# Patient Record
Sex: MALE | Race: ASIAN | NOT HISPANIC OR LATINO | URBAN - METROPOLITAN AREA
[De-identification: names, ages, dates, MRNs, and addresses within clinical notes are randomized per-mention and may not be internally consistent; named-entity substitution may affect disease eponyms.]

---

## 2019-09-01 ENCOUNTER — OUTPATIENT (OUTPATIENT)
Dept: OUTPATIENT SERVICES | Facility: HOSPITAL | Age: 74
LOS: 1 days | End: 2019-09-01
Payer: MEDICAID

## 2019-09-01 PROCEDURE — G9001: CPT

## 2019-09-06 ENCOUNTER — INPATIENT (INPATIENT)
Facility: HOSPITAL | Age: 74
LOS: 2 days | Discharge: HOME HEALTH | End: 2019-09-09
Attending: INTERNAL MEDICINE | Admitting: INTERNAL MEDICINE
Payer: MEDICAID

## 2019-09-06 VITALS
SYSTOLIC BLOOD PRESSURE: 154 MMHG | DIASTOLIC BLOOD PRESSURE: 88 MMHG | OXYGEN SATURATION: 100 % | HEART RATE: 83 BPM | RESPIRATION RATE: 18 BRPM | TEMPERATURE: 100 F

## 2019-09-06 DIAGNOSIS — N45.3 EPIDIDYMO-ORCHITIS: ICD-10-CM

## 2019-09-06 DIAGNOSIS — T83.511D INFECTION AND INFLAMMATORY REACTION DUE TO INDWELLING URETHRAL CATHETER, SUBSEQUENT ENCOUNTER: ICD-10-CM

## 2019-09-06 LAB
ALBUMIN SERPL ELPH-MCNC: 4.2 G/DL — SIGNIFICANT CHANGE UP (ref 3.3–5)
ALP SERPL-CCNC: 87 U/L — SIGNIFICANT CHANGE UP (ref 40–120)
ALT FLD-CCNC: 8 U/L — SIGNIFICANT CHANGE UP (ref 4–41)
ANION GAP SERPL CALC-SCNC: 13 MMO/L — SIGNIFICANT CHANGE UP (ref 7–14)
ANION GAP SERPL CALC-SCNC: 15 MMO/L — HIGH (ref 7–14)
APPEARANCE UR: SIGNIFICANT CHANGE UP
AST SERPL-CCNC: 11 U/L — SIGNIFICANT CHANGE UP (ref 4–40)
BACTERIA # UR AUTO: NEGATIVE — SIGNIFICANT CHANGE UP
BASE EXCESS BLDV CALC-SCNC: 1 MMOL/L — SIGNIFICANT CHANGE UP
BASOPHILS # BLD AUTO: 0.03 K/UL — SIGNIFICANT CHANGE UP (ref 0–0.2)
BASOPHILS NFR BLD AUTO: 0.2 % — SIGNIFICANT CHANGE UP (ref 0–2)
BILIRUB SERPL-MCNC: 0.7 MG/DL — SIGNIFICANT CHANGE UP (ref 0.2–1.2)
BILIRUB UR-MCNC: NEGATIVE — SIGNIFICANT CHANGE UP
BLOOD GAS VENOUS - CREATININE: 0.72 MG/DL — SIGNIFICANT CHANGE UP (ref 0.5–1.3)
BLOOD GAS VENOUS - FIO2: 21 — SIGNIFICANT CHANGE UP
BLOOD UR QL VISUAL: SIGNIFICANT CHANGE UP
BUN SERPL-MCNC: 7 MG/DL — SIGNIFICANT CHANGE UP (ref 7–23)
BUN SERPL-MCNC: 7 MG/DL — SIGNIFICANT CHANGE UP (ref 7–23)
CALCIUM SERPL-MCNC: 8.6 MG/DL — SIGNIFICANT CHANGE UP (ref 8.4–10.5)
CALCIUM SERPL-MCNC: 9.2 MG/DL — SIGNIFICANT CHANGE UP (ref 8.4–10.5)
CHLORIDE BLDV-SCNC: 88 MMOL/L — LOW (ref 96–108)
CHLORIDE SERPL-SCNC: 85 MMOL/L — LOW (ref 98–107)
CHLORIDE SERPL-SCNC: 90 MMOL/L — LOW (ref 98–107)
CK SERPL-CCNC: 104 U/L — SIGNIFICANT CHANGE UP (ref 30–200)
CO2 SERPL-SCNC: 20 MMOL/L — LOW (ref 22–31)
CO2 SERPL-SCNC: 21 MMOL/L — LOW (ref 22–31)
COLOR SPEC: SIGNIFICANT CHANGE UP
CREAT SERPL-MCNC: 0.62 MG/DL — SIGNIFICANT CHANGE UP (ref 0.5–1.3)
CREAT SERPL-MCNC: 0.7 MG/DL — SIGNIFICANT CHANGE UP (ref 0.5–1.3)
EOSINOPHIL # BLD AUTO: 0.07 K/UL — SIGNIFICANT CHANGE UP (ref 0–0.5)
EOSINOPHIL NFR BLD AUTO: 0.5 % — SIGNIFICANT CHANGE UP (ref 0–6)
GAS PNL BLDV: 120 MMOL/L — CRITICAL LOW (ref 136–146)
GLUCOSE BLDV-MCNC: 126 MG/DL — HIGH (ref 70–99)
GLUCOSE SERPL-MCNC: 124 MG/DL — HIGH (ref 70–99)
GLUCOSE SERPL-MCNC: 133 MG/DL — HIGH (ref 70–99)
GLUCOSE UR-MCNC: NEGATIVE — SIGNIFICANT CHANGE UP
HCO3 BLDV-SCNC: 24 MMOL/L — SIGNIFICANT CHANGE UP (ref 20–27)
HCT VFR BLD CALC: 36.1 % — LOW (ref 39–50)
HCT VFR BLDV CALC: 40.4 % — SIGNIFICANT CHANGE UP (ref 39–51)
HGB BLD-MCNC: 12.7 G/DL — LOW (ref 13–17)
HGB BLDV-MCNC: 13.1 G/DL — SIGNIFICANT CHANGE UP (ref 13–17)
HYALINE CASTS # UR AUTO: NEGATIVE — SIGNIFICANT CHANGE UP
IMM GRANULOCYTES NFR BLD AUTO: 0.6 % — SIGNIFICANT CHANGE UP (ref 0–1.5)
KETONES UR-MCNC: NEGATIVE — SIGNIFICANT CHANGE UP
LACTATE BLDV-MCNC: 1.8 MMOL/L — SIGNIFICANT CHANGE UP (ref 0.5–2)
LEUKOCYTE ESTERASE UR-ACNC: SIGNIFICANT CHANGE UP
LYMPHOCYTES # BLD AUTO: 19.5 % — SIGNIFICANT CHANGE UP (ref 13–44)
LYMPHOCYTES # BLD AUTO: 2.93 K/UL — SIGNIFICANT CHANGE UP (ref 1–3.3)
MCHC RBC-ENTMCNC: 25 PG — LOW (ref 27–34)
MCHC RBC-ENTMCNC: 35.2 % — SIGNIFICANT CHANGE UP (ref 32–36)
MCV RBC AUTO: 71.2 FL — LOW (ref 80–100)
MONOCYTES # BLD AUTO: 0.73 K/UL — SIGNIFICANT CHANGE UP (ref 0–0.9)
MONOCYTES NFR BLD AUTO: 4.9 % — SIGNIFICANT CHANGE UP (ref 2–14)
NEUTROPHILS # BLD AUTO: 11.15 K/UL — HIGH (ref 1.8–7.4)
NEUTROPHILS NFR BLD AUTO: 74.3 % — SIGNIFICANT CHANGE UP (ref 43–77)
NITRITE UR-MCNC: POSITIVE — HIGH
NRBC # FLD: 0 K/UL — SIGNIFICANT CHANGE UP (ref 0–0)
OSMOLALITY SERPL: 252 MOSMO/KG — LOW (ref 275–295)
OSMOLALITY UR: 182 MOSMO/KG — SIGNIFICANT CHANGE UP (ref 50–1200)
PCO2 BLDV: 44 MMHG — SIGNIFICANT CHANGE UP (ref 41–51)
PH BLDV: 7.38 PH — SIGNIFICANT CHANGE UP (ref 7.32–7.43)
PH UR: 6 — SIGNIFICANT CHANGE UP (ref 5–8)
PLATELET # BLD AUTO: 454 K/UL — HIGH (ref 150–400)
PMV BLD: 8.6 FL — SIGNIFICANT CHANGE UP (ref 7–13)
PO2 BLDV: 27 MMHG — LOW (ref 35–40)
POTASSIUM BLDV-SCNC: 3.4 MMOL/L — SIGNIFICANT CHANGE UP (ref 3.4–4.5)
POTASSIUM SERPL-MCNC: 3.3 MMOL/L — LOW (ref 3.5–5.3)
POTASSIUM SERPL-MCNC: 3.5 MMOL/L — SIGNIFICANT CHANGE UP (ref 3.5–5.3)
POTASSIUM SERPL-SCNC: 3.3 MMOL/L — LOW (ref 3.5–5.3)
POTASSIUM SERPL-SCNC: 3.5 MMOL/L — SIGNIFICANT CHANGE UP (ref 3.5–5.3)
PROT SERPL-MCNC: 8.7 G/DL — HIGH (ref 6–8.3)
PROT UR-MCNC: 10 — SIGNIFICANT CHANGE UP
RBC # BLD: 5.07 M/UL — SIGNIFICANT CHANGE UP (ref 4.2–5.8)
RBC # FLD: 13.4 % — SIGNIFICANT CHANGE UP (ref 10.3–14.5)
RBC CASTS # UR COMP ASSIST: HIGH (ref 0–?)
SAO2 % BLDV: 44.9 % — LOW (ref 60–85)
SODIUM SERPL-SCNC: 121 MMOL/L — LOW (ref 135–145)
SODIUM SERPL-SCNC: 123 MMOL/L — LOW (ref 135–145)
SODIUM UR-SCNC: < 20 MMOL/L — SIGNIFICANT CHANGE UP
SP GR SPEC: 1.01 — SIGNIFICANT CHANGE UP (ref 1–1.04)
SQUAMOUS # UR AUTO: SIGNIFICANT CHANGE UP
UROBILINOGEN FLD QL: NORMAL — SIGNIFICANT CHANGE UP
WBC # BLD: 15 K/UL — HIGH (ref 3.8–10.5)
WBC # FLD AUTO: 15 K/UL — HIGH (ref 3.8–10.5)
WBC UR QL: >50 — HIGH (ref 0–?)

## 2019-09-06 PROCEDURE — 99223 1ST HOSP IP/OBS HIGH 75: CPT

## 2019-09-06 PROCEDURE — 71045 X-RAY EXAM CHEST 1 VIEW: CPT | Mod: 26

## 2019-09-06 PROCEDURE — 76870 US EXAM SCROTUM: CPT | Mod: 26

## 2019-09-06 RX ORDER — CEFTRIAXONE 500 MG/1
1000 INJECTION, POWDER, FOR SOLUTION INTRAMUSCULAR; INTRAVENOUS ONCE
Refills: 0 | Status: COMPLETED | OUTPATIENT
Start: 2019-09-06 | End: 2019-09-06

## 2019-09-06 RX ORDER — SODIUM CHLORIDE 9 MG/ML
1000 INJECTION INTRAMUSCULAR; INTRAVENOUS; SUBCUTANEOUS ONCE
Refills: 0 | Status: COMPLETED | OUTPATIENT
Start: 2019-09-06 | End: 2019-09-06

## 2019-09-06 RX ORDER — ACETAMINOPHEN 500 MG
975 TABLET ORAL ONCE
Refills: 0 | Status: COMPLETED | OUTPATIENT
Start: 2019-09-06 | End: 2019-09-06

## 2019-09-06 RX ADMIN — Medication 975 MILLIGRAM(S): at 16:45

## 2019-09-06 RX ADMIN — SODIUM CHLORIDE 1000 MILLILITER(S): 9 INJECTION INTRAMUSCULAR; INTRAVENOUS; SUBCUTANEOUS at 18:50

## 2019-09-06 RX ADMIN — CEFTRIAXONE 100 MILLIGRAM(S): 500 INJECTION, POWDER, FOR SOLUTION INTRAMUSCULAR; INTRAVENOUS at 17:00

## 2019-09-06 NOTE — ED ADULT NURSE NOTE - OBJECTIVE STATEMENT
Pt received in #11, aaox3 with c/o fevers and left testicular pain x 3 days. Pt states that he has been taking Tylenol with moderate relief of symptoms. Pt noted to have a chronic indwelling pichardo catheter, which he states was changed within the past 2 weeks. Denies any decreased output, change to quality of urine or any hematuria. Pt received in #11, aaox3 with c/o fevers and right testicular pain x 3 days. Pt states that he has been taking Tylenol with moderate relief of symptoms. Pt noted to have a chronic indwelling pichardo catheter, which he states was changed within the past 2 weeks. Denies any decreased output, change to quality of urine or any hematuria.

## 2019-09-06 NOTE — CONSULT NOTE ADULT - ATTENDING COMMENTS
outpatient f/u for counseling regarding possible SPT placement given this episode of epididymitis and chronic need for catheter

## 2019-09-06 NOTE — ED PROVIDER NOTE - PROGRESS NOTE DETAILS
DH: Patient received in sign out. Pending urology cs and dispo urology vs medicine. Patient seen and evaluated. NAD, son at bedside. Will continue to monitor. DH: Discussed patient with hospitalist for admission, who accepted the patient. Repeat Na from 121 to 123. Will obtain EKG. Patient's daughter at bedside. Discussed plan for admission and family acknowledged. Patient in NAD, VSS, alert to baseline.

## 2019-09-06 NOTE — CONSULT NOTE ADULT - ASSESSMENT
74 year old male with a medical history of Parkinson's, Dementia, and BPH with a chronic indwelling pichardo for retention, [place] of [place], presenting to the ED with complaints of weakness, lethargy and fevers for the past 3 days, similar presentation 7/25/19, s/p 7 day course of Cefuroxime, new right sided testicular pain, with findings of  epididymo-orchitis, and recurrent UTI.

## 2019-09-06 NOTE — CONSULT NOTE ADULT - PROBLEM SELECTOR RECOMMENDATION 9
-No acute urologic intervention at this time.  -Continue antibiotics  -Patient may follow-up with his outpatient urologist, or at the [facility] with Dr. Jain, please call [phone] for an appointment.

## 2019-09-06 NOTE — CONSULT NOTE ADULT - SUBJECTIVE AND OBJECTIVE BOX
HPI:  This is a 74 year old male with a medical history of Parkinson's, Dementia, and BPH with a chronic indwelling pichardo for retention, [place] , presenting to the ED with complaints of weakness, lethargy and fevers for the past 3 days.  Patient presented to the ED  with similar presentation, was admitted and discharged the following day.  Pichardo was exchanged at that time, he completed a 7 day course of Cefuroxime, urine culture at that time was polymicrobial and considered a contaminant.  Per family he is active at baseline and able to perform all ADLs.  Over past 3 days he has become more weak and lethargic and not able to perform ADLs.  He has been complaining of right sided testicular pain as well for 3 days.  He has had low grade fevers to 100 at home.  Per grandson, the pichardo was changed by an outpatient urologist 2 weeks ago (does not know Urologist's name).  Pichardo was initially placed 1 year ago in [facility] for retention related to BPH, and has been changed monthly.  Denies hematuria, denies retention.      PAST MEDICAL & SURGICAL HISTORY:  Parkinson's  Dementia  BPH, chronic indwelling pichardo    MEDICATIONS:  Flomax 0.4mg po QHS  Baclofen 10mg 1/2 tab po BID  Biotin forte QD  Carbidopa-Levodopa 25-100mg 1 tab po 2pm, 1.5 tab po 8pm  Trihexyphenidyl 2mg po BID  Clonazepam 0.5mg po QHS  Donepezil 5mg 1/2 tab po QHS  Memantine 5mg 1/2tab po QHS    Allergies  No Known Allergies    SOCIAL HISTORY:  Originally from [facility], living in  as of   Lives with grandson  Denies smoking, alcohol or drug use    REVIEW OF SYSTEMS: Otherwise negative as stated in HPI    Vital Signs Last 24 Hrs  T(C): 37.2 (06 Sep 2019 18:55), Max: 37.9 (06 Sep 2019 15:32)  T(F): 99 (06 Sep 2019 18:55), Max: 100.3 (06 Sep 2019 15:32)  HR: 70 (06 Sep 2019 18:55) (70 - 88)  BP: 134/72 (06 Sep 2019 18:55) (134/72 - 154/88)  BP(mean): --  RR: 16 (06 Sep 2019 18:55) (16 - 18)  SpO2: 100% (06 Sep 2019 18:55) (100% - 100%)    PHYSICAL EXAM:    General: ill appearing, somnolent    Respiratory: clear  	  Cardiovascular: S1,S2 RRR    Gastrointestinal: soft, nontender, no CVAT     Genitourinary: Glans not circumcised, No lesions present. pichardo in place, draining well.   Testes  descended, Right testicle with mild tenderness, edematous and firm.                           Extremities:  no edema.   	  LABS:                        12.7   15.00 )-----------( 454      ( 06 Sep 2019 16:35 )             36.1         121<L>  |  85<L>  |  7   ----------------------------<  124<H>  3.5   |  21<L>  |  0.70    Ca    9.2      06 Sep 2019 16:35    TPro  8.7<H>  /  Alb  4.2  /  TBili  0.7  /  DBili  x   /  AST  11  /  ALT  8   /  AlkPhos  87      Urinalysis Basic - ( 06 Sep 2019 16:35 )    Color: LIGHT YELLOW / Appearance: Lt TURBID / S.007 / pH: 6.0  Gluc: NEGATIVE / Ketone: NEGATIVE  / Bili: NEGATIVE / Urobili: NORMAL   Blood: SMALL / Protein: 10 / Nitrite: POSITIVE   Leuk Esterase: LARGE / RBC: 6-10 / WBC >50   Sq Epi: OCC / Non Sq Epi: x / Bacteria: NEGATIVE    RADIOLOGY:  < from: US Testicles (19 @ 17:32) >  IMPRESSION:     Diffuse hyperemia involving the right testes, epididymis and partially   imaged spermatic cord with a large complex hydrocele. Findings are   consistent with right sided epididymoorchitis.    < end of copied text >

## 2019-09-06 NOTE — ED ADULT NURSE REASSESSMENT NOTE - CONDITION
rec'd pt in 10 b with family at bedside. pt appears comnfortable. only speaks salvador... aw per son, pt denies pain or any distress.  report given to inna. regulored to floor

## 2019-09-06 NOTE — ED PROVIDER NOTE - CARE PLAN
Principal Discharge DX:	Epididymo-orchitis  Secondary Diagnosis:	Urinary tract infection associated with indwelling urethral catheter, subsequent encounter

## 2019-09-06 NOTE — ED PROVIDER NOTE - PHYSICAL EXAMINATION
GENERAL: Patient awake alert NAD.  LUNGS: CTAB, no wheezes or crackles.   CARDIAC: RRR, no m/r/g.    ABDOMEN: Soft, NT, ND, No rebound, guarding. No CVA tenderness.   :  Testicular exam reveals right testicular tenderness, no discharge. Armando Barnard RN  EXT: No edema. No calf tenderness. CV 2+DP/PT bilaterally.   MSK: No pain with movement, no deformities.  NEURO: A&Ox3. Moving all extremities.  SKIN: Warm and dry. No rash.  PSYCH: Normal affect.

## 2019-09-06 NOTE — ED PROVIDER NOTE - ATTENDING CONTRIBUTION TO CARE
Patient is a 75 yo M with history of Parkinson's disease, BPH with a chronic indwelling pichardo here for 3 days of weakness and fever. History taken from patient's sons who are at his bedside who translated at bedside. Patient has not been able to get up out of bed secondary to weakness. He also developed pain to his right testicle. No nausea, vomiting. Pichardo was last replaced 2 weeks ago. Denies cough, shortness of breath, chest pain, abdominal pain.    VS noted  Gen. no acute distress, Non toxic   HEENT: EOMI, mmm  Lungs: CTAB/L no C/ W /R   CVS: RRR   Abd; Soft non tender, non distended   : normal external genitalia, ttp to right testicle, no erythema or crepitus, pichardo in place  Ext: no edema  Skin: no rash  Neuro AAOx3 non focal clear speech  a/p: fever, testicular pain - concern for epididymitis, UTI. Plan for antibiotics, labs, u/a, testicular US and CXR. Given 3 days of fever and weakness, unable to get out of bed - plan for admission.   - Esha COTE

## 2019-09-06 NOTE — ED PROVIDER NOTE - NS ED ROS FT
General: +fever, chills; denies weight loss/weight gain.  HENT: denies nasal congestion, sore throat, rhinorrhea, ear pain  Eyes: denies visual changes, blurred vision, eye discharge, eye redness  Neck: denies neck pain, neck swelling  CV: denies chest pain, palpitations  Resp: denies difficulty breathing, cough  Abdominal: denies nausea, vomiting, diarrhea, abdominal pain, blood in stool, dark stool  MSK: denies muscle aches, bony pain, leg pain, leg swelling  Neuro: denies headaches, numbness, tingling, dizziness, lightheadedness.  Skin: denies rashes, cuts, bruises  Hematologic: denies unexplained bruises

## 2019-09-06 NOTE — ED PROVIDER NOTE - OBJECTIVE STATEMENT
Pt. is a 74 y.o. M w/ PMHx of Parkinson's, BPH, chronic indwelling pichardo for the last 6 months p/w subjective fevers and weakness.  Pt. doesn't speak english and translation was done by son and grandson, who are at bedside.  Pt. has been feeling feverish for the last 3 days and has been weak more than usual.  Per son, pt. normally walks around and does his own ADLs, including emptying out the pichardo bag.  But over the last 3 days, he hasn't been able to empty his bag and sometimes even finds it difficult to get out of bed.  Highest recorded temperature at home was 100.  Pt. has been taking 375 of tylenol for the last 3 days w/o much relief.  The pichardo gets replaced every month by his urologist and last change was 2 weeks ago.  Pt. denies any new headache, CP, SOB, n/v/d, abdominal tenderness, dysuria, hematuria, hematochezia, or melena.

## 2019-09-06 NOTE — ED PROVIDER NOTE - CLINICAL SUMMARY MEDICAL DECISION MAKING FREE TEXT BOX
Pt. is a 74 y.o. M w/ PMHx of Parkinson's, BPH, chronic indwelling pichardo p/w fevers and weakness for 3 days.  Considering that PE revealed right testicular tenderness, highest suspicion for epididymitis as source of infection.  However, will obtain cbc, cmp, blood cultures, u/a, and cxr to r/o other sources.  Will likely admit considering the lethargy even if no source is identified. Pt. is a 74 y.o. M w/ PMHx of Parkinson's, BPH, chronic indwelling pichardo p/w fevers and weakness for 3 days.  Considering that PE revealed right testicular tenderness, highest suspicion for epididymitis as source of infection.  However, will obtain cbc, cmp, blood cultures, u/a, and cxr to r/o other sources.  Will likely admit considering the fever and lethargy.

## 2019-09-07 ENCOUNTER — TRANSCRIPTION ENCOUNTER (OUTPATIENT)
Age: 74
End: 2019-09-07

## 2019-09-07 DIAGNOSIS — N40.0 BENIGN PROSTATIC HYPERPLASIA WITHOUT LOWER URINARY TRACT SYMPTOMS: ICD-10-CM

## 2019-09-07 DIAGNOSIS — Z29.9 ENCOUNTER FOR PROPHYLACTIC MEASURES, UNSPECIFIED: ICD-10-CM

## 2019-09-07 DIAGNOSIS — D50.9 IRON DEFICIENCY ANEMIA, UNSPECIFIED: ICD-10-CM

## 2019-09-07 DIAGNOSIS — E87.1 HYPO-OSMOLALITY AND HYPONATREMIA: ICD-10-CM

## 2019-09-07 DIAGNOSIS — F03.90 UNSPECIFIED DEMENTIA WITHOUT BEHAVIORAL DISTURBANCE: ICD-10-CM

## 2019-09-07 DIAGNOSIS — G20 PARKINSON'S DISEASE: ICD-10-CM

## 2019-09-07 LAB
ANION GAP SERPL CALC-SCNC: 10 MMO/L — SIGNIFICANT CHANGE UP (ref 7–14)
ANION GAP SERPL CALC-SCNC: 11 MMO/L — SIGNIFICANT CHANGE UP (ref 7–14)
ANION GAP SERPL CALC-SCNC: 13 MMO/L — SIGNIFICANT CHANGE UP (ref 7–14)
BASOPHILS # BLD AUTO: 0.03 K/UL — SIGNIFICANT CHANGE UP (ref 0–0.2)
BASOPHILS NFR BLD AUTO: 0.2 % — SIGNIFICANT CHANGE UP (ref 0–2)
BUN SERPL-MCNC: 6 MG/DL — LOW (ref 7–23)
BUN SERPL-MCNC: 7 MG/DL — SIGNIFICANT CHANGE UP (ref 7–23)
BUN SERPL-MCNC: 7 MG/DL — SIGNIFICANT CHANGE UP (ref 7–23)
CALCIUM SERPL-MCNC: 8.3 MG/DL — LOW (ref 8.4–10.5)
CALCIUM SERPL-MCNC: 8.7 MG/DL — SIGNIFICANT CHANGE UP (ref 8.4–10.5)
CALCIUM SERPL-MCNC: 8.9 MG/DL — SIGNIFICANT CHANGE UP (ref 8.4–10.5)
CHLORIDE SERPL-SCNC: 90 MMOL/L — LOW (ref 98–107)
CHLORIDE SERPL-SCNC: 91 MMOL/L — LOW (ref 98–107)
CHLORIDE SERPL-SCNC: 93 MMOL/L — LOW (ref 98–107)
CO2 SERPL-SCNC: 20 MMOL/L — LOW (ref 22–31)
CO2 SERPL-SCNC: 21 MMOL/L — LOW (ref 22–31)
CO2 SERPL-SCNC: 21 MMOL/L — LOW (ref 22–31)
CREAT SERPL-MCNC: 0.58 MG/DL — SIGNIFICANT CHANGE UP (ref 0.5–1.3)
CREAT SERPL-MCNC: 0.63 MG/DL — SIGNIFICANT CHANGE UP (ref 0.5–1.3)
CREAT SERPL-MCNC: 0.63 MG/DL — SIGNIFICANT CHANGE UP (ref 0.5–1.3)
EOSINOPHIL # BLD AUTO: 0.04 K/UL — SIGNIFICANT CHANGE UP (ref 0–0.5)
EOSINOPHIL NFR BLD AUTO: 0.3 % — SIGNIFICANT CHANGE UP (ref 0–6)
GLUCOSE SERPL-MCNC: 195 MG/DL — HIGH (ref 70–99)
GLUCOSE SERPL-MCNC: 214 MG/DL — HIGH (ref 70–99)
GLUCOSE SERPL-MCNC: 237 MG/DL — HIGH (ref 70–99)
HCT VFR BLD CALC: 33.7 % — LOW (ref 39–50)
HGB BLD-MCNC: 11.6 G/DL — LOW (ref 13–17)
IMM GRANULOCYTES NFR BLD AUTO: 0.4 % — SIGNIFICANT CHANGE UP (ref 0–1.5)
LYMPHOCYTES # BLD AUTO: 1.74 K/UL — SIGNIFICANT CHANGE UP (ref 1–3.3)
LYMPHOCYTES # BLD AUTO: 12.9 % — LOW (ref 13–44)
MAGNESIUM SERPL-MCNC: 1.8 MG/DL — SIGNIFICANT CHANGE UP (ref 1.6–2.6)
MAGNESIUM SERPL-MCNC: 2 MG/DL — SIGNIFICANT CHANGE UP (ref 1.6–2.6)
MAGNESIUM SERPL-MCNC: 2.1 MG/DL — SIGNIFICANT CHANGE UP (ref 1.6–2.6)
MCHC RBC-ENTMCNC: 24.7 PG — LOW (ref 27–34)
MCHC RBC-ENTMCNC: 34.4 % — SIGNIFICANT CHANGE UP (ref 32–36)
MCV RBC AUTO: 71.9 FL — LOW (ref 80–100)
MONOCYTES # BLD AUTO: 0.7 K/UL — SIGNIFICANT CHANGE UP (ref 0–0.9)
MONOCYTES NFR BLD AUTO: 5.2 % — SIGNIFICANT CHANGE UP (ref 2–14)
NEUTROPHILS # BLD AUTO: 10.97 K/UL — HIGH (ref 1.8–7.4)
NEUTROPHILS NFR BLD AUTO: 81 % — HIGH (ref 43–77)
NRBC # FLD: 0 K/UL — SIGNIFICANT CHANGE UP (ref 0–0)
PHOSPHATE SERPL-MCNC: 2.3 MG/DL — LOW (ref 2.5–4.5)
PHOSPHATE SERPL-MCNC: 2.4 MG/DL — LOW (ref 2.5–4.5)
PHOSPHATE SERPL-MCNC: 2.6 MG/DL — SIGNIFICANT CHANGE UP (ref 2.5–4.5)
PLATELET # BLD AUTO: 406 K/UL — HIGH (ref 150–400)
PMV BLD: 8.7 FL — SIGNIFICANT CHANGE UP (ref 7–13)
POTASSIUM SERPL-MCNC: 3.6 MMOL/L — SIGNIFICANT CHANGE UP (ref 3.5–5.3)
POTASSIUM SERPL-MCNC: 4 MMOL/L — SIGNIFICANT CHANGE UP (ref 3.5–5.3)
POTASSIUM SERPL-MCNC: 4.5 MMOL/L — SIGNIFICANT CHANGE UP (ref 3.5–5.3)
POTASSIUM SERPL-SCNC: 3.6 MMOL/L — SIGNIFICANT CHANGE UP (ref 3.5–5.3)
POTASSIUM SERPL-SCNC: 4 MMOL/L — SIGNIFICANT CHANGE UP (ref 3.5–5.3)
POTASSIUM SERPL-SCNC: 4.5 MMOL/L — SIGNIFICANT CHANGE UP (ref 3.5–5.3)
RBC # BLD: 4.69 M/UL — SIGNIFICANT CHANGE UP (ref 4.2–5.8)
RBC # FLD: 13.4 % — SIGNIFICANT CHANGE UP (ref 10.3–14.5)
SODIUM SERPL-SCNC: 121 MMOL/L — LOW (ref 135–145)
SODIUM SERPL-SCNC: 124 MMOL/L — LOW (ref 135–145)
SODIUM SERPL-SCNC: 125 MMOL/L — LOW (ref 135–145)
SPECIMEN SOURCE: SIGNIFICANT CHANGE UP
WBC # BLD: 13.53 K/UL — HIGH (ref 3.8–10.5)
WBC # FLD AUTO: 13.53 K/UL — HIGH (ref 3.8–10.5)

## 2019-09-07 PROCEDURE — 99222 1ST HOSP IP/OBS MODERATE 55: CPT

## 2019-09-07 PROCEDURE — 12345: CPT | Mod: NC,GC

## 2019-09-07 RX ORDER — MEMANTINE HYDROCHLORIDE 10 MG/1
2.5 TABLET ORAL DAILY
Refills: 0 | Status: DISCONTINUED | OUTPATIENT
Start: 2019-09-07 | End: 2019-09-09

## 2019-09-07 RX ORDER — DONEPEZIL HYDROCHLORIDE 10 MG/1
5 TABLET, FILM COATED ORAL AT BEDTIME
Refills: 0 | Status: DISCONTINUED | OUTPATIENT
Start: 2019-09-07 | End: 2019-09-07

## 2019-09-07 RX ORDER — POTASSIUM CHLORIDE 20 MEQ
40 PACKET (EA) ORAL EVERY 4 HOURS
Refills: 0 | Status: DISCONTINUED | OUTPATIENT
Start: 2019-09-07 | End: 2019-09-07

## 2019-09-07 RX ORDER — CARBIDOPA AND LEVODOPA 25; 100 MG/1; MG/1
1.5 TABLET ORAL
Refills: 0 | Status: DISCONTINUED | OUTPATIENT
Start: 2019-09-07 | End: 2019-09-09

## 2019-09-07 RX ORDER — SODIUM,POTASSIUM PHOSPHATES 278-250MG
1 POWDER IN PACKET (EA) ORAL ONCE
Refills: 0 | Status: COMPLETED | OUTPATIENT
Start: 2019-09-07 | End: 2019-09-07

## 2019-09-07 RX ORDER — TAMSULOSIN HYDROCHLORIDE 0.4 MG/1
0.4 CAPSULE ORAL AT BEDTIME
Refills: 0 | Status: DISCONTINUED | OUTPATIENT
Start: 2019-09-07 | End: 2019-09-09

## 2019-09-07 RX ORDER — IBUPROFEN 200 MG
600 TABLET ORAL ONCE
Refills: 0 | Status: COMPLETED | OUTPATIENT
Start: 2019-09-07 | End: 2019-09-07

## 2019-09-07 RX ORDER — SODIUM CHLORIDE 9 MG/ML
1000 INJECTION INTRAMUSCULAR; INTRAVENOUS; SUBCUTANEOUS
Refills: 0 | Status: DISCONTINUED | OUTPATIENT
Start: 2019-09-07 | End: 2019-09-07

## 2019-09-07 RX ORDER — PIPERACILLIN AND TAZOBACTAM 4; .5 G/20ML; G/20ML
3.38 INJECTION, POWDER, LYOPHILIZED, FOR SOLUTION INTRAVENOUS EVERY 8 HOURS
Refills: 0 | Status: DISCONTINUED | OUTPATIENT
Start: 2019-09-07 | End: 2019-09-09

## 2019-09-07 RX ORDER — FERROUS SULFATE 325(65) MG
325 TABLET ORAL DAILY
Refills: 0 | Status: DISCONTINUED | OUTPATIENT
Start: 2019-09-07 | End: 2019-09-09

## 2019-09-07 RX ORDER — PIPERACILLIN AND TAZOBACTAM 4; .5 G/20ML; G/20ML
3.38 INJECTION, POWDER, LYOPHILIZED, FOR SOLUTION INTRAVENOUS ONCE
Refills: 0 | Status: COMPLETED | OUTPATIENT
Start: 2019-09-07 | End: 2019-09-07

## 2019-09-07 RX ORDER — ACETAMINOPHEN 500 MG
650 TABLET ORAL EVERY 4 HOURS
Refills: 0 | Status: DISCONTINUED | OUTPATIENT
Start: 2019-09-07 | End: 2019-09-09

## 2019-09-07 RX ORDER — INFLUENZA VIRUS VACCINE 15; 15; 15; 15 UG/.5ML; UG/.5ML; UG/.5ML; UG/.5ML
0.5 SUSPENSION INTRAMUSCULAR ONCE
Refills: 0 | Status: COMPLETED | OUTPATIENT
Start: 2019-09-07 | End: 2019-09-07

## 2019-09-07 RX ORDER — BACLOFEN 100 %
10 POWDER (GRAM) MISCELLANEOUS
Refills: 0 | Status: DISCONTINUED | OUTPATIENT
Start: 2019-09-07 | End: 2019-09-09

## 2019-09-07 RX ORDER — POTASSIUM CHLORIDE 20 MEQ
40 PACKET (EA) ORAL ONCE
Refills: 0 | Status: COMPLETED | OUTPATIENT
Start: 2019-09-07 | End: 2019-09-07

## 2019-09-07 RX ORDER — CLONAZEPAM 1 MG
1 TABLET ORAL
Qty: 0 | Refills: 0 | DISCHARGE

## 2019-09-07 RX ORDER — CARBIDOPA AND LEVODOPA 25; 100 MG/1; MG/1
1 TABLET ORAL
Refills: 0 | Status: DISCONTINUED | OUTPATIENT
Start: 2019-09-07 | End: 2019-09-09

## 2019-09-07 RX ORDER — BACLOFEN 100 %
0.5 POWDER (GRAM) MISCELLANEOUS
Qty: 0 | Refills: 0 | DISCHARGE

## 2019-09-07 RX ORDER — ENOXAPARIN SODIUM 100 MG/ML
40 INJECTION SUBCUTANEOUS DAILY
Refills: 0 | Status: DISCONTINUED | OUTPATIENT
Start: 2019-09-07 | End: 2019-09-09

## 2019-09-07 RX ORDER — SODIUM CHLORIDE 9 MG/ML
1000 INJECTION INTRAMUSCULAR; INTRAVENOUS; SUBCUTANEOUS
Refills: 0 | Status: DISCONTINUED | OUTPATIENT
Start: 2019-09-07 | End: 2019-09-08

## 2019-09-07 RX ORDER — ACETAMINOPHEN 500 MG
1000 TABLET ORAL ONCE
Refills: 0 | Status: COMPLETED | OUTPATIENT
Start: 2019-09-07 | End: 2019-09-07

## 2019-09-07 RX ORDER — TRIHEXYPHENIDYL HCL 2 MG
2 TABLET ORAL
Refills: 0 | Status: DISCONTINUED | OUTPATIENT
Start: 2019-09-07 | End: 2019-09-09

## 2019-09-07 RX ADMIN — Medication 10 MILLIGRAM(S): at 05:52

## 2019-09-07 RX ADMIN — Medication 1 TABLET(S): at 11:13

## 2019-09-07 RX ADMIN — SODIUM CHLORIDE 75 MILLILITER(S): 9 INJECTION INTRAMUSCULAR; INTRAVENOUS; SUBCUTANEOUS at 11:13

## 2019-09-07 RX ADMIN — CARBIDOPA AND LEVODOPA 1 TABLET(S): 25; 100 TABLET ORAL at 13:22

## 2019-09-07 RX ADMIN — ENOXAPARIN SODIUM 40 MILLIGRAM(S): 100 INJECTION SUBCUTANEOUS at 11:13

## 2019-09-07 RX ADMIN — Medication 600 MILLIGRAM(S): at 03:30

## 2019-09-07 RX ADMIN — Medication 40 MILLIEQUIVALENT(S): at 02:50

## 2019-09-07 RX ADMIN — Medication 325 MILLIGRAM(S): at 11:13

## 2019-09-07 RX ADMIN — Medication 2 MILLIGRAM(S): at 05:52

## 2019-09-07 RX ADMIN — Medication 1000 MILLIGRAM(S): at 01:12

## 2019-09-07 RX ADMIN — Medication 2 MILLIGRAM(S): at 17:16

## 2019-09-07 RX ADMIN — MEMANTINE HYDROCHLORIDE 2.5 MILLIGRAM(S): 10 TABLET ORAL at 17:15

## 2019-09-07 RX ADMIN — CARBIDOPA AND LEVODOPA 1.5 TABLET(S): 25; 100 TABLET ORAL at 19:19

## 2019-09-07 RX ADMIN — Medication 600 MILLIGRAM(S): at 02:30

## 2019-09-07 RX ADMIN — Medication 10 MILLIGRAM(S): at 17:15

## 2019-09-07 RX ADMIN — PIPERACILLIN AND TAZOBACTAM 25 GRAM(S): 4; .5 INJECTION, POWDER, LYOPHILIZED, FOR SOLUTION INTRAVENOUS at 10:06

## 2019-09-07 RX ADMIN — PIPERACILLIN AND TAZOBACTAM 200 GRAM(S): 4; .5 INJECTION, POWDER, LYOPHILIZED, FOR SOLUTION INTRAVENOUS at 02:50

## 2019-09-07 RX ADMIN — Medication 400 MILLIGRAM(S): at 00:48

## 2019-09-07 RX ADMIN — Medication 1 PACKET(S): at 10:06

## 2019-09-07 RX ADMIN — PIPERACILLIN AND TAZOBACTAM 25 GRAM(S): 4; .5 INJECTION, POWDER, LYOPHILIZED, FOR SOLUTION INTRAVENOUS at 17:21

## 2019-09-07 NOTE — PROGRESS NOTE ADULT - PROBLEM SELECTOR PLAN 7
Patient with history of dementia  - Continue home memantine, donepezil Patient with history of dementia  - Continue home memantine  - Holding home donepezil in setting of hyponatremia

## 2019-09-07 NOTE — DISCHARGE NOTE PROVIDER - CARE PROVIDER_API CALL
Venancio Henriquez  [address] [id number]  Phone: [phone]  Fax: (   )    -  Follow Up Time:

## 2019-09-07 NOTE — DISCHARGE NOTE PROVIDER - HOSPITAL COURSE
The patient is a 75yo M with PMH Parkinson disease, BPH with chronic indwelling pichardo catheter, dementia, who presented with 3 days of subjective fevers, lethargy and right groin pain. Patient was hospitalized in July 2019 with similar symptoms, was found to have polymicrobial UTI, treated with cefuroxime. In the ED, patient was found to have leukocytosis with positive UA, given 1 dose ceftriaxone, then switched to zosyn. Right testicular US showed right-sided epididymo-orchitis. UCx subsequently grew serratia marcescens. Zosyn was continued until sensitivities indicated the bacteria was susceptible to _____. Patient completed _____ days of antibiotics, will complete _____ days as an outpatient to finish the course. Patient's symptoms improved over the course of hospitalization. He is currently medically stable for discharge _____ with close outpatient follow up in the PCP's office. The patient is a 75yo M with PMH Parkinson disease, BPH with chronic indwelling pichardo catheter, dementia, who presented with 3 days of subjective fevers, lethargy and right groin pain. Patient was hospitalized in July 2019 with similar symptoms, was found to have polymicrobial UTI, treated with cefuroxime. In the ED, patient was found to have leukocytosis with positive UA, given 1 dose ceftriaxone, then switched to zosyn. Right testicular US showed right-sided epididymo-orchitis. UCx subsequently grew serratia marcescens. Zosyn was continued until sensitivities indicated the bacteria was susceptible to levofloxacin. Patient completed 4 days of antibiotics, will complete 10 days as an outpatient to finish the course. Patient's symptoms improved over the course of hospitalization. He is currently medically stable for discharge home with close outpatient follow up in the PCP's office.

## 2019-09-07 NOTE — H&P ADULT - PROBLEM SELECTOR PLAN 1
Based on US of right testes, symptoms, and history/physical most likely etiology of fever and right testicular pain is epididymo-orchitis. No recent trauma to suggest torsion. Given age and history of obstructive uropathy, most common bugs are PA and E. Coli.   - s/p 1 g of ceftriaxone. Can cover with Levaquin 500mg PO qD   - encourage testicle elevation with warm compress  - NSAIDS for pain control as Tylenol has not helped  - Urology c/s appreciated, no acute surgical intervention. Based on US of right testes, symptoms, and history/physical most likely etiology of fever and right testicular pain is epididymo-orchitis. No recent trauma to suggest torsion. Given age and history of obstructive uropathy, most common bugs are PA and E. Coli.   - s/p 1 g of ceftriaxone. Can treat with zosyn for now and transition to PO later.  - encourage testicle elevation with warm compress  - NSAIDS for pain control as Tylenol has not helped  - Urology c/s appreciated, no acute surgical intervention. Based on US of right testes, symptoms, and history/physical most likely etiology of fever and right testicular pain is epididymo-orchitis. No recent trauma to suggest torsion. Given age and history of obstructive uropathy, most common bugs are PA and E. Coli.   - s/p 1 g of ceftriaxone. Can treat with zosyn for now pending cx results and transition to PO later.  - encourage testicle elevation with warm compress  - F/u blood cxs and urine cx  - Pain control with Tylenol PRN, will avoid NSAIDs given pt at risk for TUSHAR  - Urology c/s appreciated, no acute surgical intervention.

## 2019-09-07 NOTE — PROGRESS NOTE ADULT - SUBJECTIVE AND OBJECTIVE BOX
ADALBERTO Jain, PGY 1  Pager [phone]    Patient is a 74y old  Male who presents with a chief complaint of Fevers, weakness, right testicle pain (07 Sep 2019 01:21)    SUBJECTIVE / OVERNIGHT EVENTS: No acute overnight events. Patient seen and examined at bedside this morning, appears to be resting comfortably, in NAD. Patient refused  services, translation provided by patient's son. Patient reports he feels better than he did yesterday, has mild pain in the right groin. Denies chest pain, SOB, abdominal pain, nausea/vomiting/diarrhea.     MEDICATIONS  (STANDING):  baclofen 10 milliGRAM(s) Oral two times a day  carbidopa/levodopa  25/100 1 Tablet(s) Oral <User Schedule>  carbidopa/levodopa  25/100 1.5 Tablet(s) Oral <User Schedule>  donepezil 5 milliGRAM(s) Oral at bedtime  enoxaparin Injectable 40 milliGRAM(s) SubCutaneous daily  ferrous    sulfate 325 milliGRAM(s) Oral daily  influenza   Vaccine 0.5 milliLiter(s) IntraMuscular once  memantine 2.5 milliGRAM(s) Oral daily  multivitamin 1 Tablet(s) Oral daily  piperacillin/tazobactam IVPB.. 3.375 Gram(s) IV Intermittent every 8 hours  tamsulosin 0.4 milliGRAM(s) Oral at bedtime  trihexyphenidyl 2 milliGRAM(s) Oral two times a day    MEDICATIONS  (PRN):  acetaminophen   Tablet .. 650 milliGRAM(s) Oral every 4 hours PRN Mild Pain (1 - 3)    VITAL SIGNS:  T(C): 36.4 (19 @ 05:47), Max: 37.9 (19 @ 15:32)  HR: 73 (19 @ 05:47) (70 - 88)  BP: 130/82 (19 @ 05:47) (130/82 - 154/88)  RR: 16 (19 @ 05:47) (16 - 18)  SpO2: 100% (19 @ 05:47) (100% - 100%)    PHYSICAL EXAM    GENERAL: NAD  	HEAD:  Atraumatic, Normocephalic  	EYES: EOMI, PERRLA, conjunctiva and sclera clear  	CHEST/LUNG: Clear to auscultation bilaterally; No wheeze/rhonchi/rales   	HEART: Regular rate and rhythm; normal S1 S2,  No murmurs, rubs, or gallops  	ABDOMEN: Soft, Nontender, Nondistended; Bowel sounds normal  	: Right testicular swelling, TTP  	EXTREMITIES:  2+ Peripheral Pulses, No clubbing, cyanosis, or edema  	PSYCH: AAOx3, affect and behavior appropriate for setting  	NEUROLOGY: non-focal  SKIN: No rashes or lesions    LABS:                        11.6   13.53 )-----------( 406      ( 07 Sep 2019 03:15 )             33.7     -    124<L>  |  90<L>  |  7   ----------------------------<  237<H>  3.6   |  21<L>  |  0.58    Ca    8.3<L>      07 Sep 2019 03:15  Phos  2.4     -  Mg     2.0     -    TPro  8.7<H>  /  Alb  4.2  /  TBili  0.7  /  DBili  x   /  AST  11  /  ALT  8   /  AlkPhos  87  09-06      CARDIAC MARKERS ( 06 Sep 2019 18:00 )  x     / x     / 104 u/L / x     / x        Urinalysis Basic - ( 06 Sep 2019 16:35 )    Color: LIGHT YELLOW / Appearance: Lt TURBID / S.007 / pH: 6.0  Gluc: NEGATIVE / Ketone: NEGATIVE  / Bili: NEGATIVE / Urobili: NORMAL   Blood: SMALL / Protein: 10 / Nitrite: POSITIVE   Leuk Esterase: LARGE / RBC: 6-10 / WBC >50   Sq Epi: OCC / Non Sq Epi: x / Bacteria: NEGATIVE    I&O's Summary    06 Sep 2019 07:01  -  07 Sep 2019 07:00  --------------------------------------------------------  IN: 0 mL / OUT: 1500 mL / NET: -1500 mL ADALBERTO Jain, PGY 1  Pager [phone]    Patient is a 74y old  Male who presents with a chief complaint of Fevers, weakness, right testicle pain (07 Sep 2019 01:21)    SUBJECTIVE / OVERNIGHT EVENTS: No acute overnight events. Patient seen and examined at bedside this morning, appears to be resting comfortably, in NAD. Patient refused  services, translation provided by patient's son. Patient reports he feels better than he did yesterday, has mild pain in the right groin. Denies chest pain, SOB, abdominal pain, nausea/vomiting/diarrhea.     MEDICATIONS  (STANDING):  baclofen 10 milliGRAM(s) Oral two times a day  carbidopa/levodopa  25/100 1 Tablet(s) Oral <User Schedule>  carbidopa/levodopa  25/100 1.5 Tablet(s) Oral <User Schedule>  donepezil 5 milliGRAM(s) Oral at bedtime  enoxaparin Injectable 40 milliGRAM(s) SubCutaneous daily  ferrous    sulfate 325 milliGRAM(s) Oral daily  influenza   Vaccine 0.5 milliLiter(s) IntraMuscular once  memantine 2.5 milliGRAM(s) Oral daily  multivitamin 1 Tablet(s) Oral daily  piperacillin/tazobactam IVPB.. 3.375 Gram(s) IV Intermittent every 8 hours  tamsulosin 0.4 milliGRAM(s) Oral at bedtime  trihexyphenidyl 2 milliGRAM(s) Oral two times a day    MEDICATIONS  (PRN):  acetaminophen   Tablet .. 650 milliGRAM(s) Oral every 4 hours PRN Mild Pain (1 - 3)    VITAL SIGNS:  T(C): 36.4 (19 @ 05:47), Max: 37.9 (19 @ 15:32)  HR: 73 (19 @ 05:47) (70 - 88)  BP: 130/82 (19 @ 05:47) (130/82 - 154/88)  RR: 16 (19 @ 05:47) (16 - 18)  SpO2: 100% (19 @ 05:47) (100% - 100%)    PHYSICAL EXAM    GENERAL: NAD  	HEAD:  Atraumatic, Normocephalic  	EYES: EOMI, PERRLA, conjunctiva and sclera clear  	CHEST/LUNG: Clear to auscultation bilaterally; No wheeze/rhonchi/rales   	HEART: Regular rate and rhythm; normal S1 S2,  No murmurs, rubs, or gallops  	ABDOMEN: Soft, Nontender, Nondistended; Bowel sounds normal  	: Mild TTP of R testicle, no erythema or discharge noted  	EXTREMITIES:  2+ Peripheral Pulses, No clubbing, cyanosis, or edema  	PSYCH: AAOx3, affect and behavior appropriate for setting  	NEUROLOGY: non-focal  SKIN: No rashes or lesions    LABS:                        11.6   13.53 )-----------( 406      ( 07 Sep 2019 03:15 )             33.7     09-    124<L>  |  90<L>  |  7   ----------------------------<  237<H>  3.6   |  21<L>  |  0.58    Ca    8.3<L>      07 Sep 2019 03:15  Phos  2.4       Mg     2.0         TPro  8.7<H>  /  Alb  4.2  /  TBili  0.7  /  DBili  x   /  AST  11  /  ALT  8   /  AlkPhos  87  09-06      CARDIAC MARKERS ( 06 Sep 2019 18:00 )  x     / x     / 104 u/L / x     / x        Urinalysis Basic - ( 06 Sep 2019 16:35 )    Color: LIGHT YELLOW / Appearance: Lt TURBID / S.007 / pH: 6.0  Gluc: NEGATIVE / Ketone: NEGATIVE  / Bili: NEGATIVE / Urobili: NORMAL   Blood: SMALL / Protein: 10 / Nitrite: POSITIVE   Leuk Esterase: LARGE / RBC: 6-10 / WBC >50   Sq Epi: OCC / Non Sq Epi: x / Bacteria: NEGATIVE    I&O's Summary    06 Sep 2019 07:01  -  07 Sep 2019 07:00  --------------------------------------------------------  IN: 0 mL / OUT: 1500 mL / NET: -1500 mL

## 2019-09-07 NOTE — PHYSICAL THERAPY INITIAL EVALUATION ADULT - ADDITIONAL COMMENTS
Per family, patient is active at baseline and able to perform all ADLs.  3 days prior to admission, he has become more weak and lethargic and not able to perform ADLs. Patient has 4 steps to enter without railings and then can remain on first floor.

## 2019-09-07 NOTE — DISCHARGE NOTE PROVIDER - NSFOLLOWUPCLINICS_GEN_ALL_ED_FT
[facility] [place]  Internal Medicine  [address] [place] [address]  Phone: [phone]  Fax: [phone]  Follow Up Time:

## 2019-09-07 NOTE — H&P ADULT - NSHPPHYSICALEXAM_GEN_ALL_CORE
Vital Signs Last 24 Hrs  T(C): 36.8 (06 Sep 2019 23:51), Max: 37.9 (06 Sep 2019 15:32)  T(F): 98.2 (06 Sep 2019 23:51), Max: 100.3 (06 Sep 2019 15:32)  HR: 75 (06 Sep 2019 23:51) (70 - 88)  BP: 146/79 (06 Sep 2019 23:51) (134/70 - 154/88)  RR: 16 (06 Sep 2019 23:51) (16 - 18)  SpO2: 100% (06 Sep 2019 23:51) (100% - 100%)    GENERAL: NAD  HEAD:  Atraumatic, Normocephalic  EYES: EOMI, PERRLA, conjunctiva and sclera clear  NECK: Supple, No JVD  CHEST/LUNG: Clear to auscultation bilaterally; No wheeze/rhonchi/rales   HEART: Regular rate and rhythm; normal S1 S2,  No murmurs, rubs, or gallops  ABDOMEN: Soft, Nontender, Nondistended; Bowel sounds normal  : Right testicular swelling, TTP  EXTREMITIES:  2+ Peripheral Pulses, No clubbing, cyanosis, or edema  PSYCH: AAOx3, No acute distress   NEUROLOGY: non-focal  SKIN: No rashes or lesions

## 2019-09-07 NOTE — DISCHARGE NOTE PROVIDER - PROVIDER TOKENS
FREE:[LAST:[Martinez],FIRST:[Venancio],PHONE:[phone]],FAX:[(   [phone]    -],ADDRESS:[[address] [place] [address]]]

## 2019-09-07 NOTE — DISCHARGE NOTE PROVIDER - NSDCCPCAREPLAN_GEN_ALL_CORE_FT
PRINCIPAL DISCHARGE DIAGNOSIS  Diagnosis: Urinary tract infection associated with indwelling urethral catheter, subsequent encounter  Assessment and Plan of Treatment: Urinary tract infection associated with indwelling urethral catheter, subsequent encounter  You came to the hospital because you experienced fevers and groin pain. We performed a test of the urine and found that you had a urinary tract infection. We gave you intravenous antibiotics to treat the infection. Your symptoms improved while you were in the hospital. Please continue to take oral antibiotics as prescribed for _____ days and follow up with your urologist within 1 week of discharge.      SECONDARY DISCHARGE DIAGNOSES  Diagnosis: BPH (benign prostatic hyperplasia)  Assessment and Plan of Treatment: BPH (benign prostatic hyperplasia)  You have a history of benign prostatic hyperplasia for which you take tamsulosin. Please continue to take tamsulosin as prescribed and follow up with your urologist for your scheduled catheter change in 2 weeks.    Diagnosis: Microcytic anemia  Assessment and Plan of Treatment: Microcytic anemia  While you were in the hospital, we performed a blood test which showed that you have anemia. We believe the anemia is due to iron deficiency. Please continue to take iron supplements as prescribed and follow up with your primary care doctor for further management. In addition, it is important to eat and drink regularly at home as tolerated.    Diagnosis: Dementia  Assessment and Plan of Treatment: Dementia  You have history of dementia, for which you take donepezil and memantine. Please continue to take these medications as prescribed.    Diagnosis: Parkinson disease  Assessment and Plan of Treatment: Parkinson disease  You have history of Parkinson disease for which you take sinement and baclofen. Please continue to take these medications as prescribed. PRINCIPAL DISCHARGE DIAGNOSIS  Diagnosis: Urinary tract infection associated with indwelling urethral catheter, subsequent encounter  Assessment and Plan of Treatment: Urinary tract infection associated with indwelling urethral catheter, subsequent encounter  You came to the hospital because you experienced fevers and groin pain. We performed a test of the urine and found that you had a urinary tract infection. We gave you intravenous antibiotics to treat the infection. Your symptoms improved while you were in the hospital. Please continue to take oral antibiotics as prescribed for 10 days and follow up with your urologist within 1 week of discharge.      SECONDARY DISCHARGE DIAGNOSES  Diagnosis: BPH (benign prostatic hyperplasia)  Assessment and Plan of Treatment: BPH (benign prostatic hyperplasia)  You have a history of benign prostatic hyperplasia for which you take tamsulosin. Please continue to take tamsulosin as prescribed and follow up with your urologist for your scheduled catheter change in 2 weeks.    Diagnosis: Microcytic anemia  Assessment and Plan of Treatment: Microcytic anemia  While you were in the hospital, we performed a blood test which showed that you have anemia. We believe the anemia is due to iron deficiency. Please continue to take iron supplements as prescribed and follow up with your primary care doctor for further management. In addition, it is important to eat and drink regularly at home as tolerated.    Diagnosis: Dementia  Assessment and Plan of Treatment: Dementia  You have history of dementia, for which you take donepezil and memantine. Please continue to take these medications as prescribed.    Diagnosis: Parkinson disease  Assessment and Plan of Treatment: Parkinson disease  You have history of Parkinson disease for which you take sinement and baclofen. Please continue to take these medications as prescribed.

## 2019-09-07 NOTE — PHYSICAL THERAPY INITIAL EVALUATION ADULT - DISCHARGE DISPOSITION, PT EVAL
[facility] to improve functional mobility and strength and to return to baseline functional status. However, spoke with son at bedside and he wants patient to be discharged home with home PT; patient needs a rolling walker.

## 2019-09-07 NOTE — PHYSICAL THERAPY INITIAL EVALUATION ADULT - GENERAL OBSERVATIONS, REHAB EVAL
Patient found supine in bed in NAD; +IV; Eileen speaking, son at bedside and able to translate; all info obtained from son at bedside.

## 2019-09-07 NOTE — H&P ADULT - PROBLEM SELECTOR PLAN 4
- c/w Flomax Hb 12.7 MCV 71.2 most likely 2/2 iron deficiency.   - start iron supplementation  - o/p GI f/u

## 2019-09-07 NOTE — H&P ADULT - HISTORY OF PRESENT ILLNESS
74 M with a PMHx of Parkinson's, Dementia, and BPH with a chronic indwelling pichardo for retention, [place] of [place], presented to the ED with weakness, lethargy and fevers for the past 3 days.  Patient presented to the ED on 7/25 with similar presentation, was admitted and discharged the following day.  Pichardo was exchanged at that time and he completed a 7 day course of Cefuroxime.  Urine culture at that time was polymicrobial and considered a contaminant.  Per family he is active at baseline and able to perform all ADLs.  Over past 3 days he has become more weak and lethargic and not able to perform ADLs.  He has been complaining of right sided testicular pain as well for 3 days.  He has had low grade fevers to 100 max at home.  Per grandson, the pichardo was changed by an outpatient urologist 2 weeks ago (does not know Urologist's name).  Pichardo was initially placed 1 year ago in [facility] for retention related to BPH, and has been changed monthly.  Denies hematuria, denies retention.      VS:  100.3 F, HR 88, /88, RR 18, SpO2 100% on RA  Given 1g of tylenol, ceftriaxone 1g x1, NS 1 L 74 M with a PMHx of Parkinson's, Dementia, and BPH with a chronic indwelling pichardo for retention, [place] of [place], presented to the ED with weakness, lethargy and fevers for the past 3 days.  Patient presented to the ED on 7/25 with similar presentation, was admitted and discharged the following day.  Pichardo was exchanged at that time and he completed a 7 day course of Cefuroxime.  Urine culture at that time was polymicrobial and considered a contaminant.  Per grandson at bedside, he is active at baseline and able to perform all ADLs.  Over past 3 days he has become more weak and lethargic and not able to perform ADLs.  He has been complaining of right sided testicular pain as well for 3 days.  He has had low grade fevers to 100 max at home.  Per grandson, the Pichardo was changed by an outpatient urologist 2 weeks ago (does not know Urologist's name).  Pichardo was initially placed 1 year ago in [facility] for retention related to BPH, and has been changed monthly.  Denies hematuria, denies retention.      VS:  100.3 F, HR 88, /88, RR 18, SpO2 100% on RA  Given 1g of tylenol, ceftriaxone 1g x1, NS 1 L

## 2019-09-07 NOTE — PROGRESS NOTE ADULT - ASSESSMENT
74 year old male with a medical history of Parkinson's, Dementia, and BPH with a chronic indwelling pichardo for retention, [place], presenting to the ED with complaints of weakness, lethargy and fevers for the past 3 days, similar presentation 7/25/19, s/p 7 day course of Cefuroxime, new right sided testicular pain, with findings of  epididymo-orchitis, and recurrent UTI.    - C/w pichardo catheter, change monthly  - Abx per culture, will need course of 7-10 days for complicated UTI  - Rec NSAIDs for orchitis  - Rec scrotal elevation    Urology  [id number]

## 2019-09-07 NOTE — H&P ADULT - PROBLEM SELECTOR PLAN 8
Diet: Regular  DVT: Doug Ramirez MD PGY-2  Department of Internal Medicine  Pager: [phone] (NS) /[phone] (ESTEE) Diet: Regular  DVT ppx: Doug Ramirez MD PGY-2  Department of Internal Medicine  Pager: [phone] (NS) /[phone] (ESTEE)

## 2019-09-07 NOTE — PROGRESS NOTE ADULT - ASSESSMENT
4 M with a PMHx of Parkinson's, Dementia, and BPH with a chronic indwelling pichardo found to have epididymo-orchitis and hyponatremia, currently receiving IV zosyn 4 M with a PMHx of Parkinson's, Dementia, and BPH with a chronic indwelling pichardo found to have epididymo-orchitis and hyponatremia, urine culture growing gram negative rods, currently receiving IV zosyn.

## 2019-09-07 NOTE — H&P ADULT - PROBLEM SELECTOR PLAN 3
Urine Na <20, U margret 182 and low serum osm suggest hypovolemia. Pt admits to not drink enough water or eat for the past couple of days. Na 121 improved to 123 after 1 L of NS.  - NS @100cc/hr  - check BMP q6hrs, no faster than 4-6 in a 24hr period Urine Na <20, U margret 182 and low serum osm suggest hypovolemia. Pt admits to not drink enough water or eat for the past couple of days. Na 121 improved to 123 after 1 L of NS.  - NS @100cc/hr x12 hrs  - check BMP q6hrs, no faster than 4-6 in a 24hr period Urine Na <20, U margret 182 and low serum osm suggests SIADH vs hypovolemia. Pt admits to not drink enough water or eat for the past couple of days. Na 121 improved to 123 after 1 L of NS.  - Will monitor BMP off fluids for now pending repeat BMP. If Na drops or stays the same will need maintenance fluids.   - check BMP q6hrs, no faster than 6-8 in a 24hr period given chronic hyponatremia (>48hrs) Urine Na <20, U margret 182 and low serum osm suggests SIADH vs hypovolemia. Pt admits to not drink enough water or eat for the past couple of days. Na 121 improved to 123 after 1 L of NS.  - Will monitor BMP off fluids for now pending repeat BMP, as unclear if due to SIADH (possibly from pain) or if hypovolemic hyponatremia. If Na drops or stays the same with fluid restriction, will start maintenance IVF with NS.   - check BMP q6hrs, do not correct more than 6-8 mEq in a 24hr period given chronic hyponatremia (>48hrs)  - donepezil with possible association with hyponatremia; will discontinue if no improvement in hyponatremia with measures stated above

## 2019-09-07 NOTE — H&P ADULT - PROBLEM SELECTOR PLAN 7
Hb 12.7 MCV 71.2 most likely 2/2 iron deficiency.   - start iron supplementation  - o/p GI f/u - memantine, donepezil

## 2019-09-07 NOTE — PROGRESS NOTE ADULT - SUBJECTIVE AND OBJECTIVE BOX
Subjective    Patient seen and examined. No complaints, catheter draining clear yellow urine.    Objective    Vital signs  T(F): , Max: 100.3 (09-06-19 @ 15:32)  HR: 73 (09-07-19 @ 05:47)  BP: 130/82 (09-07-19 @ 05:47)  SpO2: 100% (09-07-19 @ 05:47)  Wt(kg): --    Output     OUT:    Indwelling Catheter - Urethral: 1500 mL  Total OUT: 1500 mL    Total NET: -1500 mL      OUT:    Indwelling Catheter - Urethral: 325 mL  Total OUT: 325 mL    Total NET: -325 mL          Physical Exam  Gen: NAD  Abd: soft, NT, ND  : pichardo in place, clear yellow urine, left testicle enlarged and firm, tender to palpation, foreskin reduced    Labs      09-07 @ 09:32    WBC --    / Hct --    / SCr 0.63     09-07 @ 03:15    WBC 13.53 / Hct 33.7  / SCr 0.58       Urine Cx:   Culture - Urine (09.06.19 @ 17:08)    Culture - Urine:   GNR^Gram Neg Rods  COLONY COUNT: > = 100,000 CFU/ML    Specimen Source: URINE CATHETER        Imaging  < from: US Testicles (09.06.19 @ 17:32) >    EXAM:  US SCROTUM AND CONTENTS        PROCEDURE DATE:  Sep  6 2019         INTERPRETATION:  CLINICAL INFORMATION: Right testicular pain.    COMPARISON: None available    TECHNIQUE: Testicular ultrasound utilizing color and spectral Doppler.    FINDINGS:    RIGHT:    Right testis: 3.7 x 2.6 x 2.8 cm. Homogenous in appearance with diffuse   hyperemia. No areas of architectural distortion. There is normal arterial   and venous blood flow present.     Right epididymis: Diffuse hyperemia.    Right hydrocele: Large hydrocele with internal septations.    Right varicocele: None.    The partially imaged right spermatic cord appears hyperemic.    LEFT:     Left testis: 4.4 x 1.8 x 2.2 cm. Normal echogenicity and echotexture with   no masses or areas of architectural distortion. Normal arterial and   venous blood flow pattern.    Left epididymis: A 3 mm cyst is present.    Left hydrocele: None.    Left varicocele: None.    IMPRESSION:     Diffuse hyperemia involving the right testes, epididymis and partially   imaged spermatic cord with a large complex hydrocele. Findings are   consistent with right sided epididymoorchitis.              CHAN CAMILO M.D., RADIOLOGY RESIDENT  This document has been electronically signed.  CARLOTTA HAYWOOD M.D., ATTENDING RADIOLOGIST  This document has been electronically signed. Sep  6 2019  6:10PM                  < end of copied text >

## 2019-09-07 NOTE — PROGRESS NOTE ADULT - PROBLEM SELECTOR PLAN 3
Urine Na <20, U margret 182 and low serum osm suggests SIADH vs hypovolemia. Pt admits to poor PO intake of food and fluids over the last few days, Na improved s/p 1L NS from 121 to 124  - check BMP q6hrs, no faster than 6-8 in a 24hr period given chronic hyponatremia (>48hrs) Urine Na <20, U margret 182 and low serum osm suggests hypovolemia. Pt admits to poor PO intake of food and fluids over the last few days, Na improved s/p 1L NS from 121 to 125  - continue gentle hydration  - check BMP q6hrs, no faster than 6-8 in a 24hr period given chronic hyponatremia (>48hrs) Urine Na <20, U margret 182 and low serum osm suggests hypovolemia. Pt admits to poor PO intake of food and fluids over the last few days, Na improved s/p 1L NS from 121 to 125  - continue gentle hydration  - check BMP q12hrs, no faster than 6-8 in a 24hr period given chronic hyponatremia (>48hrs)  - If Na improves on BMP this evening, will monitor with daily BMPs starting tomorrow 9/8

## 2019-09-07 NOTE — H&P ADULT - PROBLEM SELECTOR PLAN 2
Chronic indwelling pichardo with strongly positive U/A.  - can treat with ceftriaxone 1g qD Chronic indwelling Aguilar with positive U/A. Would favor against treating at this time. Chronic indwelling Aguilar with positive U/A. Would favor against treating at this time, though Zosyn would cover for UTI as well.

## 2019-09-07 NOTE — H&P ADULT - NSHPREVIEWOFSYSTEMS_GEN_ALL_CORE
CONSTITUTIONAL: + weakness and fevers  EYES/ENT: No visual changes;  No vertigo or throat pain   NECK: No pain or stiffness  RESPIRATORY: No cough, wheezing, hemoptysis; No shortness of breath  CARDIOVASCULAR: No chest pain or palpitations  GASTROINTESTINAL: No abdominal or epigastric pain. No nausea, vomiting, or hematemesis; No diarrhea or constipation. No melena or hematochezia.  GENITOURINARY: +right testicular pain  NEUROLOGICAL: No numbness or weakness  SKIN: No itching, rashes

## 2019-09-07 NOTE — H&P ADULT - NSHPLABSRESULTS_GEN_ALL_CORE
12.7   15.00 )-----------( 454      ( 06 Sep 2019 16:35 )             36.1         123<L>  |  90<L>  |  7   ----------------------------<  133<H>  3.3<L>   |  20<L>  |  0.62    Ca    8.6      06 Sep 2019 21:04    TPro  8.7<H>  /  Alb  4.2  /  TBili  0.7  /  DBili  x   /  AST  11  /  ALT  8   /  AlkPhos  87      Urinalysis Basic - ( 06 Sep 2019 16:35 )    Color: LIGHT YELLOW / Appearance: Lt TURBID / S.007 / pH: 6.0  Gluc: NEGATIVE / Ketone: NEGATIVE  / Bili: NEGATIVE / Urobili: NORMAL   Blood: SMALL / Protein: 10 / Nitrite: POSITIVE   Leuk Esterase: LARGE / RBC: 6-10 / WBC >50   Sq Epi: OCC / Non Sq Epi: x / Bacteria: NEGATIVE    Urine Na: <20  Urine osm: 182  Serum osm: 252    < from: US Testicles (19 @ 17:32) >    IMPRESSION:     Diffuse hyperemia involving the right testes, epididymis and partially   imaged spermatic cord with a large complex hydrocele. Findings are   consistent with right sided epididymoorchitis.

## 2019-09-07 NOTE — PHYSICAL THERAPY INITIAL EVALUATION ADULT - PLANNED THERAPY INTERVENTIONS, PT EVAL
transfer training/balance training/gait training/bed mobility training/strengthening/Patient left supine in bed in NAD, call bell in reach, all lines intact. +bed alarm. Son at bedside.

## 2019-09-07 NOTE — H&P ADULT - ASSESSMENT
4 M with a PMHx of Parkinson's, Dementia, and BPH with a chronic indwelling pichardo found to have epididymo-orchitis, hyponatremia

## 2019-09-07 NOTE — PROGRESS NOTE ADULT - PROBLEM SELECTOR PLAN 1
Based on US of right testes, symptoms, and history/physical most likely etiology of fever and right testicular pain is epididymo-orchitis. No recent trauma to suggest torsion. Given age and history of obstructive uropathy, most common bugs are PA and E. Coli.   - Continue zosyn (9/7 = day 2 abx), will f/u urine culture   - encourage testicle elevation with warm compress  - NSAIDS for pain control as Tylenol has not helped  - Urology c/s appreciated, no acute surgical intervention. Based on US of right testes, symptoms, and history/physical most likely etiology of fever and right testicular pain is epididymo-orchitis. No recent trauma to suggest torsion. Given age and history of obstructive uropathy, most common bugs are PA and E. Coli.   - Continue zosyn (9/7 = day 2 abx), UCx growing GNR, will f/u sensitivities and speciation   - encourage testicle elevation with warm compress  - NSAIDS for pain control as Tylenol has not helped  - Urology c/s appreciated, no acute surgical intervention.

## 2019-09-07 NOTE — PHYSICAL THERAPY INITIAL EVALUATION ADULT - PERTINENT HX OF CURRENT PROBLEM, REHAB EVAL
74 year old male with a PMHx of Parkinson's, Dementia, and BPH with a chronic indwelling pichardo for retention, [place], presented to the ED with weakness, lethargy and fevers for 3 days prior to admission.  Patient presented to the ED on 7/25 with similar presentation, was admitted and discharged the following day.  He has been complaining of right sided testicular pain as well for 3 days, low grade fever. US testicles showed right sided epididymoorchitis.

## 2019-09-08 LAB
ANION GAP SERPL CALC-SCNC: 12 MMO/L — SIGNIFICANT CHANGE UP (ref 7–14)
ANION GAP SERPL CALC-SCNC: 12 MMO/L — SIGNIFICANT CHANGE UP (ref 7–14)
BASOPHILS # BLD AUTO: 0.03 K/UL — SIGNIFICANT CHANGE UP (ref 0–0.2)
BASOPHILS NFR BLD AUTO: 0.3 % — SIGNIFICANT CHANGE UP (ref 0–2)
BUN SERPL-MCNC: 5 MG/DL — LOW (ref 7–23)
BUN SERPL-MCNC: 6 MG/DL — LOW (ref 7–23)
CALCIUM SERPL-MCNC: 8 MG/DL — LOW (ref 8.4–10.5)
CALCIUM SERPL-MCNC: 8.2 MG/DL — LOW (ref 8.4–10.5)
CHLORIDE SERPL-SCNC: 93 MMOL/L — LOW (ref 98–107)
CHLORIDE SERPL-SCNC: 95 MMOL/L — LOW (ref 98–107)
CO2 SERPL-SCNC: 21 MMOL/L — LOW (ref 22–31)
CO2 SERPL-SCNC: 21 MMOL/L — LOW (ref 22–31)
CREAT SERPL-MCNC: 0.74 MG/DL — SIGNIFICANT CHANGE UP (ref 0.5–1.3)
CREAT SERPL-MCNC: 0.75 MG/DL — SIGNIFICANT CHANGE UP (ref 0.5–1.3)
EOSINOPHIL # BLD AUTO: 0.04 K/UL — SIGNIFICANT CHANGE UP (ref 0–0.5)
EOSINOPHIL NFR BLD AUTO: 0.5 % — SIGNIFICANT CHANGE UP (ref 0–6)
GLUCOSE SERPL-MCNC: 124 MG/DL — HIGH (ref 70–99)
GLUCOSE SERPL-MCNC: 145 MG/DL — HIGH (ref 70–99)
HCT VFR BLD CALC: 32.7 % — LOW (ref 39–50)
HGB BLD-MCNC: 11 G/DL — LOW (ref 13–17)
IMM GRANULOCYTES NFR BLD AUTO: 0.3 % — SIGNIFICANT CHANGE UP (ref 0–1.5)
LYMPHOCYTES # BLD AUTO: 3 K/UL — SIGNIFICANT CHANGE UP (ref 1–3.3)
LYMPHOCYTES # BLD AUTO: 34.1 % — SIGNIFICANT CHANGE UP (ref 13–44)
MAGNESIUM SERPL-MCNC: 1.8 MG/DL — SIGNIFICANT CHANGE UP (ref 1.6–2.6)
MAGNESIUM SERPL-MCNC: 2 MG/DL — SIGNIFICANT CHANGE UP (ref 1.6–2.6)
MCHC RBC-ENTMCNC: 24.6 PG — LOW (ref 27–34)
MCHC RBC-ENTMCNC: 33.6 % — SIGNIFICANT CHANGE UP (ref 32–36)
MCV RBC AUTO: 73 FL — LOW (ref 80–100)
METHOD TYPE: SIGNIFICANT CHANGE UP
MONOCYTES # BLD AUTO: 0.54 K/UL — SIGNIFICANT CHANGE UP (ref 0–0.9)
MONOCYTES NFR BLD AUTO: 6.1 % — SIGNIFICANT CHANGE UP (ref 2–14)
NEUTROPHILS # BLD AUTO: 5.17 K/UL — SIGNIFICANT CHANGE UP (ref 1.8–7.4)
NEUTROPHILS NFR BLD AUTO: 58.7 % — SIGNIFICANT CHANGE UP (ref 43–77)
NRBC # FLD: 0 K/UL — SIGNIFICANT CHANGE UP (ref 0–0)
ORGANISM # SPEC MICROSCOPIC CNT: SIGNIFICANT CHANGE UP
PHOSPHATE SERPL-MCNC: 3 MG/DL — SIGNIFICANT CHANGE UP (ref 2.5–4.5)
PHOSPHATE SERPL-MCNC: 3.1 MG/DL — SIGNIFICANT CHANGE UP (ref 2.5–4.5)
PLATELET # BLD AUTO: 385 K/UL — SIGNIFICANT CHANGE UP (ref 150–400)
PMV BLD: 8.6 FL — SIGNIFICANT CHANGE UP (ref 7–13)
POTASSIUM SERPL-MCNC: 3.5 MMOL/L — SIGNIFICANT CHANGE UP (ref 3.5–5.3)
POTASSIUM SERPL-MCNC: 3.7 MMOL/L — SIGNIFICANT CHANGE UP (ref 3.5–5.3)
POTASSIUM SERPL-SCNC: 3.5 MMOL/L — SIGNIFICANT CHANGE UP (ref 3.5–5.3)
POTASSIUM SERPL-SCNC: 3.7 MMOL/L — SIGNIFICANT CHANGE UP (ref 3.5–5.3)
RBC # BLD: 4.48 M/UL — SIGNIFICANT CHANGE UP (ref 4.2–5.8)
RBC # FLD: 13.5 % — SIGNIFICANT CHANGE UP (ref 10.3–14.5)
SODIUM SERPL-SCNC: 126 MMOL/L — LOW (ref 135–145)
SODIUM SERPL-SCNC: 128 MMOL/L — LOW (ref 135–145)
WBC # BLD: 8.81 K/UL — SIGNIFICANT CHANGE UP (ref 3.8–10.5)
WBC # FLD AUTO: 8.81 K/UL — SIGNIFICANT CHANGE UP (ref 3.8–10.5)

## 2019-09-08 PROCEDURE — 99233 SBSQ HOSP IP/OBS HIGH 50: CPT | Mod: GC

## 2019-09-08 RX ORDER — SODIUM CHLORIDE 9 MG/ML
1000 INJECTION INTRAMUSCULAR; INTRAVENOUS; SUBCUTANEOUS
Refills: 0 | Status: DISCONTINUED | OUTPATIENT
Start: 2019-09-08 | End: 2019-09-09

## 2019-09-08 RX ADMIN — PIPERACILLIN AND TAZOBACTAM 25 GRAM(S): 4; .5 INJECTION, POWDER, LYOPHILIZED, FOR SOLUTION INTRAVENOUS at 17:41

## 2019-09-08 RX ADMIN — CARBIDOPA AND LEVODOPA 1.5 TABLET(S): 25; 100 TABLET ORAL at 21:40

## 2019-09-08 RX ADMIN — ENOXAPARIN SODIUM 40 MILLIGRAM(S): 100 INJECTION SUBCUTANEOUS at 11:28

## 2019-09-08 RX ADMIN — Medication 2 MILLIGRAM(S): at 17:42

## 2019-09-08 RX ADMIN — PIPERACILLIN AND TAZOBACTAM 25 GRAM(S): 4; .5 INJECTION, POWDER, LYOPHILIZED, FOR SOLUTION INTRAVENOUS at 11:27

## 2019-09-08 RX ADMIN — PIPERACILLIN AND TAZOBACTAM 25 GRAM(S): 4; .5 INJECTION, POWDER, LYOPHILIZED, FOR SOLUTION INTRAVENOUS at 01:42

## 2019-09-08 RX ADMIN — TAMSULOSIN HYDROCHLORIDE 0.4 MILLIGRAM(S): 0.4 CAPSULE ORAL at 21:40

## 2019-09-08 RX ADMIN — Medication 10 MILLIGRAM(S): at 17:42

## 2019-09-08 RX ADMIN — SODIUM CHLORIDE 75 MILLILITER(S): 9 INJECTION INTRAMUSCULAR; INTRAVENOUS; SUBCUTANEOUS at 09:00

## 2019-09-08 RX ADMIN — Medication 10 MILLIGRAM(S): at 07:47

## 2019-09-08 RX ADMIN — SODIUM CHLORIDE 125 MILLILITER(S): 9 INJECTION INTRAMUSCULAR; INTRAVENOUS; SUBCUTANEOUS at 01:41

## 2019-09-08 RX ADMIN — SODIUM CHLORIDE 75 MILLILITER(S): 9 INJECTION INTRAMUSCULAR; INTRAVENOUS; SUBCUTANEOUS at 21:41

## 2019-09-08 RX ADMIN — Medication 325 MILLIGRAM(S): at 11:28

## 2019-09-08 RX ADMIN — MEMANTINE HYDROCHLORIDE 2.5 MILLIGRAM(S): 10 TABLET ORAL at 11:29

## 2019-09-08 RX ADMIN — CARBIDOPA AND LEVODOPA 1 TABLET(S): 25; 100 TABLET ORAL at 17:42

## 2019-09-08 RX ADMIN — Medication 1 TABLET(S): at 11:28

## 2019-09-08 RX ADMIN — Medication 650 MILLIGRAM(S): at 12:30

## 2019-09-08 RX ADMIN — Medication 650 MILLIGRAM(S): at 11:31

## 2019-09-08 RX ADMIN — TAMSULOSIN HYDROCHLORIDE 0.4 MILLIGRAM(S): 0.4 CAPSULE ORAL at 01:40

## 2019-09-08 RX ADMIN — Medication 2 MILLIGRAM(S): at 11:27

## 2019-09-08 NOTE — PROGRESS NOTE ADULT - ASSESSMENT
4 M with a PMHx of Parkinson's, Dementia, and BPH with a chronic indwelling pichardo found to have epididymo-orchitis and hyponatremia, urine culture growing serratia, currently receiving IV zosyn.

## 2019-09-08 NOTE — PROGRESS NOTE ADULT - SUBJECTIVE AND OBJECTIVE BOX
ADALBERTO Jain, PGY 1  Pager [phone]    Patient is a 74y old  Male who presents with a chief complaint of Fevers, weakness, right testicle pain (07 Sep 2019 01:21)    SUBJECTIVE / OVERNIGHT EVENTS: No acute overnight events. Patient seen and examined at bedside this morning, appears to be resting comfortably, in NAD. Patient refused  services, translation provided by patient's grandson. Patient says he still has some pain in the right groin, but it is improved compared to yesterday. Denies chest pain, SOB, abdominal pain, nausea/vomiting/diarrhea, fevers/chills.     MEDICATIONS  (STANDING):  baclofen 10 milliGRAM(s) Oral two times a day  carbidopa/levodopa  25/100 1 Tablet(s) Oral <User Schedule>  carbidopa/levodopa  25/100 1.5 Tablet(s) Oral <User Schedule>  enoxaparin Injectable 40 milliGRAM(s) SubCutaneous daily  ferrous    sulfate 325 milliGRAM(s) Oral daily  influenza   Vaccine 0.5 milliLiter(s) IntraMuscular once  memantine 2.5 milliGRAM(s) Oral daily  multivitamin 1 Tablet(s) Oral daily  piperacillin/tazobactam IVPB.. 3.375 Gram(s) IV Intermittent every 8 hours  sodium chloride 0.9%. 1000 milliLiter(s) (75 mL/Hr) IV Continuous <Continuous>  tamsulosin 0.4 milliGRAM(s) Oral at bedtime  trihexyphenidyl 2 milliGRAM(s) Oral two times a day    MEDICATIONS  (PRN):  acetaminophen   Tablet .. 650 milliGRAM(s) Oral every 4 hours PRN Mild Pain (1 - 3)    Vital Signs Last 24 Hrs  T(C): 37.1 (08 Sep 2019 01:46), Max: 37.1 (08 Sep 2019 01:46)  T(F): 98.7 (08 Sep 2019 01:46), Max: 98.7 (08 Sep 2019 01:46)  HR: 81 (08 Sep 2019 01:46) (81 - 89)  BP: 119/62 (08 Sep 2019 01:46) (119/62 - 142/74)  RR: 15 (08 Sep 2019 01:46) (15 - 18)  SpO2: 100% (08 Sep 2019 01:46) (100% - 100%)    PHYSICAL EXAM:  GENERAL: elderly man appears in NAD  	HEAD:  Atraumatic, Normocephalic  	CHEST/LUNG: Clear to auscultation bilaterally; No wheeze/rhonchi/rales   	HEART: Regular rate and rhythm; normal S1 S2,  No murmurs, rubs, or gallops  	ABDOMEN: Soft, mild TTP in LLQ, Nondistended; Bowel sounds normal  	: Mild TTP of R testicle, no erythema or discharge noted  	EXTREMITIES:  2+ Peripheral Pulses, No clubbing, cyanosis, or edema  	PSYCH: AAOx3, affect and behavior appropriate for setting  	NEUROLOGY: non-focal  SKIN: No rashes or lesions    LABS:                        11.0   8.81  )-----------( 385      ( 08 Sep 2019 06:58 )             32.7     -    128<L>  |  95<L>  |  6<L>  ----------------------------<  145<H>  3.5   |  21<L>  |  0.75    Ca    8.2<L>      08 Sep 2019 06:58  Phos  3.0     -  Mg     2.0     -    TPro  8.7<H>  /  Alb  4.2  /  TBili  0.7  /  DBili  x   /  AST  11  /  ALT  8   /  AlkPhos  87  -       Urinalysis Basic - ( 06 Sep 2019 16:35 )    Color: LIGHT YELLOW / Appearance: Lt TURBID / S.007 / pH: 6.0  Gluc: NEGATIVE / Ketone: NEGATIVE  / Bili: NEGATIVE / Urobili: NORMAL   Blood: SMALL / Protein: 10 / Nitrite: POSITIVE   Leuk Esterase: LARGE / RBC: 6-10 / WBC >50   Sq Epi: OCC / Non Sq Epi: x / Bacteria: NEGATIVE    CARDIAC MARKERS ( 06 Sep 2019 18:00 )  x     / x     / 104 u/L / x     / x          I&O's Summary    07 Sep 2019 07:01  -  08 Sep 2019 07:00  --------------------------------------------------------  IN: 650 mL / OUT: 1125 mL / NET: -475 mL

## 2019-09-08 NOTE — PROGRESS NOTE ADULT - ASSESSMENT
74 year old male with a medical history of Parkinson's, Dementia, and BPH with a chronic indwelling pichardo for retention, [place], presenting to the ED with complaints of weakness, lethargy and fevers for the past 3 days, similar presentation 7/25/19, s/p 7 day course of Cefuroxime, new right sided testicular pain, with findings of  epididymo-orchitis, and recurrent UTI.  - Pichardo draining well, c/w pichardo, monthly catheter changes.  - Follow up urine cultures, blood cultures.  - Recommend 2 weeks of antibiotics for complex infection.

## 2019-09-08 NOTE — PROGRESS NOTE ADULT - PROBLEM SELECTOR PLAN 7
Patient with history of dementia  - Continue home memantine  - Holding home donepezil in setting of hyponatremia

## 2019-09-08 NOTE — PROGRESS NOTE ADULT - PROBLEM SELECTOR PLAN 3
Urine Na <20, U margret 182 and low serum osm suggests hypovolemia. Pt admits to poor PO intake of food and fluids over the last few days. Na now improved to 126.  - continue gentle hydration  - check BMP daily, aim for correction no faster than 6-8 meq in a 24hr period given chronic hyponatremia (>48hrs)

## 2019-09-08 NOTE — PROGRESS NOTE ADULT - PROBLEM SELECTOR PLAN 1
Based on US of right testes, symptoms, and history/physical most likely etiology of fever and right testicular pain is epididymo-orchitis. No recent trauma to suggest torsion.   - Continue zosyn (9/8 = day 3 abx), UCx growing serratia, will f/u sensitivities  - encourage testicle elevation with warm compress  - NSAIDS for pain control as Tylenol has not helped

## 2019-09-08 NOTE — PROGRESS NOTE ADULT - SUBJECTIVE AND OBJECTIVE BOX
Subjective:  Patient doing well this AM, no fevers overnight.    Objectives:  T(C): 37.1 (19 @ 01:46), Max: 37.1 (19 @ 01:46)  HR: 81 (19 @ 01:46) (81 - 81)  BP: 119/62 (19 @ 01:46) (119/62 - 119/62)  RR: 15 (19 @ 01:46) (15 - 15)  SpO2: 100% (19 @ 01:46) (100% - 100%)  Wt(kg): --     @ 07:01  -   @ 07:00  --------------------------------------------------------  IN:    IV PiggyBack: 200 mL    sodium chloride 0.9%: 450 mL  Total IN: 650 mL    OUT:    Indwelling Catheter - Urethral: 1125 mL  Total OUT: 1125 mL    Total NET: -475 mL          Physcial Exam  GENERAL: NAD, well-developed  ABDOMEN: Soft, Nontender, Nondistended  GENITOURINARY: Right testicle mildly enlarged, non-tender to palpation, no erythema.    LABS:                        11.6   13.53 )-----------( 406      ( 07 Sep 2019 03:15 )             33.7     -    126<L>  |  93<L>  |  5<L>  ----------------------------<  124<H>  3.7   |  21<L>  |  0.74    Ca    8.0<L>      08 Sep 2019 02:00  Phos  3.1     -  Mg     1.8         TPro  8.7<H>  /  Alb  4.2  /  TBili  0.7  /  DBili  x   /  AST  11  /  ALT  8   /  AlkPhos  87      CAPILLARY BLOOD GLUCOSE          CAPILLARY BLOOD GLUCOSE        Urinalysis Basic - ( 06 Sep 2019 16:35 )    Color: LIGHT YELLOW / Appearance: Lt TURBID / S.007 / pH: 6.0  Gluc: NEGATIVE / Ketone: NEGATIVE  / Bili: NEGATIVE / Urobili: NORMAL   Blood: SMALL / Protein: 10 / Nitrite: POSITIVE   Leuk Esterase: LARGE / RBC: 6-10 / WBC >50   Sq Epi: OCC / Non Sq Epi: x / Bacteria: NEGATIVE

## 2019-09-09 ENCOUNTER — TRANSCRIPTION ENCOUNTER (OUTPATIENT)
Age: 74
End: 2019-09-09

## 2019-09-09 VITALS
DIASTOLIC BLOOD PRESSURE: 89 MMHG | SYSTOLIC BLOOD PRESSURE: 149 MMHG | HEART RATE: 75 BPM | TEMPERATURE: 98 F | RESPIRATION RATE: 19 BRPM | OXYGEN SATURATION: 99 %

## 2019-09-09 LAB
-  AMIKACIN: SIGNIFICANT CHANGE UP
-  AMIKACIN: SIGNIFICANT CHANGE UP
-  AMPICILLIN/SULBACTAM: SIGNIFICANT CHANGE UP
-  AMPICILLIN: SIGNIFICANT CHANGE UP
-  AZTREONAM: SIGNIFICANT CHANGE UP
-  AZTREONAM: SIGNIFICANT CHANGE UP
-  CEFAZOLIN: SIGNIFICANT CHANGE UP
-  CEFEPIME: SIGNIFICANT CHANGE UP
-  CEFEPIME: SIGNIFICANT CHANGE UP
-  CEFOXITIN: SIGNIFICANT CHANGE UP
-  CEFTAZIDIME: SIGNIFICANT CHANGE UP
-  CEFTAZIDIME: SIGNIFICANT CHANGE UP
-  CEFTRIAXONE: SIGNIFICANT CHANGE UP
-  ERTAPENEM: SIGNIFICANT CHANGE UP
-  GENTAMICIN: SIGNIFICANT CHANGE UP
-  GENTAMICIN: SIGNIFICANT CHANGE UP
-  IMIPENEM: SIGNIFICANT CHANGE UP
-  IMIPENEM: SIGNIFICANT CHANGE UP
-  LEVOFLOXACIN: SIGNIFICANT CHANGE UP
-  LEVOFLOXACIN: SIGNIFICANT CHANGE UP
-  MEROPENEM: SIGNIFICANT CHANGE UP
-  MEROPENEM: SIGNIFICANT CHANGE UP
-  NITROFURANTOIN: SIGNIFICANT CHANGE UP
-  PIPERACILLIN/TAZOBACTAM: SIGNIFICANT CHANGE UP
-  PIPERACILLIN/TAZOBACTAM: SIGNIFICANT CHANGE UP
-  TIGECYCLINE: SIGNIFICANT CHANGE UP
-  TOBRAMYCIN: SIGNIFICANT CHANGE UP
-  TOBRAMYCIN: SIGNIFICANT CHANGE UP
-  TRIMETHOPRIM/SULFAMETHOXAZOLE: SIGNIFICANT CHANGE UP
ANION GAP SERPL CALC-SCNC: 14 MMO/L — SIGNIFICANT CHANGE UP (ref 7–14)
BACTERIA UR CULT: SIGNIFICANT CHANGE UP
BASOPHILS # BLD AUTO: 0.04 K/UL — SIGNIFICANT CHANGE UP (ref 0–0.2)
BASOPHILS NFR BLD AUTO: 0.6 % — SIGNIFICANT CHANGE UP (ref 0–2)
BUN SERPL-MCNC: 4 MG/DL — LOW (ref 7–23)
CALCIUM SERPL-MCNC: 8.5 MG/DL — SIGNIFICANT CHANGE UP (ref 8.4–10.5)
CHLORIDE SERPL-SCNC: 95 MMOL/L — LOW (ref 98–107)
CO2 SERPL-SCNC: 21 MMOL/L — LOW (ref 22–31)
CREAT SERPL-MCNC: 0.64 MG/DL — SIGNIFICANT CHANGE UP (ref 0.5–1.3)
EOSINOPHIL # BLD AUTO: 0.19 K/UL — SIGNIFICANT CHANGE UP (ref 0–0.5)
EOSINOPHIL NFR BLD AUTO: 2.6 % — SIGNIFICANT CHANGE UP (ref 0–6)
GLUCOSE SERPL-MCNC: 149 MG/DL — HIGH (ref 70–99)
HCT VFR BLD CALC: 34.1 % — LOW (ref 39–50)
HGB BLD-MCNC: 11.7 G/DL — LOW (ref 13–17)
IMM GRANULOCYTES NFR BLD AUTO: 0.1 % — SIGNIFICANT CHANGE UP (ref 0–1.5)
LYMPHOCYTES # BLD AUTO: 2.46 K/UL — SIGNIFICANT CHANGE UP (ref 1–3.3)
LYMPHOCYTES # BLD AUTO: 34.1 % — SIGNIFICANT CHANGE UP (ref 13–44)
MAGNESIUM SERPL-MCNC: 2 MG/DL — SIGNIFICANT CHANGE UP (ref 1.6–2.6)
MCHC RBC-ENTMCNC: 24.9 PG — LOW (ref 27–34)
MCHC RBC-ENTMCNC: 34.3 % — SIGNIFICANT CHANGE UP (ref 32–36)
MCV RBC AUTO: 72.7 FL — LOW (ref 80–100)
METHOD TYPE: SIGNIFICANT CHANGE UP
MONOCYTES # BLD AUTO: 0.44 K/UL — SIGNIFICANT CHANGE UP (ref 0–0.9)
MONOCYTES NFR BLD AUTO: 6.1 % — SIGNIFICANT CHANGE UP (ref 2–14)
NEUTROPHILS # BLD AUTO: 4.07 K/UL — SIGNIFICANT CHANGE UP (ref 1.8–7.4)
NEUTROPHILS NFR BLD AUTO: 56.5 % — SIGNIFICANT CHANGE UP (ref 43–77)
NRBC # FLD: 0.07 K/UL — SIGNIFICANT CHANGE UP (ref 0–0)
NRBC FLD-RTO: 1 — SIGNIFICANT CHANGE UP
PHOSPHATE SERPL-MCNC: 3 MG/DL — SIGNIFICANT CHANGE UP (ref 2.5–4.5)
PLATELET # BLD AUTO: 389 K/UL — SIGNIFICANT CHANGE UP (ref 150–400)
PMV BLD: 8.7 FL — SIGNIFICANT CHANGE UP (ref 7–13)
POTASSIUM SERPL-MCNC: 4.4 MMOL/L — SIGNIFICANT CHANGE UP (ref 3.5–5.3)
POTASSIUM SERPL-SCNC: 4.4 MMOL/L — SIGNIFICANT CHANGE UP (ref 3.5–5.3)
RBC # BLD: 4.69 M/UL — SIGNIFICANT CHANGE UP (ref 4.2–5.8)
RBC # FLD: 14 % — SIGNIFICANT CHANGE UP (ref 10.3–14.5)
SODIUM SERPL-SCNC: 130 MMOL/L — LOW (ref 135–145)
WBC # BLD: 7.21 K/UL — SIGNIFICANT CHANGE UP (ref 3.8–10.5)
WBC # FLD AUTO: 7.21 K/UL — SIGNIFICANT CHANGE UP (ref 3.8–10.5)

## 2019-09-09 PROCEDURE — 99239 HOSP IP/OBS DSCHRG MGMT >30: CPT | Mod: GC

## 2019-09-09 RX ORDER — POLYETHYLENE GLYCOL 3350 17 G/17G
17 POWDER, FOR SOLUTION ORAL
Refills: 0 | Status: DISCONTINUED | OUTPATIENT
Start: 2019-09-09 | End: 2019-09-09

## 2019-09-09 RX ORDER — OXYBUTYNIN CHLORIDE 5 MG
1 TABLET ORAL
Qty: 5 | Refills: 0
Start: 2019-09-09 | End: 2019-09-13

## 2019-09-09 RX ORDER — OXYBUTYNIN CHLORIDE 5 MG
5 TABLET ORAL ONCE
Refills: 0 | Status: COMPLETED | OUTPATIENT
Start: 2019-09-09 | End: 2019-09-09

## 2019-09-09 RX ORDER — DOCUSATE SODIUM 100 MG
100 CAPSULE ORAL DAILY
Refills: 0 | Status: DISCONTINUED | OUTPATIENT
Start: 2019-09-09 | End: 2019-09-09

## 2019-09-09 RX ORDER — POLYETHYLENE GLYCOL 3350 17 G/17G
17 POWDER, FOR SOLUTION ORAL
Qty: 0 | Refills: 0 | DISCHARGE
Start: 2019-09-09

## 2019-09-09 RX ORDER — DOCUSATE SODIUM 100 MG
1 CAPSULE ORAL
Qty: 0 | Refills: 0 | DISCHARGE
Start: 2019-09-09

## 2019-09-09 RX ORDER — BACLOFEN 100 %
1 POWDER (GRAM) MISCELLANEOUS
Qty: 0 | Refills: 0 | DISCHARGE

## 2019-09-09 RX ORDER — FERROUS SULFATE 325(65) MG
1 TABLET ORAL
Qty: 30 | Refills: 0
Start: 2019-09-09 | End: 2019-10-08

## 2019-09-09 RX ORDER — BACLOFEN 100 %
1 POWDER (GRAM) MISCELLANEOUS
Qty: 28 | Refills: 0
Start: 2019-09-09 | End: 2019-09-22

## 2019-09-09 RX ADMIN — Medication 100 MILLIGRAM(S): at 11:42

## 2019-09-09 RX ADMIN — SODIUM CHLORIDE 75 MILLILITER(S): 9 INJECTION INTRAMUSCULAR; INTRAVENOUS; SUBCUTANEOUS at 06:47

## 2019-09-09 RX ADMIN — PIPERACILLIN AND TAZOBACTAM 25 GRAM(S): 4; .5 INJECTION, POWDER, LYOPHILIZED, FOR SOLUTION INTRAVENOUS at 10:31

## 2019-09-09 RX ADMIN — ENOXAPARIN SODIUM 40 MILLIGRAM(S): 100 INJECTION SUBCUTANEOUS at 11:38

## 2019-09-09 RX ADMIN — Medication 2 MILLIGRAM(S): at 06:47

## 2019-09-09 RX ADMIN — Medication 1 TABLET(S): at 11:37

## 2019-09-09 RX ADMIN — MEMANTINE HYDROCHLORIDE 2.5 MILLIGRAM(S): 10 TABLET ORAL at 11:37

## 2019-09-09 RX ADMIN — Medication 325 MILLIGRAM(S): at 11:38

## 2019-09-09 RX ADMIN — Medication 5 MILLIGRAM(S): at 15:21

## 2019-09-09 RX ADMIN — CARBIDOPA AND LEVODOPA 1 TABLET(S): 25; 100 TABLET ORAL at 15:21

## 2019-09-09 RX ADMIN — PIPERACILLIN AND TAZOBACTAM 25 GRAM(S): 4; .5 INJECTION, POWDER, LYOPHILIZED, FOR SOLUTION INTRAVENOUS at 01:41

## 2019-09-09 RX ADMIN — Medication 10 MILLIGRAM(S): at 06:47

## 2019-09-09 NOTE — PROGRESS NOTE ADULT - PROBLEM SELECTOR PLAN 5
- c/w Flomax  - Aguilar in place
Patient with history of BPH  - c/w Flomax  - Aguilar in place
Patient with history of BPH  - c/w Flomax  - Aguilar in place

## 2019-09-09 NOTE — PROGRESS NOTE ADULT - PROBLEM SELECTOR PLAN 8
Diet: Regular  DVT ppx: Lovenox    Dispo- will order PT consult, f/u recs
Diet: Regular  DVT ppx: Lovenox    Dispo- seen by PT, recommend home with home PT, needs rolling walker
Diet: Regular  DVT ppx: Lovenox    Dispo- seen by PT, recommend home with home PT, needs rolling walker

## 2019-09-09 NOTE — DISCHARGE NOTE NURSING/CASE MANAGEMENT/SOCIAL WORK - NSDCPNINST_GEN_ALL_CORE
This patient is stable for discharge , alert and oriented x 3, salvador speaking and appropriate in his behavior.  He walks about in no acute distress , vital signs are stable, afebrile and has no further c/o Pain in the Scrotum and Abdomen.  He has the Chronic pichardo and bags and supplies for the Pichardo are given.  Discharge Instructions are discussed with the Family at bedside and Copy provided.

## 2019-09-09 NOTE — PROGRESS NOTE ADULT - PROBLEM SELECTOR PROBLEM 2
Urinary tract infection associated with indwelling urethral catheter, subsequent encounter

## 2019-09-09 NOTE — PROGRESS NOTE ADULT - PROBLEM SELECTOR PLAN 1
Based on US of right testes, symptoms, and history/physical most likely etiology of fever and right testicular pain is epididymo-orchitis. Groin pain gradually improving.  - Continue zosyn (9/9 = day 4 abx), UCx growing serratia and pseudomonas, will f/u sensitivities  - encourage testicle elevation with warm compress  - NSAIDS for pain control as Tylenol has not helped

## 2019-09-09 NOTE — PROGRESS NOTE ADULT - ATTENDING COMMENTS
blood culture ngtd  sepsis resolved  c/w Zosyn  uCx Serratia, f/u sensitivities
PAtient clinically improving.  Cleared by urology. Cultures both sensitive to fluoroquinolone, qTC acceptable, to discharge on PO Abx to complete 14 days with outpatinet urology (patient has private urologist outside [facility]).    D/C planning 32 minutes
c/w zosyn  f/u cultures  apprec Urology f/u  d/w son at bedside

## 2019-09-09 NOTE — PROGRESS NOTE ADULT - ASSESSMENT
4 M with a PMHx of Parkinson's, Dementia, and BPH with a chronic indwelling pichardo found to have epididymo-orchitis and hyponatremia, urine culture growing serratia and pseudomonas, currently receiving IV zosyn.

## 2019-09-09 NOTE — PROGRESS NOTE ADULT - PROBLEM SELECTOR PROBLEM 3
Hyponatremia, hypo-osmolarity, or hypo-osmolar hyponatremia

## 2019-09-09 NOTE — PROGRESS NOTE ADULT - PROBLEM SELECTOR PLAN 6
Patient with history of Parkinson disease  - Continue with home sinemet and baclofen

## 2019-09-09 NOTE — PROGRESS NOTE ADULT - PROBLEM SELECTOR PLAN 3
Urine Na <20, U margret 182 and low serum osm suggests hypovolemia. Pt admits to poor PO intake of food and fluids over the last few days. Na now improved to 130.  - continue gentle hydration  - check BMP daily, aim for correction no faster than 6-8 meq in a 24hr period given chronic hyponatremia (>48hrs) Urine Na <20, U margret 182 and low serum osm suggests hypovolemia. Pt admits to poor PO intake of food and fluids over the last few days. Na now improved to 130.  - check BMP daily, aim for correction no faster than 6-8 meq in a 24hr period given chronic hyponatremia (>48hrs)

## 2019-09-09 NOTE — PROGRESS NOTE ADULT - REASON FOR ADMISSION
Fevers, weakness, right testicle pain

## 2019-09-09 NOTE — PROGRESS NOTE ADULT - PROBLEM SELECTOR PLAN 4
Hb 12.7 MCV 71.2 most likely 2/2 iron deficiency.   - continue iron supplementation  - o/p GI f/u

## 2019-09-09 NOTE — DISCHARGE NOTE NURSING/CASE MANAGEMENT/SOCIAL WORK - PATIENT PORTAL LINK FT
You can access the FollowMyHealth Patient Portal offered by [facility] by registering at the following website: [email or web address]. By joining [employer]’s FollowMyHealth portal, you will also be able to view your health information using other applications (apps) compatible with our system.

## 2019-09-09 NOTE — PROGRESS NOTE ADULT - PROBLEM SELECTOR PLAN 2
Chronic indwelling Pichardo with positive U/A.  - Per family, pichardo was changed 2 weeks ago, will be changed in 2 weeks   - Appreciate urology recs- will complete 14 day course for complicated UTI
Chronic indwelling Pichardo with positive U/A.  - Per family, pichardo was changed 2 weeks ago, will be changed in 2 weeks   - Appreciate urology recs- will complete 14 day course for complicated UTI
Chronic indwelling Pichardo with positive U/A.  - Per family, pichardo was changed 2 weeks ago, will be changed in 2 weeks   - Abx as above

## 2019-09-09 NOTE — PROGRESS NOTE ADULT - SUBJECTIVE AND OBJECTIVE BOX
ADALBERTO Jain, PGY 1  Pager [phone]    Patient is a 74y old  Male who presents with a chief complaint of Fevers, weakness, right testicle pain (07 Sep 2019 01:21)    SUBJECTIVE / OVERNIGHT EVENTS: No acute overnight events. Patient seen and examined at bedside this morning, appears to be resting comfortably, in NAD. Patient refused  services, translation provided by patient's son. Patient continues to report right groin pain, but says it is gradually improving. Unable to further characterize nature of pain or assign numerical rating. Denies chest pain, SOB, abdominal pain, nausea/vomiting/diarrhea, fevers/chills.     MEDICATIONS  (STANDING):  baclofen 10 milliGRAM(s) Oral two times a day  carbidopa/levodopa  25/100 1 Tablet(s) Oral <User Schedule>  carbidopa/levodopa  25/100 1.5 Tablet(s) Oral <User Schedule>  enoxaparin Injectable 40 milliGRAM(s) SubCutaneous daily  ferrous    sulfate 325 milliGRAM(s) Oral daily  influenza   Vaccine 0.5 milliLiter(s) IntraMuscular once  memantine 2.5 milliGRAM(s) Oral daily  multivitamin 1 Tablet(s) Oral daily  piperacillin/tazobactam IVPB.. 3.375 Gram(s) IV Intermittent every 8 hours  sodium chloride 0.9%. 1000 milliLiter(s) (75 mL/Hr) IV Continuous <Continuous>  tamsulosin 0.4 milliGRAM(s) Oral at bedtime  trihexyphenidyl 2 milliGRAM(s) Oral two times a day    MEDICATIONS  (PRN):  acetaminophen   Tablet .. 650 milliGRAM(s) Oral every 4 hours PRN Mild Pain (1 - 3)    Vital Signs Last 24 Hrs  T(C): 36.7 (09 Sep 2019 06:23), Max: 36.7 (09 Sep 2019 06:23)  T(F): 98.1 (09 Sep 2019 06:23), Max: 98.1 (09 Sep 2019 06:23)  HR: 65 (09 Sep 2019 06:23) (62 - 65)  BP: 140/68 (09 Sep 2019 06:23) (118/68 - 140/68)  RR: 19 (09 Sep 2019 06:23) (17 - 19)  SpO2: 100% (09 Sep 2019 06:23) (98% - 100%)    PHYSICAL EXAM:  GENERAL: elderly man appears in NAD  	CHEST/LUNG: Clear to auscultation bilaterally; No wheeze/rhonchi/rales   	HEART: Regular rate and rhythm; normal S1 S2,  No murmurs, rubs, or gallops  	ABDOMEN: Soft, Nontender, Nondistended; Bowel sounds normal  	: no swelling, erythema or discharge noted  	EXTREMITIES:  2+ Peripheral Pulses, No clubbing, cyanosis, or edema  	PSYCH: AAOx3, affect and behavior appropriate for setting  	NEUROLOGY: non-focal  SKIN: No rashes or lesions    LABS:                        11.7   7.21  )-----------( 389      ( 09 Sep 2019 06:35 )             34.1         130<L>  |  95<L>  |  4<L>  ----------------------------<  149<H>  4.4   |  21<L>  |  0.64    Ca    8.5      09 Sep 2019 06:35  Phos  3.0       Mg     2.0              Urinalysis Basic - ( 06 Sep 2019 16:35 )    Color: LIGHT YELLOW / Appearance: Lt TURBID / S.007 / pH: 6.0  Gluc: NEGATIVE / Ketone: NEGATIVE  / Bili: NEGATIVE / Urobili: NORMAL   Blood: SMALL / Protein: 10 / Nitrite: POSITIVE   Leuk Esterase: LARGE / RBC: 6-10 / WBC >50   Sq Epi: OCC / Non Sq Epi: x / Bacteria: NEGATIVE    CARDIAC MARKERS ( 06 Sep 2019 18:00 )  x     / x     / 104 u/L / x     / x          I&O's Summary    08 Sep 2019 07:01  -  09 Sep 2019 07:00  --------------------------------------------------------  IN: 1630 mL / OUT: 2600 mL / NET: -970 mL

## 2019-09-10 PROBLEM — F03.90 UNSPECIFIED DEMENTIA WITHOUT BEHAVIORAL DISTURBANCE: Chronic | Status: ACTIVE | Noted: 2019-09-07

## 2019-09-10 PROBLEM — G20 PARKINSON'S DISEASE: Chronic | Status: ACTIVE | Noted: 2019-09-07

## 2019-09-10 PROBLEM — N40.0 BENIGN PROSTATIC HYPERPLASIA WITHOUT LOWER URINARY TRACT SYMPTOMS: Chronic | Status: ACTIVE | Noted: 2019-09-07

## 2019-09-11 LAB
BACTERIA BLD CULT: SIGNIFICANT CHANGE UP
BACTERIA BLD CULT: SIGNIFICANT CHANGE UP

## 2019-09-12 DIAGNOSIS — Z71.89 OTHER SPECIFIED COUNSELING: ICD-10-CM

## 2019-09-17 ENCOUNTER — LABORATORY RESULT (OUTPATIENT)
Age: 74
End: 2019-09-17

## 2019-09-17 ENCOUNTER — OUTPATIENT (OUTPATIENT)
Dept: OUTPATIENT SERVICES | Facility: HOSPITAL | Age: 74
LOS: 1 days | End: 2019-09-17

## 2019-09-17 ENCOUNTER — APPOINTMENT (OUTPATIENT)
Dept: INTERNAL MEDICINE | Facility: CLINIC | Age: 74
End: 2019-09-17
Payer: MEDICAID

## 2019-09-17 VITALS
DIASTOLIC BLOOD PRESSURE: 70 MMHG | HEIGHT: 66 IN | SYSTOLIC BLOOD PRESSURE: 130 MMHG | HEART RATE: 79 BPM | WEIGHT: 152.12 LBS | OXYGEN SATURATION: 98 % | BODY MASS INDEX: 24.45 KG/M2

## 2019-09-17 LAB
ALBUMIN SERPL ELPH-MCNC: 4.2 G/DL — SIGNIFICANT CHANGE UP (ref 3.3–5)
ALP SERPL-CCNC: 78 U/L — SIGNIFICANT CHANGE UP (ref 40–120)
ALT FLD-CCNC: 8 U/L — SIGNIFICANT CHANGE UP (ref 4–41)
ANION GAP SERPL CALC-SCNC: 13 MMO/L — SIGNIFICANT CHANGE UP (ref 7–14)
AST SERPL-CCNC: 10 U/L — SIGNIFICANT CHANGE UP (ref 4–40)
BASOPHILS # BLD AUTO: 0.04 K/UL — SIGNIFICANT CHANGE UP (ref 0–0.2)
BASOPHILS NFR BLD AUTO: 0.4 % — SIGNIFICANT CHANGE UP (ref 0–2)
BILIRUB SERPL-MCNC: 0.3 MG/DL — SIGNIFICANT CHANGE UP (ref 0.2–1.2)
BUN SERPL-MCNC: 11 MG/DL — SIGNIFICANT CHANGE UP (ref 7–23)
CALCIUM SERPL-MCNC: 9.2 MG/DL — SIGNIFICANT CHANGE UP (ref 8.4–10.5)
CHLORIDE SERPL-SCNC: 91 MMOL/L — LOW (ref 98–107)
CO2 SERPL-SCNC: 25 MMOL/L — SIGNIFICANT CHANGE UP (ref 22–31)
CREAT SERPL-MCNC: 0.72 MG/DL — SIGNIFICANT CHANGE UP (ref 0.5–1.3)
EOSINOPHIL # BLD AUTO: 0.56 K/UL — HIGH (ref 0–0.5)
EOSINOPHIL NFR BLD AUTO: 6 % — SIGNIFICANT CHANGE UP (ref 0–6)
FERRITIN SERPL-MCNC: 34.56 NG/ML — SIGNIFICANT CHANGE UP (ref 30–400)
GLUCOSE BLDC GLUCOMTR-MCNC: 188
GLUCOSE SERPL-MCNC: 138 MG/DL — HIGH (ref 70–99)
HBA1C BLD-MCNC: 7 % — HIGH (ref 4–5.6)
HCT VFR BLD CALC: 38.8 % — LOW (ref 39–50)
HGB BLD-MCNC: 12.4 G/DL — LOW (ref 13–17)
IMM GRANULOCYTES NFR BLD AUTO: 0.4 % — SIGNIFICANT CHANGE UP (ref 0–1.5)
IRON SATN MFR SERPL: 304 UG/DL — SIGNIFICANT CHANGE UP (ref 155–535)
IRON SATN MFR SERPL: 56 UG/DL — SIGNIFICANT CHANGE UP (ref 45–165)
LYMPHOCYTES # BLD AUTO: 3.57 K/UL — HIGH (ref 1–3.3)
LYMPHOCYTES # BLD AUTO: 38.5 % — SIGNIFICANT CHANGE UP (ref 13–44)
MCHC RBC-ENTMCNC: 24.3 PG — LOW (ref 27–34)
MCHC RBC-ENTMCNC: 32 % — SIGNIFICANT CHANGE UP (ref 32–36)
MCV RBC AUTO: 75.9 FL — LOW (ref 80–100)
MONOCYTES # BLD AUTO: 0.45 K/UL — SIGNIFICANT CHANGE UP (ref 0–0.9)
MONOCYTES NFR BLD AUTO: 4.8 % — SIGNIFICANT CHANGE UP (ref 2–14)
NEUTROPHILS # BLD AUTO: 4.62 K/UL — SIGNIFICANT CHANGE UP (ref 1.8–7.4)
NEUTROPHILS NFR BLD AUTO: 49.9 % — SIGNIFICANT CHANGE UP (ref 43–77)
NRBC # FLD: 0 K/UL — SIGNIFICANT CHANGE UP (ref 0–0)
PLATELET # BLD AUTO: 467 K/UL — HIGH (ref 150–400)
PMV BLD: 8.5 FL — SIGNIFICANT CHANGE UP (ref 7–13)
POTASSIUM SERPL-MCNC: 4.1 MMOL/L — SIGNIFICANT CHANGE UP (ref 3.5–5.3)
POTASSIUM SERPL-SCNC: 4.1 MMOL/L — SIGNIFICANT CHANGE UP (ref 3.5–5.3)
PROT SERPL-MCNC: 8 G/DL — SIGNIFICANT CHANGE UP (ref 6–8.3)
RBC # BLD: 5.11 M/UL — SIGNIFICANT CHANGE UP (ref 4.2–5.8)
RBC # FLD: 14.3 % — SIGNIFICANT CHANGE UP (ref 10.3–14.5)
SODIUM SERPL-SCNC: 129 MMOL/L — LOW (ref 135–145)
TSH SERPL-MCNC: 2.3 UIU/ML — SIGNIFICANT CHANGE UP (ref 0.27–4.2)
UIBC SERPL-MCNC: 247.7 UG/DL — SIGNIFICANT CHANGE UP (ref 110–370)
WBC # BLD: 9.28 K/UL — SIGNIFICANT CHANGE UP (ref 3.8–10.5)
WBC # FLD AUTO: 9.28 K/UL — SIGNIFICANT CHANGE UP (ref 3.8–10.5)

## 2019-09-17 PROCEDURE — 99204 OFFICE O/P NEW MOD 45 MIN: CPT

## 2019-09-18 DIAGNOSIS — Z96.0 PRESENCE OF UROGENITAL IMPLANTS: ICD-10-CM

## 2019-09-18 DIAGNOSIS — D50.9 IRON DEFICIENCY ANEMIA, UNSPECIFIED: ICD-10-CM

## 2019-09-18 DIAGNOSIS — N40.1 BENIGN PROSTATIC HYPERPLASIA WITH LOWER URINARY TRACT SYMPTOMS: ICD-10-CM

## 2019-09-18 DIAGNOSIS — E87.1 HYPO-OSMOLALITY AND HYPONATREMIA: ICD-10-CM

## 2019-09-18 DIAGNOSIS — G20 PARKINSON'S DISEASE: ICD-10-CM

## 2019-09-18 DIAGNOSIS — N39.0 URINARY TRACT INFECTION, SITE NOT SPECIFIED: ICD-10-CM

## 2019-09-18 DIAGNOSIS — R77.8 OTHER SPECIFIED ABNORMALITIES OF PLASMA PROTEINS: ICD-10-CM

## 2019-09-18 DIAGNOSIS — R73.9 HYPERGLYCEMIA, UNSPECIFIED: ICD-10-CM

## 2019-09-18 LAB
HCV AB S/CO SERPL IA: 0.15 S/CO — SIGNIFICANT CHANGE UP (ref 0–0.99)
HCV AB SERPL-IMP: SIGNIFICANT CHANGE UP
HCV RNA SERPL NAA DL=5-ACNC: NOT DETECTED IU/ML — SIGNIFICANT CHANGE UP
HCV RNA SPEC NAA+PROBE-LOG IU: SIGNIFICANT CHANGE UP LOGIU/ML
HIV 1+2 AB+HIV1 P24 AG SERPL QL IA: SIGNIFICANT CHANGE UP

## 2019-09-30 ENCOUNTER — INPATIENT (INPATIENT)
Facility: HOSPITAL | Age: 74
LOS: 3 days | Discharge: HOME HEALTH | End: 2019-10-04
Attending: STUDENT IN AN ORGANIZED HEALTH CARE EDUCATION/TRAINING PROGRAM | Admitting: STUDENT IN AN ORGANIZED HEALTH CARE EDUCATION/TRAINING PROGRAM
Payer: MEDICAID

## 2019-09-30 VITALS
TEMPERATURE: 98 F | DIASTOLIC BLOOD PRESSURE: 90 MMHG | HEART RATE: 92 BPM | RESPIRATION RATE: 18 BRPM | OXYGEN SATURATION: 100 % | SYSTOLIC BLOOD PRESSURE: 174 MMHG

## 2019-09-30 LAB
ALBUMIN SERPL ELPH-MCNC: 4.3 G/DL — SIGNIFICANT CHANGE UP (ref 3.3–5)
ALP SERPL-CCNC: 81 U/L — SIGNIFICANT CHANGE UP (ref 40–120)
ALT FLD-CCNC: 8 U/L — SIGNIFICANT CHANGE UP (ref 4–41)
ANION GAP SERPL CALC-SCNC: 16 MMO/L — HIGH (ref 7–14)
APPEARANCE UR: SIGNIFICANT CHANGE UP
AST SERPL-CCNC: 12 U/L — SIGNIFICANT CHANGE UP (ref 4–40)
BACTERIA # UR AUTO: HIGH
BILIRUB SERPL-MCNC: 0.5 MG/DL — SIGNIFICANT CHANGE UP (ref 0.2–1.2)
BILIRUB UR-MCNC: NEGATIVE — SIGNIFICANT CHANGE UP
BLOOD UR QL VISUAL: HIGH
BUN SERPL-MCNC: 9 MG/DL — SIGNIFICANT CHANGE UP (ref 7–23)
CALCIUM SERPL-MCNC: 9.1 MG/DL — SIGNIFICANT CHANGE UP (ref 8.4–10.5)
CHLORIDE SERPL-SCNC: 84 MMOL/L — LOW (ref 98–107)
CO2 SERPL-SCNC: 21 MMOL/L — LOW (ref 22–31)
COLOR SPEC: SIGNIFICANT CHANGE UP
CREAT SERPL-MCNC: 0.77 MG/DL — SIGNIFICANT CHANGE UP (ref 0.5–1.3)
GLUCOSE SERPL-MCNC: 123 MG/DL — HIGH (ref 70–99)
GLUCOSE UR-MCNC: 200 — HIGH
HYALINE CASTS # UR AUTO: NEGATIVE — SIGNIFICANT CHANGE UP
KETONES UR-MCNC: NEGATIVE — SIGNIFICANT CHANGE UP
LEUKOCYTE ESTERASE UR-ACNC: SIGNIFICANT CHANGE UP
NITRITE UR-MCNC: POSITIVE — HIGH
PH UR: 6.5 — SIGNIFICANT CHANGE UP (ref 5–8)
POTASSIUM SERPL-MCNC: 4.3 MMOL/L — SIGNIFICANT CHANGE UP (ref 3.5–5.3)
POTASSIUM SERPL-SCNC: 4.3 MMOL/L — SIGNIFICANT CHANGE UP (ref 3.5–5.3)
PROT SERPL-MCNC: 7.7 G/DL — SIGNIFICANT CHANGE UP (ref 6–8.3)
PROT UR-MCNC: 20 — SIGNIFICANT CHANGE UP
RBC CASTS # UR COMP ASSIST: SIGNIFICANT CHANGE UP (ref 0–?)
SODIUM SERPL-SCNC: 121 MMOL/L — LOW (ref 135–145)
SP GR SPEC: 1.01 — SIGNIFICANT CHANGE UP (ref 1–1.04)
SQUAMOUS # UR AUTO: SIGNIFICANT CHANGE UP
UROBILINOGEN FLD QL: NORMAL — SIGNIFICANT CHANGE UP
WBC UR QL: >50 — HIGH (ref 0–?)

## 2019-09-30 RX ORDER — SODIUM CHLORIDE 9 MG/ML
500 INJECTION INTRAMUSCULAR; INTRAVENOUS; SUBCUTANEOUS ONCE
Refills: 0 | Status: COMPLETED | OUTPATIENT
Start: 2019-09-30 | End: 2019-09-30

## 2019-09-30 RX ORDER — CEPHALEXIN 500 MG
1 CAPSULE ORAL
Qty: 20 | Refills: 0
Start: 2019-09-30 | End: 2019-10-09

## 2019-09-30 RX ORDER — CEPHALEXIN 500 MG
500 CAPSULE ORAL ONCE
Refills: 0 | Status: COMPLETED | OUTPATIENT
Start: 2019-09-30 | End: 2019-09-30

## 2019-09-30 RX ORDER — SODIUM CHLORIDE 9 MG/ML
1000 INJECTION INTRAMUSCULAR; INTRAVENOUS; SUBCUTANEOUS ONCE
Refills: 0 | Status: DISCONTINUED | OUTPATIENT
Start: 2019-09-30 | End: 2019-09-30

## 2019-09-30 RX ADMIN — SODIUM CHLORIDE 500 MILLILITER(S): 9 INJECTION INTRAMUSCULAR; INTRAVENOUS; SUBCUTANEOUS at 23:46

## 2019-09-30 RX ADMIN — Medication 500 MILLIGRAM(S): at 21:35

## 2019-09-30 NOTE — ED PROVIDER NOTE - ATTENDING CONTRIBUTION TO CARE
I have personally performed a face to face bedside history and physical examination of this patient. I have discussed the history, examination, review of systems, assessment and plan of management with the resident. I have reviewed the electronic medical record and amended it to reflect my history, review of systems, physical exam, assessment and plan.    74y m PMhx BPH, had indwelling pichardo for last month, hospitalization earlier this month for hyponatremia and uti (pseudomonas and serratia, discharged on levofloxacin) dced today at urologist office at 2PM, pw urinary retention since pichardo removal. Pt complaining of suprapubic pain, no fevers/chills, no systemic symptoms.  VSS af.  Exam non-toxic, mild suprapubic distention, no r/g.  Will place pichardo, labs, ua.  Dispo pending.

## 2019-09-30 NOTE — ED PROVIDER NOTE - PROGRESS NOTE DETAILS
Isael, pgy3: UA showing signs of infx, pt asymptomatic but will empirically cover w/ Keflex, otherwise pt with indwelling pichardo, stable for dc w/ antibx ppx Isael, pgy3: upon review of past labs pt found to have significant hyponatremia in the past; rechecked Na here and found to have sodium 121; will obtain additional labs and admit pt for hyponatremia tx and monitoring Isael, pgy3: slow urinary diuresis, no concern for severe post-obstructive diuresis at this time. Serum and urine osmols ordered

## 2019-09-30 NOTE — ED ADULT NURSE NOTE - NSIMPLEMENTINTERV_GEN_ALL_ED
Implemented All Fall Risk Interventions:  [place] to call system. Call bell, personal items and telephone within reach. Instruct patient to call for assistance. Room bathroom lighting operational. Non-slip footwear when patient is off stretcher. Physically safe environment: no spills, clutter or unnecessary equipment. Stretcher in lowest position, wheels locked, appropriate side rails in place. Provide visual cue, wrist band, yellow gown, etc. Monitor gait and stability. Monitor for mental status changes and reorient to person, place, and time. Review medications for side effects contributing to fall risk. Reinforce activity limits and safety measures with patient and family.

## 2019-09-30 NOTE — ED PROVIDER NOTE - NSFOLLOWUPINSTRUCTIONS_ED_ALL_ED_FT
YOU NEED TO FOLLOW UP WITH YOUR UROLOGIST FOR CONTINUED EVALUATION AND MANAGEMENT OF YOUR URINARY RETENTION AND INDWELLING JOY

## 2019-09-30 NOTE — ED PROVIDER NOTE - OBJECTIVE STATEMENT
74y m PMhx BPH, had indwelling pichardo for last month, dced today at urologist office at 2PM, pw urinary retention since pichardo removal. Pt complaining of suprapubic pain, no fevers/chills, no systemic symptoms.

## 2019-09-30 NOTE — ED ADULT NURSE NOTE - OBJECTIVE STATEMENT
received pt in bed A and Ox 3 in pain, pt c/o inability to urinate since 2;00 pm today after Aguilar of 7 months was taken out in urology office.  abd soft non distended non tender with palpable full bladder. pt denies flank pain, fever or chills. pt seen by resident orders noted and completed.

## 2019-09-30 NOTE — ED PROVIDER NOTE - CLINICAL SUMMARY MEDICAL DECISION MAKING FREE TEXT BOX
Pt w known BPH, had indwelling pichardo dced at urologist office today, pw urinary retention and discomfort suprapubic. Will replace indwelling here, assess urine for infx, baseline renal fx ok, not worried about obstructive TUSHAR given relatively small period of retention time. Pt w/ good uro f/u will dc after indwelling replaced

## 2019-09-30 NOTE — ED ADULT TRIAGE NOTE - CHIEF COMPLAINT QUOTE
Patient had his pichardo catheter removed today at 2 pm, unable to void since then, very uncomfortable in triage.

## 2019-09-30 NOTE — ED PROVIDER NOTE - CARE PLAN
Principal Discharge DX:	Urinary retention Principal Discharge DX:	Hyponatremia  Secondary Diagnosis:	Urinary retention

## 2019-10-01 ENCOUNTER — OUTPATIENT (OUTPATIENT)
Dept: OUTPATIENT SERVICES | Facility: HOSPITAL | Age: 74
LOS: 1 days | End: 2019-10-01

## 2019-10-01 DIAGNOSIS — R33.9 RETENTION OF URINE, UNSPECIFIED: ICD-10-CM

## 2019-10-01 DIAGNOSIS — G20 PARKINSON'S DISEASE: ICD-10-CM

## 2019-10-01 DIAGNOSIS — D72.829 ELEVATED WHITE BLOOD CELL COUNT, UNSPECIFIED: ICD-10-CM

## 2019-10-01 DIAGNOSIS — Z29.9 ENCOUNTER FOR PROPHYLACTIC MEASURES, UNSPECIFIED: ICD-10-CM

## 2019-10-01 DIAGNOSIS — N39.0 URINARY TRACT INFECTION, SITE NOT SPECIFIED: ICD-10-CM

## 2019-10-01 DIAGNOSIS — E87.1 HYPO-OSMOLALITY AND HYPONATREMIA: ICD-10-CM

## 2019-10-01 DIAGNOSIS — F03.90 UNSPECIFIED DEMENTIA WITHOUT BEHAVIORAL DISTURBANCE: ICD-10-CM

## 2019-10-01 DIAGNOSIS — Z91.89 OTHER SPECIFIED PERSONAL RISK FACTORS, NOT ELSEWHERE CLASSIFIED: ICD-10-CM

## 2019-10-01 DIAGNOSIS — N40.0 BENIGN PROSTATIC HYPERPLASIA WITHOUT LOWER URINARY TRACT SYMPTOMS: ICD-10-CM

## 2019-10-01 LAB
ALBUMIN SERPL ELPH-MCNC: 4 G/DL — SIGNIFICANT CHANGE UP (ref 3.3–5)
ALP SERPL-CCNC: 81 U/L — SIGNIFICANT CHANGE UP (ref 40–120)
ALT FLD-CCNC: 9 U/L — SIGNIFICANT CHANGE UP (ref 4–41)
ANION GAP SERPL CALC-SCNC: 12 MMO/L — SIGNIFICANT CHANGE UP (ref 7–14)
ANION GAP SERPL CALC-SCNC: 13 MMO/L — SIGNIFICANT CHANGE UP (ref 7–14)
ANION GAP SERPL CALC-SCNC: 14 MMO/L — SIGNIFICANT CHANGE UP (ref 7–14)
ANION GAP SERPL CALC-SCNC: 17 MMO/L — HIGH (ref 7–14)
ANISOCYTOSIS BLD QL: SLIGHT — SIGNIFICANT CHANGE UP
AST SERPL-CCNC: 14 U/L — SIGNIFICANT CHANGE UP (ref 4–40)
BASOPHILS # BLD AUTO: 0.03 K/UL — SIGNIFICANT CHANGE UP (ref 0–0.2)
BASOPHILS # BLD AUTO: 0.06 K/UL — SIGNIFICANT CHANGE UP (ref 0–0.2)
BASOPHILS NFR BLD AUTO: 0.3 % — SIGNIFICANT CHANGE UP (ref 0–2)
BASOPHILS NFR BLD AUTO: 0.4 % — SIGNIFICANT CHANGE UP (ref 0–2)
BASOPHILS NFR SPEC: 0 % — SIGNIFICANT CHANGE UP (ref 0–2)
BILIRUB SERPL-MCNC: 0.7 MG/DL — SIGNIFICANT CHANGE UP (ref 0.2–1.2)
BLASTS # FLD: 0 % — SIGNIFICANT CHANGE UP (ref 0–0)
BUN SERPL-MCNC: 6 MG/DL — LOW (ref 7–23)
BUN SERPL-MCNC: 6 MG/DL — LOW (ref 7–23)
BUN SERPL-MCNC: 7 MG/DL — SIGNIFICANT CHANGE UP (ref 7–23)
BUN SERPL-MCNC: 7 MG/DL — SIGNIFICANT CHANGE UP (ref 7–23)
CALCIUM SERPL-MCNC: 9.1 MG/DL — SIGNIFICANT CHANGE UP (ref 8.4–10.5)
CALCIUM SERPL-MCNC: 9.2 MG/DL — SIGNIFICANT CHANGE UP (ref 8.4–10.5)
CHLORIDE SERPL-SCNC: 89 MMOL/L — LOW (ref 98–107)
CHLORIDE SERPL-SCNC: 91 MMOL/L — LOW (ref 98–107)
CHLORIDE SERPL-SCNC: 94 MMOL/L — LOW (ref 98–107)
CHLORIDE SERPL-SCNC: 94 MMOL/L — LOW (ref 98–107)
CHLORIDE UR-SCNC: 28 MMOL/L — SIGNIFICANT CHANGE UP
CO2 SERPL-SCNC: 20 MMOL/L — LOW (ref 22–31)
CO2 SERPL-SCNC: 20 MMOL/L — LOW (ref 22–31)
CO2 SERPL-SCNC: 23 MMOL/L — SIGNIFICANT CHANGE UP (ref 22–31)
CO2 SERPL-SCNC: 23 MMOL/L — SIGNIFICANT CHANGE UP (ref 22–31)
CORTIS SERPL-MCNC: 6.8 UG/DL — SIGNIFICANT CHANGE UP (ref 2.7–18.4)
CREAT SERPL-MCNC: 0.61 MG/DL — SIGNIFICANT CHANGE UP (ref 0.5–1.3)
CREAT SERPL-MCNC: 0.63 MG/DL — SIGNIFICANT CHANGE UP (ref 0.5–1.3)
CREAT SERPL-MCNC: 0.65 MG/DL — SIGNIFICANT CHANGE UP (ref 0.5–1.3)
CREAT SERPL-MCNC: 0.65 MG/DL — SIGNIFICANT CHANGE UP (ref 0.5–1.3)
EOSINOPHIL # BLD AUTO: 0.22 K/UL — SIGNIFICANT CHANGE UP (ref 0–0.5)
EOSINOPHIL # BLD AUTO: 0.47 K/UL — SIGNIFICANT CHANGE UP (ref 0–0.5)
EOSINOPHIL NFR BLD AUTO: 1.5 % — SIGNIFICANT CHANGE UP (ref 0–6)
EOSINOPHIL NFR BLD AUTO: 4.7 % — SIGNIFICANT CHANGE UP (ref 0–6)
EOSINOPHIL NFR FLD: 0 % — SIGNIFICANT CHANGE UP (ref 0–6)
GIANT PLATELETS BLD QL SMEAR: PRESENT — SIGNIFICANT CHANGE UP
GLUCOSE SERPL-MCNC: 103 MG/DL — HIGH (ref 70–99)
GLUCOSE SERPL-MCNC: 137 MG/DL — HIGH (ref 70–99)
GLUCOSE SERPL-MCNC: 153 MG/DL — HIGH (ref 70–99)
GLUCOSE SERPL-MCNC: 261 MG/DL — HIGH (ref 70–99)
HCT VFR BLD CALC: 37.1 % — LOW (ref 39–50)
HCT VFR BLD CALC: 37.5 % — LOW (ref 39–50)
HGB BLD-MCNC: 12.3 G/DL — LOW (ref 13–17)
HGB BLD-MCNC: 12.9 G/DL — LOW (ref 13–17)
IMM GRANULOCYTES NFR BLD AUTO: 0.4 % — SIGNIFICANT CHANGE UP (ref 0–1.5)
IMM GRANULOCYTES NFR BLD AUTO: 0.4 % — SIGNIFICANT CHANGE UP (ref 0–1.5)
LYMPHOCYTES # BLD AUTO: 23.3 % — SIGNIFICANT CHANGE UP (ref 13–44)
LYMPHOCYTES # BLD AUTO: 3.4 K/UL — HIGH (ref 1–3.3)
LYMPHOCYTES # BLD AUTO: 3.53 K/UL — HIGH (ref 1–3.3)
LYMPHOCYTES # BLD AUTO: 35.5 % — SIGNIFICANT CHANGE UP (ref 13–44)
LYMPHOCYTES NFR SPEC AUTO: 10.6 % — LOW (ref 13–44)
MAGNESIUM SERPL-MCNC: 1.9 MG/DL — SIGNIFICANT CHANGE UP (ref 1.6–2.6)
MAGNESIUM SERPL-MCNC: 2 MG/DL — SIGNIFICANT CHANGE UP (ref 1.6–2.6)
MCHC RBC-ENTMCNC: 24.3 PG — LOW (ref 27–34)
MCHC RBC-ENTMCNC: 24.7 PG — LOW (ref 27–34)
MCHC RBC-ENTMCNC: 33.2 % — SIGNIFICANT CHANGE UP (ref 32–36)
MCHC RBC-ENTMCNC: 34.4 % — SIGNIFICANT CHANGE UP (ref 32–36)
MCV RBC AUTO: 71.8 FL — LOW (ref 80–100)
MCV RBC AUTO: 73.2 FL — LOW (ref 80–100)
METAMYELOCYTES # FLD: 0 % — SIGNIFICANT CHANGE UP (ref 0–1)
MICROCYTES BLD QL: SIGNIFICANT CHANGE UP
MONOCYTES # BLD AUTO: 0.43 K/UL — SIGNIFICANT CHANGE UP (ref 0–0.9)
MONOCYTES # BLD AUTO: 0.6 K/UL — SIGNIFICANT CHANGE UP (ref 0–0.9)
MONOCYTES NFR BLD AUTO: 4.1 % — SIGNIFICANT CHANGE UP (ref 2–14)
MONOCYTES NFR BLD AUTO: 4.3 % — SIGNIFICANT CHANGE UP (ref 2–14)
MONOCYTES NFR BLD: 3.5 % — SIGNIFICANT CHANGE UP (ref 2–9)
MYELOCYTES NFR BLD: 0 % — SIGNIFICANT CHANGE UP (ref 0–0)
NEUTROPHIL AB SER-ACNC: 72.6 % — SIGNIFICANT CHANGE UP (ref 43–77)
NEUTROPHILS # BLD AUTO: 10.24 K/UL — HIGH (ref 1.8–7.4)
NEUTROPHILS # BLD AUTO: 5.45 K/UL — SIGNIFICANT CHANGE UP (ref 1.8–7.4)
NEUTROPHILS NFR BLD AUTO: 54.8 % — SIGNIFICANT CHANGE UP (ref 43–77)
NEUTROPHILS NFR BLD AUTO: 70.3 % — SIGNIFICANT CHANGE UP (ref 43–77)
NEUTS BAND # BLD: 0 % — SIGNIFICANT CHANGE UP (ref 0–6)
NRBC # FLD: 0 K/UL — SIGNIFICANT CHANGE UP (ref 0–0)
NRBC # FLD: 0 K/UL — SIGNIFICANT CHANGE UP (ref 0–0)
OSMOLALITY SERPL: 266 MOSMO/KG — LOW (ref 275–295)
OSMOLALITY UR: 112 MOSMO/KG — SIGNIFICANT CHANGE UP (ref 50–1200)
OSMOLALITY UR: 276 MOSMO/KG — SIGNIFICANT CHANGE UP (ref 50–1200)
OTHER - HEMATOLOGY %: 0 — SIGNIFICANT CHANGE UP
PHOSPHATE SERPL-MCNC: 3.2 MG/DL — SIGNIFICANT CHANGE UP (ref 2.5–4.5)
PHOSPHATE SERPL-MCNC: 3.7 MG/DL — SIGNIFICANT CHANGE UP (ref 2.5–4.5)
PLATELET # BLD AUTO: 407 K/UL — HIGH (ref 150–400)
PLATELET # BLD AUTO: 427 K/UL — HIGH (ref 150–400)
PLATELET COUNT - ESTIMATE: NORMAL — SIGNIFICANT CHANGE UP
PMV BLD: 8.3 FL — SIGNIFICANT CHANGE UP (ref 7–13)
PMV BLD: 8.7 FL — SIGNIFICANT CHANGE UP (ref 7–13)
POIKILOCYTOSIS BLD QL AUTO: SLIGHT — SIGNIFICANT CHANGE UP
POTASSIUM SERPL-MCNC: 3.8 MMOL/L — SIGNIFICANT CHANGE UP (ref 3.5–5.3)
POTASSIUM SERPL-MCNC: 3.8 MMOL/L — SIGNIFICANT CHANGE UP (ref 3.5–5.3)
POTASSIUM SERPL-MCNC: 4 MMOL/L — SIGNIFICANT CHANGE UP (ref 3.5–5.3)
POTASSIUM SERPL-MCNC: 4.1 MMOL/L — SIGNIFICANT CHANGE UP (ref 3.5–5.3)
POTASSIUM SERPL-SCNC: 3.8 MMOL/L — SIGNIFICANT CHANGE UP (ref 3.5–5.3)
POTASSIUM SERPL-SCNC: 3.8 MMOL/L — SIGNIFICANT CHANGE UP (ref 3.5–5.3)
POTASSIUM SERPL-SCNC: 4 MMOL/L — SIGNIFICANT CHANGE UP (ref 3.5–5.3)
POTASSIUM SERPL-SCNC: 4.1 MMOL/L — SIGNIFICANT CHANGE UP (ref 3.5–5.3)
POTASSIUM UR-SCNC: 9.7 MMOL/L — SIGNIFICANT CHANGE UP
PROMYELOCYTES # FLD: 0 % — SIGNIFICANT CHANGE UP (ref 0–0)
PROT SERPL-MCNC: 7.2 G/DL — SIGNIFICANT CHANGE UP (ref 6–8.3)
RBC # BLD: 5.07 M/UL — SIGNIFICANT CHANGE UP (ref 4.2–5.8)
RBC # BLD: 5.22 M/UL — SIGNIFICANT CHANGE UP (ref 4.2–5.8)
RBC # FLD: 14 % — SIGNIFICANT CHANGE UP (ref 10.3–14.5)
RBC # FLD: 14.3 % — SIGNIFICANT CHANGE UP (ref 10.3–14.5)
SODIUM SERPL-SCNC: 126 MMOL/L — LOW (ref 135–145)
SODIUM SERPL-SCNC: 127 MMOL/L — LOW (ref 135–145)
SODIUM SERPL-SCNC: 128 MMOL/L — LOW (ref 135–145)
SODIUM SERPL-SCNC: 129 MMOL/L — LOW (ref 135–145)
SODIUM UR-SCNC: 33 MMOL/L — SIGNIFICANT CHANGE UP
SODIUM UR-SCNC: 53 MMOL/L — SIGNIFICANT CHANGE UP
SPECIMEN SOURCE: SIGNIFICANT CHANGE UP
VARIANT LYMPHS # BLD: 13.3 % — SIGNIFICANT CHANGE UP
WBC # BLD: 14.58 K/UL — HIGH (ref 3.8–10.5)
WBC # BLD: 9.95 K/UL — SIGNIFICANT CHANGE UP (ref 3.8–10.5)
WBC # FLD AUTO: 14.58 K/UL — HIGH (ref 3.8–10.5)
WBC # FLD AUTO: 9.95 K/UL — SIGNIFICANT CHANGE UP (ref 3.8–10.5)

## 2019-10-01 PROCEDURE — 99223 1ST HOSP IP/OBS HIGH 75: CPT

## 2019-10-01 PROCEDURE — 12345: CPT | Mod: NC

## 2019-10-01 PROCEDURE — 99223 1ST HOSP IP/OBS HIGH 75: CPT | Mod: GC

## 2019-10-01 RX ORDER — TRIHEXYPHENIDYL HCL 2 MG
2 TABLET ORAL
Refills: 0 | Status: DISCONTINUED | OUTPATIENT
Start: 2019-10-01 | End: 2019-10-04

## 2019-10-01 RX ORDER — PIPERACILLIN AND TAZOBACTAM 4; .5 G/20ML; G/20ML
3.38 INJECTION, POWDER, LYOPHILIZED, FOR SOLUTION INTRAVENOUS ONCE
Refills: 0 | Status: COMPLETED | OUTPATIENT
Start: 2019-10-01 | End: 2019-10-01

## 2019-10-01 RX ORDER — CARBIDOPA AND LEVODOPA 25; 100 MG/1; MG/1
1 TABLET ORAL
Qty: 0 | Refills: 0 | DISCHARGE

## 2019-10-01 RX ORDER — CEFTRIAXONE 500 MG/1
1000 INJECTION, POWDER, FOR SOLUTION INTRAMUSCULAR; INTRAVENOUS ONCE
Refills: 0 | Status: COMPLETED | OUTPATIENT
Start: 2019-10-01 | End: 2019-10-01

## 2019-10-01 RX ORDER — TAMSULOSIN HYDROCHLORIDE 0.4 MG/1
0.4 CAPSULE ORAL AT BEDTIME
Refills: 0 | Status: DISCONTINUED | OUTPATIENT
Start: 2019-10-01 | End: 2019-10-04

## 2019-10-01 RX ORDER — CARBIDOPA AND LEVODOPA 25; 100 MG/1; MG/1
1 TABLET ORAL THREE TIMES A DAY
Refills: 0 | Status: DISCONTINUED | OUTPATIENT
Start: 2019-10-01 | End: 2019-10-04

## 2019-10-01 RX ORDER — MORPHINE SULFATE 50 MG/1
1 CAPSULE, EXTENDED RELEASE ORAL ONCE
Refills: 0 | Status: DISCONTINUED | OUTPATIENT
Start: 2019-10-01 | End: 2019-10-01

## 2019-10-01 RX ORDER — SODIUM CHLORIDE 9 MG/ML
1000 INJECTION, SOLUTION INTRAVENOUS
Refills: 0 | Status: DISCONTINUED | OUTPATIENT
Start: 2019-10-01 | End: 2019-10-01

## 2019-10-01 RX ORDER — DONEPEZIL HYDROCHLORIDE 10 MG/1
2.5 TABLET, FILM COATED ORAL AT BEDTIME
Refills: 0 | Status: DISCONTINUED | OUTPATIENT
Start: 2019-10-01 | End: 2019-10-04

## 2019-10-01 RX ORDER — ATROPA BELLADONNA AND OPIUM 16.2; 6 MG/1; MG/1
1 SUPPOSITORY RECTAL EVERY 8 HOURS
Refills: 0 | Status: DISCONTINUED | OUTPATIENT
Start: 2019-10-01 | End: 2019-10-04

## 2019-10-01 RX ORDER — SODIUM CHLORIDE 9 MG/ML
1000 INJECTION, SOLUTION INTRAVENOUS
Refills: 0 | Status: COMPLETED | OUTPATIENT
Start: 2019-10-01 | End: 2019-10-01

## 2019-10-01 RX ORDER — MEMANTINE HYDROCHLORIDE 10 MG/1
2.5 TABLET ORAL DAILY
Refills: 0 | Status: DISCONTINUED | OUTPATIENT
Start: 2019-10-01 | End: 2019-10-04

## 2019-10-01 RX ORDER — BACLOFEN 100 %
10 POWDER (GRAM) MISCELLANEOUS
Refills: 0 | Status: DISCONTINUED | OUTPATIENT
Start: 2019-10-01 | End: 2019-10-04

## 2019-10-01 RX ORDER — PIPERACILLIN AND TAZOBACTAM 4; .5 G/20ML; G/20ML
3.38 INJECTION, POWDER, LYOPHILIZED, FOR SOLUTION INTRAVENOUS EVERY 8 HOURS
Refills: 0 | Status: DISCONTINUED | OUTPATIENT
Start: 2019-10-01 | End: 2019-10-04

## 2019-10-01 RX ORDER — CARBIDOPA AND LEVODOPA 25; 100 MG/1; MG/1
1.5 TABLET ORAL
Qty: 0 | Refills: 0 | DISCHARGE

## 2019-10-01 RX ORDER — ENOXAPARIN SODIUM 100 MG/ML
40 INJECTION SUBCUTANEOUS DAILY
Refills: 0 | Status: DISCONTINUED | OUTPATIENT
Start: 2019-10-01 | End: 2019-10-04

## 2019-10-01 RX ADMIN — ENOXAPARIN SODIUM 40 MILLIGRAM(S): 100 INJECTION SUBCUTANEOUS at 11:51

## 2019-10-01 RX ADMIN — Medication 30 MILLILITER(S): at 07:43

## 2019-10-01 RX ADMIN — Medication 1 TABLET(S): at 11:52

## 2019-10-01 RX ADMIN — Medication 2 MILLIGRAM(S): at 21:52

## 2019-10-01 RX ADMIN — Medication 10 MILLIGRAM(S): at 21:51

## 2019-10-01 RX ADMIN — SODIUM CHLORIDE 1000 MILLILITER(S): 9 INJECTION, SOLUTION INTRAVENOUS at 15:11

## 2019-10-01 RX ADMIN — TAMSULOSIN HYDROCHLORIDE 0.4 MILLIGRAM(S): 0.4 CAPSULE ORAL at 21:53

## 2019-10-01 RX ADMIN — PIPERACILLIN AND TAZOBACTAM 25 GRAM(S): 4; .5 INJECTION, POWDER, LYOPHILIZED, FOR SOLUTION INTRAVENOUS at 21:52

## 2019-10-01 RX ADMIN — PIPERACILLIN AND TAZOBACTAM 200 GRAM(S): 4; .5 INJECTION, POWDER, LYOPHILIZED, FOR SOLUTION INTRAVENOUS at 13:48

## 2019-10-01 RX ADMIN — CEFTRIAXONE 100 MILLIGRAM(S): 500 INJECTION, POWDER, FOR SOLUTION INTRAMUSCULAR; INTRAVENOUS at 00:25

## 2019-10-01 RX ADMIN — CARBIDOPA AND LEVODOPA 1 TABLET(S): 25; 100 TABLET ORAL at 21:52

## 2019-10-01 RX ADMIN — DONEPEZIL HYDROCHLORIDE 2.5 MILLIGRAM(S): 10 TABLET, FILM COATED ORAL at 21:51

## 2019-10-01 RX ADMIN — CARBIDOPA AND LEVODOPA 1 TABLET(S): 25; 100 TABLET ORAL at 15:17

## 2019-10-01 RX ADMIN — MEMANTINE HYDROCHLORIDE 2.5 MILLIGRAM(S): 10 TABLET ORAL at 11:51

## 2019-10-01 RX ADMIN — CEFTRIAXONE 1000 MILLIGRAM(S): 500 INJECTION, POWDER, FOR SOLUTION INTRAMUSCULAR; INTRAVENOUS at 00:55

## 2019-10-01 RX ADMIN — SODIUM CHLORIDE 500 MILLILITER(S): 9 INJECTION INTRAMUSCULAR; INTRAVENOUS; SUBCUTANEOUS at 00:45

## 2019-10-01 NOTE — H&P ADULT - PROBLEM SELECTOR PLAN 7
Son requesting Clonazepam however no prescription found on ISTOP. As per outpt records, Clonazepam was discontinued due to worsening lethargy therefore will hold off on restarting as pt has been off of it for 1 month DVT ppx: lovenox  Diet: Regular  PT eval  Son requesting Clonazepam however no prescription found on ISTOP. As per outpt records, Clonazepam was discontinued due to worsening lethargy therefore will hold off on restarting as pt has been off of it for 1 month

## 2019-10-01 NOTE — H&P ADULT - PROBLEM SELECTOR PLAN 1
-Pt with euvolemic hyponatremia. Urine studies with limited utility in the setting of IVF bolus and being post retention   -Na >20, Osmolality >100 consistent with SIADH vs adrenal insufficiency vs less likely 2/2 to hypervolemia in the setting of urinary retention. Low suspicion for hypothyroidism given normal value obtained ~2 weeks ago  -Follow up repeat CMP s/p pichardo insertion   -Avoid correcting more than 6-8 mEq over 24 hours -Pt with euvolemic hyponatremia. Urine studies with limited utility in the setting of IVF bolus and being post retention   -Na >20, Osmolality >100 consistent with SIADH vs adrenal insufficiency vs less likely 2/2 to hypervolemia in the setting of urinary retention. Low suspicion for hypothyroidism given normal value obtained ~2 weeks ago  -Follow up repeat CMP s/p pichardo insertion. Continue to trend q8h  -Avoid correcting more than 6-8 mEq over 24 hours -Pt with euvolemic hyponatremia. Urine studies with limited utility in the setting of IVF bolus and being post retention   -Na >20, Osmolality >100 consistent with SIADH vs adrenal insufficiency vs less likely 2/2 to hypervolemia in the setting of urinary retention. Low suspicion for hypothyroidism given normal value obtained ~2 weeks ago  -Follow up repeat CMP s/p pichardo insertion. Continue to trend q8h  -Avoid correcting more than 6-8 mEq over 24 hours    Addendum:  Repeat sodium 128, pt with ~7 mEq correction. Will repeat BMP in 4 hours, if continues to increase, will need to start on IVF to slow correction -Pt with euvolemic hyponatremia. Urine studies with limited utility in the setting of IVF bolus and being post retention   -Na >20, Osmolality >100 consistent with SIADH vs adrenal insufficiency vs 2/2 to hypervolemia in the setting of urinary retention. Low suspicion for hypothyroidism given normal value obtained ~2 weeks ago  -Follow up repeat CMP s/p pichardo insertion. Continue to trend q8h  -Avoid correcting more than 6-8 mEq over 24 hours    Addendum:  Repeat sodium 128, pt with ~7 mEq correction. Will repeat BMP in 4 hours, if continues to increase, will need to start on IVF to slow correction

## 2019-10-01 NOTE — ADDENDUM
[FreeTextEntry1] : Will need AM cortisol testing for hyponatremia evaluation given prior evaluation and mild elev eos. \par A1c 7%, at goal for DM, but unclear if prior Dx, will discuss at follow up visit.

## 2019-10-01 NOTE — PROGRESS NOTE ADULT - SUBJECTIVE AND OBJECTIVE BOX
Eric Thompson DO  Division of Hospital Medicine  Pager # [phone]    Patient is a 74y old  Male who presents with a chief complaint of suprapubic abdominal pain (01 Oct 2019 13:11)       INTERVAL HPI/OVERNIGHT EVENTS: Pt laying in bed, reports mild discomfort around pichardo catheter site. No nausea, vomiting, headaches, confusion.     MEDICATIONS (STANDING):  baclofen 10 milliGRAM(s) Oral two times a day  carbidopa/levodopa  25/100 1 Tablet(s) Oral three times a day  dextrose 5%. 1000 milliLiter(s) IV Continuous <Continuous>  donepezil 2.5 milliGRAM(s) Oral at bedtime  enoxaparin Injectable 40 milliGRAM(s) SubCutaneous daily  memantine 2.5 milliGRAM(s) Oral daily  multivitamin 1 Tablet(s) Oral daily  piperacillin/tazobactam IVPB.. 3.375 Gram(s) IV Intermittent every 8 hours  tamsulosin 0.4 milliGRAM(s) Oral at bedtime  trihexyphenidyl 2 milliGRAM(s) Oral two times a day    MEDICATIONS  (PRN):  aluminum hydroxide/magnesium hydroxide/simethicone Suspension 30 milliLiter(s) Oral every 4 hours PRN        T(F): 98.4 (10-01-19 @ 08:22), Max: 98.4 (10-01-19 @ 05:27)  HR: 66 (10-01-19 @ 08:22) (65 - 92)  BP: 134/67 (10-01-19 @ 08:22) (116/51 - 174/90)  RR: 18 (10-01-19 @ 08:22) (16 - 18)  SpO2: 98% (10-01-19 @ 08:22) (98% - 100%)  Wt(kg): --  CAPILLARY BLOOD GLUCOSE        I&O's Summary      PHYSICAL EXAM:  GENERAL: NAD, elderly  EYES: EOMI, PERRLA, conjunctiva and sclera clear  ENMT: No tonsillar erythema, exudates, or enlargement; Moist mucous membranes  NECK: Supple, No JVD  NERVOUS SYSTEM:  Alert, nonfocal exam   CHEST/LUNG: Clear to auscultation bilaterally, no wheeze  HEART: Regular rate and rhythm; No murmurs, rubs, or gallops  ABDOMEN: Soft, Nontender, Nondistended; Bowel sounds present  : pichardo in place draining yellow urine  EXTREMITIES:  wwp, no edema   SKIN: No rashes or lesions    LABS:                        12.3   9.95  )-----------( 407      ( 01 Oct 2019 05:05 )             37.1     10-    129<L>  |  94<L>  |  6<L>  ----------------------------<  153<H>  3.8   |  23  |  0.65    Ca    9.2      01 Oct 2019 10:15  Phos  3.7     10-  Mg     2.0     10-01    TPro  7.2  /  Alb  4.0  /  TBili  0.7  /  DBili  x   /  AST  14  /  ALT  9   /  AlkPhos  81  10-01      Urinalysis Basic - ( 30 Sep 2019 20:35 )    Color: LIGHT YELLOW / Appearance: Lt TURBID / S.012 / pH: 6.5  Gluc: 200 / Ketone: NEGATIVE  / Bili: NEGATIVE / Urobili: NORMAL   Blood: MODERATE / Protein: 20 / Nitrite: POSITIVE   Leuk Esterase: LARGE / RBC: 25-50 / WBC >50   Sq Epi: OCC / Non Sq Epi: x / Bacteria: MANY          RADIOLOGY & ADDITIONAL TESTS:    Consultant notes reviewed [x]     Plan discussed with DEIRDRE Cifuentes, pt, son, renal fellow, and PMD, Dr. Valverde

## 2019-10-01 NOTE — ED ADULT NURSE REASSESSMENT NOTE - NS ED NURSE REASSESS COMMENT FT1
Pt received awake and alert appears comfortable, pt with Aguilar in placed clear yellow drainage noted. Pt is afebrile vs wnl, Pt awaiting floor bed.

## 2019-10-01 NOTE — PROGRESS NOTE ADULT - ASSESSMENT
74M w/PMH of Parkinson's, Dementia, and BPH with a chronic indwelling pichardo w/recent admission for epididymo-orchitis presents to the ED with urinary retention c/b hyponatremia, UCX w/GNR.

## 2019-10-01 NOTE — PHYSICAL THERAPY INITIAL EVALUATION ADULT - ADDITIONAL COMMENTS
As per patient's son, patient lives with son in house, 4 steps to enter, ambulated with walker and assistance.

## 2019-10-01 NOTE — H&P ADULT - PROBLEM SELECTOR PLAN 3
-Pt with a leukocytosis however without other signs of infection. Suprapubic pain resolved with pichardo insertion. No fevers or chills. 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 S/p 1x dose of CTX. Will monitor off antibiotics for now. Continue to trend WBC No

## 2019-10-01 NOTE — H&P ADULT - PROBLEM SELECTOR PLAN 2
-in the setting of known BPH. Failed TOV. Pichardo reinserted in ED with good output. Renal function intact   -continue with pichardo for now  -c/w flomax

## 2019-10-01 NOTE — PROGRESS NOTE ADULT - ATTENDING COMMENTS
Discussed pts case with PMD, concern for adrenal insufficiency and persistent hyponatremia, planned for outpt stim test. Will call endocrine inpt for eval.

## 2019-10-01 NOTE — CONSULT NOTE ADULT - SUBJECTIVE AND OBJECTIVE BOX
[facility] DIVISION OF KIDNEY DISEASES AND HYPERTENSION -- [phone]  -- INITIAL CONSULT NOTE  --------------------------------------------------------------------------------  HPI: 74 M with a PMHx of Parkinson's, Dementia, and BPH with a chronic indwelling pichardo w/ recent admission for epididymo-orchitis presents to the ED with suprapubic abdominal pain and no urine output for a few hours. Son providing history as per patient preference. Son reports pt was seen at urology's office earlier that evening with removal of his pichardo. States he began to complain of suprapubic abdominal pain shorty after and was unable to urinate therefore was brought in by son for eval. Son also notes that pt has appeared more lethargic but otherwise reports he is at baseline mental status and has been eating/drinking 3 meals a day. Denies fevers, chills, chest pain, shortness of breath, LE edema or changes in urine smell/appearance. Of note, pt was recently admitted mid September for epididymo-orchitis with cultures growing serratia and pseudomonas treated with zosyn with levaquin to complete 2 week course. Also found to be hyponatremic to 121 at the time which was attributed to poor PO intake. In the ED, VSS. Pichardo catheter was inserted with drainage of urine.    Nephrology consulted for hyponatremia. Pt with sodium of 121 at approximately 10PM last night (9/30), improved today to 129 (10/1). Pt has received a 500cc bolus of NS last night around 12AM. On recent admission in early September, pt was also noted with hyponatremia (ranging from 121 to 130). Serum osm of 266, urine osm of 112, urine sodium 33.         PAST HISTORY  --------------------------------------------------------------------------------  PAST MEDICAL & SURGICAL HISTORY:  Dementia  Parkinson disease  BPH (benign prostatic hyperplasia)  No significant past surgical history    FAMILY HISTORY:  No pertinent family history in first degree relatives    PAST SOCIAL HISTORY:    ALLERGIES & MEDICATIONS  --------------------------------------------------------------------------------  Allergies    No Known Allergies    Intolerances      Standing Inpatient Medications  baclofen 10 milliGRAM(s) Oral two times a day  carbidopa/levodopa  25/100 1 Tablet(s) Oral three times a day  dextrose 5%. 1000 milliLiter(s) IV Continuous <Continuous>  donepezil 2.5 milliGRAM(s) Oral at bedtime  enoxaparin Injectable 40 milliGRAM(s) SubCutaneous daily  memantine 2.5 milliGRAM(s) Oral daily  multivitamin 1 Tablet(s) Oral daily  piperacillin/tazobactam IVPB. 3.375 Gram(s) IV Intermittent once  piperacillin/tazobactam IVPB.. 3.375 Gram(s) IV Intermittent every 8 hours  tamsulosin 0.4 milliGRAM(s) Oral at bedtime  trihexyphenidyl 2 milliGRAM(s) Oral two times a day    PRN Inpatient Medications  aluminum hydroxide/magnesium hydroxide/simethicone Suspension 30 milliLiter(s) Oral every 4 hours PRN      REVIEW OF SYSTEMS  --------------------------------------------------------------------------------  Gen: No fevers/chills  Skin: No rashes  Head/Eyes/Ears: Normal hearing,   Respiratory: No dyspnea, cough  CV: No chest pain  GI: No abdominal pain, diarrhea  : No dysuria, hematuria  MSK: No  edema  Heme: No easy bruising or bleeding  Psych: No significant depression    All other systems were reviewed and are negative, except as noted.    VITALS/PHYSICAL EXAM  --------------------------------------------------------------------------------  T(C): 36.9 (10-01-19 @ 08:22), Max: 36.9 (10-01-19 @ 05:27)  HR: 66 (10-01-19 @ 08:22) (65 - 92)  BP: 134/67 (10-01-19 @ 08:22) (116/51 - 174/90)  RR: 18 (10-01-19 @ 08:22) (16 - 18)  SpO2: 98% (10-01-19 @ 08:22) (98% - 100%)  Wt(kg): --        Physical Exam:  	Gen: NAD  	HEENT: MMM  	Pulm: CTA B/L  	CV: S1S2  	Abd: Soft, +BS   	Ext: No LE edema B/L  	Neuro: Awake  	Skin: Warm and dry  	Vascular access:    LABS/STUDIES  --------------------------------------------------------------------------------              12.3   9.95  >-----------<  407      [10-01-19 @ 05:05]              37.1     129  |  94  |  6   ----------------------------<  153      [10-01-19 @ 10:15]  3.8   |  23  |  0.65        Ca     9.2     [10-01-19 @ 10:15]      Mg     2.0     [10-01-19 @ 05:05]      Phos  3.7     [10-01-19 @ 05:05]    TPro  7.2  /  Alb  4.0  /  TBili  0.7  /  DBili  x   /  AST  14  /  ALT  9   /  AlkPhos  81  [10-01-19 @ 05:05]        Serum Osmolality 266      [10-01-19 @ 05:15]    Creatinine Trend:  SCr 0.65 [10-01 @ 10:15]  SCr 0.63 [10-01 @ 05:05]  SCr 0.77 [09-30 @ 21:40]  SCr 0.72 [09-17 @ 15:20]  SCr 0.64 [09-09 @ 06:35]    Urinalysis - [09-30-19 @ 20:35]      Color LIGHT YELLOW / Appearance Lt TURBID / SG 1.012 / pH 6.5      Gluc 200 / Ketone NEGATIVE  / Bili NEGATIVE / Urobili NORMAL       Blood MODERATE / Protein 20 / Leuk Est LARGE / Nitrite POSITIVE      RBC 25-50 / WBC >50 / Hyaline NEGATIVE / Gran  / Sq Epi OCC / Non Sq Epi  / Bacteria MANY    Urine Sodium 33      [09-30-19 @ 23:44]  Urine Potassium 9.7      [09-30-19 @ 23:44]  Urine Chloride 28      [09-30-19 @ 23:44]  Urine Osmolality 112      [10-01-19 @ 00:55]    Iron 56, TIBC 304, %sat --      [09-17-19 @ 15:20]  Ferritin 34.56      [09-17-19 @ 15:20]  HbA1c 7.0      [09-17-19 @ 15:20]  TSH 2.30      [09-17-19 @ 15:20]    HCV 0.15, Nonreactive Hepatitis C AB  S/CO Ratio                        Interpretation  < 1.00                                   Non-Reactive  1.00 - 4.99                         Weakly-Reactive  >= 5.00                                Reactive  Non-Reactive: Aperson with a non-reactive HCV antibody  result is considered uninfected.  No further action is  needed unless recent infection is suspected.  In these  cases, consider repeat testing later to detect  seroconversion..  Weakly-Reactive: HCV antibody test is abnormal, HCV RNA  Qualitative test will follow.  Reactive: HCV antibody test is abnormal, HCV RNA  Qualitative test will follow.  Note: HCV antibody testing is performed on the [employer] system.      [09-17-19 @ 15:20]  HIV Nonreactive The HIV Ag/Ab Combo test performed screens for HIV-1 p24  antigen, antibodies to HIV-1 (group M and group O), and  antibodies to HIV-2. All specimens repeatedly reactive  will reflex to an HIV 1/2 antibody confirmation and  differentiation test. This assay detects p24 antigen which  may be present prior to the development of HIV antibodies,  therefore a reactive result with a negative HIV 1/2 AB  Confirmation should be followed up with HIV-1 RNA, HIV-2  RNA and repeat testing in 4-8 weeks. A nonreactive result  does not preclude previous exposure to or infection with  HIV-1 or HIV-2. [facility] prohibits disclosure of this  result to any unauthorized party.      [09-17-19 @ 15:20] [facility] DIVISION OF KIDNEY DISEASES AND HYPERTENSION -- [phone]  -- INITIAL CONSULT NOTE  --------------------------------------------------------------------------------  HPI: 74 M with a PMHx of Parkinson's, Dementia, and BPH with a chronic indwelling pichardo w/ recent admission for epididymo-orchitis presents to the ED with suprapubic abdominal pain and no urine output for a few hours. Son providing history as per patient preference. Son reports pt was seen at urology's office earlier that evening with removal of his pichardo. States he began to complain of suprapubic abdominal pain shorty after and was unable to urinate therefore was brought in by son for eval. Son also notes that pt has appeared more lethargic but otherwise reports he is at baseline mental status and has been eating/drinking 3 meals a day. Denies fevers, chills, chest pain, shortness of breath, LE edema or changes in urine smell/appearance. Of note, pt was recently admitted mid September for epididymo-orchitis with cultures growing serratia and pseudomonas treated with zosyn with levaquin to complete 2 week course. Also found to be hyponatremic to 121 at the time which was attributed to poor PO intake. In the ED, VSS. Pichardo catheter was inserted with drainage of urine.    Nephrology consulted for hyponatremia. Pt with sodium of 121 at approximately 10PM last night (9/30), improved today to 129 (at approximately 10AM). Pt received a 500cc bolus of NS last night around 12AM. They are now s/p 1L of D5 IVF today. On recent admission in early September, pt was also noted with hyponatremia (ranging from 121 to 130). Pt denies changes in mentation, chest pain, shortness of breath, or diarrhea. He does report suprapubic pain. Pt reported to be eating and drinking fluids well.     PAST HISTORY  --------------------------------------------------------------------------------  PAST MEDICAL & SURGICAL HISTORY:  Dementia  Parkinson disease  BPH (benign prostatic hyperplasia)  No significant past surgical history    FAMILY HISTORY:  No pertinent family history in first degree relatives    PAST SOCIAL HISTORY: Denies toxic habits  	Lives with wife, son and daughter in law who provide all his care  He requires assistance with some ADLs    ALLERGIES & MEDICATIONS  --------------------------------------------------------------------------------  Allergies    No Known Allergies    Intolerances      Standing Inpatient Medications  baclofen 10 milliGRAM(s) Oral two times a day  carbidopa/levodopa  25/100 1 Tablet(s) Oral three times a day  dextrose 5%. 1000 milliLiter(s) IV Continuous <Continuous>  donepezil 2.5 milliGRAM(s) Oral at bedtime  enoxaparin Injectable 40 milliGRAM(s) SubCutaneous daily  memantine 2.5 milliGRAM(s) Oral daily  multivitamin 1 Tablet(s) Oral daily  piperacillin/tazobactam IVPB. 3.375 Gram(s) IV Intermittent once  piperacillin/tazobactam IVPB.. 3.375 Gram(s) IV Intermittent every 8 hours  tamsulosin 0.4 milliGRAM(s) Oral at bedtime  trihexyphenidyl 2 milliGRAM(s) Oral two times a day    PRN Inpatient Medications  aluminum hydroxide/magnesium hydroxide/simethicone Suspension 30 milliLiter(s) Oral every 4 hours PRN      REVIEW OF SYSTEMS  --------------------------------------------------------------------------------  Gen: No fevers/chills   Respiratory: No dyspnea  CV: No chest pain  GI: + suprapubic abd pain  : + suprapubic abd pain  MSK: No  edema      All other systems were reviewed and are negative, except as noted.    VITALS/PHYSICAL EXAM  --------------------------------------------------------------------------------  T(C): 36.9 (10-01-19 @ 08:22), Max: 36.9 (10-01-19 @ 05:27)  HR: 66 (10-01-19 @ 08:22) (65 - 92)  BP: 134/67 (10-01-19 @ 08:22) (116/51 - 174/90)  RR: 18 (10-01-19 @ 08:22) (16 - 18)  SpO2: 98% (10-01-19 @ 08:22) (98% - 100%)  Wt(kg): --        Physical Exam:  	Gen: NAD  	HEENT: MMM  	Pulm: CTA B/L  	CV: S1S2  	Abd: Soft, +BS   	Ext: No LE edema B/L  	Neuro: Awake  	Skin: Warm and dry  	    LABS/STUDIES  --------------------------------------------------------------------------------              12.3   9.95  >-----------<  407      [10-01-19 @ 05:05]              37.1     129  |  94  |  6   ----------------------------<  153      [10-01-19 @ 10:15]  3.8   |  23  |  0.65        Ca     9.2     [10-01-19 @ 10:15]      Mg     2.0     [10-01-19 @ 05:05]      Phos  3.7     [10-01-19 @ 05:05]    TPro  7.2  /  Alb  4.0  /  TBili  0.7  /  DBili  x   /  AST  14  /  ALT  9   /  AlkPhos  81  [10-01-19 @ 05:05]        Serum Osmolality 266      [10-01-19 @ 05:15]    Creatinine Trend:  SCr 0.65 [10-01 @ 10:15]  SCr 0.63 [10-01 @ 05:05]  SCr 0.77 [09-30 @ 21:40]  SCr 0.72 [09-17 @ 15:20]  SCr 0.64 [09-09 @ 06:35]    Urinalysis - [09-30-19 @ 20:35]      Color LIGHT YELLOW / Appearance Lt TURBID / SG 1.012 / pH 6.5      Gluc 200 / Ketone NEGATIVE  / Bili NEGATIVE / Urobili NORMAL       Blood MODERATE / Protein 20 / Leuk Est LARGE / Nitrite POSITIVE      RBC 25-50 / WBC >50 / Hyaline NEGATIVE / Gran  / Sq Epi OCC / Non Sq Epi  / Bacteria MANY    Urine Sodium 33      [09-30-19 @ 23:44]  Urine Potassium 9.7      [09-30-19 @ 23:44]  Urine Chloride 28      [09-30-19 @ 23:44]  Urine Osmolality 112      [10-01-19 @ 00:55]    Iron 56, TIBC 304, %sat --      [09-17-19 @ 15:20]  Ferritin 34.56      [09-17-19 @ 15:20]  HbA1c 7.0      [09-17-19 @ 15:20]  TSH 2.30      [09-17-19 @ 15:20]    HCV 0.15, Nonreactive Hepatitis C AB  S/CO Ratio                        Interpretation  < 1.00                                   Non-Reactive  1.00 - 4.99                         Weakly-Reactive  >= 5.00                                Reactive  Non-Reactive: Aperson with a non-reactive HCV antibody  result is considered uninfected.  No further action is  needed unless recent infection is suspected.  In these  cases, consider repeat testing later to detect  seroconversion..  Weakly-Reactive: HCV antibody test is abnormal, HCV RNA  Qualitative test will follow.  Reactive: HCV antibody test is abnormal, HCV RNA  Qualitative test will follow.  Note: HCV antibody testing is performed on the [employer] system.      [09-17-19 @ 15:20]  HIV Nonreactive The HIV Ag/Ab Combo test performed screens for HIV-1 p24  antigen, antibodies to HIV-1 (group M and group O), and  antibodies to HIV-2. All specimens repeatedly reactive  will reflex to an HIV 1/2 antibody confirmation and  differentiation test. This assay detects p24 antigen which  may be present prior to the development of HIV antibodies,  therefore a reactive result with a negative HIV 1/2 AB  Confirmation should be followed up with HIV-1 RNA, HIV-2  RNA and repeat testing in 4-8 weeks. A nonreactive result  does not preclude previous exposure to or infection with  HIV-1 or HIV-2. [facility] prohibits disclosure of this  result to any unauthorized party.      [09-17-19 @ 15:20]

## 2019-10-01 NOTE — PHYSICAL EXAM
[No Acute Distress] : no acute distress [Well Nourished] : well nourished [Well Developed] : well developed [Ill-Appearing] : ill-appearing [Normal Sclera/Conjunctiva] : normal sclera/conjunctiva [EOMI] : extraocular movements intact [Normal Outer Ear/Nose] : the outer ears and nose were normal in appearance [Normal Oropharynx] : the oropharynx was normal [Normal TMs] : both tympanic membranes were normal [Normal Nasal Mucosa] : the nasal mucosa was normal [No JVD] : no jugular venous distention [No Lymphadenopathy] : no lymphadenopathy [Supple] : supple [Thyroid Normal, No Nodules] : the thyroid was normal and there were no nodules present [No Respiratory Distress] : no respiratory distress  [No Accessory Muscle Use] : no accessory muscle use [Clear to Auscultation] : lungs were clear to auscultation bilaterally [Normal Rate] : normal rate  [Regular Rhythm] : with a regular rhythm [Normal S1, S2] : normal S1 and S2 [No Murmur] : no murmur heard [No Varicosities] : no varicosities [Pedal Pulses Present] : the pedal pulses are present [No Edema] : there was no peripheral edema [No Extremity Clubbing/Cyanosis] : no extremity clubbing/cyanosis [Soft] : abdomen soft [Non Tender] : non-tender [Non-distended] : non-distended [No Masses] : no abdominal mass palpated [No Rash] : no rash [de-identified] : s [de-identified] : mucous membranes moist, no parotid enlargement or tenderness [de-identified] : difficulty standing and changing position without support, some rigidity of arms [de-identified] : alert and oriented to self, location, year/month/season [FreeTextEntry1] : mild TTP right testis/epididymis and mildly enlarged testis, no testicular masses appreciated; no penile tenderness, no discharge or erythema at urethral meatus with pichardo catheter in place. left testis normal to palpation and inspection

## 2019-10-01 NOTE — PROGRESS NOTE ADULT - PROBLEM SELECTOR PLAN 1
Euvolemic on exam. Na 121 to 128 after 500cc bolus overnight.   -Serum osm 266  -Nataliia 33, Usom 112   -TSH wnl, AM Cortisol 6.8   -121--> 128 --> 129 in 12 hours, given 1L D5 bolus at 12PM  -Repeat BMP pending  -BMP q4-6  -Renal consulted, appreciate recs

## 2019-10-01 NOTE — REVIEW OF SYSTEMS
[Cough] : cough [Fever] : no fever [Fatigue] : no fatigue [Chills] : no chills [Night Sweats] : no night sweats [Discharge] : no discharge [Recent Change In Weight] : ~T no recent weight change [Pain] : no pain [Redness] : no redness [Dryness] : no dryness [Vision Problems] : no vision problems [Itching] : no itching [Earache] : no earache [Hearing Loss] : no hearing loss [Postnasal Drip] : no postnasal drip [Nasal Discharge] : no nasal discharge [Sore Throat] : no sore throat [Hoarseness] : no hoarseness [Lower Ext Edema] : no lower extremity edema [Chest Pain] : no chest pain [Orthopena] : no orthopnea [Wheezing] : no wheezing [Shortness Of Breath] : no shortness of breath [Dyspnea on Exertion] : not dyspnea on exertion [Abdominal Pain] : no abdominal pain [Nausea] : no nausea [Constipation] : no constipation [Vomiting] : no vomiting [Diarrhea] : no diarrhea [Dysuria] : no dysuria [Muscle Weakness] : no muscle weakness [Back Pain] : no back pain [Skin Rash] : no skin rash [Itching] : no itching [Headache] : no headache [de-identified] : son reports at [facility] he knows self, location, year/season/month  [FreeTextEntry8] : has urinary catheter in place with reported clear urine

## 2019-10-01 NOTE — PHYSICAL THERAPY INITIAL EVALUATION ADULT - PLANNED THERAPY INTERVENTIONS, PT EVAL
balance training/transfer training/gait training/strengthening/stairs training/bed mobility training

## 2019-10-01 NOTE — H&P ADULT - NSHPPHYSICALEXAM_GEN_ALL_CORE
GENERAL APPEARANCE: Well developed, well nourished, alert and cooperative. NAD.   HEENT:  PERRL, EOMI. External auditory canals normal, hearing grossly intact.  NECK: Neck supple, non-tender without lymphadenopathy, masses or thyromegaly.  CARDIAC: Normal S1 and S2. No S3, S4 or murmurs. Rhythm is regular.  LUNGS: Clear to auscultation and percussion without rales, rhonchi, wheezing or diminished breath sounds.  ABDOMEN: Positive bowel sounds. Soft, nondistended, nontender. No guarding or rebound.   MUSCULOSKELETAL: ROM intact extremities. No joint erythema or tenderness.   EXTREMITIES: No significant deformity or joint abnormality. No edema. Peripheral pulses intact. No varicosities.  NEUROLOGICAL: CN II-XII intact. Strength and sensation symmetric and intact throughout.   SKIN: Skin normal color, texture and turgor with no lesions or eruptions.  PSYCHIATRIC: AOx2, disoriented to date.Normal affect and behavior.

## 2019-10-01 NOTE — H&P ADULT - NSHPSOCIALHISTORY_GEN_ALL_CORE
Denies toxic habits  Lives with wife, son and daughter in law who provide all his care  He requires assistance with some ADLs

## 2019-10-01 NOTE — CHART NOTE - NSCHARTNOTEFT_GEN_A_CORE
s/w lab - BMP was received and is still processing, will f/u results and contact renal. s/w lab - BMP was received and is still processing, will f/u results.   Na on repeat BMP noted to be 127. Per renal max for today would be 130.   next BMP in AM s/w lab - BMP was received and is still processing, will f/u results.   Na on repeat BMP noted to be 127.   s/w nephro fellow onc all - rec to check level again now. If between 127-130 can repeat in AM.  if >130, will notify renal.

## 2019-10-01 NOTE — PHYSICAL THERAPY INITIAL EVALUATION ADULT - PERTINENT HX OF CURRENT PROBLEM, REHAB EVAL
Patient is 74 year old male admitted with history of Parkinson's, dementia, BPH, presents with suprapubic abdominal pain and no urine output.

## 2019-10-01 NOTE — H&P ADULT - NSHPLABSRESULTS_GEN_ALL_CORE
( @ 23:44)                      12.9  14.58 )-----------( 427                 37.5    Neutrophils = 10.24 (70.3%)  Lymphocytes = 3.40 (23.3%)  Eosinophils = 0.22 (1.5%)  Basophils = 0.06 (0.4%)  Monocytes = 0.60 (4.1%)  Bands = 0%        121<L>  |  84<L>  |  9   ----------------------------<  123<H>  4.3   |  21<L>  |  0.77    Ca    9.1      30 Sep 2019 21:40    TPro  7.7  /  Alb  4.3  /  TBili  0.5  /  DBili  x   /  AST  12  /  ALT  8   /  AlkPhos  81        Urinalysis Basic - ( 30 Sep 2019 20:35 )    Color: LIGHT YELLOW / Appearance: Lt TURBID / S.012 / pH: 6.5  Gluc: 200 / Ketone: NEGATIVE  / Bili: NEGATIVE / Urobili: NORMAL   Blood: MODERATE / Protein: 20 / Nitrite: POSITIVE   Leuk Esterase: LARGE / RBC: 25-50 / WBC >50   Sq Epi: OCC / Non Sq Epi: x / Bacteria: MANY    Labs reviewed   Urine studies reviewed

## 2019-10-01 NOTE — ASSESSMENT
[FreeTextEntry1] : HCM-\par to bring vaccine records from [facility] prior to pneumococcal vaccination or tetanus vaccination\par flu vaccine received in [place] during hospitalization\par next visit discuss HCP/MOLST and ADL/IADL as well as depression screening\par not experiencing constipation, has docusate/miralax at home but not taking-told do not need to take unless constipated, will stop at this time given hypovolemia in hospital

## 2019-10-01 NOTE — HEALTH RISK ASSESSMENT
[HIV Test offered] : HIV Test offered [Hepatitis C test offered] : Hepatitis C test offered [Independent] : feeding [Some assistance needed] : managing finances [Full assistance needed] : using transportation [Change in mental status noted] : No change in mental status noted [FreeTextEntry8] : uses walker

## 2019-10-01 NOTE — PHYSICAL THERAPY INITIAL EVALUATION ADULT - CRITERIA FOR SKILLED THERAPEUTIC INTERVENTIONS
OT order for evaluation acknowledged, however per note on 03/15/17, patient and his wife refused occupational therapy services as they demonstrated that patient's wife can assist him as needed at home with self care tasks.  No need for OT intervention at this time.  D/C OT.     functional limitations in following categories/impairments found

## 2019-10-01 NOTE — H&P ADULT - ASSESSMENT
74 M with a PMHx of Parkinson's, Dementia, and BPH with a chronic indwelling pichardo w/ recent admission for epididymo-orchitis presents to the ED with urinary retention c/b hyponatremia

## 2019-10-01 NOTE — CHART NOTE - NSCHARTNOTEFT_GEN_A_CORE
Patient has been complaining of leakage around the catheter and urge to urinate. Urology was called to irrigate. Patient had clear urine draining, about 100 cc for an hour. There was no need for irrigation since catheter is draining properly.   Monitor urine output  Recommend belladonna suppository for bladder spasm

## 2019-10-01 NOTE — CHART NOTE - NSCHARTNOTEFT_GEN_A_CORE
Patient here for urinary retention after pichardo catheter removal.   Urology consulted.   Per urology:  - patient can follow up as outpatient with Venancio Manzo  - treat UTI per medicine     Vane Lennon NP   pager [phone]

## 2019-10-01 NOTE — CONSULT NOTE ADULT - PROBLEM SELECTOR RECOMMENDATION 9
Pt with serum sodium of 121 at approximately 10PM last night. Now with Na of  129 (10:15AM). Pt also has history of hyponatremia. Check a stat BMP. Continue to monitor pt's sodium. Do not correct serum sodium >6-8 meq/day. Pt with serum sodium of 121 at approximately 10PM last night. Now with Na of  129 (10:15AM). Pt also has history of hyponatremia. Check a stat BMP. Continue to monitor pt's sodium. Do not correct serum sodium >6-8 meq/day.    PENDING DISCUSSION WITH ATTENDING

## 2019-10-01 NOTE — H&P ADULT - PROBLEM SELECTOR PROBLEM 2
Adequate: hears normal conversation without difficulty Urinary retention Do not rub the eye. Tylenol or extra strength tylenol if needed for discomfort, avoid   Advil, Motrin, Aleve and aspirin to minimize bleeding.  Appointment with Dr. Bobo or assoc. on 4/24/19

## 2019-10-01 NOTE — H&P ADULT - NSHPREVIEWOFSYSTEMS_GEN_ALL_CORE
CONSTITUTIONAL:  +fatigue No weight loss, fever, chills.  HEENT:  Eyes:  No visual loss, blurred vision, double vision or yellow sclerae. Ears, Nose, Throat:  No hearing loss, sneezing, congestion, runny nose or sore throat.  SKIN:  No rash or itching.  CARDIOVASCULAR:  No chest pain, chest pressure or chest discomfort. No palpitations or edema.  RESPIRATORY:  No shortness of breath, cough or sputum.  GASTROINTESTINAL: +suprapubic abdominal pain No anorexia, nausea, vomiting or diarrhea. No blood.  GENITOURINARY:  Denies hematuria, dysuria.   NEUROLOGICAL:  No headache, dizziness, syncope, paralysis, ataxia, numbness or tingling in the extremities. No change in bowel or bladder control.  MUSCULOSKELETAL:  No muscle, back pain, joint pain or stiffness.  HEMATOLOGIC:  No anemia, bleeding or bruising.  LYMPHATICS:  No enlarged nodes. No history of splenectomy.  PSYCHIATRIC:  No history of depression or anxiety.  ENDOCRINOLOGIC:  No reports of sweating, cold or heat intolerance. No polyuria or polydipsia.  ALLERGIES:  No history of asthma, hives, eczema or rhinitis.

## 2019-10-01 NOTE — HISTORY OF PRESENT ILLNESS
[Post-hospitalization from ___ Hospital] : Post-hospitalization from [unfilled] Hospital [Admitted on: ___] : The patient was admitted on [unfilled] [Discharged on ___] : discharged on [unfilled] [Pertinent Labs] : pertinent labs [Discharge Summary] : discharge summary [Radiology Findings] : radiology findings [Med Reconciliation] : medication reconciliation has been completed [Discharge Med List] : discharge medication list [FreeTextEntry2] : [facility]  # [id number], patient is here with his son\par \par 74 year old man with Parkinson's disease/mild dementia with BPH and urinary retention with chronic indwelling pichardo catheter who was admitted with fever and lethargy found to have Serratia marcescens UTI with orchitis/epididymitis seen on testicular US, now s/p treatment with zosyn iv followed by 10d po levofloxacin. \par \agueda Has been taking levofloxacin since discharge and will complete course tomorrow, feeling better, son reports that urine looks clear and is not malodorous; went to outside urologist and had pichardo changed yesterday, still with mild R testicular pain assessed by urology, planning follow-up on Sept 30 to ensure improvement. Otherwise is much better, doing ADLs, bathes with assistance. Sometimes coughs loudly in the morning and clears chest, dry without phlegm, no fevers, chest pain, shortness of breath, or sick contacts. No other symptoms reported. He was also hyponatremic initially 121 up to 130 on discharge with IVF NS; was hypoosmolar likely hypovolemic due to decreased po intake prior to admission. Now he has been eating and drinking well, drinking lots of water, eating chicken soup, vegetables, some roti, and fruit. Good appetite. Son notes that the patient is sometimes sad about medical conditions/social condition when he is on the phone with relatives in [place] (patient came to [place] from [place] to live with adult son in Jun 2019; patient's wife is also here with him). \par \par Medications reconciled. Received flu vaccine in hospital. Prior visit to [facility] in July received unknown vaccination, they will bring records. \par \par Son also reports that up until a month ago patient was taking 0.5mg clonazepam nightly for sleep and that it was stopped by him because of lethargy; inquires if this should be restarted at this time to aid sleep, patient sleeps fewer hours now, but no behavioral disturbance/agitation. No h/o seizures. \par \par Seeing an outside urologist Dr. Venancio Henriquez [email or web address])-is on tamsulosin and oxybutynin.\par Seeing an outside neurologist for Parkinson's Dr. Kristine Isabel at [address], [place] Tel [phone]/Fax [phone]. Main appt line [phone]. Had EEG done reportedly and is on sinemet, baclofen, trihexyphenidyl, biotin, donepezil, memantine from neurology.

## 2019-10-01 NOTE — H&P ADULT - HISTORY OF PRESENT ILLNESS
74 M with a PMHx of Parkinson's, Dementia, and BPH with a chronic indwelling pichardo w/ recent admission for epididymo-orchitis presents to the ED with suprapubic abdominal pain and no urine output for a few hours. Son providing history as per patient preference. Son reports pt was seen at urology's office earlier that evening with removal of his pichardo. States he began to complain of suprapubic abdominal pain shorty after and was unable to urinate therefore was brought in by son for eval. Son also notes that pt has appeared more lethargic but otherwise reports he is at baseline mental status and has been eating/drinking 3 meals a day. Denies fevers, chills, chest pain, shortness of breath, LE edema or changes in urine smell/appearance. Of note, pt was recentlt admitted mid September for epididymo-orchitis with cultures growing serratia and pseudomonas treated with zosyn with levaquin to complete 2 week course. Also found to be hyponatremic to 121 at the time which was attributed to poor PO intake. 74 M with a PMHx of Parkinson's, Dementia, and BPH with a chronic indwelling pichardo w/ recent admission for epididymo-orchitis presents to the ED with suprapubic abdominal pain and no urine output for a few hours. Son providing history as per patient preference. Son reports pt was seen at urology's office earlier that evening with removal of his pichardo. States he began to complain of suprapubic abdominal pain shorty after and was unable to urinate therefore was brought in by son for eval. Son also notes that pt has appeared more lethargic but otherwise reports he is at baseline mental status and has been eating/drinking 3 meals a day. Denies fevers, chills, chest pain, shortness of breath, LE edema or changes in urine smell/appearance. Of note, pt was recently admitted mid September for epididymo-orchitis with cultures growing serratia and pseudomonas treated with zosyn with levaquin to complete 2 week course. Also found to be hyponatremic to 121 at the time which was attributed to poor PO intake.  In the ED, VSS. Pichardo catheter was inserted with drainage of urine.

## 2019-10-01 NOTE — H&P ADULT - PROBLEM SELECTOR PLAN 4
-continue with Carbidopa-Levodopa  -PT eval -continue with Carbidopa-Levodopa, trihexyphenidyl, baclofen and biotin   -PT eval

## 2019-10-02 DIAGNOSIS — I10 ESSENTIAL (PRIMARY) HYPERTENSION: ICD-10-CM

## 2019-10-02 DIAGNOSIS — E78.5 HYPERLIPIDEMIA, UNSPECIFIED: ICD-10-CM

## 2019-10-02 DIAGNOSIS — E11.65 TYPE 2 DIABETES MELLITUS WITH HYPERGLYCEMIA: ICD-10-CM

## 2019-10-02 DIAGNOSIS — Z71.89 OTHER SPECIFIED COUNSELING: ICD-10-CM

## 2019-10-02 DIAGNOSIS — E27.49 OTHER ADRENOCORTICAL INSUFFICIENCY: ICD-10-CM

## 2019-10-02 LAB
ACTH SER-ACNC: 33.4 PG/ML — SIGNIFICANT CHANGE UP (ref 7.2–63.3)
ALDOST SERPL-MCNC: 17.2 NG/DL — SIGNIFICANT CHANGE UP
ANION GAP SERPL CALC-SCNC: 11 MMO/L — SIGNIFICANT CHANGE UP (ref 7–14)
ANION GAP SERPL CALC-SCNC: 11 MMO/L — SIGNIFICANT CHANGE UP (ref 7–14)
ANION GAP SERPL CALC-SCNC: 16 MMO/L — HIGH (ref 7–14)
BUN SERPL-MCNC: 6 MG/DL — LOW (ref 7–23)
BUN SERPL-MCNC: 7 MG/DL — SIGNIFICANT CHANGE UP (ref 7–23)
BUN SERPL-MCNC: 9 MG/DL — SIGNIFICANT CHANGE UP (ref 7–23)
CALCIUM SERPL-MCNC: 8.9 MG/DL — SIGNIFICANT CHANGE UP (ref 8.4–10.5)
CALCIUM SERPL-MCNC: 8.9 MG/DL — SIGNIFICANT CHANGE UP (ref 8.4–10.5)
CALCIUM SERPL-MCNC: 9.5 MG/DL — SIGNIFICANT CHANGE UP (ref 8.4–10.5)
CHLORIDE SERPL-SCNC: 90 MMOL/L — LOW (ref 98–107)
CHLORIDE SERPL-SCNC: 91 MMOL/L — LOW (ref 98–107)
CHLORIDE SERPL-SCNC: 91 MMOL/L — LOW (ref 98–107)
CO2 SERPL-SCNC: 21 MMOL/L — LOW (ref 22–31)
CO2 SERPL-SCNC: 22 MMOL/L — SIGNIFICANT CHANGE UP (ref 22–31)
CO2 SERPL-SCNC: 22 MMOL/L — SIGNIFICANT CHANGE UP (ref 22–31)
CORTIS SERPL-MCNC: 25.6 UG/DL — HIGH (ref 2.7–18.4)
CORTIS SERPL-MCNC: 7.3 UG/DL — SIGNIFICANT CHANGE UP (ref 2.7–18.4)
CREAT SERPL-MCNC: 0.61 MG/DL — SIGNIFICANT CHANGE UP (ref 0.5–1.3)
CREAT SERPL-MCNC: 0.72 MG/DL — SIGNIFICANT CHANGE UP (ref 0.5–1.3)
CREAT SERPL-MCNC: 0.73 MG/DL — SIGNIFICANT CHANGE UP (ref 0.5–1.3)
DENV1 AB SER-ACNC: 68.1 UG/DL — SIGNIFICANT CHANGE UP (ref 28–175)
GLUCOSE BLDC GLUCOMTR-MCNC: 131 MG/DL — HIGH (ref 70–99)
GLUCOSE BLDC GLUCOMTR-MCNC: 138 MG/DL — HIGH (ref 70–99)
GLUCOSE BLDC GLUCOMTR-MCNC: 239 MG/DL — HIGH (ref 70–99)
GLUCOSE SERPL-MCNC: 101 MG/DL — HIGH (ref 70–99)
GLUCOSE SERPL-MCNC: 133 MG/DL — HIGH (ref 70–99)
GLUCOSE SERPL-MCNC: 186 MG/DL — HIGH (ref 70–99)
HCT VFR BLD CALC: 36.2 % — LOW (ref 39–50)
HGB BLD-MCNC: 12.5 G/DL — LOW (ref 13–17)
MAGNESIUM SERPL-MCNC: 1.9 MG/DL — SIGNIFICANT CHANGE UP (ref 1.6–2.6)
MCHC RBC-ENTMCNC: 24.8 PG — LOW (ref 27–34)
MCHC RBC-ENTMCNC: 34.5 % — SIGNIFICANT CHANGE UP (ref 32–36)
MCV RBC AUTO: 71.8 FL — LOW (ref 80–100)
NRBC # FLD: 0 K/UL — SIGNIFICANT CHANGE UP (ref 0–0)
OSMOLALITY UR: 76 MOSMO/KG — SIGNIFICANT CHANGE UP (ref 50–1200)
PHOSPHATE SERPL-MCNC: 4.2 MG/DL — SIGNIFICANT CHANGE UP (ref 2.5–4.5)
PLATELET # BLD AUTO: 367 K/UL — SIGNIFICANT CHANGE UP (ref 150–400)
PMV BLD: 8.5 FL — SIGNIFICANT CHANGE UP (ref 7–13)
POTASSIUM SERPL-MCNC: 3.8 MMOL/L — SIGNIFICANT CHANGE UP (ref 3.5–5.3)
POTASSIUM SERPL-MCNC: 4 MMOL/L — SIGNIFICANT CHANGE UP (ref 3.5–5.3)
POTASSIUM SERPL-MCNC: 4.5 MMOL/L — SIGNIFICANT CHANGE UP (ref 3.5–5.3)
POTASSIUM SERPL-SCNC: 3.8 MMOL/L — SIGNIFICANT CHANGE UP (ref 3.5–5.3)
POTASSIUM SERPL-SCNC: 4 MMOL/L — SIGNIFICANT CHANGE UP (ref 3.5–5.3)
POTASSIUM SERPL-SCNC: 4.5 MMOL/L — SIGNIFICANT CHANGE UP (ref 3.5–5.3)
POTASSIUM UR-SCNC: 11 MMOL/L — SIGNIFICANT CHANGE UP
RBC # BLD: 5.04 M/UL — SIGNIFICANT CHANGE UP (ref 4.2–5.8)
RBC # FLD: 14.6 % — HIGH (ref 10.3–14.5)
SODIUM SERPL-SCNC: 124 MMOL/L — LOW (ref 135–145)
SODIUM SERPL-SCNC: 124 MMOL/L — LOW (ref 135–145)
SODIUM SERPL-SCNC: 127 MMOL/L — LOW (ref 135–145)
SODIUM UR-SCNC: < 20 MMOL/L — SIGNIFICANT CHANGE UP
WBC # BLD: 9.23 K/UL — SIGNIFICANT CHANGE UP (ref 3.8–10.5)
WBC # FLD AUTO: 9.23 K/UL — SIGNIFICANT CHANGE UP (ref 3.8–10.5)

## 2019-10-02 PROCEDURE — 99255 IP/OBS CONSLTJ NEW/EST HI 80: CPT

## 2019-10-02 PROCEDURE — 99233 SBSQ HOSP IP/OBS HIGH 50: CPT | Mod: GC

## 2019-10-02 PROCEDURE — 99233 SBSQ HOSP IP/OBS HIGH 50: CPT

## 2019-10-02 RX ORDER — INSULIN LISPRO 100/ML
VIAL (ML) SUBCUTANEOUS AT BEDTIME
Refills: 0 | Status: DISCONTINUED | OUTPATIENT
Start: 2019-10-02 | End: 2019-10-04

## 2019-10-02 RX ORDER — INSULIN LISPRO 100/ML
VIAL (ML) SUBCUTANEOUS
Refills: 0 | Status: DISCONTINUED | OUTPATIENT
Start: 2019-10-02 | End: 2019-10-04

## 2019-10-02 RX ORDER — ATORVASTATIN CALCIUM 80 MG/1
20 TABLET, FILM COATED ORAL AT BEDTIME
Refills: 0 | Status: DISCONTINUED | OUTPATIENT
Start: 2019-10-02 | End: 2019-10-04

## 2019-10-02 RX ORDER — COSYNTROPIN 0.25 MG/ML
0.25 INJECTION, SOLUTION INTRAVENOUS ONCE
Refills: 0 | Status: COMPLETED | OUTPATIENT
Start: 2019-10-02 | End: 2019-10-02

## 2019-10-02 RX ADMIN — ATROPA BELLADONNA AND OPIUM 1 SUPPOSITORY(S): 16.2; 6 SUPPOSITORY RECTAL at 22:36

## 2019-10-02 RX ADMIN — PIPERACILLIN AND TAZOBACTAM 25 GRAM(S): 4; .5 INJECTION, POWDER, LYOPHILIZED, FOR SOLUTION INTRAVENOUS at 22:36

## 2019-10-02 RX ADMIN — COSYNTROPIN 0.25 MILLIGRAM(S): 0.25 INJECTION, SOLUTION INTRAVENOUS at 14:25

## 2019-10-02 RX ADMIN — PIPERACILLIN AND TAZOBACTAM 25 GRAM(S): 4; .5 INJECTION, POWDER, LYOPHILIZED, FOR SOLUTION INTRAVENOUS at 06:01

## 2019-10-02 RX ADMIN — Medication 10 MILLIGRAM(S): at 06:01

## 2019-10-02 RX ADMIN — DONEPEZIL HYDROCHLORIDE 2.5 MILLIGRAM(S): 10 TABLET, FILM COATED ORAL at 22:36

## 2019-10-02 RX ADMIN — CARBIDOPA AND LEVODOPA 1 TABLET(S): 25; 100 TABLET ORAL at 06:01

## 2019-10-02 RX ADMIN — TAMSULOSIN HYDROCHLORIDE 0.4 MILLIGRAM(S): 0.4 CAPSULE ORAL at 22:36

## 2019-10-02 RX ADMIN — ATROPA BELLADONNA AND OPIUM 1 SUPPOSITORY(S): 16.2; 6 SUPPOSITORY RECTAL at 06:07

## 2019-10-02 RX ADMIN — CARBIDOPA AND LEVODOPA 1 TABLET(S): 25; 100 TABLET ORAL at 22:36

## 2019-10-02 RX ADMIN — ATROPA BELLADONNA AND OPIUM 1 SUPPOSITORY(S): 16.2; 6 SUPPOSITORY RECTAL at 06:37

## 2019-10-02 RX ADMIN — PIPERACILLIN AND TAZOBACTAM 25 GRAM(S): 4; .5 INJECTION, POWDER, LYOPHILIZED, FOR SOLUTION INTRAVENOUS at 12:27

## 2019-10-02 RX ADMIN — Medication 2 MILLIGRAM(S): at 06:01

## 2019-10-02 RX ADMIN — Medication 30 MILLILITER(S): at 19:52

## 2019-10-02 RX ADMIN — Medication 1 TABLET(S): at 12:15

## 2019-10-02 RX ADMIN — MEMANTINE HYDROCHLORIDE 2.5 MILLIGRAM(S): 10 TABLET ORAL at 12:14

## 2019-10-02 RX ADMIN — Medication 2 MILLIGRAM(S): at 17:17

## 2019-10-02 RX ADMIN — ENOXAPARIN SODIUM 40 MILLIGRAM(S): 100 INJECTION SUBCUTANEOUS at 12:14

## 2019-10-02 RX ADMIN — Medication 2: at 17:17

## 2019-10-02 RX ADMIN — Medication 10 MILLIGRAM(S): at 17:17

## 2019-10-02 RX ADMIN — ATORVASTATIN CALCIUM 20 MILLIGRAM(S): 80 TABLET, FILM COATED ORAL at 22:36

## 2019-10-02 RX ADMIN — CARBIDOPA AND LEVODOPA 1 TABLET(S): 25; 100 TABLET ORAL at 12:15

## 2019-10-02 RX ADMIN — ATROPA BELLADONNA AND OPIUM 1 SUPPOSITORY(S): 16.2; 6 SUPPOSITORY RECTAL at 23:16

## 2019-10-02 RX ADMIN — ATROPA BELLADONNA AND OPIUM 1 SUPPOSITORY(S): 16.2; 6 SUPPOSITORY RECTAL at 13:15

## 2019-10-02 RX ADMIN — ATROPA BELLADONNA AND OPIUM 1 SUPPOSITORY(S): 16.2; 6 SUPPOSITORY RECTAL at 12:15

## 2019-10-02 NOTE — PROGRESS NOTE ADULT - ATTENDING COMMENTS
Repeat SNa 1pm was 124. Down from 129 last night, like due to D5W infusion. UOsm and Nataliia noted. Estimated free water clearance still acceptable ~50% (which means his kidneys are responding appropriately to hypoNa). Would continue fluid restriction to 1L daily. Repeat Urine studies including Nataliia, UK. Would recommend checking SNa q6h to see if continues to excrete free water (now w/o D5W supplementation). Target SNa until tong AM is 130. If SNa>130, please call on-call Nephrology team.      Thank you for allowing us to participate in your patient's care. We will continue to follow the patient with you. Please call/ text: [phone] today for any q's or c's.    Hua Johnston  Nephrology

## 2019-10-02 NOTE — PROGRESS NOTE ADULT - ASSESSMENT
74M w/PMH of Parkinson's, Dementia, and BPH with a chronic indwelling pichardo w/recent admission for epididymo-orchitis presents to the ED with urinary retention c/b hyponatremia, Klebsiella UTI

## 2019-10-02 NOTE — PROGRESS NOTE ADULT - SUBJECTIVE AND OBJECTIVE BOX
Patient is a 74y old  Male who presents with a chief complaint of suprapubic abdominal pain (02 Oct 2019 11:27)      SUBJECTIVE / OVERNIGHT EVENTS: No acute events overnight. Son: john at bedside aiding with salvador translation. Patient reports pain at urine catheter site and suprapubically. Denies chest pain, SOB.  MEDICATIONS  (STANDING):  baclofen 10 milliGRAM(s) Oral two times a day  carbidopa/levodopa  25/100 1 Tablet(s) Oral three times a day  donepezil 2.5 milliGRAM(s) Oral at bedtime  enoxaparin Injectable 40 milliGRAM(s) SubCutaneous daily  insulin lispro (HumaLOG) corrective regimen sliding scale   SubCutaneous three times a day before meals  insulin lispro (HumaLOG) corrective regimen sliding scale   SubCutaneous at bedtime  memantine 2.5 milliGRAM(s) Oral daily  multivitamin 1 Tablet(s) Oral daily  piperacillin/tazobactam IVPB.. 3.375 Gram(s) IV Intermittent every 8 hours  tamsulosin 0.4 milliGRAM(s) Oral at bedtime  trihexyphenidyl 2 milliGRAM(s) Oral two times a day    MEDICATIONS  (PRN):  aluminum hydroxide/magnesium hydroxide/simethicone Suspension 30 milliLiter(s) Oral every 4 hours PRN Dyspepsia  belladonna 16.2 mG/opium 30 mg Suppository 1 Suppository(s) Rectal every 8 hours PRN bladder spasms      T(C): 36.7 (10-02-19 @ 12:45), Max: 36.7 (10-02-19 @ 12:45)  HR: 70 (10-02-19 @ 12:45) (70 - 78)  BP: 112/65 (10-02-19 @ 12:45) (108/67 - 137/74)  RR: 17 (10-02-19 @ 12:45) (17 - 17)  SpO2: 100% (10-02-19 @ 12:45) (98% - 100%)  CAPILLARY BLOOD GLUCOSE      POCT Blood Glucose.: 138 mg/dL (02 Oct 2019 11:59)    I&O's Summary    01 Oct 2019 07:01  -  02 Oct 2019 07:00  --------------------------------------------------------  IN: 250 mL / OUT: 3600 mL / NET: -3350 mL    02 Oct 2019 07:01  -  02 Oct 2019 16:16  --------------------------------------------------------  IN: 0 mL / OUT: 400 mL / NET: -400 mL        PHYSICAL EXAM:  GENERAL: NAD, elderly, on room air  EYES: sclera clear   CHEST/LUNG: Clear to auscultation bilaterally, no wheeze  HEART: Regular rate and rhythm; No murmurs, rubs, or gallops  ABDOMEN: Soft, suprapubic TTP, + BS  : pichardo in place draining yellow urine  EXTREMITIES:  wwp, no edema   NERVOUS SYSTEM:  Alert, nonfocal exam, oriented to self, place, recognizes son, doesn't know date (per son, father never had education and would not know)  SKIN: No rashes or lesions    LABS:                        12.5   9.23  )-----------( 367      ( 02 Oct 2019 05:10 )             36.2     10-    124<L>  |  91<L>  |  6<L>  ----------------------------<  133<H>  4.0   |  22  |  0.61    Ca    8.9      02 Oct 2019 12:58  Phos  4.2     10-  Mg     1.9     10    TPro  7.2  /  Alb  4.0  /  TBili  0.7  /  DBili  x   /  AST  14  /  ALT  9   /  AlkPhos  81  10-          Urinalysis Basic - ( 30 Sep 2019 20:35 )    Color: LIGHT YELLOW / Appearance: Lt TURBID / S.012 / pH: 6.5  Gluc: 200 / Ketone: NEGATIVE  / Bili: NEGATIVE / Urobili: NORMAL   Blood: MODERATE / Protein: 20 / Nitrite: POSITIVE   Leuk Esterase: LARGE / RBC: 25-50 / WBC >50   Sq Epi: OCC / Non Sq Epi: x / Bacteria: MANY        RADIOLOGY & ADDITIONAL TESTS:

## 2019-10-02 NOTE — PROGRESS NOTE ADULT - PROBLEM SELECTOR PLAN 9
1) PCP Contacted on Admission: (Y/N) --> Name & Phone #: Dr. Freddy Valverde ([phone])  2) Date of Contact with PCP: 10/1   3) PCP Contacted at Discharge: (Y/N)  4) Summary of Handoff Given to PCP:   5) Post-Discharge Appointment Date and Location:

## 2019-10-02 NOTE — PROGRESS NOTE ADULT - PROBLEM SELECTOR PLAN 1
Euvolemic on exam. Na stable after D5W to prevent overcorrection  -Serum osm 266, Ethel 33, Usom 112. Rpt Serum, urine osm, Ethel  -TSH wnl, AM Cortisol 6.8. BMP q4-6  C/w fluid restriction to 1L daily. Target Serum Na until tong AM is 130.  - Adrenal insufficiency workup as per Endo  -Renal following

## 2019-10-02 NOTE — CONSULT NOTE ADULT - PROBLEM SELECTOR RECOMMENDATION 2
- newly diagnosed, HbA1c 7.0%, at goal  - start low correction scale qac and qhs  - change diet to consistent carb  - RD consult  - does not require medical therapy on discharge. Will need glucometer teaching prior to discharge.

## 2019-10-02 NOTE — PROGRESS NOTE ADULT - PROBLEM SELECTOR PLAN 1
Pt with sodium of 124 this AM. Do not recommend any additional intervention currently. Pt also has history of hyponatremia and is planned to f/u with PCP for adrenal insufficiency workup. Endocrinology consulted. Check BID BMPs. Do not correct serum sodium >6-8 meq/day.    ***************PENDING DISCUSSION WITH ATTENDING ******************** Pt with sodium of 124 this AM. Do not recommend any additional intervention currently. Pt also has history of hyponatremia and is planned to f/u with PCP for adrenal insufficiency workup. Endocrinology consulted. Recommend repeat BMP at 12PM. Do not correct serum sodium >6-8 meq/day.    ***************PENDING DISCUSSION WITH ATTENDING ******************** Pt with sodium of 124 this AM. Do not recommend any additional intervention currently. Pt also has history of hyponatremia and is planned to f/u with PCP for adrenal insufficiency workup. Endocrinology consulted. Recommend repeat BMP at 12PM. Do not correct serum sodium >6-8 meq/day.

## 2019-10-02 NOTE — CONSULT NOTE ADULT - ATTENDING COMMENTS
Free water excretion is 65%, which means for every liter of urine he makes, 650cc is free water. He is having an appropriate response to hyponatremia now that obstruction was relieved with pichardo insertion. I predict he will continue to auto-correct if he continues to have high volume urine output.    Do not recommend any IVF or other treatment for Na at this point w/o getting new labs. We have called the ED nurse to try to get repeat labs, but no results are available. Na 121 -129 in a few hours. We have expressed our concern to the ED staff and told them to draw repeat stat labs and check the Na. Absolute max Na until 22:00 today is 130.     Call or page on-call nephrology team if Na is >130 before 22:00 or if patient is having >100ml/hr urine output
Tomer Rg MD   Pager # [phone]  On evenings and weekends, please call the office at [phone] or page endocrine fellow on call. Please note that this patient may be followed by different provider tomorrow. If no answer, contact the office.

## 2019-10-02 NOTE — CONSULT NOTE ADULT - PROBLEM SELECTOR RECOMMENDATION 9
- noted to have low AM cortisol yesterday, 8.6. No obvious cause such as medications, known adrenal/pituitary disease, hypoalbuminemia/malnutrition, etc  - repeat cortisol with ACTH sent this morning. Will follow up results. If repeat AM cortisol less than 10, can perform co-syntropin stimulation test as follows : check baseline cortisol, administer 250 mcg of IV cosyntropin, and then recheck cortisol approximately 45 minutes later. Normal response is rise in cortisol > 18  - will follow up renin/aldosterone levels  - will follow - noted to have low AM cortisol yesterday, 8.6. No obvious cause such as medications, known adrenal/pituitary disease, hypoalbuminemia/malnutrition, etc  - ACTH sent this morning without cortisol.  Can either repeat AM cortisol tomorrow and if less than 10, can perform co-syntropin stimulation test as follows : check baseline cortisol + ACTH, administer 250 mcg of IV cosyntropin, and then recheck cortisol approximately 45 minutes later. Normal response is rise in cortisol > 18. Otherwise, can perform stim test now -  spoke with team, will perform now.  - will follow up renin/aldosterone levels  - will follow

## 2019-10-02 NOTE — CONSULT NOTE ADULT - ASSESSMENT
74 M with a PMHx of Parkinson's, Dementia, BPH with a chronic indwelling pichardo w/ recent admission for epididymo-orchitis, here with hyponatremia and urinary retention, found to have new DM2, hypocortisolemia.
74 M with a PMHx of Parkinson's, Dementia, and BPH with a chronic indwelling pichardo presents to the ED with suprapubic abdominal pain 2/2 urinary retention now with hyponatremia.

## 2019-10-02 NOTE — PROGRESS NOTE ADULT - SUBJECTIVE AND OBJECTIVE BOX
[facility] DIVISION OF KIDNEY DISEASES AND HYPERTENSION -- FOLLOW UP NOTE  --------------------------------------------------------------------------------  24 hour events/subjective: HPI: 74 M with a PMHx of Parkinson's, Dementia, and BPH with a chronic indwelling pichardo w/ recent admission for epididymo-orchitis presents to the ED with suprapubic abdominal pain and no urine output for a few hours. Son providing history as per patient preference. Son reports pt was seen at urology's office earlier that evening with removal of his pichardo. States he began to complain of suprapubic abdominal pain shorty after and was unable to urinate therefore was brought in by son for eval. Son also notes that pt has appeared more lethargic but otherwise reports he is at baseline mental status and has been eating/drinking 3 meals a day. Denies fevers, chills, chest pain, shortness of breath, LE edema or changes in urine smell/appearance. Of note, pt was recently admitted mid September for epididymo-orchitis with cultures growing serratia and pseudomonas treated with zosyn with levaquin to complete 2 week course. Also found to be hyponatremic to 121 at the time which was attributed to poor PO intake. In the ED, VSS. Pichardo catheter was inserted with drainage of urine.    Nephrology consulted for hyponatremia. Pt with sodium of 121 on evening of 9/30, corrected to 129 morning of 10/1 and was subsequently given 1L of D5 IVF (which lowered sodium to 127 early that evening). Sodium is 124 today. [facility] reviewed. On recent admission in early September, pt was also noted with hyponatremia (ranging from 121 to 130). Overnight (10/1), pt was noted to have leakage around the Pichardo. Urology was called for irrigation but the Pichardo was found to be draining properly. Pt denies changes in mentation, chest pain, shortness of breath, or diarrhea. He does report suprapubic pain. Pt reported to be eating and drinking fluids well.         PAST HISTORY  --------------------------------------------------------------------------------  No significant changes to PMH, PSH, FHx, SHx, unless otherwise noted    ALLERGIES & MEDICATIONS  --------------------------------------------------------------------------------  Allergies    No Known Allergies    Intolerances      Standing Inpatient Medications  baclofen 10 milliGRAM(s) Oral two times a day  carbidopa/levodopa  25/100 1 Tablet(s) Oral three times a day  donepezil 2.5 milliGRAM(s) Oral at bedtime  enoxaparin Injectable 40 milliGRAM(s) SubCutaneous daily  memantine 2.5 milliGRAM(s) Oral daily  multivitamin 1 Tablet(s) Oral daily  piperacillin/tazobactam IVPB.. 3.375 Gram(s) IV Intermittent every 8 hours  tamsulosin 0.4 milliGRAM(s) Oral at bedtime  trihexyphenidyl 2 milliGRAM(s) Oral two times a day    PRN Inpatient Medications  aluminum hydroxide/magnesium hydroxide/simethicone Suspension 30 milliLiter(s) Oral every 4 hours PRN  belladonna 16.2 mG/opium 30 mg Suppository 1 Suppository(s) Rectal every 8 hours PRN      REVIEW OF SYSTEMS  --------------------------------------------------------------------------------  Gen: No fevers/chills  Head/Eyes/Ears: Normal hearing,   Respiratory: No dyspnea  CV: No chest pain  GI: + suprapubic abd pain, no diarrhea  : + suprapubic pain, no dysuria, hematuria  MSK: No  edema        All other systems were reviewed and are negative, except as noted.    VITALS/PHYSICAL EXAM  --------------------------------------------------------------------------------  T(C): 36.4 (10-02-19 @ 06:05), Max: 36.6 (10-01-19 @ 15:19)  HR: 78 (10-02-19 @ 06:05) (64 - 78)  BP: 137/74 (10-02-19 @ 06:05) (108/67 - 137/74)  RR: 17 (10-02-19 @ 06:05) (17 - 18)  SpO2: 98% (10-02-19 @ 06:05) (98% - 100%)  Wt(kg): --  Height (cm): 165.1 (10-01-19 @ 18:58)  Weight (kg): 70.3 (10-01-19 @ 18:58)  BMI (kg/m2): 25.8 (10-01-19 @ 18:58)  BSA (m2): 1.77 (10-01-19 @ 18:58)      10-01-19 @ 07:01  -  10-02-19 @ 07:00  --------------------------------------------------------  IN: 250 mL / OUT: 3600 mL / NET: -3350 mL        Physical Exam:  	Gen: NAD  	HEENT: MMM  	Pulm: CTA B/L  	CV: S1S2  	Abd: Soft, +BS, + mild suprapubic tenderness to palpation  	Ext: No LE edema B/L  	Neuro: A + O x2 (at baseline)  	Skin: Warm and dry  	      LABS/STUDIES  --------------------------------------------------------------------------------              12.5   9.23  >-----------<  367      [10-02-19 @ 05:10]              36.2     124  |  91  |  7   ----------------------------<  101      [10-02-19 @ 05:10]  3.8   |  22  |  0.73        Ca     8.9     [10-02-19 @ 05:10]      Mg     1.9     [10-02-19 @ 05:10]      Phos  4.2     [10-02-19 @ 05:10]    TPro  7.2  /  Alb  4.0  /  TBili  0.7  /  DBili  x   /  AST  14  /  ALT  9   /  AlkPhos  81  [10-01-19 @ 05:05]        Serum Osmolality 266      [10-01-19 @ 05:15]    Creatinine Trend:  SCr 0.73 [10-02 @ 05:10]  SCr 0.65 [10-01 @ 21:37]  SCr 0.61 [10-01 @ 17:08]  SCr 0.65 [10-01 @ 10:15]  SCr 0.63 [10-01 @ 05:05]    Urinalysis - [09-30-19 @ 20:35]      Color LIGHT YELLOW / Appearance Lt TURBID / SG 1.012 / pH 6.5      Gluc 200 / Ketone NEGATIVE  / Bili NEGATIVE / Urobili NORMAL       Blood MODERATE / Protein 20 / Leuk Est LARGE / Nitrite POSITIVE      RBC 25-50 / WBC >50 / Hyaline NEGATIVE / Gran  / Sq Epi OCC / Non Sq Epi  / Bacteria MANY    Urine Sodium 53      [10-01-19 @ 22:20]  Urine Potassium 9.7      [09-30-19 @ 23:44]  Urine Chloride 28      [09-30-19 @ 23:44]  Urine Osmolality 276      [10-01-19 @ 22:20]    Iron 56, TIBC 304, %sat --      [09-17-19 @ 15:20]  Ferritin 34.56      [09-17-19 @ 15:20]  HbA1c 7.0      [09-17-19 @ 15:20]  TSH 2.30      [09-17-19 @ 15:20]    HCV 0.15, Nonreactive Hepatitis C AB  S/CO Ratio                        Interpretation  < 1.00                                   Non-Reactive  1.00 - 4.99                         Weakly-Reactive  >= 5.00                                Reactive  Non-Reactive: Aperson with a non-reactive HCV antibody  result is considered uninfected.  No further action is  needed unless recent infection is suspected.  In these  cases, consider repeat testing later to detect  seroconversion..  Weakly-Reactive: HCV antibody test is abnormal, HCV RNA  Qualitative test will follow.  Reactive: HCV antibody test is abnormal, HCV RNA  Qualitative test will follow.  Note: HCV antibody testing is performed on the Abbott   system.      [09-17-19 @ 15:20]  HIV Nonreactive The HIV Ag/Ab Combo test performed screens for HIV-1 p24  antigen, antibodies to HIV-1 (group M and group O), and  antibodies to HIV-2. All specimens repeatedly reactive  will reflex to an HIV 1/2 antibody confirmation and  differentiation test. This assay detects p24 antigen which  may be present prior to the development of HIV antibodies,  therefore a reactive result with a negative HIV 1/2 AB  Confirmation should be followed up with HIV-1 RNA, HIV-2  RNA and repeat testing in 4-8 weeks. A nonreactive result  does not preclude previous exposure to or infection with  HIV-1 or HIV-2. [facility] prohibits disclosure of this  result to any unauthorized party.      [09-17-19 @ 15:20] [facility] DIVISION OF KIDNEY DISEASES AND HYPERTENSION -- FOLLOW UP NOTE  --------------------------------------------------------------------------------  24 hour events/subjective: HPI: 74 M with a PMHx of Parkinson's, Dementia, and BPH with a chronic indwelling pichardo w/ recent admission for epididymo-orchitis presents to the ED with suprapubic abdominal pain and no urine output for a few hours. Son providing history as per patient preference. Son reports pt was seen at urology's office earlier that evening with removal of his pichardo. States he began to complain of suprapubic abdominal pain shorty after and was unable to urinate therefore was brought in by son for eval. Son also notes that pt has appeared more lethargic but otherwise reports he is at baseline mental status and has been eating/drinking 3 meals a day. Denies fevers, chills, chest pain, shortness of breath, LE edema or changes in urine smell/appearance. Of note, pt was recently admitted mid September for epididymo-orchitis with cultures growing serratia and pseudomonas treated with zosyn with levaquin to complete 2 week course. Also found to be hyponatremic to 121 at the time which was attributed to poor PO intake. In the ED, VSS. Pichardo catheter was inserted with drainage of urine.    Nephrology consulted for hyponatremia. Pt with sodium of 121 on evening of 9/30, corrected to 129 morning of 10/1 and was subsequently given 1L of D5 IVF (which lowered sodium to 127 early that evening). Sodium is 124 today. [facility] reviewed. On recent admission in early September, pt was also noted with hyponatremia (ranging from 121 to 130). Overnight (10/1), pt was noted to have leakage around the Pichardo. Urology was called for irrigation but the Pichardo was found to be draining properly. Pt denies changes in mentation, chest pain, shortness of breath, or diarrhea. He does report suprapubic pain.         PAST HISTORY  --------------------------------------------------------------------------------  No significant changes to PMH, PSH, FHx, SHx, unless otherwise noted    ALLERGIES & MEDICATIONS  --------------------------------------------------------------------------------  Allergies    No Known Allergies    Intolerances      Standing Inpatient Medications  baclofen 10 milliGRAM(s) Oral two times a day  carbidopa/levodopa  25/100 1 Tablet(s) Oral three times a day  donepezil 2.5 milliGRAM(s) Oral at bedtime  enoxaparin Injectable 40 milliGRAM(s) SubCutaneous daily  memantine 2.5 milliGRAM(s) Oral daily  multivitamin 1 Tablet(s) Oral daily  piperacillin/tazobactam IVPB.. 3.375 Gram(s) IV Intermittent every 8 hours  tamsulosin 0.4 milliGRAM(s) Oral at bedtime  trihexyphenidyl 2 milliGRAM(s) Oral two times a day    PRN Inpatient Medications  aluminum hydroxide/magnesium hydroxide/simethicone Suspension 30 milliLiter(s) Oral every 4 hours PRN  belladonna 16.2 mG/opium 30 mg Suppository 1 Suppository(s) Rectal every 8 hours PRN      REVIEW OF SYSTEMS  --------------------------------------------------------------------------------  Gen: No fevers/chills  Head/Eyes/Ears: Normal hearing,   Respiratory: No dyspnea  CV: No chest pain  GI: + suprapubic abd pain, no diarrhea  : + suprapubic pain, no dysuria, hematuria  MSK: No  edema        All other systems were reviewed and are negative, except as noted.    VITALS/PHYSICAL EXAM  --------------------------------------------------------------------------------  T(C): 36.4 (10-02-19 @ 06:05), Max: 36.6 (10-01-19 @ 15:19)  HR: 78 (10-02-19 @ 06:05) (64 - 78)  BP: 137/74 (10-02-19 @ 06:05) (108/67 - 137/74)  RR: 17 (10-02-19 @ 06:05) (17 - 18)  SpO2: 98% (10-02-19 @ 06:05) (98% - 100%)  Wt(kg): --  Height (cm): 165.1 (10-01-19 @ 18:58)  Weight (kg): 70.3 (10-01-19 @ 18:58)  BMI (kg/m2): 25.8 (10-01-19 @ 18:58)  BSA (m2): 1.77 (10-01-19 @ 18:58)      10-01-19 @ 07:01  -  10-02-19 @ 07:00  --------------------------------------------------------  IN: 250 mL / OUT: 3600 mL / NET: -3350 mL        Physical Exam:  	Gen: NAD  	HEENT: MMM  	Pulm: CTA B/L  	CV: S1S2  	Abd: Soft, +BS, + mild suprapubic tenderness to palpation  	Ext: No LE edema B/L  	Neuro: A + O x2 (at baseline)  	Skin: Warm and dry  	      LABS/STUDIES  --------------------------------------------------------------------------------              12.5   9.23  >-----------<  367      [10-02-19 @ 05:10]              36.2     124  |  91  |  7   ----------------------------<  101      [10-02-19 @ 05:10]  3.8   |  22  |  0.73        Ca     8.9     [10-02-19 @ 05:10]      Mg     1.9     [10-02-19 @ 05:10]      Phos  4.2     [10-02-19 @ 05:10]    TPro  7.2  /  Alb  4.0  /  TBili  0.7  /  DBili  x   /  AST  14  /  ALT  9   /  AlkPhos  81  [10-01-19 @ 05:05]        Serum Osmolality 266      [10-01-19 @ 05:15]    Creatinine Trend:  SCr 0.73 [10-02 @ 05:10]  SCr 0.65 [10-01 @ 21:37]  SCr 0.61 [10-01 @ 17:08]  SCr 0.65 [10-01 @ 10:15]  SCr 0.63 [10-01 @ 05:05]    Urinalysis - [09-30-19 @ 20:35]      Color LIGHT YELLOW / Appearance Lt TURBID / SG 1.012 / pH 6.5      Gluc 200 / Ketone NEGATIVE  / Bili NEGATIVE / Urobili NORMAL       Blood MODERATE / Protein 20 / Leuk Est LARGE / Nitrite POSITIVE      RBC 25-50 / WBC >50 / Hyaline NEGATIVE / Gran  / Sq Epi OCC / Non Sq Epi  / Bacteria MANY    Urine Sodium 53      [10-01-19 @ 22:20]  Urine Potassium 9.7      [09-30-19 @ 23:44]  Urine Chloride 28      [09-30-19 @ 23:44]  Urine Osmolality 276      [10-01-19 @ 22:20]    Iron 56, TIBC 304, %sat --      [09-17-19 @ 15:20]  Ferritin 34.56      [09-17-19 @ 15:20]  HbA1c 7.0      [09-17-19 @ 15:20]  TSH 2.30      [09-17-19 @ 15:20]    HCV 0.15, Nonreactive Hepatitis C AB  S/CO Ratio                        Interpretation  < 1.00                                   Non-Reactive  1.00 - 4.99                         Weakly-Reactive  >= 5.00                                Reactive  Non-Reactive: Aperson with a non-reactive HCV antibody  result is considered uninfected.  No further action is  needed unless recent infection is suspected.  In these  cases, consider repeat testing later to detect  seroconversion..  Weakly-Reactive: HCV antibody test is abnormal, HCV RNA  Qualitative test will follow.  Reactive: HCV antibody test is abnormal, HCV RNA  Qualitative test will follow.  Note: HCV antibody testing is performed on the [employer]   system.      [09-17-19 @ 15:20]  HIV Nonreactive The HIV Ag/Ab Combo test performed screens for HIV-1 p24  antigen, antibodies to HIV-1 (group M and group O), and  antibodies to HIV-2. All specimens repeatedly reactive  will reflex to an HIV 1/2 antibody confirmation and  differentiation test. This assay detects p24 antigen which  may be present prior to the development of HIV antibodies,  therefore a reactive result with a negative HIV 1/2 AB  Confirmation should be followed up with HIV-1 RNA, HIV-2  RNA and repeat testing in 4-8 weeks. A nonreactive result  does not preclude previous exposure to or infection with  HIV-1 or HIV-2. [facility] prohibits disclosure of this  result to any unauthorized party.      [09-17-19 @ 15:20]

## 2019-10-02 NOTE — CONSULT NOTE ADULT - SUBJECTIVE AND OBJECTIVE BOX
HPI:  74 M with a PMHx of Parkinson's, Dementia, BPH with a chronic indwelling pichardo w/ recent admission for epididymo-orchitis presents to the ED with suprapubic abdominal pain and no urine output for a few hours, now admitted for management of urinary retention with hyponatremia. Cortisol was checked and found to be 6.8 (on the lower end of normal) in the AM of 10/1. Consult called for evaluation of hypocortisolemia with hyponatremia.    History obtained with assistance of patient's son at bedside. Patient has no prior history of hypocortisolemia. As per son, patient has only been taking the medications prescribed to him; he showed me the medication list from previous admission in 9/2019. Patient has no history of oral steroid use. Has not received any intra-articular steroid injections. He has no known history of pituitary disease or pituitary surgery. He has apparently lost about 3 kg recently. However, he has had a healthy appetite, eats a lot of fruits at home. Also has roti, rice etc. As per son, patient "eats a healthy diet". He has had no nausea, vomiting, abdominal pain. Last admission, he was discharged  home on antibiotics and son states that the antibiotics made patient dizzy, but he did not have any falls. Patient is not any appetite stimulants such as Megace or chronic opiates. He does not have liver disease or hypoalbuminemia that could explain low measured cortisol. Did not receive any steroids while here.     He was also found to have new DM2 last admission. HbA1c was 7.0% in 9/2019. Does not have blurry vision, but does admit to polyuria and polydipsia. No symptoms of neuropathy in the hands or the feet. No history of nephropathy. There is no family history of DM.       PAST MEDICAL & SURGICAL HISTORY:  Dementia  Parkinson disease  BPH (benign prostatic hyperplasia)  No significant past surgical history      FAMILY HISTORY:  no family history of DM    Social History:  Former alcohol use, stopped 5 years ago  No alcohol use  From [facility]    Outpatient Medications:  · 	baclofen 10 mg oral tablet: Last Dose Taken:  , 1 tab(s) orally 2 times a day  · 	carbidopa-levodopa 25 mg-100 mg oral tablet: Last Dose Taken:  , 1 tab(s) orally 3 times a day  · 	Flomax 0.4 mg oral capsule: Last Dose Taken:  , 1 cap(s) orally once a day (at bedtime)  · 	Biotin Forte oral tablet: Last Dose Taken:  , 1 tab(s) orally once a day  · 	trihexyphenidyl 2 mg oral tablet: Last Dose Taken:  , 1 tab(s) orally 2 times a day  · 	donepezil 5 mg oral tablet: Last Dose Taken:  , 0.5 tab(s) orally once a day (at bedtime)  · 	memantine 5 mg oral tablet: Last Dose Taken:  , 0.5 tab(s) orally once a day    MEDICATIONS  (STANDING):  baclofen 10 milliGRAM(s) Oral two times a day  carbidopa/levodopa  25/100 1 Tablet(s) Oral three times a day  donepezil 2.5 milliGRAM(s) Oral at bedtime  enoxaparin Injectable 40 milliGRAM(s) SubCutaneous daily  memantine 2.5 milliGRAM(s) Oral daily  multivitamin 1 Tablet(s) Oral daily  piperacillin/tazobactam IVPB.. 3.375 Gram(s) IV Intermittent every 8 hours  tamsulosin 0.4 milliGRAM(s) Oral at bedtime  trihexyphenidyl 2 milliGRAM(s) Oral two times a day    MEDICATIONS  (PRN):  aluminum hydroxide/magnesium hydroxide/simethicone Suspension 30 milliLiter(s) Oral every 4 hours PRN Dyspepsia  belladonna 16.2 mG/opium 30 mg Suppository 1 Suppository(s) Rectal every 8 hours PRN bladder spasms      Allergies  No Known Allergies    Review of Systems:  Constitutional: No fever; + weight loss  Eyes: No blurry vision  Neuro: No tremors  HEENT: No pain  Cardiovascular: No chest pain, palpitations  Respiratory: No SOB, no cough  GI: No nausea, vomiting, abdominal pain  : No dysuria  Skin: no rash  Endocrine: + polyuria, polydipsia    ALL OTHER SYSTEMS REVIEWED AND NEGATIVE    PHYSICAL EXAM:  VITALS: T(C): 36.4 (10-02-19 @ 06:05)  T(F): 97.5 (10-02-19 @ 06:05), Max: 97.9 (10-01-19 @ 15:19)  HR: 78 (10-02-19 @ 06:05) (64 - 78)  BP: 137/74 (10-02-19 @ 06:05) (108/67 - 137/74)  RR:  (17 - 18)  SpO2:  (98% - 100%)  Wt(kg): --  GENERAL: NAD, well-developed  EYES: No proptosis, anicteric  HEENT:  Atraumatic, Normocephalic, no hyperpigmentation noted on mucous membranes  THYROID: Normal size, no palpable nodules  RESPIRATORY: Clear to auscultation bilaterally; No rales, rhonchi, wheezing  CARDIOVASCULAR: Regular rate and rhythm; No murmurs; no peripheral edema  GI: Soft, nontender, non distended, normal bowel sounds  SKIN: Dry, intact, No ulcers or wounds noted on feet; no hyperpigmentation noted on hands  PSYCH: reactive affect, euthymic mood                            12.5   9.23  )-----------( 367      ( 02 Oct 2019 05:10 )             36.2       10-02    124<L>  |  91<L>  |  7   ----------------------------<  101<H>  3.8   |  22  |  0.73    EGFR if : 105  EGFR if non : 91    Ca    8.9      10-02  Mg     1.9     10-02  Phos  4.2     10-02    TPro  7.2  /  Alb  4.0  /  TBili  0.7  /  DBili  x   /  AST  14  /  ALT  9   /  AlkPhos  81  10-01      Thyroid Function Tests:  09-17 @ 15:20 TSH 2.30 FreeT4 -- T3 -- Anti TPO -- Anti Thyroglobulin Ab -- TSI --      Hemoglobin A1C, Whole Blood: 7.0 % <H> [4.0 - 5.6] (09-17-19 @ 15:20) HPI:  74 M with a PMHx of Parkinson's, Dementia, BPH with a chronic indwelling pichardo w/ recent admission for epididymo-orchitis presents to the ED with suprapubic abdominal pain and no urine output for a few hours, now admitted for management of urinary retention with hyponatremia. Cortisol was checked and found to be 6.8 (on the lower end of normal) in the AM of 10/1. Consult called for evaluation of hypocortisolemia with hyponatremia.    History obtained with assistance of patient's son at bedside. Patient has no prior history of hypocortisolemia. As per son, patient has only been taking the medications prescribed to him; he showed me the medication list from previous admission in 9/2019. Patient has no history of oral steroid use. Has not received any intra-articular steroid injections. He has no known history of pituitary disease or pituitary surgery. He has apparently lost about 3 kg recently. However, he has had a healthy appetite, eats a lot of fruits at home. Also has roti, rice etc. As per son, patient "eats a healthy diet". He has had no nausea, vomiting, abdominal pain. Last admission, he was discharged  home on antibiotics and son states that the antibiotics made patient dizzy, but he did not have any falls. Patient is not any appetite stimulants such as Megace or chronic opiates. He does not have liver disease or hypoalbuminemia that could explain low measured cortisol. Did not receive any steroids while here. TSH normal.    He was also found to have new DM2 last admission. HbA1c was 7.0% in 9/2019. Does not have blurry vision, but does admit to polyuria and polydipsia. No symptoms of neuropathy in the hands or the feet. No history of nephropathy. There is no family history of DM.       PAST MEDICAL & SURGICAL HISTORY:  Dementia  Parkinson disease  BPH (benign prostatic hyperplasia)  No significant past surgical history      FAMILY HISTORY:  no family history of DM    Social History:  Former alcohol use, stopped 5 years ago  No alcohol use  From [facility]    Outpatient Medications:  · 	baclofen 10 mg oral tablet: Last Dose Taken:  , 1 tab(s) orally 2 times a day  · 	carbidopa-levodopa 25 mg-100 mg oral tablet: Last Dose Taken:  , 1 tab(s) orally 3 times a day  · 	Flomax 0.4 mg oral capsule: Last Dose Taken:  , 1 cap(s) orally once a day (at bedtime)  · 	Biotin Forte oral tablet: Last Dose Taken:  , 1 tab(s) orally once a day  · 	trihexyphenidyl 2 mg oral tablet: Last Dose Taken:  , 1 tab(s) orally 2 times a day  · 	donepezil 5 mg oral tablet: Last Dose Taken:  , 0.5 tab(s) orally once a day (at bedtime)  · 	memantine 5 mg oral tablet: Last Dose Taken:  , 0.5 tab(s) orally once a day    MEDICATIONS  (STANDING):  baclofen 10 milliGRAM(s) Oral two times a day  carbidopa/levodopa  25/100 1 Tablet(s) Oral three times a day  donepezil 2.5 milliGRAM(s) Oral at bedtime  enoxaparin Injectable 40 milliGRAM(s) SubCutaneous daily  memantine 2.5 milliGRAM(s) Oral daily  multivitamin 1 Tablet(s) Oral daily  piperacillin/tazobactam IVPB.. 3.375 Gram(s) IV Intermittent every 8 hours  tamsulosin 0.4 milliGRAM(s) Oral at bedtime  trihexyphenidyl 2 milliGRAM(s) Oral two times a day    MEDICATIONS  (PRN):  aluminum hydroxide/magnesium hydroxide/simethicone Suspension 30 milliLiter(s) Oral every 4 hours PRN Dyspepsia  belladonna 16.2 mG/opium 30 mg Suppository 1 Suppository(s) Rectal every 8 hours PRN bladder spasms      Allergies  No Known Allergies    Review of Systems:  Constitutional: No fever; + weight loss  Eyes: No blurry vision  Neuro: No tremors  HEENT: No pain  Cardiovascular: No chest pain, palpitations  Respiratory: No SOB, no cough  GI: No nausea, vomiting, abdominal pain  : No dysuria  Skin: no rash  Endocrine: + polyuria, polydipsia    ALL OTHER SYSTEMS REVIEWED AND NEGATIVE    PHYSICAL EXAM:  VITALS: T(C): 36.4 (10-02-19 @ 06:05)  T(F): 97.5 (10-02-19 @ 06:05), Max: 97.9 (10-01-19 @ 15:19)  HR: 78 (10-02-19 @ 06:05) (64 - 78)  BP: 137/74 (10-02-19 @ 06:05) (108/67 - 137/74)  RR:  (17 - 18)  SpO2:  (98% - 100%)  Wt(kg): --  GENERAL: NAD, well-developed  EYES: No proptosis, anicteric  HEENT:  Atraumatic, Normocephalic, no hyperpigmentation noted on mucous membranes  THYROID: Normal size, no palpable nodules  RESPIRATORY: Clear to auscultation bilaterally; No rales, rhonchi, wheezing  CARDIOVASCULAR: Regular rate and rhythm; No murmurs; no peripheral edema  GI: Soft, nontender, non distended, normal bowel sounds  SKIN: Dry, intact, No ulcers or wounds noted on feet; no hyperpigmentation noted on hands  PSYCH: reactive affect, euthymic mood                            12.5   9.23  )-----------( 367      ( 02 Oct 2019 05:10 )             36.2       10-02    124<L>  |  91<L>  |  7   ----------------------------<  101<H>  3.8   |  22  |  0.73    EGFR if : 105  EGFR if non : 91    Ca    8.9      10-02  Mg     1.9     10-02  Phos  4.2     10-02    TPro  7.2  /  Alb  4.0  /  TBili  0.7  /  DBili  x   /  AST  14  /  ALT  9   /  AlkPhos  81  10-01      Thyroid Function Tests:  09-17 @ 15:20 TSH 2.30 FreeT4 -- T3 -- Anti TPO -- Anti Thyroglobulin Ab -- TSI --      Hemoglobin A1C, Whole Blood: 7.0 % <H> [4.0 - 5.6] (09-17-19 @ 15:20)

## 2019-10-02 NOTE — PROGRESS NOTE ADULT - ASSESSMENT
74 M with a PMHx of Parkinson's, Dementia, and BPH with a chronic indwelling pichardo presents to the ED with suprapubic abdominal pain 2/2 urinary retention now with hyponatremia.

## 2019-10-03 ENCOUNTER — TRANSCRIPTION ENCOUNTER (OUTPATIENT)
Age: 74
End: 2019-10-03

## 2019-10-03 DIAGNOSIS — R33.9 RETENTION OF URINE, UNSPECIFIED: ICD-10-CM

## 2019-10-03 LAB
-  AMIKACIN: SIGNIFICANT CHANGE UP
-  AMPICILLIN/SULBACTAM: SIGNIFICANT CHANGE UP
-  AMPICILLIN: SIGNIFICANT CHANGE UP
-  AZTREONAM: SIGNIFICANT CHANGE UP
-  CEFAZOLIN: SIGNIFICANT CHANGE UP
-  CEFEPIME: SIGNIFICANT CHANGE UP
-  CEFOXITIN: SIGNIFICANT CHANGE UP
-  CEFTAZIDIME: SIGNIFICANT CHANGE UP
-  CEFTRIAXONE: SIGNIFICANT CHANGE UP
-  CEFUROXIME: SIGNIFICANT CHANGE UP
-  ERTAPENEM: SIGNIFICANT CHANGE UP
-  GENTAMICIN: SIGNIFICANT CHANGE UP
-  IMIPENEM: SIGNIFICANT CHANGE UP
-  LEVOFLOXACIN: SIGNIFICANT CHANGE UP
-  MEROPENEM: SIGNIFICANT CHANGE UP
-  NITROFURANTOIN: SIGNIFICANT CHANGE UP
-  PIPERACILLIN/TAZOBACTAM: SIGNIFICANT CHANGE UP
-  TIGECYCLINE: SIGNIFICANT CHANGE UP
-  TOBRAMYCIN: SIGNIFICANT CHANGE UP
-  TRIMETHOPRIM/SULFAMETHOXAZOLE: SIGNIFICANT CHANGE UP
ACTH SER-ACNC: 31 PG/ML — SIGNIFICANT CHANGE UP (ref 7.2–63.3)
ANION GAP SERPL CALC-SCNC: 12 MMO/L — SIGNIFICANT CHANGE UP (ref 7–14)
ANION GAP SERPL CALC-SCNC: 14 MMO/L — SIGNIFICANT CHANGE UP (ref 7–14)
BACTERIA UR CULT: SIGNIFICANT CHANGE UP
BUN SERPL-MCNC: 13 MG/DL — SIGNIFICANT CHANGE UP (ref 7–23)
BUN SERPL-MCNC: 8 MG/DL — SIGNIFICANT CHANGE UP (ref 7–23)
CALCIUM SERPL-MCNC: 9.1 MG/DL — SIGNIFICANT CHANGE UP (ref 8.4–10.5)
CALCIUM SERPL-MCNC: 9.3 MG/DL — SIGNIFICANT CHANGE UP (ref 8.4–10.5)
CHLORIDE SERPL-SCNC: 89 MMOL/L — LOW (ref 98–107)
CHLORIDE SERPL-SCNC: 94 MMOL/L — LOW (ref 98–107)
CHOLEST SERPL-MCNC: 128 MG/DL — SIGNIFICANT CHANGE UP (ref 120–199)
CO2 SERPL-SCNC: 22 MMOL/L — SIGNIFICANT CHANGE UP (ref 22–31)
CO2 SERPL-SCNC: 25 MMOL/L — SIGNIFICANT CHANGE UP (ref 22–31)
CREAT SERPL-MCNC: 0.72 MG/DL — SIGNIFICANT CHANGE UP (ref 0.5–1.3)
CREAT SERPL-MCNC: 0.77 MG/DL — SIGNIFICANT CHANGE UP (ref 0.5–1.3)
GLUCOSE BLDC GLUCOMTR-MCNC: 106 MG/DL — HIGH (ref 70–99)
GLUCOSE BLDC GLUCOMTR-MCNC: 126 MG/DL — HIGH (ref 70–99)
GLUCOSE BLDC GLUCOMTR-MCNC: 135 MG/DL — HIGH (ref 70–99)
GLUCOSE BLDC GLUCOMTR-MCNC: 143 MG/DL — HIGH (ref 70–99)
GLUCOSE SERPL-MCNC: 118 MG/DL — HIGH (ref 70–99)
GLUCOSE SERPL-MCNC: 144 MG/DL — HIGH (ref 70–99)
HCT VFR BLD CALC: 36 % — LOW (ref 39–50)
HDLC SERPL-MCNC: 52 MG/DL — SIGNIFICANT CHANGE UP (ref 35–55)
HGB BLD-MCNC: 12.5 G/DL — LOW (ref 13–17)
LIPID PNL WITH DIRECT LDL SERPL: 76 MG/DL — SIGNIFICANT CHANGE UP
MAGNESIUM SERPL-MCNC: 2 MG/DL — SIGNIFICANT CHANGE UP (ref 1.6–2.6)
MCHC RBC-ENTMCNC: 24.9 PG — LOW (ref 27–34)
MCHC RBC-ENTMCNC: 34.7 % — SIGNIFICANT CHANGE UP (ref 32–36)
MCV RBC AUTO: 71.7 FL — LOW (ref 80–100)
METHOD TYPE: SIGNIFICANT CHANGE UP
NRBC # FLD: 0 K/UL — SIGNIFICANT CHANGE UP (ref 0–0)
ORGANISM # SPEC MICROSCOPIC CNT: SIGNIFICANT CHANGE UP
ORGANISM # SPEC MICROSCOPIC CNT: SIGNIFICANT CHANGE UP
PHOSPHATE SERPL-MCNC: 4.2 MG/DL — SIGNIFICANT CHANGE UP (ref 2.5–4.5)
PLATELET # BLD AUTO: 387 K/UL — SIGNIFICANT CHANGE UP (ref 150–400)
PMV BLD: 8.5 FL — SIGNIFICANT CHANGE UP (ref 7–13)
POTASSIUM SERPL-MCNC: 3.9 MMOL/L — SIGNIFICANT CHANGE UP (ref 3.5–5.3)
POTASSIUM SERPL-MCNC: 4 MMOL/L — SIGNIFICANT CHANGE UP (ref 3.5–5.3)
POTASSIUM SERPL-SCNC: 3.9 MMOL/L — SIGNIFICANT CHANGE UP (ref 3.5–5.3)
POTASSIUM SERPL-SCNC: 4 MMOL/L — SIGNIFICANT CHANGE UP (ref 3.5–5.3)
RBC # BLD: 5.02 M/UL — SIGNIFICANT CHANGE UP (ref 4.2–5.8)
RBC # FLD: 14.7 % — HIGH (ref 10.3–14.5)
RENIN DIRECT, PLASMA: 31 PG/ML — SIGNIFICANT CHANGE UP
SODIUM SERPL-SCNC: 128 MMOL/L — LOW (ref 135–145)
SODIUM SERPL-SCNC: 128 MMOL/L — LOW (ref 135–145)
TRIGL SERPL-MCNC: 79 MG/DL — SIGNIFICANT CHANGE UP (ref 10–149)
WBC # BLD: 9.38 K/UL — SIGNIFICANT CHANGE UP (ref 3.8–10.5)
WBC # FLD AUTO: 9.38 K/UL — SIGNIFICANT CHANGE UP (ref 3.8–10.5)

## 2019-10-03 PROCEDURE — 99233 SBSQ HOSP IP/OBS HIGH 50: CPT

## 2019-10-03 PROCEDURE — 99233 SBSQ HOSP IP/OBS HIGH 50: CPT | Mod: GC

## 2019-10-03 RX ADMIN — Medication 30 MILLILITER(S): at 17:36

## 2019-10-03 RX ADMIN — PIPERACILLIN AND TAZOBACTAM 25 GRAM(S): 4; .5 INJECTION, POWDER, LYOPHILIZED, FOR SOLUTION INTRAVENOUS at 14:39

## 2019-10-03 RX ADMIN — Medication 1 TABLET(S): at 14:42

## 2019-10-03 RX ADMIN — Medication 2 MILLIGRAM(S): at 17:36

## 2019-10-03 RX ADMIN — CARBIDOPA AND LEVODOPA 1 TABLET(S): 25; 100 TABLET ORAL at 14:42

## 2019-10-03 RX ADMIN — ENOXAPARIN SODIUM 40 MILLIGRAM(S): 100 INJECTION SUBCUTANEOUS at 14:41

## 2019-10-03 RX ADMIN — Medication 10 MILLIGRAM(S): at 16:44

## 2019-10-03 RX ADMIN — PIPERACILLIN AND TAZOBACTAM 25 GRAM(S): 4; .5 INJECTION, POWDER, LYOPHILIZED, FOR SOLUTION INTRAVENOUS at 21:02

## 2019-10-03 RX ADMIN — ATORVASTATIN CALCIUM 20 MILLIGRAM(S): 80 TABLET, FILM COATED ORAL at 21:02

## 2019-10-03 RX ADMIN — CARBIDOPA AND LEVODOPA 1 TABLET(S): 25; 100 TABLET ORAL at 21:02

## 2019-10-03 RX ADMIN — Medication 2 MILLIGRAM(S): at 06:16

## 2019-10-03 RX ADMIN — CARBIDOPA AND LEVODOPA 1 TABLET(S): 25; 100 TABLET ORAL at 06:16

## 2019-10-03 RX ADMIN — TAMSULOSIN HYDROCHLORIDE 0.4 MILLIGRAM(S): 0.4 CAPSULE ORAL at 21:03

## 2019-10-03 RX ADMIN — DONEPEZIL HYDROCHLORIDE 2.5 MILLIGRAM(S): 10 TABLET, FILM COATED ORAL at 21:02

## 2019-10-03 RX ADMIN — MEMANTINE HYDROCHLORIDE 2.5 MILLIGRAM(S): 10 TABLET ORAL at 14:42

## 2019-10-03 RX ADMIN — Medication 30 MILLILITER(S): at 10:56

## 2019-10-03 RX ADMIN — ATROPA BELLADONNA AND OPIUM 1 SUPPOSITORY(S): 16.2; 6 SUPPOSITORY RECTAL at 10:56

## 2019-10-03 RX ADMIN — PIPERACILLIN AND TAZOBACTAM 25 GRAM(S): 4; .5 INJECTION, POWDER, LYOPHILIZED, FOR SOLUTION INTRAVENOUS at 06:17

## 2019-10-03 NOTE — DISCHARGE NOTE PROVIDER - HOSPITAL COURSE
Patient is a 74 M with a PMHx of Parkinson's, Dementia, and BPH with a chronic indwelling pichardo w/ recent admission for epididymo-orchitis presents to the ED with urinary retention c/b hyponatremia     Hyponatremia    - euvolemic hypoNa    - house renal cs    - concern for adrenal insufficiency    - house endo cs        Complicated UTI    - with chronic indwelling pichardo 2/2 BPH, failed TOV at outpt urology office on 9/30    - zosyn     - 9/30 u.cx klebsiella         Urinary retention    - in the setting of known BPH    - Failed outpatient TOV    - 10/1 Pichardo reinserted in ED     - flomax    - 10/1 spoke with urology, will not see patient recommended outpatient f/u        Parkinson disease    - carbidopa-Levodopa, trihexyphenidyl, baclofen and biotin     - PT eval - rehab         Dementia    - donepezil, memantine        BPH    - flomax        Son requesting Clonazepam however no prescription found on [facility]. As per outpt records, Clonazepam was discontinued due to worsening lethargy therefore will hold off on restarting as pt has been off of it for 1 month. Patient is a 74 M with a PMHx of Parkinson's, Dementia, and BPH with a chronic indwelling pichardo w/ recent admission for epididymo-orchitis presents to the ED with urinary retention c/b hyponatremia. Patient failued outpatient TOV and pichardo reinserted on 10/1 in ED. Urology recs: outpatient followup. Hyponatremia improved with fluid restriction. Adrenal insufficiency was ruled out with cosyntropin test, Patient grew MDR Kleb from pichardo catheter. Given lack of fevers and improvement of mild leukocytosis, it was felt that this was colonization and antibitocs dced after 3 days. PT recs: rehab but son opted to take patient home instead.        Son requesting Clonazepam however no prescription found on [facility]. As per outpt records, Clonazepam was discontinued due to worsening lethargy therefore will hold off on restarting as pt has been off of it for 1 month.

## 2019-10-03 NOTE — PROGRESS NOTE ADULT - SUBJECTIVE AND OBJECTIVE BOX
[facility] DIVISION OF KIDNEY DISEASES AND HYPERTENSION -- FOLLOW UP NOTE  --------------------------------------------------------------------------------  4 hour events/subjective: HPI: 74 M with a PMHx of Parkinson's, Dementia, and BPH with a chronic indwelling pichardo w/ recent admission for epididymo-orchitis presents to the ED with suprapubic abdominal pain and no urine output for a few hours. Son providing history as per patient preference. Son reports pt was seen at urology's office earlier that evening with removal of his pichardo. States he began to complain of suprapubic abdominal pain shorty after and was unable to urinate therefore was brought in by son for eval. Son also notes that pt has appeared more lethargic but otherwise reports he is at baseline mental status and has been eating/drinking 3 meals a day. Denies fevers, chills, chest pain, shortness of breath, LE edema or changes in urine smell/appearance. Of note, pt was recently admitted mid September for epididymo-orchitis with cultures growing serratia and pseudomonas treated with zosyn with levaquin to complete 2 week course. Also found to be hyponatremic to 121 at the time which was attributed to poor PO intake. In the ED, VSS. Pichardo catheter was inserted with drainage of urine.    Nephrology consulted for hyponatremia. Pt with sodium of 121 on evening of 9/30, corrected to 129 morning of 10/1 and was subsequently given 1L of D5 IVF (which lowered sodium to 127 early that evening). Sodium is 128 today. [facility] reviewed. On recent admission in early September, pt was also noted with hyponatremia (ranging from 121 to 130). Pt denies changes in mentation, chest pain, shortness of breath, or diarrhea. He does report suprapubic pain.         PAST HISTORY  --------------------------------------------------------------------------------  No significant changes to PMH, PSH, FHx, SHx, unless otherwise noted    ALLERGIES & MEDICATIONS  --------------------------------------------------------------------------------  Allergies    No Known Allergies    Intolerances      Standing Inpatient Medications  atorvastatin 20 milliGRAM(s) Oral at bedtime  baclofen 10 milliGRAM(s) Oral two times a day  carbidopa/levodopa  25/100 1 Tablet(s) Oral three times a day  donepezil 2.5 milliGRAM(s) Oral at bedtime  enoxaparin Injectable 40 milliGRAM(s) SubCutaneous daily  insulin lispro (HumaLOG) corrective regimen sliding scale   SubCutaneous three times a day before meals  insulin lispro (HumaLOG) corrective regimen sliding scale   SubCutaneous at bedtime  memantine 2.5 milliGRAM(s) Oral daily  multivitamin 1 Tablet(s) Oral daily  piperacillin/tazobactam IVPB.. 3.375 Gram(s) IV Intermittent every 8 hours  tamsulosin 0.4 milliGRAM(s) Oral at bedtime  trihexyphenidyl 2 milliGRAM(s) Oral two times a day    PRN Inpatient Medications  aluminum hydroxide/magnesium hydroxide/simethicone Suspension 30 milliLiter(s) Oral every 4 hours PRN  belladonna 16.2 mG/opium 30 mg Suppository 1 Suppository(s) Rectal every 8 hours PRN      REVIEW OF SYSTEMS  --------------------------------------------------------------------------------  Gen: No fevers/chills  Skin: No rashes  Head/Eyes/Ears: Normal hearing,   Respiratory: No dyspnea, cough  CV: No chest pain  GI: No abdominal pain, diarrhea  : No dysuria, hematuria  MSK: No  edema  Heme: No easy bruising or bleeding  Psych: No significant depression      All other systems were reviewed and are negative, except as noted.    VITALS/PHYSICAL EXAM  --------------------------------------------------------------------------------  T(C): 36.7 (10-03-19 @ 06:14), Max: 36.7 (10-02-19 @ 12:45)  HR: 77 (10-03-19 @ 06:14) (70 - 78)  BP: 111/76 (10-03-19 @ 06:14) (111/76 - 123/77)  RR: 17 (10-03-19 @ 06:14) (17 - 17)  SpO2: 99% (10-03-19 @ 06:14) (99% - 100%)  Wt(kg): --  Height (cm): 165.1 (10-01-19 @ 18:58)  Weight (kg): 70.3 (10-01-19 @ 18:58)  BMI (kg/m2): 25.8 (10-01-19 @ 18:58)  BSA (m2): 1.77 (10-01-19 @ 18:58)      10-02-19 @ 07:01  -  10-03-19 @ 07:00  --------------------------------------------------------  IN: 250 mL / OUT: 2300 mL / NET: -2050 mL        Physical Exam:  	Gen: NAD  	HEENT: MMM  	Pulm: CTA B/L  	CV: S1S2  	Abd: Soft, +BS   	Ext: No LE edema B/L  	Neuro: Awake  	Skin: Warm and dry  	Vascular access:      LABS/STUDIES  --------------------------------------------------------------------------------              12.5   9.38  >-----------<  387      [10-03-19 @ 02:28]              36.0     128  |  94  |  8   ----------------------------<  118      [10-03-19 @ 02:28]  3.9   |  22  |  0.72        Ca     9.1     [10-03-19 @ 02:28]      Mg     2.0     [10-03-19 @ 02:28]      Phos  4.2     [10-03-19 @ 02:28]            Creatinine Trend:  SCr 0.72 [10-03 @ 02:28]  SCr 0.72 [10-02 @ 19:41]  SCr 0.61 [10-02 @ 12:58]  SCr 0.73 [10-02 @ 05:10]  SCr 0.65 [10-01 @ 21:37]    Urinalysis - [09-30-19 @ 20:35]      Color LIGHT YELLOW / Appearance Lt TURBID / SG 1.012 / pH 6.5      Gluc 200 / Ketone NEGATIVE  / Bili NEGATIVE / Urobili NORMAL       Blood MODERATE / Protein 20 / Leuk Est LARGE / Nitrite POSITIVE      RBC 25-50 / WBC >50 / Hyaline NEGATIVE / Gran  / Sq Epi OCC / Non Sq Epi  / Bacteria MANY    Urine Sodium < 20      [10-02-19 @ 21:00]  Urine Potassium 11.0      [10-02-19 @ 21:00]  Urine Chloride 28      [09-30-19 @ 23:44]  Urine Osmolality 76      [10-02-19 @ 21:00]    Iron 56, TIBC 304, %sat --      [09-17-19 @ 15:20]  Ferritin 34.56      [09-17-19 @ 15:20]  HbA1c 7.0      [09-17-19 @ 15:20]  TSH 2.30      [09-17-19 @ 15:20]  Lipid: chol 128, TG 79, HDL 52, LDL 76      [10-03-19 @ 02:28]    HCV 0.15, Nonreactive Hepatitis C AB  S/CO Ratio                        Interpretation  < 1.00                                   Non-Reactive  1.00 - 4.99                         Weakly-Reactive  >= 5.00                                Reactive  Non-Reactive: Aperson with a non-reactive HCV antibody  result is considered uninfected.  No further action is  needed unless recent infection is suspected.  In these  cases, consider repeat testing later to detect  seroconversion..  Weakly-Reactive: HCV antibody test is abnormal, HCV RNA  Qualitative test will follow.  Reactive: HCV antibody test is abnormal, HCV RNA  Qualitative test will follow.  Note: HCV antibody testing is performed on the Abbott   system.      [09-17-19 @ 15:20]  HIV Nonreactive The HIV Ag/Ab Combo test performed screens for HIV-1 p24  antigen, antibodies to HIV-1 (group M and group O), and  antibodies to HIV-2. All specimens repeatedly reactive  will reflex to an HIV 1/2 antibody confirmation and  differentiation test. This assay detects p24 antigen which  may be present prior to the development of HIV antibodies,  therefore a reactive result with a negative HIV 1/2 AB  Confirmation should be followed up with HIV-1 RNA, HIV-2  RNA and repeat testing in 4-8 weeks. A nonreactive result  does not preclude previous exposure to or infection with  HIV-1 or HIV-2. [facility] prohibits disclosure of this  result to any unauthorized party.      [09-17-19 @ 15:20] [facility] DIVISION OF KIDNEY DISEASES AND HYPERTENSION -- FOLLOW UP NOTE  --------------------------------------------------------------------------------  4 hour events/subjective: HPI: 74 M with a PMHx of Parkinson's, Dementia, and BPH with a chronic indwelling pichardo w/ recent admission for epididymo-orchitis presents to the ED with suprapubic abdominal pain and no urine output for a few hours. Son providing history as per patient preference. Son reports pt was seen at urology's office earlier that evening with removal of his pichardo. States he began to complain of suprapubic abdominal pain shorty after and was unable to urinate therefore was brought in by son for eval. Son also notes that pt has appeared more lethargic but otherwise reports he is at baseline mental status and has been eating/drinking 3 meals a day. Denies fevers, chills, chest pain, shortness of breath, LE edema or changes in urine smell/appearance. Of note, pt was recently admitted mid September for epididymo-orchitis with cultures growing serratia and pseudomonas treated with zosyn with levaquin to complete 2 week course. Also found to be hyponatremic to 121 at the time which was attributed to poor PO intake. In the ED, VSS. Pichardo catheter was inserted with drainage of urine.    Nephrology consulted for hyponatremia. Pt with sodium of 121 on evening of 9/30, corrected to 129 morning of 10/1 and was subsequently given 1L of D5 IVF (which lowered sodium to 127 early that evening). Sodium is 128 today. [facility] reviewed. On recent admission in early September, pt was also noted with hyponatremia (ranging from 121 to 130). Pt denies changes in mentation, chest pain, shortness of breath, or diarrhea. He does report suprapubic pain with associated burning on urination.         PAST HISTORY  --------------------------------------------------------------------------------  No significant changes to PMH, PSH, FHx, SHx, unless otherwise noted    ALLERGIES & MEDICATIONS  --------------------------------------------------------------------------------  Allergies    No Known Allergies    Intolerances      Standing Inpatient Medications  atorvastatin 20 milliGRAM(s) Oral at bedtime  baclofen 10 milliGRAM(s) Oral two times a day  carbidopa/levodopa  25/100 1 Tablet(s) Oral three times a day  donepezil 2.5 milliGRAM(s) Oral at bedtime  enoxaparin Injectable 40 milliGRAM(s) SubCutaneous daily  insulin lispro (HumaLOG) corrective regimen sliding scale   SubCutaneous three times a day before meals  insulin lispro (HumaLOG) corrective regimen sliding scale   SubCutaneous at bedtime  memantine 2.5 milliGRAM(s) Oral daily  multivitamin 1 Tablet(s) Oral daily  piperacillin/tazobactam IVPB.. 3.375 Gram(s) IV Intermittent every 8 hours  tamsulosin 0.4 milliGRAM(s) Oral at bedtime  trihexyphenidyl 2 milliGRAM(s) Oral two times a day    PRN Inpatient Medications  aluminum hydroxide/magnesium hydroxide/simethicone Suspension 30 milliLiter(s) Oral every 4 hours PRN  belladonna 16.2 mG/opium 30 mg Suppository 1 Suppository(s) Rectal every 8 hours PRN      REVIEW OF SYSTEMS  --------------------------------------------------------------------------------  Gen: No fevers/chills  Skin: No rashes  Head/Eyes/Ears: Normal hearing,   Respiratory: No dyspnea, cough  CV: No chest pain  GI: No abdominal pain, diarrhea  : No dysuria, hematuria  MSK: No  edema  Heme: No easy bruising or bleeding  Psych: No significant depression      All other systems were reviewed and are negative, except as noted.    VITALS/PHYSICAL EXAM  --------------------------------------------------------------------------------  T(C): 36.7 (10-03-19 @ 06:14), Max: 36.7 (10-02-19 @ 12:45)  HR: 77 (10-03-19 @ 06:14) (70 - 78)  BP: 111/76 (10-03-19 @ 06:14) (111/76 - 123/77)  RR: 17 (10-03-19 @ 06:14) (17 - 17)  SpO2: 99% (10-03-19 @ 06:14) (99% - 100%)  Wt(kg): --  Height (cm): 165.1 (10-01-19 @ 18:58)  Weight (kg): 70.3 (10-01-19 @ 18:58)  BMI (kg/m2): 25.8 (10-01-19 @ 18:58)  BSA (m2): 1.77 (10-01-19 @ 18:58)      10-02-19 @ 07:01  -  10-03-19 @ 07:00  --------------------------------------------------------  IN: 250 mL / OUT: 2300 mL / NET: -2050 mL        Physical Exam:  	Gen: NAD  	HEENT: MMM  	Pulm: CTA B/L  	CV: S1S2  	Abd: Soft, +BS   	Ext: No LE edema B/L  	Neuro: Awake  	Skin: Warm and dry  	Vascular access:      LABS/STUDIES  --------------------------------------------------------------------------------              12.5   9.38  >-----------<  387      [10-03-19 @ 02:28]              36.0     128  |  94  |  8   ----------------------------<  118      [10-03-19 @ 02:28]  3.9   |  22  |  0.72        Ca     9.1     [10-03-19 @ 02:28]      Mg     2.0     [10-03-19 @ 02:28]      Phos  4.2     [10-03-19 @ 02:28]            Creatinine Trend:  SCr 0.72 [10-03 @ 02:28]  SCr 0.72 [10-02 @ 19:41]  SCr 0.61 [10-02 @ 12:58]  SCr 0.73 [10-02 @ 05:10]  SCr 0.65 [10-01 @ 21:37]    Urinalysis - [09-30-19 @ 20:35]      Color LIGHT YELLOW / Appearance Lt TURBID / SG 1.012 / pH 6.5      Gluc 200 / Ketone NEGATIVE  / Bili NEGATIVE / Urobili NORMAL       Blood MODERATE / Protein 20 / Leuk Est LARGE / Nitrite POSITIVE      RBC 25-50 / WBC >50 / Hyaline NEGATIVE / Gran  / Sq Epi OCC / Non Sq Epi  / Bacteria MANY    Urine Sodium < 20      [10-02-19 @ 21:00]  Urine Potassium 11.0      [10-02-19 @ 21:00]  Urine Chloride 28      [09-30-19 @ 23:44]  Urine Osmolality 76      [10-02-19 @ 21:00]    Iron 56, TIBC 304, %sat --      [09-17-19 @ 15:20]  Ferritin 34.56      [09-17-19 @ 15:20]  HbA1c 7.0      [09-17-19 @ 15:20]  TSH 2.30      [09-17-19 @ 15:20]  Lipid: chol 128, TG 79, HDL 52, LDL 76      [10-03-19 @ 02:28]    HCV 0.15, Nonreactive Hepatitis C AB  S/CO Ratio                        Interpretation  < 1.00                                   Non-Reactive  1.00 - 4.99                         Weakly-Reactive  >= 5.00                                Reactive  Non-Reactive: Aperson with a non-reactive HCV antibody  result is considered uninfected.  No further action is  needed unless recent infection is suspected.  In these  cases, consider repeat testing later to detect  seroconversion..  Weakly-Reactive: HCV antibody test is abnormal, HCV RNA  Qualitative test will follow.  Reactive: HCV antibody test is abnormal, HCV RNA  Qualitative test will follow.  Note: HCV antibody testing is performed on the Abbott   system.      [09-17-19 @ 15:20]  HIV Nonreactive The HIV Ag/Ab Combo test performed screens for HIV-1 p24  antigen, antibodies to HIV-1 (group M and group O), and  antibodies to HIV-2. All specimens repeatedly reactive  will reflex to an HIV 1/2 antibody confirmation and  differentiation test. This assay detects p24 antigen which  may be present prior to the development of HIV antibodies,  therefore a reactive result with a negative HIV 1/2 AB  Confirmation should be followed up with HIV-1 RNA, HIV-2  RNA and repeat testing in 4-8 weeks. A nonreactive result  does not preclude previous exposure to or infection with  HIV-1 or HIV-2. [facility] prohibits disclosure of this  result to any unauthorized party.      [09-17-19 @ 15:20] [facility] DIVISION OF KIDNEY DISEASES AND HYPERTENSION -- FOLLOW UP NOTE  --------------------------------------------------------------------------------  4 hour events/subjective: HPI: 74 M with a PMHx of Parkinson's, Dementia, and BPH with a chronic indwelling pichardo w/ recent admission for epididymo-orchitis presents to the ED with suprapubic abdominal pain and no urine output for a few hours. Son providing history as per patient preference. Son reports pt was seen at urology's office earlier that evening with removal of his pichardo. States he began to complain of suprapubic abdominal pain shorty after and was unable to urinate therefore was brought in by son for eval. Son also notes that pt has appeared more lethargic but otherwise reports he is at baseline mental status and has been eating/drinking 3 meals a day. Denies fevers, chills, chest pain, shortness of breath, LE edema or changes in urine smell/appearance. Of note, pt was recently admitted mid September for epididymo-orchitis with cultures growing serratia and pseudomonas treated with zosyn with levaquin to complete 2 week course. Also found to be hyponatremic to 121 at the time which was attributed to poor PO intake. In the ED, VSS. Pichardo catheter was inserted with drainage of urine.    Nephrology consulted for hyponatremia. Pt with sodium of 121 on evening of 9/30, corrected to 129 morning of 10/1 and was subsequently given 1L of D5 IVF (which lowered sodium to 127 early that evening). Sodium is 128 today. [facility] reviewed. On recent admission in early September, pt was also noted with hyponatremia (ranging from 121 to 130). Pt denies changes in mentation, chest pain, shortness of breath, or diarrhea. He does report suprapubic pain with associated burning on urination.         PAST HISTORY  --------------------------------------------------------------------------------  No significant changes to PMH, PSH, FHx, SHx, unless otherwise noted    ALLERGIES & MEDICATIONS  --------------------------------------------------------------------------------  Allergies    No Known Allergies    Intolerances      Standing Inpatient Medications  atorvastatin 20 milliGRAM(s) Oral at bedtime  baclofen 10 milliGRAM(s) Oral two times a day  carbidopa/levodopa  25/100 1 Tablet(s) Oral three times a day  donepezil 2.5 milliGRAM(s) Oral at bedtime  enoxaparin Injectable 40 milliGRAM(s) SubCutaneous daily  insulin lispro (HumaLOG) corrective regimen sliding scale   SubCutaneous three times a day before meals  insulin lispro (HumaLOG) corrective regimen sliding scale   SubCutaneous at bedtime  memantine 2.5 milliGRAM(s) Oral daily  multivitamin 1 Tablet(s) Oral daily  piperacillin/tazobactam IVPB.. 3.375 Gram(s) IV Intermittent every 8 hours  tamsulosin 0.4 milliGRAM(s) Oral at bedtime  trihexyphenidyl 2 milliGRAM(s) Oral two times a day    PRN Inpatient Medications  aluminum hydroxide/magnesium hydroxide/simethicone Suspension 30 milliLiter(s) Oral every 4 hours PRN  belladonna 16.2 mG/opium 30 mg Suppository 1 Suppository(s) Rectal every 8 hours PRN      REVIEW OF SYSTEMS  --------------------------------------------------------------------------------  Gen: No fevers/chills  Respiratory: No dyspnea  CV: No chest pain  GI: + Suprapubic abd pain, no diarrhea  : + burning with urination   MSK: No  edema        All other systems were reviewed and are negative, except as noted.    VITALS/PHYSICAL EXAM  --------------------------------------------------------------------------------  T(C): 36.7 (10-03-19 @ 06:14), Max: 36.7 (10-02-19 @ 12:45)  HR: 77 (10-03-19 @ 06:14) (70 - 78)  BP: 111/76 (10-03-19 @ 06:14) (111/76 - 123/77)  RR: 17 (10-03-19 @ 06:14) (17 - 17)  SpO2: 99% (10-03-19 @ 06:14) (99% - 100%)  Wt(kg): --  Height (cm): 165.1 (10-01-19 @ 18:58)  Weight (kg): 70.3 (10-01-19 @ 18:58)  BMI (kg/m2): 25.8 (10-01-19 @ 18:58)  BSA (m2): 1.77 (10-01-19 @ 18:58)      10-02-19 @ 07:01  -  10-03-19 @ 07:00  --------------------------------------------------------  IN: 250 mL / OUT: 2300 mL / NET: -2050 mL        Physical Exam:  	Gen: NAD  	HEENT: MMM  	Pulm: CTA B/L  	CV: S1S2  	Abd: Soft, +BS, Mild suprapubic tenderness to palpation    	Ext: No LE edema B/L  	Neuro: Awake  	Skin: Warm and dry  	Vascular access:      LABS/STUDIES  --------------------------------------------------------------------------------              12.5   9.38  >-----------<  387      [10-03-19 @ 02:28]              36.0     128  |  94  |  8   ----------------------------<  118      [10-03-19 @ 02:28]  3.9   |  22  |  0.72        Ca     9.1     [10-03-19 @ 02:28]      Mg     2.0     [10-03-19 @ 02:28]      Phos  4.2     [10-03-19 @ 02:28]            Creatinine Trend:  SCr 0.72 [10-03 @ 02:28]  SCr 0.72 [10-02 @ 19:41]  SCr 0.61 [10-02 @ 12:58]  SCr 0.73 [10-02 @ 05:10]  SCr 0.65 [10-01 @ 21:37]    Urinalysis - [09-30-19 @ 20:35]      Color LIGHT YELLOW / Appearance Lt TURBID / SG 1.012 / pH 6.5      Gluc 200 / Ketone NEGATIVE  / Bili NEGATIVE / Urobili NORMAL       Blood MODERATE / Protein 20 / Leuk Est LARGE / Nitrite POSITIVE      RBC 25-50 / WBC >50 / Hyaline NEGATIVE / Gran  / Sq Epi OCC / Non Sq Epi  / Bacteria MANY    Urine Sodium < 20      [10-02-19 @ 21:00]  Urine Potassium 11.0      [10-02-19 @ 21:00]  Urine Chloride 28      [09-30-19 @ 23:44]  Urine Osmolality 76      [10-02-19 @ 21:00]    Iron 56, TIBC 304, %sat --      [09-17-19 @ 15:20]  Ferritin 34.56      [09-17-19 @ 15:20]  HbA1c 7.0      [09-17-19 @ 15:20]  TSH 2.30      [09-17-19 @ 15:20]  Lipid: chol 128, TG 79, HDL 52, LDL 76      [10-03-19 @ 02:28]    HCV 0.15, Nonreactive Hepatitis C AB  S/CO Ratio                        Interpretation  < 1.00                                   Non-Reactive  1.00 - 4.99                         Weakly-Reactive  >= 5.00                                Reactive  Non-Reactive: Aperson with a non-reactive HCV antibody  result is considered uninfected.  No further action is  needed unless recent infection is suspected.  In these  cases, consider repeat testing later to detect  seroconversion..  Weakly-Reactive: HCV antibody test is abnormal, HCV RNA  Qualitative test will follow.  Reactive: HCV antibody test is abnormal, HCV RNA  Qualitative test will follow.  Note: HCV antibody testing is performed on the Abbott   system.      [09-17-19 @ 15:20]  HIV Nonreactive The HIV Ag/Ab Combo test performed screens for HIV-1 p24  antigen, antibodies to HIV-1 (group M and group O), and  antibodies to HIV-2. All specimens repeatedly reactive  will reflex to an HIV 1/2 antibody confirmation and  differentiation test. This assay detects p24 antigen which  may be present prior to the development of HIV antibodies,  therefore a reactive result with a negative HIV 1/2 AB  Confirmation should be followed up with HIV-1 RNA, HIV-2  RNA and repeat testing in 4-8 weeks. A nonreactive result  does not preclude previous exposure to or infection with  HIV-1 or HIV-2. [facility] prohibits disclosure of this  result to any unauthorized party.      [09-17-19 @ 15:20]

## 2019-10-03 NOTE — PROGRESS NOTE ADULT - ASSESSMENT
74 M with a PMHx of Parkinson's, dementia, BPH with a chronic indwelling pichardo w/ recent admission for epididymo-orchitis, here with hyponatremia and urinary retention, found to have new DM2, hypocortisolemia.     1. Hypocortisolemia  -Patient noted to have low AM cortisol on 10/1, result 6.8. No obvious cause such as medications, known adrenal/pituitary disease, hypoalbuminemia/malnutrition, etc  -Co-syntropin stimulation test performed yesterday 10/2 and results were normal, indicating no adrenal insufficiency, no further workup at this time    2. Type 2 diabetes mellitus with hyperglycemia, without long-term current use of insulin.   -Newly diagnosed, HbA1c 7.0%  -For age and comorbidities, goal A1c 7.0-8.0, goal glucose 100-180 mg/dl, patient within target today  While inpatient:  -Check blood glucose before meals and bedtime  -Would continue Humalog low dose correctional scale before meals and Humalog low dose correction scale before bed  -Would not recommend pre-meal Humalog or basal insulin at this time, correction scales only  -Continue consistent carbohydrate diet, recommend registered dietitian consult while inpatient for DM dietary counseling     Discharge Plan:  -At this time, patient does not require medical therapy for DM on discharge. Counseled son on consistent carbohydrate diet and glucose monitoring at home.  -Please send prescription for glucometer, glucose test strips and lancets to pharmacy prior to dc. Will need glucometer teaching prior to discharge, reviewed with patient son that he should check BG levels once a day in the AM before breakfast and record log of BG levels, follow up with PCP for monitoring and intervention if needed.    3. Hyperlipidemia, unspecified hyperlipidemia type  -Recommend checking fasting lipid panel and consider starting Lipitor 20 mg PO qHS if there is no contraindication. 74 M with a PMHx of Parkinson's, dementia, BPH with a chronic indwelling pichardo w/ recent admission for epididymo-orchitis, here with hyponatremia and urinary retention, found to have new DM2, hypocortisolemia.     1. Hypocortisolemia  -Patient noted to have low AM cortisol on 10/1, result 6.8. No obvious cause such as medications, known adrenal/pituitary disease, hypoalbuminemia/malnutrition, etc  -Co-syntropin stimulation test performed yesterday 10/2 and results were normal, indicating no adrenal insufficiency, no further workup at this time    2. Type 2 diabetes mellitus with hyperglycemia, without long-term current use of insulin.   -Newly diagnosed, HbA1c 7.0%  -For age and comorbidities, goal A1c 7.0-8.0, goal glucose 100-180 mg/dl, patient within target today  While inpatient:  -Check blood glucose before meals and bedtime  -Would continue Humalog low dose correctional scale before meals and Humalog low dose correction scale before bed  -Would not recommend pre-meal Humalog or basal insulin at this time, correction scales only  -Continue consistent carbohydrate diet, recommend registered dietitian consult while inpatient for DM dietary counseling     Discharge Plan:  -At this time, patient does not require medical therapy for DM on discharge. Counseled son on consistent carbohydrate diet and glucose monitoring at home.  -Please send prescription for glucometer, glucose test strips and lancets to pharmacy prior to dc. Will need glucometer teaching prior to discharge, reviewed with patient son that he should check BG levels once a day in the AM before breakfast and record log of BG levels, follow up with PCP for monitoring and intervention if needed.    3. Hyperlipidemia, unspecified hyperlipidemia type  -Recommend checking fasting lipid panel and consider starting Lipitor 20 mg PO qHS if there is no contraindication.     Reviewed discharge plan with primary team, kashmir RODAS

## 2019-10-03 NOTE — PROGRESS NOTE ADULT - PROBLEM SELECTOR PLAN 9
1) PCP Contacted on Admission: (Y/N) --> Name & Phone #: Dr. Freddy Valverde ([phone])  2) Date of Contact with PCP: 10/1   3) PCP Contacted at Discharge: (Y/N)  4) Summary of Handoff Given to PCP:   5) Post-Discharge Appointment Date and Location:

## 2019-10-03 NOTE — PROGRESS NOTE ADULT - SUBJECTIVE AND OBJECTIVE BOX
Patient is a 74y old  Male who presents with a chief complaint of suprapubic abdominal pain (02 Oct 2019 16:16)      SUBJECTIVE / OVERNIGHT EVENTS: No acute events overnight. Son Tyshawn at bedside providing translation. Suprapubic pain is better but still present. Denies chest pain, SOB, N/V. Not eating much due to lack of hunger.    MEDICATIONS  (STANDING):  atorvastatin 20 milliGRAM(s) Oral at bedtime  baclofen 10 milliGRAM(s) Oral two times a day  carbidopa/levodopa  25/100 1 Tablet(s) Oral three times a day  donepezil 2.5 milliGRAM(s) Oral at bedtime  enoxaparin Injectable 40 milliGRAM(s) SubCutaneous daily  insulin lispro (HumaLOG) corrective regimen sliding scale   SubCutaneous three times a day before meals  insulin lispro (HumaLOG) corrective regimen sliding scale   SubCutaneous at bedtime  memantine 2.5 milliGRAM(s) Oral daily  multivitamin 1 Tablet(s) Oral daily  piperacillin/tazobactam IVPB.. 3.375 Gram(s) IV Intermittent every 8 hours  tamsulosin 0.4 milliGRAM(s) Oral at bedtime  trihexyphenidyl 2 milliGRAM(s) Oral two times a day    MEDICATIONS  (PRN):  aluminum hydroxide/magnesium hydroxide/simethicone Suspension 30 milliLiter(s) Oral every 4 hours PRN Dyspepsia  belladonna 16.2 mG/opium 30 mg Suppository 1 Suppository(s) Rectal every 8 hours PRN bladder spasms      T(C): 36.7 (10-03-19 @ 06:14), Max: 36.7 (10-02-19 @ 12:45)  HR: 77 (10-03-19 @ 06:14) (70 - 78)  BP: 111/76 (10-03-19 @ 06:14) (111/76 - 123/77)  RR: 17 (10-03-19 @ 06:14) (17 - 17)  SpO2: 99% (10-03-19 @ 06:14) (99% - 100%)  CAPILLARY BLOOD GLUCOSE      POCT Blood Glucose.: 106 mg/dL (03 Oct 2019 07:38)  POCT Blood Glucose.: 131 mg/dL (02 Oct 2019 22:01)  POCT Blood Glucose.: 239 mg/dL (02 Oct 2019 17:02)  POCT Blood Glucose.: 138 mg/dL (02 Oct 2019 11:59)    I&O's Summary    02 Oct 2019 07:01  -  03 Oct 2019 07:00  --------------------------------------------------------  IN: 250 mL / OUT: 2300 mL / NET: -2050 mL        PHYSICAL EXAM:  GENERAL: NAD, elderly, on room air  EYES: sclera clear   CHEST/LUNG: Clear to auscultation bilaterally, no wheeze  HEART: Regular rate and rhythm; No murmurs, rubs, or gallops  ABDOMEN: Soft, mild suprapubic TTP, + BS  : pichardo in place draining yellow urine  EXTREMITIES:  wwp, no edema   NERVOUS SYSTEM:  Alert, nonfocal exam, oriented to self, place, (per son, father never had education and would not know dates)  SKIN: No rashes or lesions    LABS:                        12.5   9.38  )-----------( 387      ( 03 Oct 2019 02:28 )             36.0     10-03    128<L>  |  94<L>  |  8   ----------------------------<  118<H>  3.9   |  22  |  0.72    Ca    9.1      03 Oct 2019 02:28  Phos  4.2     10-03  Mg     2.0     10-03                RADIOLOGY & ADDITIONAL TESTS:

## 2019-10-03 NOTE — DISCHARGE NOTE PROVIDER - NSDCFUSCHEDAPPT_GEN_ALL_CORE_FT
JEANE JEFFERSON ; 10/29/2019 ; NPP Intmed OP [id number] [facility] JEANE JEFFERSON ; 10/29/2019 ; NPP Intmed OP [id number] [facility] JEANE JEFFERSON ; 10/29/2019 ; NPP Intmed OP [id number] [facility] JEANE JEFFERSON ; 10/29/2019 ; NPP Intmed OP [id number] [facility]

## 2019-10-03 NOTE — DISCHARGE NOTE PROVIDER - PROVIDER TOKENS
PROVIDER:[TOKEN:[[id number]]] PROVIDER:[TOKEN:[[id number]],FOLLOWUP:[1 week]],PROVIDER:[TOKEN:[[id number]]] PROVIDER:[TOKEN:[[id number]],FOLLOWUP:[1 week]],PROVIDER:[TOKEN:[[id number]]],PROVIDER:[TOKEN:[[id number]]]

## 2019-10-03 NOTE — DISCHARGE NOTE NURSING/CASE MANAGEMENT/SOCIAL WORK - PATIENT PORTAL LINK FT
You can access the FollowMyHealth Patient Portal offered by [facility] by registering at the following website: [email or web address]. By joining [employer]’s FollowMyHealth portal, you will also be able to view your health information using other applications (apps) compatible with our system.

## 2019-10-03 NOTE — PROGRESS NOTE ADULT - SUBJECTIVE AND OBJECTIVE BOX
Chief Complaint: follow up on DM 2, hypocortisolemia    History: Patient seen at bedside. Patient Eileen speaking and confused at baseline, history/collateral information obtained from patient son at bedside. Son reports patient is eating well, denies n/v, denies s/s of hypoglycemia.    MEDICATIONS  (STANDING):  atorvastatin 20 milliGRAM(s) Oral at bedtime  baclofen 10 milliGRAM(s) Oral two times a day  carbidopa/levodopa  25/100 1 Tablet(s) Oral three times a day  donepezil 2.5 milliGRAM(s) Oral at bedtime  enoxaparin Injectable 40 milliGRAM(s) SubCutaneous daily  insulin lispro (HumaLOG) corrective regimen sliding scale   SubCutaneous three times a day before meals  insulin lispro (HumaLOG) corrective regimen sliding scale   SubCutaneous at bedtime  memantine 2.5 milliGRAM(s) Oral daily  multivitamin 1 Tablet(s) Oral daily  piperacillin/tazobactam IVPB.. 3.375 Gram(s) IV Intermittent every 8 hours  tamsulosin 0.4 milliGRAM(s) Oral at bedtime  trihexyphenidyl 2 milliGRAM(s) Oral two times a day    MEDICATIONS  (PRN):  aluminum hydroxide/magnesium hydroxide/simethicone Suspension 30 milliLiter(s) Oral every 4 hours PRN Dyspepsia  belladonna 16.2 mG/opium 30 mg Suppository 1 Suppository(s) Rectal every 8 hours PRN bladder spasms    No Known Allergies    Review of Systems:  UNABLE TO OBTAIN    PHYSICAL EXAM:  VITALS: T(C): 37.2 (10-03-19 @ 12:37)  T(F): 98.9 (10-03-19 @ 12:37), Max: 98.9 (10-03-19 @ 12:37)  HR: 73 (10-03-19 @ 12:37) (73 - 78)  BP: 139/78 (10-03-19 @ 12:37) (111/76 - 139/78)  RR:  (17 - 18)  SpO2:  (98% - 100%)  Wt(kg): --  GENERAL: NAD  EYES: No proptosis, anicteric  RESPIRATORY: unlabored respirations   NEURO: extraocular movements intact, no tremor  PSYCH: Alert, cooperative, mood is calm    CAPILLARY BLOOD GLUCOSE    POCT Blood Glucose.: 126 mg/dL (03 Oct 2019 11:59)  POCT Blood Glucose.: 106 mg/dL (03 Oct 2019 07:38)  POCT Blood Glucose.: 131 mg/dL (02 Oct 2019 22:01)  POCT Blood Glucose.: 239 mg/dL (02 Oct 2019 17:02)      10-03    128<L>  |  94<L>  |  8   ----------------------------<  118<H>  3.9   |  22  |  0.72    EGFR if : 106  EGFR if non : 91    Ca    9.1      10-03  Mg     2.0     10-03  Phos  4.2     10-03    TPro  7.2  /  Alb  4.0  /  TBili  0.7  /  DBili  x   /  AST  14  /  ALT  9   /  AlkPhos  81  10-01        Thyroid Function Tests:  09-17 @ 15:20 TSH 2.30 FreeT4 -- T3 -- Anti TPO -- Anti Thyroglobulin Ab -- TSI --      Hemoglobin A1C, Whole Blood: 7.0 % <H> [4.0 - 5.6] (09-17-19 @ 15:20)

## 2019-10-03 NOTE — DISCHARGE NOTE PROVIDER - CARE PROVIDER_API CALL
Eric Thompson ()  Medicine  Hospitalists  [address] [place] [address]  Phone: [phone]  Fax: [phone]  Follow Up Time: Freddy Valverde)  Internal Medicine  [address] [place] [address]  Phone: [phone]  Fax: [phone]  Follow Up Time: 1 week    Hua Johnston)  Internal Medicine; Nephrology  [address] [place] [address]  Phone: [phone]  Fax: [phone]  Follow Up Time: Freddy Valverde)  Internal Medicine  [address] [place] [address]  Phone: [phone]  Fax: [phone]  Follow Up Time: 1 week    Hua Johnston)  Internal Medicine; Nephrology  [address] [place] [address]  Phone: [phone]  Fax: [phone]  Follow Up Time:     Venancio Jain)  Urology  [address] [place] [address], [place] [address]  Phone: [phone]  Fax: [phone]  Follow Up Time:

## 2019-10-03 NOTE — PROGRESS NOTE ADULT - ATTENDING COMMENTS
SNa trending up. As per estimated free water clearance, patient will likely auto-correct as kidneys are responding appropriately. Encouraged to increase PO intake. This will be the best long-term management for the patient. Discussed with family member.    If patient cannot take adequate PO in short-term, can add protein shakes/ Na tabs/ Urea for solute load.     Na level non-critical. Can check labs to qdaily    Please also address pain. Pt seems to still be in mild-moderate distress. This can drive up ADH as well.    Appreciate endo recs.      Thank you for allowing us to participate in your patient's care. We will continue to follow the patient with you. Please call/ text: [phone] today for any q's or c's.    Hua Johnston  Nephrology .

## 2019-10-03 NOTE — PROGRESS NOTE ADULT - PROBLEM SELECTOR PLAN 1
Euvolemic on exam. Variable urine osm, ? reset osmostat however Ethel<20  Sodium improving with fluid restriction  -TSH wnl, AM Cortisol 6.8 with rise in cortisol >18 with cosyntropin test. BMP q6  -Renal and Endo following

## 2019-10-03 NOTE — PROGRESS NOTE ADULT - PROBLEM SELECTOR PLAN 1
Pt with sodium improved to 128. Do not recommend any additional intervention currently. Pt also has history of hyponatremia. Endocrinology consulted, f/u co-syntropin stimulation test. Do not correct serum sodium >6-8 meq/day.    **********PENDING DISCUSSION WITH ATTENDING**************** Pt with sodium improved to 128. Do not recommend any additional intervention currently. Pt also has history of hyponatremia. Endocrinology consulted, f/u co-syntropin stimulation test. Do not correct serum sodium >6-8 meq/day.

## 2019-10-03 NOTE — DISCHARGE NOTE PROVIDER - CARE PROVIDERS DIRECT ADDRESSES
,DirectAddress_Unknown ,DirectAddress_Unknown,DirectAddress_Unknown ,DirectAddress_Unknown,DirectAddress_Unknown,[email or web address]

## 2019-10-03 NOTE — DISCHARGE NOTE PROVIDER - NSDCFUADDINST_GEN_ALL_CORE_FT
Follow up appointments within 1 week. Call to schedule Follow up appointments within 1 week. Call to schedule  follow up with urologist Dr Jain as outpatient

## 2019-10-03 NOTE — DISCHARGE NOTE PROVIDER - NSDCCPCAREPLAN_GEN_ALL_CORE_FT
PRINCIPAL DISCHARGE DIAGNOSIS  Diagnosis: Hyponatremia  Assessment and Plan of Treatment: Monitor your levels with your PMD. Take medications as prescribed.      SECONDARY DISCHARGE DIAGNOSES  Diagnosis: Complicated UTI (urinary tract infection)  Assessment and Plan of Treatment: Always wipe from front to back after using the bathroom. Never wipe twice with the same tissue. Take showers and avoid prolonged baths. Try to empty the bladder at least every 4 hours during the day while awake, even if the need or urge to void is absent.  Do not try to hold your urine until a more convenient time or place.  Drink plenty of water.      Diagnosis: Hypocortisolemia  Assessment and Plan of Treatment: Follow with PMD. Continue recommended medications    Diagnosis: Type 2 diabetes mellitus with hyperglycemia, without long-term current use of insulin  Assessment and Plan of Treatment: Monitor finger sticks pre-meal and bedtime, low salt, fat and carbohydrate diet, minimize glucose intake.  Exercise daily for at least 30 minutes and weight loss.  Follow up with primary care physician and endocrinologist for routine Hemoglobin A1C checks and management.  Follow up with your ophthalmologist for routine yearly vision exams.

## 2019-10-04 VITALS
RESPIRATION RATE: 18 BRPM | HEART RATE: 68 BPM | OXYGEN SATURATION: 100 % | DIASTOLIC BLOOD PRESSURE: 783 MMHG | TEMPERATURE: 98 F | SYSTOLIC BLOOD PRESSURE: 137 MMHG

## 2019-10-04 LAB
ANION GAP SERPL CALC-SCNC: 12 MMO/L — SIGNIFICANT CHANGE UP (ref 7–14)
ANION GAP SERPL CALC-SCNC: 14 MMO/L — SIGNIFICANT CHANGE UP (ref 7–14)
BUN SERPL-MCNC: 11 MG/DL — SIGNIFICANT CHANGE UP (ref 7–23)
BUN SERPL-MCNC: 12 MG/DL — SIGNIFICANT CHANGE UP (ref 7–23)
CALCIUM SERPL-MCNC: 9.3 MG/DL — SIGNIFICANT CHANGE UP (ref 8.4–10.5)
CALCIUM SERPL-MCNC: 9.3 MG/DL — SIGNIFICANT CHANGE UP (ref 8.4–10.5)
CHLORIDE SERPL-SCNC: 91 MMOL/L — LOW (ref 98–107)
CHLORIDE SERPL-SCNC: 93 MMOL/L — LOW (ref 98–107)
CO2 SERPL-SCNC: 24 MMOL/L — SIGNIFICANT CHANGE UP (ref 22–31)
CO2 SERPL-SCNC: 24 MMOL/L — SIGNIFICANT CHANGE UP (ref 22–31)
CREAT SERPL-MCNC: 0.82 MG/DL — SIGNIFICANT CHANGE UP (ref 0.5–1.3)
CREAT SERPL-MCNC: 0.91 MG/DL — SIGNIFICANT CHANGE UP (ref 0.5–1.3)
GLUCOSE BLDC GLUCOMTR-MCNC: 107 MG/DL — HIGH (ref 70–99)
GLUCOSE BLDC GLUCOMTR-MCNC: 206 MG/DL — HIGH (ref 70–99)
GLUCOSE SERPL-MCNC: 106 MG/DL — HIGH (ref 70–99)
GLUCOSE SERPL-MCNC: 120 MG/DL — HIGH (ref 70–99)
HCT VFR BLD CALC: 36.7 % — LOW (ref 39–50)
HGB BLD-MCNC: 12.5 G/DL — LOW (ref 13–17)
MCHC RBC-ENTMCNC: 24.9 PG — LOW (ref 27–34)
MCHC RBC-ENTMCNC: 34.1 % — SIGNIFICANT CHANGE UP (ref 32–36)
MCV RBC AUTO: 73 FL — LOW (ref 80–100)
NRBC # FLD: 0.02 K/UL — SIGNIFICANT CHANGE UP (ref 0–0)
PLATELET # BLD AUTO: 370 K/UL — SIGNIFICANT CHANGE UP (ref 150–400)
PMV BLD: 8.6 FL — SIGNIFICANT CHANGE UP (ref 7–13)
POTASSIUM SERPL-MCNC: 3.8 MMOL/L — SIGNIFICANT CHANGE UP (ref 3.5–5.3)
POTASSIUM SERPL-MCNC: 4 MMOL/L — SIGNIFICANT CHANGE UP (ref 3.5–5.3)
POTASSIUM SERPL-SCNC: 3.8 MMOL/L — SIGNIFICANT CHANGE UP (ref 3.5–5.3)
POTASSIUM SERPL-SCNC: 4 MMOL/L — SIGNIFICANT CHANGE UP (ref 3.5–5.3)
RBC # BLD: 5.03 M/UL — SIGNIFICANT CHANGE UP (ref 4.2–5.8)
RBC # FLD: 14.7 % — HIGH (ref 10.3–14.5)
SODIUM SERPL-SCNC: 129 MMOL/L — LOW (ref 135–145)
SODIUM SERPL-SCNC: 129 MMOL/L — LOW (ref 135–145)
WBC # BLD: 9.36 K/UL — SIGNIFICANT CHANGE UP (ref 3.8–10.5)
WBC # FLD AUTO: 9.36 K/UL — SIGNIFICANT CHANGE UP (ref 3.8–10.5)

## 2019-10-04 PROCEDURE — 99239 HOSP IP/OBS DSCHRG MGMT >30: CPT

## 2019-10-04 RX ORDER — DONEPEZIL HYDROCHLORIDE 10 MG/1
0.5 TABLET, FILM COATED ORAL
Qty: 15 | Refills: 0
Start: 2019-10-04 | End: 2019-11-02

## 2019-10-04 RX ORDER — TAMSULOSIN HYDROCHLORIDE 0.4 MG/1
1 CAPSULE ORAL
Qty: 30 | Refills: 0
Start: 2019-10-04 | End: 2019-11-02

## 2019-10-04 RX ORDER — CARBIDOPA AND LEVODOPA 25; 100 MG/1; MG/1
1 TABLET ORAL
Qty: 90 | Refills: 0
Start: 2019-10-04 | End: 2019-11-02

## 2019-10-04 RX ORDER — LANOLIN ALCOHOL/MO/W.PET/CERES
3 CREAM (GRAM) TOPICAL AT BEDTIME
Refills: 0 | Status: DISCONTINUED | OUTPATIENT
Start: 2019-10-04 | End: 2019-10-04

## 2019-10-04 RX ORDER — MEMANTINE HYDROCHLORIDE 10 MG/1
0.5 TABLET ORAL
Qty: 15 | Refills: 0
Start: 2019-10-04 | End: 2019-11-02

## 2019-10-04 RX ORDER — MEMANTINE HYDROCHLORIDE 10 MG/1
0.5 TABLET ORAL
Qty: 0 | Refills: 0 | DISCHARGE

## 2019-10-04 RX ORDER — ATORVASTATIN CALCIUM 80 MG/1
1 TABLET, FILM COATED ORAL
Qty: 30 | Refills: 0
Start: 2019-10-04 | End: 2019-11-02

## 2019-10-04 RX ORDER — TRIHEXYPHENIDYL HCL 2 MG
1 TABLET ORAL
Qty: 60 | Refills: 0
Start: 2019-10-04 | End: 2019-11-02

## 2019-10-04 RX ORDER — DONEPEZIL HYDROCHLORIDE 10 MG/1
0.5 TABLET, FILM COATED ORAL
Qty: 0 | Refills: 0 | DISCHARGE

## 2019-10-04 RX ORDER — TRIHEXYPHENIDYL HCL 2 MG
1 TABLET ORAL
Qty: 0 | Refills: 0 | DISCHARGE

## 2019-10-04 RX ORDER — BACLOFEN 100 %
1 POWDER (GRAM) MISCELLANEOUS
Qty: 60 | Refills: 0
Start: 2019-10-04 | End: 2019-11-02

## 2019-10-04 RX ORDER — LANOLIN ALCOHOL/MO/W.PET/CERES
1 CREAM (GRAM) TOPICAL
Qty: 30 | Refills: 0
Start: 2019-10-04 | End: 2019-11-02

## 2019-10-04 RX ORDER — CARBIDOPA AND LEVODOPA 25; 100 MG/1; MG/1
1 TABLET ORAL
Qty: 0 | Refills: 0 | DISCHARGE

## 2019-10-04 RX ORDER — ATORVASTATIN CALCIUM 80 MG/1
1 TABLET, FILM COATED ORAL
Qty: 0 | Refills: 0 | DISCHARGE
Start: 2019-10-04

## 2019-10-04 RX ORDER — TAMSULOSIN HYDROCHLORIDE 0.4 MG/1
1 CAPSULE ORAL
Qty: 0 | Refills: 0 | DISCHARGE

## 2019-10-04 RX ORDER — LANOLIN ALCOHOL/MO/W.PET/CERES
1 CREAM (GRAM) TOPICAL
Qty: 0 | Refills: 0 | DISCHARGE
Start: 2019-10-04

## 2019-10-04 RX ADMIN — Medication 1 TABLET(S): at 14:14

## 2019-10-04 RX ADMIN — CARBIDOPA AND LEVODOPA 1 TABLET(S): 25; 100 TABLET ORAL at 06:16

## 2019-10-04 RX ADMIN — Medication 2: at 12:46

## 2019-10-04 RX ADMIN — Medication 30 MILLILITER(S): at 11:33

## 2019-10-04 RX ADMIN — MEMANTINE HYDROCHLORIDE 2.5 MILLIGRAM(S): 10 TABLET ORAL at 14:13

## 2019-10-04 RX ADMIN — Medication 10 MILLIGRAM(S): at 06:17

## 2019-10-04 RX ADMIN — ATROPA BELLADONNA AND OPIUM 1 SUPPOSITORY(S): 16.2; 6 SUPPOSITORY RECTAL at 11:32

## 2019-10-04 RX ADMIN — ENOXAPARIN SODIUM 40 MILLIGRAM(S): 100 INJECTION SUBCUTANEOUS at 14:14

## 2019-10-04 RX ADMIN — CARBIDOPA AND LEVODOPA 1 TABLET(S): 25; 100 TABLET ORAL at 14:12

## 2019-10-04 RX ADMIN — PIPERACILLIN AND TAZOBACTAM 25 GRAM(S): 4; .5 INJECTION, POWDER, LYOPHILIZED, FOR SOLUTION INTRAVENOUS at 06:16

## 2019-10-04 RX ADMIN — Medication 2 MILLIGRAM(S): at 06:16

## 2019-10-04 RX ADMIN — PIPERACILLIN AND TAZOBACTAM 25 GRAM(S): 4; .5 INJECTION, POWDER, LYOPHILIZED, FOR SOLUTION INTRAVENOUS at 14:19

## 2019-10-04 NOTE — PROGRESS NOTE ADULT - PROBLEM/PLAN-6
TLC drain removed at bedside per Dr. Hayden  
DISPLAY PLAN FREE TEXT

## 2019-10-04 NOTE — PROGRESS NOTE ADULT - SUBJECTIVE AND OBJECTIVE BOX
Patient is a 74y old  Male who presents with a chief complaint of suprapubic abdominal pain (03 Oct 2019 14:48)      SUBJECTIVE / OVERNIGHT EVENTS: Per son at bedside, pt had one episode nonbloody vomiting x1 last night with dinner. Patient's suprapubic pain is better. Denies chest pain, SOB but complains of acid reflux.    MEDICATIONS  (STANDING):  atorvastatin 20 milliGRAM(s) Oral at bedtime  baclofen 10 milliGRAM(s) Oral two times a day  carbidopa/levodopa  25/100 1 Tablet(s) Oral three times a day  donepezil 2.5 milliGRAM(s) Oral at bedtime  enoxaparin Injectable 40 milliGRAM(s) SubCutaneous daily  insulin lispro (HumaLOG) corrective regimen sliding scale   SubCutaneous three times a day before meals  insulin lispro (HumaLOG) corrective regimen sliding scale   SubCutaneous at bedtime  melatonin 3 milliGRAM(s) Oral at bedtime  memantine 2.5 milliGRAM(s) Oral daily  multivitamin 1 Tablet(s) Oral daily  piperacillin/tazobactam IVPB.. 3.375 Gram(s) IV Intermittent every 8 hours  tamsulosin 0.4 milliGRAM(s) Oral at bedtime  trihexyphenidyl 2 milliGRAM(s) Oral two times a day    MEDICATIONS  (PRN):  aluminum hydroxide/magnesium hydroxide/simethicone Suspension 30 milliLiter(s) Oral every 4 hours PRN Dyspepsia  aluminum hydroxide/magnesium hydroxide/simethicone Suspension 30 milliLiter(s) Oral once PRN Dyspepsia  belladonna 16.2 mG/opium 30 mg Suppository 1 Suppository(s) Rectal every 8 hours PRN bladder spasms      T(C): 36.6 (10-04-19 @ 06:15), Max: 37.2 (10-03-19 @ 12:37)  HR: 65 (10-04-19 @ 06:15) (65 - 81)  BP: 131/83 (10-04-19 @ 06:15) (110/73 - 139/78)  RR: 17 (10-04-19 @ 06:15) (17 - 18)  SpO2: 100% (10-04-19 @ 06:15) (98% - 100%)  CAPILLARY BLOOD GLUCOSE      POCT Blood Glucose.: 107 mg/dL (04 Oct 2019 07:43)  POCT Blood Glucose.: 135 mg/dL (03 Oct 2019 21:39)  POCT Blood Glucose.: 143 mg/dL (03 Oct 2019 17:05)  POCT Blood Glucose.: 126 mg/dL (03 Oct 2019 11:59)    I&O's Summary    03 Oct 2019 07:01  -  04 Oct 2019 07:00  --------------------------------------------------------  IN: 200 mL / OUT: 1640 mL / NET: -1440 mL        PHYSICAL EXAM:  GENERAL: NAD, elderly, on room air  EYES: sclera clear   CHEST/LUNG: Clear to auscultation bilaterally, no wheeze  HEART: Regular rate and rhythm; No murmurs, rubs, or gallops  ABDOMEN: Soft, mild suprapubic TTP, + BS  : pichardo in place draining yellow urine  EXTREMITIES:  wwp, no edema   NERVOUS SYSTEM:  Alert, awake, no gross deficits      LABS:                        12.5   9.36  )-----------( 370      ( 04 Oct 2019 05:30 )             36.7     10-04    129<L>  |  93<L>  |  11  ----------------------------<  106<H>  4.0   |  24  |  0.82    Ca    9.3      04 Oct 2019 05:30  Phos  4.2     10-03  Mg     2.0     10-03                RADIOLOGY & ADDITIONAL TESTS:

## 2019-10-04 NOTE — DIETITIAN INITIAL EVALUATION ADULT. - PROBLEM SELECTOR PLAN 1
-Pt with euvolemic hyponatremia. Urine studies with limited utility in the setting of IVF bolus and being post retention   -Na >20, Osmolality >100 consistent with SIADH vs adrenal insufficiency vs 2/2 to hypervolemia in the setting of urinary retention. Low suspicion for hypothyroidism given normal value obtained ~2 weeks ago  -Follow up repeat CMP s/p pichardo insertion. Continue to trend q8h  -Avoid correcting more than 6-8 mEq over 24 hours    Addendum:  Repeat sodium 128, pt with ~7 mEq correction. Will repeat BMP in 4 hours, if continues to increase, will need to start on IVF to slow correction

## 2019-10-04 NOTE — PROGRESS NOTE ADULT - PROBLEM SELECTOR PLAN 5
-C/w Carbidopa-Levodopa, trihexyphenidyl, baclofen and biotin   -PT eval pending
-C/w indwelling pichardo placed in ED on 9/30   -C/w Flomax   follow up as outpatient with urology: Venancio Manzo

## 2019-10-04 NOTE — PROGRESS NOTE ADULT - PROBLEM SELECTOR PLAN 4
-C/w indwelling pichardo placed in ED on 9/30   -C/w Flomax   -Spoke to urology team, who recommends patient can follow up as outpatient with Venancio Manzo
newly diagnosed, HbA1c 7.0%, at goal  consistent carb diet, low dose SSI, dietician consult  no medical therapy on DC per endo. glucometer teaching prior to discharge.  Check lipid profile in AM. Lipitor 20mg qhs started
newly diagnosed, HbA1c 7.0%, at goal  consistent carb diet, low dose SSI, dietician/nutrition consult  no medical therapy on DC per endo. glucometer teaching prior to discharge.  Lipitor 20mg qhs started, lipid profile reviewed
newly diagnosed, HbA1c 7.0%, at goal  consistent carb diet, low dose SSI, dietician/nutrition consult  no medical therapy on DC per endo. glucometer teaching prior to discharge.  Lipitor 20mg qhs started, lipid profile reviewed

## 2019-10-04 NOTE — PROGRESS NOTE ADULT - PROBLEM SELECTOR PROBLEM 2
Type 2 diabetes mellitus with hyperglycemia, without long-term current use of insulin
R/O Adrenal insufficiency

## 2019-10-04 NOTE — PROGRESS NOTE ADULT - PROBLEM SELECTOR PLAN 1
Euvolemic on exam. Sodium improving with fluid restriction  -TSH wnl, cosyntropin test stimulation ruled out adrenal insufficiency.   Per renal, encourage adequate PO in short-term but if can't tolerate, can add protein shakes/ Na tabs/ Urea for solute load.

## 2019-10-04 NOTE — DIETITIAN INITIAL EVALUATION ADULT. - PROBLEM SELECTOR PLAN 7
DVT ppx: lovenox  Diet: Regular  PT eval  Son requesting Clonazepam however no prescription found on ISTOP. As per outpt records, Clonazepam was discontinued due to worsening lethargy therefore will hold off on restarting as pt has been off of it for 1 month

## 2019-10-04 NOTE — DIETITIAN INITIAL EVALUATION ADULT. - PROBLEM SELECTOR PLAN 3
-Pt with a leukocytosis however without other signs of infection. Suprapubic pain resolved with pichardo insertion. No fevers or chills. 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 S/p 1x dose of CTX. Will monitor off antibiotics for now. Continue to trend WBC

## 2019-10-04 NOTE — PROGRESS NOTE ADULT - PROBLEM SELECTOR PLAN 6
-C/w donepezil and memantine
-C/w Carbidopa-Levodopa, trihexyphenidyl, baclofen and biotin   -PT eval pending
-C/w Carbidopa-Levodopa, trihexyphenidyl, baclofen and biotin   -PT eval: rehab but son prefers home
-C/w Carbidopa-Levodopa, trihexyphenidyl, baclofen and biotin   -PT eval: rehab but son prefers home

## 2019-10-04 NOTE — PROGRESS NOTE ADULT - PROBLEM SELECTOR PROBLEM 5
Parkinson disease
BPH (benign prostatic hyperplasia)

## 2019-10-04 NOTE — PROGRESS NOTE ADULT - REASON FOR ADMISSION
suprapubic abdominal pain

## 2019-10-04 NOTE — PROGRESS NOTE ADULT - PROBLEM SELECTOR PROBLEM 4
Type 2 diabetes mellitus with hyperglycemia, without long-term current use of insulin
BPH (benign prostatic hyperplasia)

## 2019-10-04 NOTE — PROGRESS NOTE ADULT - PROBLEM SELECTOR PLAN 8
1) PCP Contacted on Admission: (Y/N) --> Name & Phone #: Dr. Freddy Valverde ([phone])  2) Date of Contact with PCP: 10/1   3) PCP Contacted at Discharge: (Y/N)  4) Summary of Handoff Given to PCP:   5) Post-Discharge Appointment Date and Location:
Lovenox for DVT ppx

## 2019-10-04 NOTE — PROGRESS NOTE ADULT - PROBLEM SELECTOR PROBLEM 3
Hyperlipidemia, unspecified hyperlipidemia type
Complicated UTI (urinary tract infection)

## 2019-10-04 NOTE — PROGRESS NOTE ADULT - PROBLEM SELECTOR PLAN 2
outpt provider, Dr. Lowry  -TSH wnl, AM Cortisol 6.8  co-syntropin stimulation test as per endo today
outpt provider, Dr. Lowry  -TSH wnl, AM Cortisol 6.8 s/p co-syntropin stimulation test   Endo to followup
outpt provider, Dr. Lowry  ruled out adrenal insufficiency with cosyntropin test
Pt with recurrent hyponatremia, discussed pt with outpt provider, Dr. Lowry who stated she was planning on doing outpt stim test to assess for adrenal insuffiencey.   -TSH wnl   -AM Corisol 6.8  -Endocrine consulted for recs

## 2019-10-04 NOTE — PROGRESS NOTE ADULT - PROBLEM SELECTOR PLAN 9
1) PCP Contacted on Admission: (Y/N) --> Name & Phone #: Dr. Freddy Valverde ([phone])  2) Date of Contact with PCP: 10/1   3) PCP Contacted at Discharge: (Y/N)  4) Summary of Handoff Given to PCP:   5) Post-Discharge Appointment Date and Location:

## 2019-10-04 NOTE — DIETITIAN INITIAL EVALUATION ADULT. - PROBLEM SELECTOR PLAN 2
-in the setting of known BPH. Failed TOV. Pichardo reinserted in ED with good output. Renal function intact   -continue with pichardo for now  -c/w flomax
No

## 2019-10-04 NOTE — PROGRESS NOTE ADULT - PROBLEM SELECTOR PLAN 3
Pt with chronic indwelling pichardo 2/2 BPH, failed TOV at outpt urology office on 9/30  -UCx w/ Klebsiella >100K, MSR sensitive to tygacil and amikacin. WBC downtrended, no fevers on admission or during stay. Suspect colonization  Zosyn day 4, will dc Abx today  belladonna PRN for bladder spasms, ensure pichardo draining adequately
Pt with chronic indwelling pichardo 2/2 BPH, failed TOV at outpt urology office on 9/30  -UCx w/ Klebsiella >100K, pending sensitivities. C/w Zosyn for now  -Last Cx w/Pseudomonas and Serratia sensitive to zosyn  belladonna PRN for bladder spasms, check bladder scan for retention and ensure pichardo draining adequately
Pt with chronic indwelling pichardo 2/2 BPH, failed TOV at outpt urology office on 9/30  -UCx w/ Klebsiella >100K, pending sensitivities. C/w Zosyn for now-day 3  -Last Cx w/Pseudomonas and Serratia sensitive to zosyn  belladonna PRN for bladder spasms, check bladder scan for retention and ensure pichardo draining adequately
Pt with chronic indwelling pichardo 2/2 BPH, failed TOV at outpt urology office on 9/30  -UCx w/GNR >100K, pending speciation   -Last Cx w/Pseudomonas and Serratia sensitive to zosyn  -Start Zosyn to treat complicated UTI in setting of pichardo, can deescalate pending sensitivities.

## 2019-10-04 NOTE — PROGRESS NOTE ADULT - PROBLEM SELECTOR PROBLEM 8
Transition of care performed with sharing of clinical summary
Prophylactic measure

## 2019-10-04 NOTE — PROGRESS NOTE ADULT - ATTENDING COMMENTS
Disp: stable for DC home if tolerating diet after Maalox given. DC time: 33 min Disp: stable for DC home if tolerating diet after Maalox given. DC time: 33 min    Of note, Son requesting Clonazepam however clonazepam was discontinued due to worsening lethargy. informed son that it will not be resumed. Last dose was 2 weeks ago per son. Outpt followup required

## 2019-10-04 NOTE — DIETITIAN INITIAL EVALUATION ADULT. - OTHER INFO
75 y/o male admitted with  fair po, no nausea, vomiting and diarrhea at this time, UBW reported to be 73 kg 7/2019, current admit weight 70 kg with no edema. As per son pt taking three meals a day but in much smaller portions, BMI remains 25.8. Ethnic food options and food choices discussed in detail with pt and son in their native language [facility]. LABS reviewed, current diet remains appropriate. Add Glucerna 1 can daily to increase po and caloric intake . 73 y/o male admitted with  fair po, no nausea, vomiting and diarrhea at this time, UBW reported to be 73 kg 7/2019, current admit weight 70 kg with no edema. Information obtained from pt in [facility] and confirmed with son present at bedside.  As per son pt taking three meals a day but in much smaller portions, BMI remains 25.8. Ethnic food options and food choices discussed in detail with pt and son in their native language  LABS reviewed, current diet remains appropriate. Add Glucerna 1 can daily to increase po and caloric intake .

## 2019-10-16 ENCOUNTER — LABORATORY RESULT (OUTPATIENT)
Age: 74
End: 2019-10-16

## 2019-10-16 ENCOUNTER — OUTPATIENT (OUTPATIENT)
Dept: OUTPATIENT SERVICES | Facility: HOSPITAL | Age: 74
LOS: 1 days | End: 2019-10-16

## 2019-10-16 ENCOUNTER — APPOINTMENT (OUTPATIENT)
Dept: INTERNAL MEDICINE | Facility: CLINIC | Age: 74
End: 2019-10-16
Payer: MEDICAID

## 2019-10-16 VITALS
OXYGEN SATURATION: 99 % | HEART RATE: 79 BPM | WEIGHT: 145.51 LBS | BODY MASS INDEX: 23.48 KG/M2 | RESPIRATION RATE: 13 BRPM | SYSTOLIC BLOOD PRESSURE: 120 MMHG | DIASTOLIC BLOOD PRESSURE: 68 MMHG

## 2019-10-16 DIAGNOSIS — N39.0 URINARY TRACT INFECTION, SITE NOT SPECIFIED: ICD-10-CM

## 2019-10-16 DIAGNOSIS — N45.3 EPIDIDYMO-ORCHITIS: ICD-10-CM

## 2019-10-16 LAB
ANION GAP SERPL CALC-SCNC: 19 MMO/L — HIGH (ref 7–14)
BUN SERPL-MCNC: 7 MG/DL — SIGNIFICANT CHANGE UP (ref 7–23)
CALCIUM SERPL-MCNC: 9.5 MG/DL — SIGNIFICANT CHANGE UP (ref 8.4–10.5)
CHLORIDE SERPL-SCNC: 89 MMOL/L — LOW (ref 98–107)
CO2 SERPL-SCNC: 18 MMOL/L — LOW (ref 22–31)
CREAT SERPL-MCNC: 0.68 MG/DL — SIGNIFICANT CHANGE UP (ref 0.5–1.3)
GLUCOSE SERPL-MCNC: 108 MG/DL — HIGH (ref 70–99)
POTASSIUM SERPL-MCNC: 4.7 MMOL/L — SIGNIFICANT CHANGE UP (ref 3.5–5.3)
POTASSIUM SERPL-SCNC: 4.7 MMOL/L — SIGNIFICANT CHANGE UP (ref 3.5–5.3)
SODIUM SERPL-SCNC: 126 MMOL/L — LOW (ref 135–145)

## 2019-10-16 PROCEDURE — 99214 OFFICE O/P EST MOD 30 MIN: CPT

## 2019-10-17 VITALS — HEIGHT: 66 IN

## 2019-10-17 DIAGNOSIS — E11.9 TYPE 2 DIABETES MELLITUS WITHOUT COMPLICATIONS: ICD-10-CM

## 2019-10-17 DIAGNOSIS — E87.1 HYPO-OSMOLALITY AND HYPONATREMIA: ICD-10-CM

## 2019-10-17 DIAGNOSIS — M54.5 LOW BACK PAIN: ICD-10-CM

## 2019-10-17 DIAGNOSIS — F32.89 OTHER SPECIFIED DEPRESSIVE EPISODES: ICD-10-CM

## 2019-10-17 DIAGNOSIS — N40.1 BENIGN PROSTATIC HYPERPLASIA WITH LOWER URINARY TRACT SYMPTOMS: ICD-10-CM

## 2019-10-17 DIAGNOSIS — R77.8 OTHER SPECIFIED ABNORMALITIES OF PLASMA PROTEINS: ICD-10-CM

## 2019-10-17 DIAGNOSIS — R14.1 GAS PAIN: ICD-10-CM

## 2019-10-17 DIAGNOSIS — D50.9 IRON DEFICIENCY ANEMIA, UNSPECIFIED: ICD-10-CM

## 2019-10-17 DIAGNOSIS — R63.4 ABNORMAL WEIGHT LOSS: ICD-10-CM

## 2019-10-17 PROBLEM — N45.3 ORCHITIS AND EPIDIDYMITIS: Status: RESOLVED | Noted: 2019-09-17 | Resolved: 2019-10-17

## 2019-10-17 PROBLEM — N39.0 URINARY TRACT INFECTION, ACUTE: Status: RESOLVED | Noted: 2019-09-17 | Resolved: 2019-10-17

## 2019-10-17 NOTE — HISTORY OF PRESENT ILLNESS
[Post-hospitalization from ___ Hospital] : Post-hospitalization from [unfilled] Hospital [Admitted on: ___] : The patient was admitted on [unfilled] [Pertinent Labs] : pertinent labs [Discharge Summary] : discharge summary [Discharged on ___] : discharged on [unfilled] [Radiology Findings] : radiology findings [Med Reconciliation] : medication reconciliation has been completed [Discharge Med List] : discharge medication list [FreeTextEntry3] : here with his son and daughter-in-law today [FreeTextEntry2] : 74 year old man with Parkinson's disease/mild dementia with BPH and urinary retention with chronic indwelling pichardo catheter who is here to follow up after recent hospitalization [facility] 10/1-10/4/19; had pichardo removed by urology outpatient, then with inability to urinate afterwards, went to ED, found to be hyponatremic again to 121 in ED and pichardo replaced, gradually improved to 129 by discharge with minimal treatment. AM cortisol was low initially but then cosyntropin stim test was normal. Thought hyponatremia 2/2 urine obstruction, seen by renal. Since discharge, has been feeling ok, no fevers/chills. \par Saw urology earlier today and had pichardo replaced; no more symptoms of orchitis. Planning thermal procedure for prostate volume reduction on 10/23/19. \par Having trouble sleeping, eating less. Son believes he is depressed, thinking about his medical problems all the time. Lives with a lot of family (his own wife, son/wife, their kids) and is never alone. Never expresses interest in self harm, denies today. They report he is burping a lot lately, but deny constipation, abdominal pain, or significant flatulence

## 2019-10-17 NOTE — PHYSICAL EXAM
[No Acute Distress] : no acute distress [Normal Outer Ear/Nose] : the outer ears and nose were normal in appearance [Normal Sclera/Conjunctiva] : normal sclera/conjunctiva [EOMI] : extraocular movements intact [No Respiratory Distress] : no respiratory distress  [No JVD] : no jugular venous distention [No Accessory Muscle Use] : no accessory muscle use [Clear to Auscultation] : lungs were clear to auscultation bilaterally [Normal Rate] : normal rate  [Regular Rhythm] : with a regular rhythm [Normal S1, S2] : normal S1 and S2 [No Varicosities] : no varicosities [Pedal Pulses Present] : the pedal pulses are present [Soft] : abdomen soft [No Edema] : there was no peripheral edema [Non-distended] : non-distended [No Masses] : no abdominal mass palpated [No Joint Swelling] : no joint swelling [No CVA Tenderness] : no CVA  tenderness [No Rash] : no rash [de-identified] : +mild TTP over lower lumbar spine midline [de-identified] : +indwelling pichardo catheter in place [de-identified] : +mild suprapubic tenderness [de-identified] : +rigidity  [de-identified] : awake and alert, oriented to self and location, trouble with date (family prompted) [de-identified] : shuffling, slow, small step gait

## 2019-10-17 NOTE — REVIEW OF SYSTEMS
[Back Pain] : back pain [Unsteady Walk] : ataxia [Memory Loss] : memory loss [Insomnia] : insomnia [Depression] : depression [Negative] : ENT [Fever] : no fever [Chills] : no chills [Recent Change In Weight] : ~T no recent weight change [Chest Pain] : no chest pain [Lower Ext Edema] : no lower extremity edema [Orthopena] : no orthopnea [Shortness Of Breath] : no shortness of breath [Paroxysmal Nocturnal Dyspnea] : no paroxysmal nocturnal dyspnea [Cough] : no cough [Dyspnea on Exertion] : not dyspnea on exertion [Abdominal Pain] : no abdominal pain [Constipation] : no constipation [Nausea] : no nausea [Diarrhea] : no diarrhea [Vomiting] : no vomiting [Dysuria] : no dysuria [Hematuria] : no hematuria [Joint Stiffness] : no joint stiffness [Muscle Weakness] : no muscle weakness [Muscle Pain] : no muscle pain [Skin Rash] : no skin rash [Headache] : no headache [Dizziness] : no dizziness [Fainting] : no fainting [Suicidal] : not suicidal [Easy Bleeding] : no easy bleeding [Anxiety] : no anxiety [Easy Bruising] : no easy bruising

## 2019-10-17 NOTE — ASSESSMENT
[FreeTextEntry1] : HCM-flu shot already given this season\par discuss remainder of HCM at next CPE, will also need adv dir discussion\par following with neurology for Parkinson's disease Dr. Kristine Isabel in [place] [phone]

## 2019-10-21 ENCOUNTER — APPOINTMENT (OUTPATIENT)
Dept: INTERNAL MEDICINE | Facility: CLINIC | Age: 74
End: 2019-10-21

## 2019-10-21 ENCOUNTER — LABORATORY RESULT (OUTPATIENT)
Age: 74
End: 2019-10-21

## 2019-10-21 ENCOUNTER — OUTPATIENT (OUTPATIENT)
Dept: OUTPATIENT SERVICES | Facility: HOSPITAL | Age: 74
LOS: 1 days | End: 2019-10-21

## 2019-10-21 LAB
ANION GAP SERPL CALC-SCNC: 12 MMO/L — SIGNIFICANT CHANGE UP (ref 7–14)
BUN SERPL-MCNC: 6 MG/DL — LOW (ref 7–23)
CALCIUM SERPL-MCNC: 9.1 MG/DL — SIGNIFICANT CHANGE UP (ref 8.4–10.5)
CHLORIDE SERPL-SCNC: 89 MMOL/L — LOW (ref 98–107)
CO2 SERPL-SCNC: 21 MMOL/L — LOW (ref 22–31)
CREAT SERPL-MCNC: 0.64 MG/DL — SIGNIFICANT CHANGE UP (ref 0.5–1.3)
GLUCOSE SERPL-MCNC: 120 MG/DL — HIGH (ref 70–99)
POTASSIUM SERPL-MCNC: 4.5 MMOL/L — SIGNIFICANT CHANGE UP (ref 3.5–5.3)
POTASSIUM SERPL-SCNC: 4.5 MMOL/L — SIGNIFICANT CHANGE UP (ref 3.5–5.3)
SODIUM SERPL-SCNC: 122 MMOL/L — LOW (ref 135–145)

## 2019-10-24 ENCOUNTER — CLINICAL ADVICE (OUTPATIENT)
Age: 74
End: 2019-10-24

## 2019-10-24 RX ORDER — LEVOFLOXACIN 750 MG/1
750 TABLET, FILM COATED ORAL
Qty: 2 | Refills: 0 | Status: DISCONTINUED | COMMUNITY
Start: 2019-09-17 | End: 2019-10-24

## 2019-10-26 ENCOUNTER — EMERGENCY (EMERGENCY)
Facility: HOSPITAL | Age: 74
LOS: 1 days | Discharge: HOME | End: 2019-10-26
Attending: EMERGENCY MEDICINE | Admitting: EMERGENCY MEDICINE
Payer: MEDICAID

## 2019-10-26 VITALS
RESPIRATION RATE: 16 BRPM | TEMPERATURE: 99 F | HEART RATE: 90 BPM | OXYGEN SATURATION: 100 % | SYSTOLIC BLOOD PRESSURE: 149 MMHG | DIASTOLIC BLOOD PRESSURE: 80 MMHG

## 2019-10-26 VITALS
SYSTOLIC BLOOD PRESSURE: 148 MMHG | TEMPERATURE: 98 F | OXYGEN SATURATION: 100 % | RESPIRATION RATE: 17 BRPM | HEART RATE: 75 BPM | DIASTOLIC BLOOD PRESSURE: 74 MMHG

## 2019-10-26 LAB
ALBUMIN SERPL ELPH-MCNC: 4.2 G/DL — SIGNIFICANT CHANGE UP (ref 3.3–5)
ALP SERPL-CCNC: 82 U/L — SIGNIFICANT CHANGE UP (ref 40–120)
ALT FLD-CCNC: 5 U/L — SIGNIFICANT CHANGE UP (ref 4–41)
ANION GAP SERPL CALC-SCNC: 16 MMO/L — HIGH (ref 7–14)
ANISOCYTOSIS BLD QL: SLIGHT — SIGNIFICANT CHANGE UP
APPEARANCE UR: CLEAR — SIGNIFICANT CHANGE UP
AST SERPL-CCNC: 17 U/L — SIGNIFICANT CHANGE UP (ref 4–40)
BACTERIA # UR AUTO: HIGH
BASOPHILS # BLD AUTO: 0.03 K/UL — SIGNIFICANT CHANGE UP (ref 0–0.2)
BASOPHILS NFR BLD AUTO: 0.3 % — SIGNIFICANT CHANGE UP (ref 0–2)
BASOPHILS NFR SPEC: 0 % — SIGNIFICANT CHANGE UP (ref 0–2)
BILIRUB SERPL-MCNC: 0.5 MG/DL — SIGNIFICANT CHANGE UP (ref 0.2–1.2)
BILIRUB UR-MCNC: NEGATIVE — SIGNIFICANT CHANGE UP
BLASTS # FLD: 0 % — SIGNIFICANT CHANGE UP (ref 0–0)
BLOOD UR QL VISUAL: SIGNIFICANT CHANGE UP
BUN SERPL-MCNC: 8 MG/DL — SIGNIFICANT CHANGE UP (ref 7–23)
CALCIUM SERPL-MCNC: 8.8 MG/DL — SIGNIFICANT CHANGE UP (ref 8.4–10.5)
CHLORIDE SERPL-SCNC: 88 MMOL/L — LOW (ref 98–107)
CO2 SERPL-SCNC: 21 MMOL/L — LOW (ref 22–31)
COLOR SPEC: COLORLESS — SIGNIFICANT CHANGE UP
CREAT SERPL-MCNC: 0.69 MG/DL — SIGNIFICANT CHANGE UP (ref 0.5–1.3)
EOSINOPHIL # BLD AUTO: 0.27 K/UL — SIGNIFICANT CHANGE UP (ref 0–0.5)
EOSINOPHIL NFR BLD AUTO: 2.5 % — SIGNIFICANT CHANGE UP (ref 0–6)
EOSINOPHIL NFR FLD: 1.9 % — SIGNIFICANT CHANGE UP (ref 0–6)
GLUCOSE SERPL-MCNC: 114 MG/DL — HIGH (ref 70–99)
GLUCOSE UR-MCNC: NEGATIVE — SIGNIFICANT CHANGE UP
HCT VFR BLD CALC: 39 % — SIGNIFICANT CHANGE UP (ref 39–50)
HGB BLD-MCNC: 13.5 G/DL — SIGNIFICANT CHANGE UP (ref 13–17)
HYALINE CASTS # UR AUTO: NEGATIVE — SIGNIFICANT CHANGE UP
HYPOCHROMIA BLD QL: SLIGHT — SIGNIFICANT CHANGE UP
IMM GRANULOCYTES NFR BLD AUTO: 0.5 % — SIGNIFICANT CHANGE UP (ref 0–1.5)
KETONES UR-MCNC: NEGATIVE — SIGNIFICANT CHANGE UP
LEUKOCYTE ESTERASE UR-ACNC: SIGNIFICANT CHANGE UP
LYMPHOCYTES # BLD AUTO: 3.83 K/UL — HIGH (ref 1–3.3)
LYMPHOCYTES # BLD AUTO: 35.2 % — SIGNIFICANT CHANGE UP (ref 13–44)
LYMPHOCYTES NFR SPEC AUTO: 23.6 % — SIGNIFICANT CHANGE UP (ref 13–44)
MCHC RBC-ENTMCNC: 25.2 PG — LOW (ref 27–34)
MCHC RBC-ENTMCNC: 34.6 % — SIGNIFICANT CHANGE UP (ref 32–36)
MCV RBC AUTO: 72.8 FL — LOW (ref 80–100)
METAMYELOCYTES # FLD: 0 % — SIGNIFICANT CHANGE UP (ref 0–1)
MICROCYTES BLD QL: SLIGHT — SIGNIFICANT CHANGE UP
MONOCYTES # BLD AUTO: 0.52 K/UL — SIGNIFICANT CHANGE UP (ref 0–0.9)
MONOCYTES NFR BLD AUTO: 4.8 % — SIGNIFICANT CHANGE UP (ref 2–14)
MONOCYTES NFR BLD: 4.7 % — SIGNIFICANT CHANGE UP (ref 2–9)
MYELOCYTES NFR BLD: 0 % — SIGNIFICANT CHANGE UP (ref 0–0)
NEUTROPHIL AB SER-ACNC: 63.2 % — SIGNIFICANT CHANGE UP (ref 43–77)
NEUTROPHILS # BLD AUTO: 6.18 K/UL — SIGNIFICANT CHANGE UP (ref 1.8–7.4)
NEUTROPHILS NFR BLD AUTO: 56.7 % — SIGNIFICANT CHANGE UP (ref 43–77)
NEUTS BAND # BLD: 0 % — SIGNIFICANT CHANGE UP (ref 0–6)
NITRITE UR-MCNC: POSITIVE — HIGH
NRBC # FLD: 0 K/UL — SIGNIFICANT CHANGE UP (ref 0–0)
OTHER - HEMATOLOGY %: 0 — SIGNIFICANT CHANGE UP
PH UR: 6.5 — SIGNIFICANT CHANGE UP (ref 5–8)
PLATELET # BLD AUTO: 396 K/UL — SIGNIFICANT CHANGE UP (ref 150–400)
PLATELET COUNT - ESTIMATE: NORMAL — SIGNIFICANT CHANGE UP
PMV BLD: 8.2 FL — SIGNIFICANT CHANGE UP (ref 7–13)
POTASSIUM SERPL-MCNC: 3.8 MMOL/L — SIGNIFICANT CHANGE UP (ref 3.5–5.3)
POTASSIUM SERPL-SCNC: 3.8 MMOL/L — SIGNIFICANT CHANGE UP (ref 3.5–5.3)
PROMYELOCYTES # FLD: 0 % — SIGNIFICANT CHANGE UP (ref 0–0)
PROT SERPL-MCNC: 7.5 G/DL — SIGNIFICANT CHANGE UP (ref 6–8.3)
PROT UR-MCNC: NEGATIVE — SIGNIFICANT CHANGE UP
RBC # BLD: 5.36 M/UL — SIGNIFICANT CHANGE UP (ref 4.2–5.8)
RBC # FLD: 14.2 % — SIGNIFICANT CHANGE UP (ref 10.3–14.5)
RBC CASTS # UR COMP ASSIST: SIGNIFICANT CHANGE UP (ref 0–?)
SMUDGE CELLS # BLD: PRESENT — SIGNIFICANT CHANGE UP
SODIUM SERPL-SCNC: 125 MMOL/L — LOW (ref 135–145)
SP GR SPEC: 1.01 — SIGNIFICANT CHANGE UP (ref 1–1.04)
SQUAMOUS # UR AUTO: SIGNIFICANT CHANGE UP
UROBILINOGEN FLD QL: NORMAL — SIGNIFICANT CHANGE UP
VARIANT LYMPHS # BLD: 6.6 % — SIGNIFICANT CHANGE UP
WBC # BLD: 10.88 K/UL — HIGH (ref 3.8–10.5)
WBC # FLD AUTO: 10.88 K/UL — HIGH (ref 3.8–10.5)
WBC UR QL: HIGH (ref 0–?)

## 2019-10-26 PROCEDURE — 99283 EMERGENCY DEPT VISIT LOW MDM: CPT

## 2019-10-26 RX ORDER — ACETAMINOPHEN 500 MG
975 TABLET ORAL ONCE
Refills: 0 | Status: COMPLETED | OUTPATIENT
Start: 2019-10-26 | End: 2019-10-26

## 2019-10-26 RX ORDER — CEPHALEXIN 500 MG
1 CAPSULE ORAL
Qty: 10 | Refills: 0
Start: 2019-10-26 | End: 2019-10-30

## 2019-10-26 RX ORDER — CEPHALEXIN 500 MG
500 CAPSULE ORAL ONCE
Refills: 0 | Status: COMPLETED | OUTPATIENT
Start: 2019-10-26 | End: 2019-10-26

## 2019-10-26 RX ADMIN — Medication 500 MILLIGRAM(S): at 20:49

## 2019-10-26 RX ADMIN — Medication 975 MILLIGRAM(S): at 20:49

## 2019-10-26 NOTE — ED PROVIDER NOTE - PHYSICAL EXAMINATION
Gen: Well appearing, NAD  Head: NCAT  HEENT: PERRL, MMM, normal conjunctiva, anicteric, neck supple  Lung: CTAB, no adventitious sounds  CV: RRR, no murmurs  Abd: soft, mild ttp suprapubic, no rebound or guarding, no CVAT  MSK: No edema, no visible deformities  Neuro: Moving all extremity grossly  Skin: Warm and dry, no evidence of rash  Psych: normal mood and affect  : No discharge/leakage around penis, no erytehma on penis

## 2019-10-26 NOTE — ED PROVIDER NOTE - NSFOLLOWUPINSTRUCTIONS_ED_ALL_ED_FT
- Follow up with your urologist this week    - Lab and imaging results, if performed, were discussed with you along with your discharge diagnosis    - Follow up with your doctor in 1 week - bring copies of your results if you were given. If you do not have a primary doctor, please call [phone] to find one convenient for you    - Return to the ED for any new, worsening, or concerning symptoms to you    - Continue all prescribed medications    - Take ibuprofen/tylenol as directed as needed for pain    - Rest and keep yourself hydrated with fluids

## 2019-10-26 NOTE — ED ADULT TRIAGE NOTE - CHIEF COMPLAINT QUOTE
Pt  s/p prostate procedure on Wednesday , arrives with  chronic pichardo placed by urologist; c/o dysuria since today.  Pichardo changed on Wednesday

## 2019-10-26 NOTE — ED ADULT NURSE NOTE - NSIMPLEMENTINTERV_GEN_ALL_ED
Implemented All Universal Safety Interventions:  [place] to call system. Call bell, personal items and telephone within reach. Instruct patient to call for assistance. Room bathroom lighting operational. Non-slip footwear when patient is off stretcher. Physically safe environment: no spills, clutter or unnecessary equipment. Stretcher in lowest position, wheels locked, appropriate side rails in place.

## 2019-10-26 NOTE — ED PROVIDER NOTE - CLINICAL SUMMARY MEDICAL DECISION MAKING FREE TEXT BOX
Impression/Plan:  likely pichardo-associated UTI in patient who has normal VS, unremarkable exam, and clear urine in pichardo bag.  I do not suspect post-op infection, given his lack of pain/fever, we do not suspect acute cholecystitis, ACS, or vascular etiology of the abdominal pain because the location, quality, and modifiers are inconsistent with these diagnoses.  cbc, cmp, are UNLIKELY to provide useful clinical information.    As such, UA/UCx are the most important tests at this time.  Plan:  empiric coverage for gram-positive bacteria, and follow up urine culture.  Changing pichardo catheter not indicated, given the recent prostate surgery. Impression/Plan:  likely pichardo-associated UTI in patient who has normal VS, unremarkable exam, and clear urine in pichardo bag.  I do not suspect post-op infection, given his lack of pain/fever, we do not suspect acute cholecystitis, ACS, or vascular etiology of the abdominal pain because the location, quality, and modifiers are inconsistent with these diagnoses.  Family reporting that outpatient sodium = 121, and will need to verify/check to see if this is true.

## 2019-10-26 NOTE — ED ADULT NURSE REASSESSMENT NOTE - NS ED NURSE REASSESS COMMENT FT1
Handoff report received from ERA Maldonado. Pt is laying on stretcher, a&ox3, calm and cooperative, c/o of pain to "penis and bladder", provider made aware,  denies any other medical discomfort. Respirations even and unlabored, NAD noted. Approximately 100 cc of Light yellow urine noted in pichardo bag. will continue monitor

## 2019-10-26 NOTE — ED PROVIDER NOTE - ATTENDING CONTRIBUTION TO CARE
MD Tran:  patient seen and evaluated with the resident.  I was present for key portions of the History & Physical, and I agree with the Impression & Plan.  MD Tran:  75 yo M, c/o suprapubic pain and penile burning w/pichardo catheter.  Patient underwent TURP procedure 3d ago by Dr. Venancio Henriquez.  Saw him in clinic today, and by the time he got home (~1:30pm) the suprapubic and penile pain had worsened to the point that he wanted to get checked out.   Associated Sx:  no flank pain, no f/c, no N/v/d, tolerating normal POs.  Quality:  burning sensation.  Context:  pichardo catheter is draining clear urine.    VS: wnl. Physical Exam: adult M, well-nourished, NAD, NCAT, PERRL, Abd: s/nd +mild suprapubic TTP, no flank pain.  Penis uncircumcised, no pus/epidermolysis.    Impression/Plan:  likely pichardo-associated UTI in patient who has normal VS, unremarkable exam, and clear urine in pichardo bag.  I do not suspect post-op infection, given his lack of pain/fever, we do not suspect acute cholecystitis, ACS, or vascular etiology of the abdominal pain because the location, quality, and modifiers are inconsistent with these diagnoses.  cbc, cmp, are UNLIKELY to provide useful clinical information.    As such, UA/UCx are the most important tests at this time.  Plan:  empiric coverage for gram-positive bacteria, and follow up urine culture.  Changing pichardo catheter not indicated, given the recent prostate surgery. MD Tran:  patient seen and evaluated with the resident.  I was present for key portions of the History & Physical, and I agree with the Impression & Plan.  MD Tran:  75 yo M, c/o suprapubic pain and penile burning w/pichardo catheter.  Patient underwent TURP procedure 3d ago by Dr. Venancio Henriquez.  Saw him in clinic today, and by the time he got home (~1:30pm) the suprapubic and penile pain had worsened to the point that he wanted to get checked out.   Associated Sx:  no flank pain, no f/c, no N/v/d, tolerating normal POs.  Quality:  burning sensation.  Context:  pichardo catheter is draining clear urine.    VS: wnl. Physical Exam: adult M, well-nourished, NAD, NCAT, PERRL, Abd: s/nd +mild suprapubic TTP, no flank pain.  Penis uncircumcised, no pus/epidermolysis.    Impression/Plan:  likely pichardo-associated UTI in patient who has normal VS, unremarkable exam, and clear urine in pichardo bag.  I do not suspect post-op infection, given his lack of pain/fever, we do not suspect acute cholecystitis, ACS, or vascular etiology of the abdominal pain because the location, quality, and modifiers are inconsistent with these diagnoses.  Family reporting that outpatient sodium = 121, and will need to verify/check to see if this is true.

## 2019-10-26 NOTE — ED ADULT NURSE NOTE - OBJECTIVE STATEMENT
pt s/p "prostate procedure" on Wednesday possibly TURB, has had a pichardo in place for a few days and was replaced on Wednesday. pt saw Urologist today and was supposed to have pichardo removed today however urologist decided to wait two days. pt felt well at the urologist however today pt began to experience penile and subrapubic pain later on today. upon assessment suprapubic area is tender however not distended. As per family pts pichardo is draining and they emptied it prior to ED arrival and it does have 200 cc clear yellow urine present. denies other abd pain denes NVD.

## 2019-10-26 NOTE — ED PROVIDER NOTE - NS ED ROS FT
CONSTITUTIONAL: No fevers, no chills, no lightheadedness, no dizziness  Eyes: no visual changes  Ears: no ear drainage, no ear pain  Nose: no nasal congestion  Mouth/Throat: no sore throat  CV: No chest pain, no palpitations  PULM: No SOB, no cough  GI: No n/v/d,  : no hematuria  SKIN: no rashes.  NEURO: no headache, no focal weakness or numbness  PSYCHIATRIC: no known mental health issues.

## 2019-10-26 NOTE — ED PROVIDER NOTE - PATIENT PORTAL LINK FT
You can access the FollowMyHealth Patient Portal offered by [facility] by registering at the following website: [email or web address]. By joining [employer]’s FollowMyHealth portal, you will also be able to view your health information using other applications (apps) compatible with our system.

## 2019-10-26 NOTE — ED PROVIDER NOTE - OBJECTIVE STATEMENT
75yo M hx parkinsons, BPH, dm, s/p TURP on wednesday w/ dr. dewey, had pichardo replaced wednesday and reevaluation by his urologist today but failed TOV and sent home. After arriving home, began having penile burning and suprapubic pain. No fever, chills, n/v/, tolerating PO. Pichardo draining appropriate amount and no blood in pichardo bag. Pt admits to having outpt labs showing hyponatremia to 121

## 2019-10-27 NOTE — ED ADULT TRIAGE NOTE - CHIEF COMPLAINT QUOTE
pt coming with pichardo Catheter in place for fever x 3 days, last Tylenol taken 10AM today. alert and awake

## 2019-10-28 LAB — SPECIMEN SOURCE: SIGNIFICANT CHANGE UP

## 2019-10-29 ENCOUNTER — APPOINTMENT (OUTPATIENT)
Dept: INTERNAL MEDICINE | Facility: CLINIC | Age: 74
End: 2019-10-29
Payer: MEDICAID

## 2019-10-29 ENCOUNTER — APPOINTMENT (OUTPATIENT)
Dept: NEPHROLOGY | Facility: CLINIC | Age: 74
End: 2019-10-29

## 2019-10-29 ENCOUNTER — LABORATORY RESULT (OUTPATIENT)
Age: 74
End: 2019-10-29

## 2019-10-29 ENCOUNTER — OUTPATIENT (OUTPATIENT)
Dept: OUTPATIENT SERVICES | Facility: HOSPITAL | Age: 74
LOS: 1 days | End: 2019-10-29

## 2019-10-29 VITALS
RESPIRATION RATE: 16 BRPM | DIASTOLIC BLOOD PRESSURE: 70 MMHG | BODY MASS INDEX: 23.46 KG/M2 | WEIGHT: 146 LBS | SYSTOLIC BLOOD PRESSURE: 116 MMHG | HEIGHT: 66 IN | HEART RATE: 72 BPM | OXYGEN SATURATION: 97 %

## 2019-10-29 DIAGNOSIS — Z11.3 ENCOUNTER FOR SCREENING FOR INFECTIONS WITH A PREDOMINANTLY SEXUAL MODE OF TRANSMISSION: ICD-10-CM

## 2019-10-29 DIAGNOSIS — Z70.9 SEX COUNSELING, UNSPECIFIED: ICD-10-CM

## 2019-10-29 DIAGNOSIS — R35.0 FREQUENCY OF MICTURITION: ICD-10-CM

## 2019-10-29 DIAGNOSIS — Z11.4 ENCOUNTER FOR SCREENING FOR HUMAN IMMUNODEFICIENCY VIRUS [HIV]: ICD-10-CM

## 2019-10-29 LAB
ANION GAP SERPL CALC-SCNC: 14 MMO/L — SIGNIFICANT CHANGE UP (ref 7–14)
BUN SERPL-MCNC: 7 MG/DL — SIGNIFICANT CHANGE UP (ref 7–23)
CALCIUM SERPL-MCNC: 9.5 MG/DL — SIGNIFICANT CHANGE UP (ref 8.4–10.5)
CHLORIDE SERPL-SCNC: 90 MMOL/L — LOW (ref 98–107)
CO2 SERPL-SCNC: 24 MMOL/L — SIGNIFICANT CHANGE UP (ref 22–31)
CREAT SERPL-MCNC: 0.63 MG/DL — SIGNIFICANT CHANGE UP (ref 0.5–1.3)
GLUCOSE BLDC GLUCOMTR-MCNC: 167
GLUCOSE SERPL-MCNC: 140 MG/DL — HIGH (ref 70–99)
POTASSIUM SERPL-MCNC: 4.1 MMOL/L — SIGNIFICANT CHANGE UP (ref 3.5–5.3)
POTASSIUM SERPL-SCNC: 4.1 MMOL/L — SIGNIFICANT CHANGE UP (ref 3.5–5.3)
SODIUM SERPL-SCNC: 128 MMOL/L — LOW (ref 135–145)

## 2019-10-29 PROCEDURE — 99214 OFFICE O/P EST MOD 30 MIN: CPT

## 2019-10-29 NOTE — PHYSICAL EXAM
[No Acute Distress] : no acute distress [Normal Sclera/Conjunctiva] : normal sclera/conjunctiva [Normal Outer Ear/Nose] : the outer ears and nose were normal in appearance [No JVD] : no jugular venous distention [No Respiratory Distress] : no respiratory distress  [No Accessory Muscle Use] : no accessory muscle use [Soft] : abdomen soft [Non-distended] : non-distended [Urethral Meatus] : meatus normal [Penis Abnormality] : normal uncircumcised penis [Scrotum] : the scrotum was normal [Epididymis] : the epididymides were normal [Testes Tenderness] : no tenderness of the testes [Normal Inguinal Nodes] : no inguinal lymphadenopathy [No CVA Tenderness] : no CVA  tenderness [No Rash] : no rash [de-identified] : very mild tenderness to suprapubic deep palpation

## 2019-10-29 NOTE — HISTORY OF PRESENT ILLNESS
[Other: _____] : [unfilled] [FreeTextEntry1] : urinary frequency, f/u hyponatremia [de-identified] : Started sodium chloride tablets on 10/24/19 for repeat Na last week of 122; went to the ED on Saturday for penile burning and had +UA, UCx with kleb pneumo, pending sensitivities still, was given keflex which he didn't fill because he was taking levofloxacin from urology-followed up with urology and pichardo removed yesterday; completed 10d course levofloxacin per son earlier today. Since removal of pichardo he has been voiding, but has been voiding very frequently since that time, no penile pain, but reports that he has some lower abdominal pain. Na in ED on 10/26 was up to 125. \par No other complaints.

## 2019-10-30 ENCOUNTER — INPATIENT (INPATIENT)
Facility: HOSPITAL | Age: 74
LOS: 12 days | Discharge: HOME | End: 2019-11-12
Attending: HOSPITALIST | Admitting: HOSPITALIST
Payer: MEDICAID

## 2019-10-30 VITALS
DIASTOLIC BLOOD PRESSURE: 68 MMHG | RESPIRATION RATE: 20 BRPM | TEMPERATURE: 98 F | HEART RATE: 80 BPM | SYSTOLIC BLOOD PRESSURE: 133 MMHG | OXYGEN SATURATION: 98 %

## 2019-10-30 DIAGNOSIS — E11.9 TYPE 2 DIABETES MELLITUS WITHOUT COMPLICATIONS: ICD-10-CM

## 2019-10-30 DIAGNOSIS — N40.1 BENIGN PROSTATIC HYPERPLASIA WITH LOWER URINARY TRACT SYMPTOMS: ICD-10-CM

## 2019-10-30 DIAGNOSIS — N39.0 URINARY TRACT INFECTION, SITE NOT SPECIFIED: ICD-10-CM

## 2019-10-30 DIAGNOSIS — G20 PARKINSON'S DISEASE: ICD-10-CM

## 2019-10-30 DIAGNOSIS — R35.0 FREQUENCY OF MICTURITION: ICD-10-CM

## 2019-10-30 DIAGNOSIS — E87.1 HYPO-OSMOLALITY AND HYPONATREMIA: ICD-10-CM

## 2019-10-30 LAB
-  AMIKACIN: SIGNIFICANT CHANGE UP
-  AMPICILLIN/SULBACTAM: SIGNIFICANT CHANGE UP
-  AMPICILLIN: SIGNIFICANT CHANGE UP
-  AZTREONAM: SIGNIFICANT CHANGE UP
-  CEFAZOLIN: SIGNIFICANT CHANGE UP
-  CEFEPIME: SIGNIFICANT CHANGE UP
-  CEFOXITIN: SIGNIFICANT CHANGE UP
-  CEFTAZIDIME: SIGNIFICANT CHANGE UP
-  CEFTRIAXONE: SIGNIFICANT CHANGE UP
-  CEFUROXIME: SIGNIFICANT CHANGE UP
-  ERTAPENEM: SIGNIFICANT CHANGE UP
-  GENTAMICIN: SIGNIFICANT CHANGE UP
-  IMIPENEM: SIGNIFICANT CHANGE UP
-  LEVOFLOXACIN: SIGNIFICANT CHANGE UP
-  MEROPENEM: SIGNIFICANT CHANGE UP
-  NITROFURANTOIN: SIGNIFICANT CHANGE UP
-  PIPERACILLIN/TAZOBACTAM: SIGNIFICANT CHANGE UP
-  TIGECYCLINE: SIGNIFICANT CHANGE UP
-  TOBRAMYCIN: SIGNIFICANT CHANGE UP
-  TRIMETHOPRIM/SULFAMETHOXAZOLE: SIGNIFICANT CHANGE UP
ALBUMIN SERPL ELPH-MCNC: 4.2 G/DL — SIGNIFICANT CHANGE UP (ref 3.3–5)
ALP SERPL-CCNC: 86 U/L — SIGNIFICANT CHANGE UP (ref 40–120)
ALT FLD-CCNC: 10 U/L — SIGNIFICANT CHANGE UP (ref 4–41)
ANION GAP SERPL CALC-SCNC: 14 MMO/L — SIGNIFICANT CHANGE UP (ref 7–14)
APPEARANCE UR: CLEAR — SIGNIFICANT CHANGE UP
AST SERPL-CCNC: 17 U/L — SIGNIFICANT CHANGE UP (ref 4–40)
BACTERIA # UR AUTO: HIGH
BACTERIA UR CULT: SIGNIFICANT CHANGE UP
BASOPHILS # BLD AUTO: 0.05 K/UL — SIGNIFICANT CHANGE UP (ref 0–0.2)
BASOPHILS NFR BLD AUTO: 0.5 % — SIGNIFICANT CHANGE UP (ref 0–2)
BILIRUB SERPL-MCNC: 0.5 MG/DL — SIGNIFICANT CHANGE UP (ref 0.2–1.2)
BILIRUB UR-MCNC: NEGATIVE — SIGNIFICANT CHANGE UP
BLOOD UR QL VISUAL: NEGATIVE — SIGNIFICANT CHANGE UP
BUN SERPL-MCNC: 8 MG/DL — SIGNIFICANT CHANGE UP (ref 7–23)
CALCIUM SERPL-MCNC: 9.4 MG/DL — SIGNIFICANT CHANGE UP (ref 8.4–10.5)
CHLORIDE SERPL-SCNC: 91 MMOL/L — LOW (ref 98–107)
CO2 SERPL-SCNC: 23 MMOL/L — SIGNIFICANT CHANGE UP (ref 22–31)
COLOR SPEC: SIGNIFICANT CHANGE UP
CREAT SERPL-MCNC: 0.69 MG/DL — SIGNIFICANT CHANGE UP (ref 0.5–1.3)
EOSINOPHIL # BLD AUTO: 0.4 K/UL — SIGNIFICANT CHANGE UP (ref 0–0.5)
EOSINOPHIL NFR BLD AUTO: 4.1 % — SIGNIFICANT CHANGE UP (ref 0–6)
GLUCOSE SERPL-MCNC: 112 MG/DL — HIGH (ref 70–99)
GLUCOSE UR-MCNC: NEGATIVE — SIGNIFICANT CHANGE UP
HCT VFR BLD CALC: 36.9 % — LOW (ref 39–50)
HGB BLD-MCNC: 12.9 G/DL — LOW (ref 13–17)
HYALINE CASTS # UR AUTO: NEGATIVE — SIGNIFICANT CHANGE UP
IMM GRANULOCYTES NFR BLD AUTO: 0.3 % — SIGNIFICANT CHANGE UP (ref 0–1.5)
KETONES UR-MCNC: NEGATIVE — SIGNIFICANT CHANGE UP
LEUKOCYTE ESTERASE UR-ACNC: SIGNIFICANT CHANGE UP
LYMPHOCYTES # BLD AUTO: 4.36 K/UL — HIGH (ref 1–3.3)
LYMPHOCYTES # BLD AUTO: 44.4 % — HIGH (ref 13–44)
MCHC RBC-ENTMCNC: 25.5 PG — LOW (ref 27–34)
MCHC RBC-ENTMCNC: 35 % — SIGNIFICANT CHANGE UP (ref 32–36)
MCV RBC AUTO: 73.1 FL — LOW (ref 80–100)
METHOD TYPE: SIGNIFICANT CHANGE UP
MONOCYTES # BLD AUTO: 0.52 K/UL — SIGNIFICANT CHANGE UP (ref 0–0.9)
MONOCYTES NFR BLD AUTO: 5.3 % — SIGNIFICANT CHANGE UP (ref 2–14)
NEUTROPHILS # BLD AUTO: 4.47 K/UL — SIGNIFICANT CHANGE UP (ref 1.8–7.4)
NEUTROPHILS NFR BLD AUTO: 45.4 % — SIGNIFICANT CHANGE UP (ref 43–77)
NITRITE UR-MCNC: POSITIVE — HIGH
NRBC # FLD: 0 K/UL — SIGNIFICANT CHANGE UP (ref 0–0)
ORGANISM # SPEC MICROSCOPIC CNT: SIGNIFICANT CHANGE UP
ORGANISM # SPEC MICROSCOPIC CNT: SIGNIFICANT CHANGE UP
PH UR: 6.5 — SIGNIFICANT CHANGE UP (ref 5–8)
PLATELET # BLD AUTO: 411 K/UL — HIGH (ref 150–400)
PMV BLD: 8.2 FL — SIGNIFICANT CHANGE UP (ref 7–13)
POTASSIUM SERPL-MCNC: 4.7 MMOL/L — SIGNIFICANT CHANGE UP (ref 3.5–5.3)
POTASSIUM SERPL-SCNC: 4.7 MMOL/L — SIGNIFICANT CHANGE UP (ref 3.5–5.3)
PROT SERPL-MCNC: 7.9 G/DL — SIGNIFICANT CHANGE UP (ref 6–8.3)
PROT UR-MCNC: NEGATIVE — SIGNIFICANT CHANGE UP
RBC # BLD: 5.05 M/UL — SIGNIFICANT CHANGE UP (ref 4.2–5.8)
RBC # FLD: 14.1 % — SIGNIFICANT CHANGE UP (ref 10.3–14.5)
RBC CASTS # UR COMP ASSIST: SIGNIFICANT CHANGE UP (ref 0–?)
SODIUM SERPL-SCNC: 128 MMOL/L — LOW (ref 135–145)
SP GR SPEC: 1.01 — SIGNIFICANT CHANGE UP (ref 1–1.04)
SQUAMOUS # UR AUTO: SIGNIFICANT CHANGE UP
UROBILINOGEN FLD QL: NORMAL — SIGNIFICANT CHANGE UP
WBC # BLD: 9.83 K/UL — SIGNIFICANT CHANGE UP (ref 3.8–10.5)
WBC # FLD AUTO: 9.83 K/UL — SIGNIFICANT CHANGE UP (ref 3.8–10.5)
WBC UR QL: >50 — HIGH (ref 0–?)

## 2019-10-30 PROCEDURE — 99223 1ST HOSP IP/OBS HIGH 75: CPT

## 2019-10-30 RX ORDER — MEROPENEM-VABORBACTAM 1; 1 G/2G; G/2G
4 INJECTION, POWDER, FOR SOLUTION INTRAVENOUS EVERY 8 HOURS
Refills: 0 | Status: DISCONTINUED | OUTPATIENT
Start: 2019-10-30 | End: 2019-11-04

## 2019-10-30 NOTE — ED PROVIDER NOTE - OBJECTIVE STATEMENT
74M PMH Parkinson's, BPH, DM, s/p TURP 7 days ago called in for IV abx for MDR Klebsiella 10/26 UCx. Aguilar was place in place from 10/23-10/28. Completed 7 day course of Keflex yesterday. C/o dysuria and suprapubic pain. No f/c, n/v. Is tolerating PO. 74M PMH Parkinson's, BPH, DM, s/p TURP 7 days ago, evaluated for pichardo-related UTI 4 days ago, called in for IV abx for MDR Klebsiella 10/26 UCx. Pichardo was place in place from 10/23-10/28. Completed 7 day course of Keflex yesterday. C/o dysuria and suprapubic pain. No f/c, n/v. Is tolerating PO.

## 2019-10-30 NOTE — ED ADULT NURSE NOTE - INTERVENTIONS DEFINITIONS
Call bell, personal items and telephone within reach/Monitor gait and stability/Non-slip footwear when patient is off stretcher/[place] to call system/Instruct patient to call for assistance/Physically safe environment: no spills, clutter or unnecessary equipment

## 2019-10-30 NOTE — ED PROVIDER NOTE - NS ED ROS FT
General: Denies fevers or chills  Eyes: Denies vision changes  ENMT: Denies trouble swallowing or speaking, or sore throat  CV: Denies chest pain  Resp: Denies cough or SOB  GI: +Suprapubic pain. Denies nausea, vomiting, diarrhea, or constipation   : +Dysuria  Skin: Denies new rashes  Neuro: Denies headache, numbness, or weakness  MSK: Denies recent trauma or joint pain  Endocrine: Denies unexpected weight loss  Heme: Denies bleeding disorders

## 2019-10-30 NOTE — ED ADULT TRIAGE NOTE - CHIEF COMPLAINT QUOTE
pt was seen on Sat. for UTI was call back for IV antibx.  pt denies fever. + dysuria. no hematuria. Aguilar removed on Monday at MD office.

## 2019-10-30 NOTE — ED POST DISCHARGE NOTE - RESULT SUMMARY
MIcro called UCX: Klebsiella pneumoniae CRE Patient discharged home with a prescription for Keflex which is resistant. Resistant to all PO Meds listed on S/R profile. Patient contact # [phone] and  Msg left with Call back PA # and hrs and CDU PA # alt # [phone] S/W patient's son discussed with Son father needs to return to ED for IVABX possible admission. Discussed with son father could develop bacteremia which could lead to sepsis and possible death if patient does not return. Son agreed to bring patient back to ED.

## 2019-10-30 NOTE — ED PROVIDER NOTE - CLINICAL SUMMARY MEDICAL DECISION MAKING FREE TEXT BOX
[age] yo M a/w CRE klebsiella, will admit for IV antibiotics, not septic at this time, only show signs of cystitis, will consult ID, given length of time with bactiuria.  Contact precautions, Follow up repeat UA and Urine culture

## 2019-10-30 NOTE — ED ADULT NURSE NOTE - OBJECTIVE STATEMENT
Pt arrives for positive urine culture results and is here to receive IV antibiotics. Pt is in no discomfort at rest; only discomfort is with urination. Son at bedside assisting with translation. Will continue to monitor  Pt has redness and scab to left AC from where he had blood taken yesterday, no other skin issues noted  Hx BPH, son states pichardo was removed yesterday

## 2019-10-30 NOTE — ED PROVIDER NOTE - PHYSICAL EXAMINATION
I have reviewed the triage vital signs.  Const: AAOx3, laying comfortably in NAD  Eyes: PERRL, no conjunctival injection  HENT: NCAT, Neck supple without meningismus, oral mucosa moist  CV: RRR, no obvious murmurs, rubs, or gallops appreciated  Resp: CTAB, no respiratory distress, no wheezes, rales, or rhonchi  GI: Abdomen soft, nondistended, +suprapubic TTP, no guarding, no CVA tenderness  Extremities: No peripheral edema,  2+ radial and DP pulses  Skin: Warm, well perfused, no rash  MSK: No gross deformities appreciated  Neuro: No focal sensory or motor deficits, CN 2-12 grossly intact  Psych: Appropriate mood and affect

## 2019-10-30 NOTE — ED PROVIDER NOTE - ATTENDING CONTRIBUTION TO CARE
Ladonna: I have seen and examined the patient face to face, have reviewed and addended the HPI, PE and a/p as necessary.     75 yo M Parkinson's, BPH, DM, s/p TURP 7 days ago, evaluated for pichardo-related UTI 4 days ago, called in for IV abx for CRE/MDR  Klebsiella 10/26 UCx noted to completed 7 day course of Keflex.  Pichardo was removed 2 days ago.  Pt reports having persistent dysuria and suprapubic pain.  Of note on chart review, patient was noted to have similar CRE culture on last admission.  Deemed to be a colonization, however continues to have symptoms, with no fever, no flank pain, no AMS, at baseline mental status, no nausea, or vomiting.      GEN - NAD; well appearing; A+O; non-toxic appearing  CARD -s1s2, RRR, no M,G,R;   PULM - CTA b/l, symmetric breath sounds;   ABD -  +BS, TTP over the suprapubic area, no cva tenderness. soft, no guarding, no rebound, no masses;   BACK - no CVA tenderness, Normal  spine;   EXT - symmetric pulses, 2+ dp, capillary refill < 2 seconds, no clubbing, no cyanosis, no edema  NEURO - no focal neuro deficits, no slurred speech    75 yo M a/w CRE klebsiella, will admit for IV antibiotics, not septic at this time, only show signs of cystitis, will consult ID, given length of time with bactiuria.  Contact precautions, Follow up repeat UA and Urine cultre

## 2019-10-31 DIAGNOSIS — G20 PARKINSON'S DISEASE: ICD-10-CM

## 2019-10-31 DIAGNOSIS — Z29.9 ENCOUNTER FOR PROPHYLACTIC MEASURES, UNSPECIFIED: ICD-10-CM

## 2019-10-31 DIAGNOSIS — R73.9 HYPERGLYCEMIA, UNSPECIFIED: ICD-10-CM

## 2019-10-31 DIAGNOSIS — Z02.9 ENCOUNTER FOR ADMINISTRATIVE EXAMINATIONS, UNSPECIFIED: ICD-10-CM

## 2019-10-31 DIAGNOSIS — F03.90 UNSPECIFIED DEMENTIA WITHOUT BEHAVIORAL DISTURBANCE: ICD-10-CM

## 2019-10-31 DIAGNOSIS — E87.1 HYPO-OSMOLALITY AND HYPONATREMIA: ICD-10-CM

## 2019-10-31 DIAGNOSIS — N40.0 BENIGN PROSTATIC HYPERPLASIA WITHOUT LOWER URINARY TRACT SYMPTOMS: ICD-10-CM

## 2019-10-31 DIAGNOSIS — N39.0 URINARY TRACT INFECTION, SITE NOT SPECIFIED: ICD-10-CM

## 2019-10-31 DIAGNOSIS — D50.9 IRON DEFICIENCY ANEMIA, UNSPECIFIED: ICD-10-CM

## 2019-10-31 LAB
ANION GAP SERPL CALC-SCNC: 18 MMO/L — HIGH (ref 7–14)
ANISOCYTOSIS BLD QL: SLIGHT — SIGNIFICANT CHANGE UP
BASOPHILS NFR SPEC: 0 % — SIGNIFICANT CHANGE UP (ref 0–2)
BLASTS # FLD: 0 % — SIGNIFICANT CHANGE UP (ref 0–0)
BUN SERPL-MCNC: 7 MG/DL — SIGNIFICANT CHANGE UP (ref 7–23)
CALCIUM SERPL-MCNC: 9.3 MG/DL — SIGNIFICANT CHANGE UP (ref 8.4–10.5)
CHLORIDE SERPL-SCNC: 92 MMOL/L — LOW (ref 98–107)
CHLORIDE UR-SCNC: 55 MMOL/L — SIGNIFICANT CHANGE UP
CHOLEST SERPL-MCNC: 88 MG/DL — LOW (ref 120–199)
CO2 SERPL-SCNC: 20 MMOL/L — LOW (ref 22–31)
CREAT SERPL-MCNC: 0.59 MG/DL — SIGNIFICANT CHANGE UP (ref 0.5–1.3)
EOSINOPHIL NFR FLD: 5.6 % — SIGNIFICANT CHANGE UP (ref 0–6)
FERRITIN SERPL-MCNC: 17.38 NG/ML — LOW (ref 30–400)
GLUCOSE BLDC GLUCOMTR-MCNC: 116 MG/DL — HIGH (ref 70–99)
GLUCOSE BLDC GLUCOMTR-MCNC: 119 MG/DL — HIGH (ref 70–99)
GLUCOSE BLDC GLUCOMTR-MCNC: 165 MG/DL — HIGH (ref 70–99)
GLUCOSE SERPL-MCNC: 106 MG/DL — HIGH (ref 70–99)
HCT VFR BLD CALC: 40.2 % — SIGNIFICANT CHANGE UP (ref 39–50)
HDLC SERPL-MCNC: 54 MG/DL — SIGNIFICANT CHANGE UP (ref 35–55)
HGB BLD-MCNC: 13.8 G/DL — SIGNIFICANT CHANGE UP (ref 13–17)
IRON SATN MFR SERPL: 317 UG/DL — SIGNIFICANT CHANGE UP (ref 155–535)
IRON SATN MFR SERPL: 53 UG/DL — SIGNIFICANT CHANGE UP (ref 45–165)
LIPID PNL WITH DIRECT LDL SERPL: 29 MG/DL — SIGNIFICANT CHANGE UP
LYMPHOCYTES NFR SPEC AUTO: 29 % — SIGNIFICANT CHANGE UP (ref 13–44)
MACROCYTES BLD QL: SLIGHT — SIGNIFICANT CHANGE UP
MAGNESIUM SERPL-MCNC: 2 MG/DL — SIGNIFICANT CHANGE UP (ref 1.6–2.6)
MCHC RBC-ENTMCNC: 25.2 PG — LOW (ref 27–34)
MCHC RBC-ENTMCNC: 34.3 % — SIGNIFICANT CHANGE UP (ref 32–36)
MCV RBC AUTO: 73.4 FL — LOW (ref 80–100)
METAMYELOCYTES # FLD: 0 % — SIGNIFICANT CHANGE UP (ref 0–1)
MICROCYTES BLD QL: SLIGHT — SIGNIFICANT CHANGE UP
MONOCYTES NFR BLD: 3.7 % — SIGNIFICANT CHANGE UP (ref 2–9)
MYELOCYTES NFR BLD: 0 % — SIGNIFICANT CHANGE UP (ref 0–0)
NEUTROPHIL AB SER-ACNC: 37.4 % — LOW (ref 43–77)
NEUTS BAND # BLD: 0 % — SIGNIFICANT CHANGE UP (ref 0–6)
NRBC # FLD: 0 K/UL — SIGNIFICANT CHANGE UP (ref 0–0)
OSMOLALITY UR: 249 MOSMO/KG — SIGNIFICANT CHANGE UP (ref 50–1200)
OTHER - HEMATOLOGY %: 0 — SIGNIFICANT CHANGE UP
OVALOCYTES BLD QL SMEAR: SLIGHT — SIGNIFICANT CHANGE UP
PHOSPHATE SERPL-MCNC: 3.8 MG/DL — SIGNIFICANT CHANGE UP (ref 2.5–4.5)
PLATELET # BLD AUTO: 384 K/UL — SIGNIFICANT CHANGE UP (ref 150–400)
PLATELET COUNT - ESTIMATE: NORMAL — SIGNIFICANT CHANGE UP
PMV BLD: 8.2 FL — SIGNIFICANT CHANGE UP (ref 7–13)
POIKILOCYTOSIS BLD QL AUTO: SLIGHT — SIGNIFICANT CHANGE UP
POTASSIUM SERPL-MCNC: 3.5 MMOL/L — SIGNIFICANT CHANGE UP (ref 3.5–5.3)
POTASSIUM SERPL-SCNC: 3.5 MMOL/L — SIGNIFICANT CHANGE UP (ref 3.5–5.3)
POTASSIUM UR-SCNC: 15.8 MMOL/L — SIGNIFICANT CHANGE UP
PROMYELOCYTES # FLD: 0 % — SIGNIFICANT CHANGE UP (ref 0–0)
RBC # BLD: 5.48 M/UL — SIGNIFICANT CHANGE UP (ref 4.2–5.8)
RBC # FLD: 14.2 % — SIGNIFICANT CHANGE UP (ref 10.3–14.5)
RETICS #: 29 K/UL — SIGNIFICANT CHANGE UP (ref 25–125)
RETICS/RBC NFR: 0.5 % — SIGNIFICANT CHANGE UP (ref 0.5–2.5)
SMUDGE CELLS # BLD: PRESENT — SIGNIFICANT CHANGE UP
SODIUM SERPL-SCNC: 130 MMOL/L — LOW (ref 135–145)
SODIUM UR-SCNC: 57 MMOL/L — SIGNIFICANT CHANGE UP
TRIGL SERPL-MCNC: 73 MG/DL — SIGNIFICANT CHANGE UP (ref 10–149)
TSH SERPL-MCNC: 2.29 UIU/ML — SIGNIFICANT CHANGE UP (ref 0.27–4.2)
UIBC SERPL-MCNC: 264.1 UG/DL — SIGNIFICANT CHANGE UP (ref 110–370)
VARIANT LYMPHS # BLD: 24.3 % — SIGNIFICANT CHANGE UP
WBC # BLD: 9.53 K/UL — SIGNIFICANT CHANGE UP (ref 3.8–10.5)
WBC # FLD AUTO: 9.53 K/UL — SIGNIFICANT CHANGE UP (ref 3.8–10.5)

## 2019-10-31 PROCEDURE — 99233 SBSQ HOSP IP/OBS HIGH 50: CPT

## 2019-10-31 PROCEDURE — 99222 1ST HOSP IP/OBS MODERATE 55: CPT | Mod: GC

## 2019-10-31 PROCEDURE — 93010 ELECTROCARDIOGRAM REPORT: CPT | Mod: 76

## 2019-10-31 RX ORDER — PANTOPRAZOLE SODIUM 20 MG/1
40 TABLET, DELAYED RELEASE ORAL
Refills: 0 | Status: DISCONTINUED | OUTPATIENT
Start: 2019-10-31 | End: 2019-11-12

## 2019-10-31 RX ORDER — LATANOPROST 0.05 MG/ML
1 SOLUTION/ DROPS OPHTHALMIC; TOPICAL AT BEDTIME
Refills: 0 | Status: DISCONTINUED | OUTPATIENT
Start: 2019-10-31 | End: 2019-11-12

## 2019-10-31 RX ORDER — HEPARIN SODIUM 5000 [USP'U]/ML
5000 INJECTION INTRAVENOUS; SUBCUTANEOUS EVERY 12 HOURS
Refills: 0 | Status: DISCONTINUED | OUTPATIENT
Start: 2019-10-31 | End: 2019-11-12

## 2019-10-31 RX ORDER — SIMETHICONE 80 MG/1
80 TABLET, CHEWABLE ORAL EVERY 6 HOURS
Refills: 0 | Status: DISCONTINUED | OUTPATIENT
Start: 2019-10-31 | End: 2019-11-12

## 2019-10-31 RX ORDER — QUETIAPINE FUMARATE 200 MG/1
25 TABLET, FILM COATED ORAL DAILY
Refills: 0 | Status: DISCONTINUED | OUTPATIENT
Start: 2019-10-31 | End: 2019-11-12

## 2019-10-31 RX ORDER — DONEPEZIL HYDROCHLORIDE 10 MG/1
10 TABLET, FILM COATED ORAL AT BEDTIME
Refills: 0 | Status: DISCONTINUED | OUTPATIENT
Start: 2019-10-31 | End: 2019-11-06

## 2019-10-31 RX ORDER — SODIUM CHLORIDE 9 MG/ML
1 INJECTION INTRAMUSCULAR; INTRAVENOUS; SUBCUTANEOUS
Refills: 0 | Status: DISCONTINUED | OUTPATIENT
Start: 2019-10-31 | End: 2019-11-01

## 2019-10-31 RX ORDER — LACTOBACILLUS ACIDOPHILUS 100MM CELL
1 CAPSULE ORAL
Refills: 0 | Status: DISCONTINUED | OUTPATIENT
Start: 2019-10-31 | End: 2019-11-12

## 2019-10-31 RX ORDER — INFLUENZA VIRUS VACCINE 15; 15; 15; 15 UG/.5ML; UG/.5ML; UG/.5ML; UG/.5ML
0.5 SUSPENSION INTRAMUSCULAR ONCE
Refills: 0 | Status: DISCONTINUED | OUTPATIENT
Start: 2019-10-31 | End: 2019-11-12

## 2019-10-31 RX ORDER — TRIHEXYPHENIDYL HCL 2 MG
2 TABLET ORAL
Refills: 0 | Status: DISCONTINUED | OUTPATIENT
Start: 2019-10-31 | End: 2019-11-12

## 2019-10-31 RX ORDER — ESCITALOPRAM OXALATE 10 MG/1
5 TABLET, FILM COATED ORAL DAILY
Refills: 0 | Status: DISCONTINUED | OUTPATIENT
Start: 2019-10-31 | End: 2019-11-12

## 2019-10-31 RX ORDER — DORZOLAMIDE HYDROCHLORIDE TIMOLOL MALEATE 20; 5 MG/ML; MG/ML
1 SOLUTION/ DROPS OPHTHALMIC
Refills: 0 | Status: DISCONTINUED | OUTPATIENT
Start: 2019-10-31 | End: 2019-11-12

## 2019-10-31 RX ORDER — TAMSULOSIN HYDROCHLORIDE 0.4 MG/1
0.4 CAPSULE ORAL AT BEDTIME
Refills: 0 | Status: DISCONTINUED | OUTPATIENT
Start: 2019-10-31 | End: 2019-11-12

## 2019-10-31 RX ORDER — MEMANTINE HYDROCHLORIDE 10 MG/1
10 TABLET ORAL DAILY
Refills: 0 | Status: DISCONTINUED | OUTPATIENT
Start: 2019-10-31 | End: 2019-11-06

## 2019-10-31 RX ORDER — CARBIDOPA AND LEVODOPA 25; 100 MG/1; MG/1
1 TABLET ORAL
Qty: 0 | Refills: 0 | DISCHARGE

## 2019-10-31 RX ORDER — CARBIDOPA AND LEVODOPA 25; 100 MG/1; MG/1
1 TABLET ORAL THREE TIMES A DAY
Refills: 0 | Status: DISCONTINUED | OUTPATIENT
Start: 2019-10-31 | End: 2019-11-12

## 2019-10-31 RX ADMIN — MEROPENEM-VABORBACTAM 83.33 GRAM(S): 1; 1 INJECTION, POWDER, FOR SOLUTION INTRAVENOUS at 01:24

## 2019-10-31 RX ADMIN — TAMSULOSIN HYDROCHLORIDE 0.4 MILLIGRAM(S): 0.4 CAPSULE ORAL at 21:37

## 2019-10-31 RX ADMIN — HEPARIN SODIUM 5000 UNIT(S): 5000 INJECTION INTRAVENOUS; SUBCUTANEOUS at 05:12

## 2019-10-31 RX ADMIN — Medication 1 TABLET(S): at 18:04

## 2019-10-31 RX ADMIN — Medication 1 DROP(S): at 18:04

## 2019-10-31 RX ADMIN — Medication 1 TABLET(S): at 05:12

## 2019-10-31 RX ADMIN — Medication 1 DROP(S): at 05:11

## 2019-10-31 RX ADMIN — MEROPENEM-VABORBACTAM 83.33 GRAM(S): 1; 1 INJECTION, POWDER, FOR SOLUTION INTRAVENOUS at 18:10

## 2019-10-31 RX ADMIN — SODIUM CHLORIDE 1 GRAM(S): 9 INJECTION INTRAMUSCULAR; INTRAVENOUS; SUBCUTANEOUS at 18:04

## 2019-10-31 RX ADMIN — PANTOPRAZOLE SODIUM 40 MILLIGRAM(S): 20 TABLET, DELAYED RELEASE ORAL at 05:57

## 2019-10-31 RX ADMIN — CARBIDOPA AND LEVODOPA 1 TABLET(S): 25; 100 TABLET ORAL at 13:05

## 2019-10-31 RX ADMIN — DONEPEZIL HYDROCHLORIDE 10 MILLIGRAM(S): 10 TABLET, FILM COATED ORAL at 22:48

## 2019-10-31 RX ADMIN — CARBIDOPA AND LEVODOPA 1 TABLET(S): 25; 100 TABLET ORAL at 05:12

## 2019-10-31 RX ADMIN — QUETIAPINE FUMARATE 25 MILLIGRAM(S): 200 TABLET, FILM COATED ORAL at 12:42

## 2019-10-31 RX ADMIN — DORZOLAMIDE HYDROCHLORIDE TIMOLOL MALEATE 1 DROP(S): 20; 5 SOLUTION/ DROPS OPHTHALMIC at 18:04

## 2019-10-31 RX ADMIN — ESCITALOPRAM OXALATE 5 MILLIGRAM(S): 10 TABLET, FILM COATED ORAL at 12:41

## 2019-10-31 RX ADMIN — SODIUM CHLORIDE 1 GRAM(S): 9 INJECTION INTRAMUSCULAR; INTRAVENOUS; SUBCUTANEOUS at 05:11

## 2019-10-31 RX ADMIN — LATANOPROST 1 DROP(S): 0.05 SOLUTION/ DROPS OPHTHALMIC; TOPICAL at 22:48

## 2019-10-31 RX ADMIN — DORZOLAMIDE HYDROCHLORIDE TIMOLOL MALEATE 1 DROP(S): 20; 5 SOLUTION/ DROPS OPHTHALMIC at 05:57

## 2019-10-31 RX ADMIN — Medication 2 MILLIGRAM(S): at 05:12

## 2019-10-31 RX ADMIN — HEPARIN SODIUM 5000 UNIT(S): 5000 INJECTION INTRAVENOUS; SUBCUTANEOUS at 18:03

## 2019-10-31 RX ADMIN — MEMANTINE HYDROCHLORIDE 10 MILLIGRAM(S): 10 TABLET ORAL at 12:41

## 2019-10-31 RX ADMIN — MEROPENEM-VABORBACTAM 83.33 GRAM(S): 1; 1 INJECTION, POWDER, FOR SOLUTION INTRAVENOUS at 08:43

## 2019-10-31 RX ADMIN — CARBIDOPA AND LEVODOPA 1 TABLET(S): 25; 100 TABLET ORAL at 21:37

## 2019-10-31 RX ADMIN — Medication 2 MILLIGRAM(S): at 18:04

## 2019-10-31 RX ADMIN — SIMETHICONE 80 MILLIGRAM(S): 80 TABLET, CHEWABLE ORAL at 18:03

## 2019-10-31 NOTE — H&P ADULT - RS GEN PE MLT RESP DETAILS PC
respirations non-labored/good air movement/clear to auscultation bilaterally/normal/no chest wall tenderness/no rales/breath sounds equal/no intercostal retractions/no wheezes/no rhonchi/airway patent clear to auscultation bilaterally/no rales/airway patent/no wheezes/no chest wall tenderness/no intercostal retractions/respirations non-labored/no rhonchi/breath sounds equal/good air movement

## 2019-10-31 NOTE — PROGRESS NOTE ADULT - PROBLEM SELECTOR PLAN 3
H&H revealed 12.9/36.9 with low MCV  - Will check ferritin, TIBC, and retic count H&H revealed 12.9/36.9 with low MCV  -ferritin, TIBC, and retic count noted, , iron level normal , low ferritin, H/H wnl, will continue to monitor

## 2019-10-31 NOTE — PROGRESS NOTE ADULT - ASSESSMENT
73 y/o M with PMH of Dementia, BPH(with recent Aguilar removal on 10/28 and TURP a week ago), Parkinson's was told to come to the ER for positive urine cultures.     +UTI with positive Klebsiella PNA(CRE)-On IV Vabomere

## 2019-10-31 NOTE — H&P ADULT - GASTROINTESTINAL DETAILS
no guarding/soft/normal/bowel sounds normal/no distention/no rebound tenderness/no rigidity no rigidity/no rebound tenderness/soft/no distention/bowel sounds normal/no guarding

## 2019-10-31 NOTE — CONSULT NOTE ADULT - ASSESSMENT
74M PMHx of Parkinson dementia, BPH formerly with chronic pichardo, now s/p TURP on 10/23 at an outside facility, presenting with dysuria and suprapubic pain. He has had 2 prior urine cultures, one with the pichardo, one without the pichardo with CRE klebsiella. Since the second culture is without the pichardo, would not consider this colonization. The patient has been afebrile, and does not have a leukocytosis.     Complicated UTI 2/2 CRE klebsiella   - Continue with Vabomere 4g IV q 8 hours  - Follow up urine culture and blood cultures  - Follow up susceptibilities of urine culture taken 10/26  - Monitor for fevers  - Trend WBCs    Elzbieta Ureña, PGY-4  Infectious Disease Fellow   Pager: [phone]  After 5pm/weekends: [phone]

## 2019-10-31 NOTE — H&P ADULT - PROBLEM SELECTOR PLAN 9
1.  Name of PCP: Dr. Freddy Valverde  2.  PCP Contacted on Admission: [ ] Y    [X] N    3.  PCP contacted at Discharge: [ ] Y    [ ] N    [ ] N/A  4.  Post-Discharge Appointment Date and Location:  5.  Summary of Handoff given to PCP:

## 2019-10-31 NOTE — H&P ADULT - NEGATIVE GASTROINTESTINAL SYMPTOMS
no melena/no nausea/no vomiting/no abdominal pain/no diarrhea/no change in bowel habits/no hematochezia/no constipation

## 2019-10-31 NOTE — ED ADULT NURSE REASSESSMENT NOTE - NS ED NURSE REASSESS COMMENT FT1
Break coverage RN. Unable to obtain blood culture, guided ultrasound IV access required, provider aware and at bedside. Respirations even and unlabored, NAD noted at this time. will continue to monitor

## 2019-10-31 NOTE — H&P ADULT - NEUROLOGICAL DETAILS
sensation intact/responds to verbal commands responds to pain/responds to verbal commands/sensation intact/normal strength

## 2019-10-31 NOTE — H&P ADULT - ASSESSMENT
75 y/o M with PMH of Dementia, BPH(with recent Aguilar removal on 10/28 and TURP a week ago), Parkinson's was told to come to the ER for positive urine cultures.     +UTI with positive Klebsiella PNA(CRE)-On IV Vabomere

## 2019-10-31 NOTE — H&P ADULT - PROBLEM SELECTOR PLAN 3
Na in the ED revealed 128 and patient with prior Hx of chronic hyponatremia  Will check urine electrolytes  Continue with Sodium chloride 1gram BID H&H revealed 12.9/36.9 with low MCV  Will check ferritin, TIBC and Retic count H&H revealed 12.9/36.9 with low MCV  - Will check ferritin, TIBC, and retic count

## 2019-10-31 NOTE — PROGRESS NOTE ADULT - PROBLEM SELECTOR PLAN 1
UA revealed large leuks and positive nitrites with urine cultures showing Klebsiella pneumoniae (CRE)  - Patient started on IV Vabromere per ID recs  - ID to see patient in am   - Urine and Blood cultures ordered  - Started on lactobacillus UA revealed large leuks and positive nitrites with urine cultures showing Klebsiella pneumoniae (CRE)  - Patient started on IV Vabromere per ID recs  - ID  eval appreciated, c/w Vabromere ,  f/u sensitivity for vabomere, avycaz, and polymixin   - Urine and Blood cultures sent, will f/u report   - Started on lactobacillus

## 2019-10-31 NOTE — PROGRESS NOTE ADULT - PROBLEM SELECTOR PLAN 2
Serum Na in the ED revealed 128 and patient with prior hx of chronic hyponatremia. Currently, euvolemic on exam.   - Will check urine electrolytes and urine osmolality. Earlier this month he was seen by both Renal and Endocrine. Hyponatremia thought to be 2/2 SIADH vs. adrenal insufficiency (even though low cortisol level, had a normal cosyntropin test).  - Continue with sodium chloride 1 gram BID for now. Will monitor serum Na level closely. Serum Na in the ED revealed 128 and patient with prior hx of chronic hyponatremia. Currently, euvolemic on exam.   - urine electrolytes and urine osmolality.. noted  Earlier this month he was seen by both Renal and Endocrine. Hyponatremia thought to be 2/2 SIADH vs. adrenal insufficiency (even though low cortisol level, had a normal cosyntropin test).  - Continue with sodium chloride 1 gram BID for now. Will monitor serum Na level closely.

## 2019-10-31 NOTE — H&P ADULT - PROBLEM SELECTOR PLAN 4
Continue with Seroquel, Namenda and Aricept daily Sugar on admission noted to be 112. HgA1C from 09/19 noted to be 7.0 and patient was started on low dose insulin sliding scale   Will check FS TID and at bedtime for now Sugar on admission noted to be 112. HgA1C from 09/19 noted to be 7% and patient was started on low-dose insulin sliding scale.   - Will check FS TID and at bedtime for now

## 2019-10-31 NOTE — H&P ADULT - NEGATIVE OPHTHALMOLOGIC SYMPTOMS
no diplopia/no discharge R/no discharge L/no blurred vision R/no lacrimation R/no blurred vision L/no photophobia/no lacrimation L

## 2019-10-31 NOTE — PATIENT PROFILE ADULT - BRADEN FRICTION AND SHEAR
"-- Message is from the [employer]--    Reason for Call: requesting patient's pre op docutments be faxed to :[phone]    Caller Information       Type Contact Phone    02/07/2019 01:11 PM Phone (Incoming) Matthew España  (Provider) [phone]     [facility] [place] pre op          Alternative phone number: None     Turnaround time given to caller: ""This message will be sent to [facility] Provider's name]. The clinical team will fulfill your request as soon as they review your message. \""    "
Pre op was faxed again
(3) no apparent problem

## 2019-10-31 NOTE — H&P ADULT - NEGATIVE CARDIOVASCULAR SYMPTOMS
no palpitations/no dyspnea on exertion/no orthopnea/no peripheral edema/no paroxysmal nocturnal dyspnea/no chest pain

## 2019-10-31 NOTE — H&P ADULT - PROBLEM SELECTOR PLAN 8
1.  Name of PCP: Dr. Freddy Valverde  2.  PCP Contacted on Admission: [ ] Y    [X] N    3.  PCP contacted at Discharge: [ ] Y    [ ] N    [ ] N/A  4.  Post-Discharge Appointment Date and Location:  5.  Summary of Handoff given to PCP: On heparin sq 5000U q12hrs - On heparin sq 5000U q12hrs

## 2019-10-31 NOTE — H&P ADULT - PROBLEM SELECTOR PLAN 1
UA revealed large leuks and positive nitrites with urine cultures showing Klebsiella PNA (CRE)  Patient started on IV Vabromere   ID to see patient in am   Urine and Blood cultures ordered  Started on lactobacillus UA revealed large leuks and positive nitrites with urine cultures showing Klebsiella pneumoniae (CRE)  - Patient started on IV Vabromere per ID recs  - ID to see patient in am   - Urine and Blood cultures ordered  - Started on lactobacillus

## 2019-10-31 NOTE — H&P ADULT - PROBLEM SELECTOR PLAN 5
Continue with Sinemet and Artane daily Continue with Seroquel, Namenda and Aricept daily - Continue with Seroquel, Namenda, and Aricept daily. QTc on admission 420 ms.

## 2019-10-31 NOTE — PROGRESS NOTE ADULT - SUBJECTIVE AND OBJECTIVE BOX
Patient is a 74y old  Male who presents with a chief complaint of Told to come ot the ER for positive urine cultures (31 Oct 2019 09:42)      SUBJECTIVE / OVERNIGHT EVENTS: patient seen and examined by bedside, pt c/o lower abdominal pain, burning and dysuria , denies fever, chills , headache, dizziness, SOB, CP, Palpitations , N/V/D,     MEDICATIONS  (STANDING):  artificial  tears Solution 1 Drop(s) Both EYES two times a day  carbidopa/levodopa  25/100 1 Tablet(s) Oral three times a day  donepezil 10 milliGRAM(s) Oral at bedtime  dorzolamide 2%/timolol 0.5% Ophthalmic Solution 1 Drop(s) Left EYE two times a day  escitalopram 5 milliGRAM(s) Oral daily  heparin  Injectable 5000 Unit(s) SubCutaneous every 12 hours  influenza   Vaccine 0.5 milliLiter(s) IntraMuscular once  lactobacillus acidophilus 1 Tablet(s) Oral two times a day  latanoprost 0.005% Ophthalmic Solution 1 Drop(s) Right EYE at bedtime  memantine 10 milliGRAM(s) Oral daily  meropenem/vaborbactam IVPB 4 Gram(s) IV Intermittent every 8 hours  pantoprazole    Tablet 40 milliGRAM(s) Oral before breakfast  QUEtiapine 25 milliGRAM(s) Oral daily  sodium chloride 1 Gram(s) Oral two times a day  trihexyphenidyl 2 milliGRAM(s) Oral two times a day    MEDICATIONS  (PRN):  simethicone 80 milliGRAM(s) Chew every 6 hours PRN Gas      Vital Signs Last 24 Hrs  T(C): 36.7 (31 Oct 2019 15:08), Max: 36.8 (30 Oct 2019 21:25)  T(F): 98.1 (31 Oct 2019 15:08), Max: 98.2 (30 Oct 2019 21:25)  HR: 75 (31 Oct 2019 15:08) (68 - 80)  BP: 141/72 (31 Oct 2019 15:08) (133/68 - 152/87)  BP(mean): --  RR: 17 (31 Oct 2019 15:08) (16 - 20)  SpO2: 96% (31 Oct 2019 15:08) (96% - 100%)  CAPILLARY BLOOD GLUCOSE      POCT Blood Glucose.: 165 mg/dL (31 Oct 2019 12:28)  POCT Blood Glucose.: 105 mg/dL (31 Oct 2019 08:41)    I&O's Summary      PHYSICAL EXAM:  GENERAL: NAD, well-developed  HEAD:  Atraumatic, Normocephalic  EYES: EOMI, PERRLA, conjunctiva and sclera clear  NECK: Supple,  CHEST/LUNG: Clear to auscultation bilaterally; No wheeze  HEART: Regular rate and rhythm;  ABDOMEN: Soft ,tenderness in lower abdomen , Nondistended; Bowel sounds present  EXTREMITIES:  2+ Peripheral Pulses, No clubbing, cyanosis, or edema  PSYCH: AAOx2   NEUROLOGY: non-focal  SKIN: No rashes or lesions    LABS:                        13.8   9.53  )-----------( 384      ( 31 Oct 2019 05:38 )             40.2     10-31    130<L>  |  92<L>  |  7   ----------------------------<  106<H>  3.5   |  20<L>  |  0.59    Ca    9.3      31 Oct 2019 05:38  Phos  3.8     10-31  Mg     2.0     10-31    TPro  7.9  /  Alb  4.2  /  TBili  0.5  /  DBili  x   /  AST  17  /  ALT  10  /  AlkPhos  86  10-30          Urinalysis Basic - ( 30 Oct 2019 22:25 )    Color: LIGHT YELLOW / Appearance: CLEAR / S.009 / pH: 6.5  Gluc: NEGATIVE / Ketone: NEGATIVE  / Bili: NEGATIVE / Urobili: NORMAL   Blood: NEGATIVE / Protein: NEGATIVE / Nitrite: POSITIVE   Leuk Esterase: LARGE / RBC: 0-2 / WBC >50   Sq Epi: OCC / Non Sq Epi: x / Bacteria: MODERATE        RADIOLOGY & ADDITIONAL TESTS:    Imaging Personally Reviewed:    Consultant(s) Notes Reviewed:      Care Discussed with Consultants/Other Providers:

## 2019-10-31 NOTE — H&P ADULT - NEGATIVE MUSCULOSKELETAL SYMPTOMS
no arthralgia/no muscle weakness/no neck pain/no arthritis/no joint swelling/no myalgia/no muscle cramps/no stiffness

## 2019-10-31 NOTE — H&P ADULT - PROBLEM SELECTOR PLAN 2
H&H revealed 12.9/36.9 with low MCV  Will check ferritin, TIBC and Retic count Na in the ED revealed 128 and patient with prior Hx of chronic hyponatremia  Will check urine electrolytes and urine osmolality. Earlier this month he was seen by both Renal and Endocrine(even though low cortisol level had a normal cosyntropin test)  Continue with Sodium chloride 1gram BID for now will monitor Na level closely Serum Na in the ED revealed 128 and patient with prior hx of chronic hyponatremia. Currently, euvolemic on exam.   - Will check urine electrolytes and urine osmolality. Earlier this month he was seen by both Renal and Endocrine. Hyponatremia thought to be 2/2 SIADH vs. adrenal insufficiency (even though low cortisol level, had a normal cosyntropin test).  - Continue with sodium chloride 1 gram BID for now. Will monitor serum Na level closely.

## 2019-10-31 NOTE — H&P ADULT - PROBLEM SELECTOR PLAN 6
Continue with Flomax daily Continue with Sinemet and Artane daily - Continue with Sinemet and Artane daily  - Fall risk protocol  - Aspiration precautions

## 2019-10-31 NOTE — CONSULT NOTE ADULT - SUBJECTIVE AND OBJECTIVE BOX
INFECTIOUS DISEASE SERVICE INITIAL CONSULTATION NOTE    HPI:  75 y/o M with PMH of Dementia, BPH (with recent Aguilar removal on 10/28 and TURP a week ago), Parkinson's (on Sinemet) was told to come to the ER for positive urine cultures. As per son who was at bedside his father has been with a Aguilar catheter for the past 9 months and had it removed on Monday 10/28. Patient was brought by family on Saturday for suprapubic pain and dysuria. Patient had labs drawn and was positive for UTI and was told to follow up with his urologist. Patient then had the Aguilar catheter removed on Monday 10/28 and has been urinating but did still have penile burning. Patient received a call from his PCP today stating that his urine cultures were positive and needed to get IV antibiotics. Patient denied any CP, SOB, fevers, chills, N/V/D/C, melena, hematochezia, recent travel, sick contact, pleuritic or positional chest pain.     On ED admission H&H: 12.9/36.9, Plt: 411, Na: 128, Gluc: 112, UA: Large leuks, positive nitrites. Urine culture from 10/26 Klebsellia PNA CRE. In the ED patient was started on IV Vabomere. (31 Oct 2019 00:58)    Patient still complains of dysuria and pain in her lower abdomen.     PAST MEDICAL & SURGICAL HISTORY:  Dementia  Parkinson disease  BPH (benign prostatic hyperplasia)  No significant past surgical history      REVIEW OF SYSTEMS:  Constitutional: +weakness, no fevers, no chills  Dermatologic: no rash  Respiratory: no SOB, no cough  Cardiovascular: no chest pain, no palpitations  Gastrointestinal: no nausea, no vomiting, no diarrhea  Genitourinary: +dysuria, no urinary frequency, no hematuria, no urinary retention  Musculoskeletal:	no weakness, no joint swelling/pain  Neurological: no focal weakness or numbness  Endocrine: no polyuria, no polydipsia    ACTIVE ANTIMICROBIAL/ANTIBIOTIC MEDICATIONS:  meropenem/vaborbactam IVPB 4 Gram(s) IV Intermittent every 8 hours      OTHER MEDICATIONS:  artificial  tears Solution 1 Drop(s) Both EYES two times a day  carbidopa/levodopa  25/100 1 Tablet(s) Oral three times a day  donepezil 10 milliGRAM(s) Oral at bedtime  dorzolamide 2%/timolol 0.5% Ophthalmic Solution 1 Drop(s) Left EYE two times a day  escitalopram 5 milliGRAM(s) Oral daily  heparin  Injectable 5000 Unit(s) SubCutaneous every 12 hours  influenza   Vaccine 0.5 milliLiter(s) IntraMuscular once  lactobacillus acidophilus 1 Tablet(s) Oral two times a day  latanoprost 0.005% Ophthalmic Solution 1 Drop(s) Right EYE at bedtime  memantine 10 milliGRAM(s) Oral daily  pantoprazole    Tablet 40 milliGRAM(s) Oral before breakfast  QUEtiapine 25 milliGRAM(s) Oral daily  sodium chloride 1 Gram(s) Oral two times a day  trihexyphenidyl 2 milliGRAM(s) Oral two times a day      ALLERGIES:  Allergies    No Known Allergies    Intolerances        SOCIAL HISTORY: No longer drinks alcohol, nonsmoker, no drugs. Moved from [facility] 4 months ago. No sick contacts. Lives with his son in [place], no pets.       FAMILY HISTORY:  No pertinent family history in first degree relatives      VITAL SIGNS:  ICU Vital Signs Last 24 Hrs  T(C): 36.3 (31 Oct 2019 05:09), Max: 36.8 (30 Oct 2019 21:25)  T(F): 97.4 (31 Oct 2019 05:09), Max: 98.2 (30 Oct 2019 21:25)  HR: 68 (31 Oct 2019 05:09) (68 - 80)  BP: 142/59 (31 Oct 2019 05:09) (133/68 - 152/87)  BP(mean): --  ABP: --  ABP(mean): --  RR: 17 (31 Oct 2019 05:09) (16 - 20)  SpO2: 100% (31 Oct 2019 05:09) (98% - 100%)      PHYSICAL EXAM:  Constitutional: WDWN  Head: NC/AT  Eyes: anicteric sclera  ENMT: no rhinorrhea; no sinus tenderness on palpation; no oropharyngeal lesions, erythema, or exudates	  Neck: supple; no JVD or LAD  Respiratory: CTA B/L  Cardiovascular: +S1/S2, RRR; no appreciable murmurs  Gastrointestinal: soft, ND; +BSx4, no HSM; suprapubic tenderness   Extremities: WWP; no clubbing, cyanosis, or edema  Dermatologic: skin warm and dry; no visible rashes or lesions  Neurologic: Responds to verbal commands, moves extremities    LABS:                        13.8   9.53  )-----------( 384      ( 31 Oct 2019 05:38 )             40.2     10-31    130<L>  |  92<L>  |  7   ----------------------------<  106<H>  3.5   |  20<L>  |  0.59    Ca    9.3      31 Oct 2019 05:38  Phos  3.8     10-31  Mg     2.0     10-31    TPro  7.9  /  Alb  4.2  /  TBili  0.5  /  DBili  x   /  AST  17  /  ALT  10  /  AlkPhos  86  10-30      Urinalysis Basic - ( 30 Oct 2019 22:25 )    Color: LIGHT YELLOW / Appearance: CLEAR / S.009 / pH: 6.5  Gluc: NEGATIVE / Ketone: NEGATIVE  / Bili: NEGATIVE / Urobili: NORMAL   Blood: NEGATIVE / Protein: NEGATIVE / Nitrite: POSITIVE   Leuk Esterase: LARGE / RBC: 0-2 / WBC >50   Sq Epi: OCC / Non Sq Epi: x / Bacteria: MODERATE        MICROBIOLOGY:    Culture - Urine (collected 10-26-19 @ 21:01)  Source: URINE MIDSTREAM  Preliminary Report (10-28-19 @ 12:35):    KLP^Klebsiella pneumoniae    COLONY COUNT: > = 100,000 CFU/ML  Final Report (10-30-19 @ 13:49):    RESULT CALLED TO / READ BACK: [id number]    DATE / TIME CALLED: 10/30/19 1147    CALLED BY: ANGELA RESENDEZ    NO ONE ANSWER-FIRST CALL -CALL WAS HANGED UP    WILL CALL AGAIN    RESULT CALLED TO / READ BACK: [id number] SAMUEL FIERRO/YES    DATE/ TIME CALLED: 10/30/19 1341    CALLED BY: ANGELA RESENDEZ  Organism: Klebsiella pneumoniae (CRE) (10-30-19 @ 11:55)  Organism: Klebsiella pneumoniae (CRE)  COLONY COUNT: > = 100,000 CFU/ML (10-30-19 @ 11:55)      -  Amikacin: S 16 YULIET      -  Ampicillin: R >16 YULIET      -  Ampicillin/Sulbactam: R >16/8 YULIET      -  Aztreonam: R >16 YULIET      -  Cefazolin: R      -  Cefepime: R >16 YULIET      -  Cefoxitin: R >16 YULIET      -  Ceftazidime: R >16 YULIET      -  Ceftriaxone: R      -  Cefuroxime: R >16 YULIET      -  Ertapenem: R >4 YULIET      -  Gentamicin: R >8 YULIET      -  Imipenem: R >8 YULIET      -  Levofloxacin: R >4 YULIET      -  Meropenem: R >8 YULIET      -  Nitrofurantoin: R >64 YULIET      -  Piperacillin/Tazobactam: R >64 YULIET      -  Tigecycline: S 2 YULIET      -  Tobramycin: R >8 YULIET      -  Trimethoprim/Sulfamethoxazole: R >2/38 YULIET      Method Type: NEGATIVE YULIET 43

## 2019-10-31 NOTE — H&P ADULT - HISTORY OF PRESENT ILLNESS
75 y/o M with PMH of Dementia, BPH(with recent Aguilar removal on 10/28 and TURP a week ago), Parkinson's was told to come to the ER for positive urine cultures. As per son who was at bedside his father has been with a Aguilar catheter for the past 9 months and had it removed on Monday 10/28. Patient was brought by family on Saturday for suprapubic pain and dysuria. Patient had labs drawn and was positive for UTI and was told to follow up with his urologist. Patient then had the Aguilar catheter removed on Monday 10/28 and has been urinating but did still have penile burning. Patient received a call from his PCP today stating that his urine cultures were positive and needed to get IV antibiotics. Patient denied any CP, SOB, fevers, chills, N/V/D/C, melena, hematochezia, recent travel, sick contact, pleuritic or positional chest pain.     On ED admission EKG revealed 75 y/o M with PMH of Dementia, BPH(with recent Aguilar removal on 10/28 and TURP a week ago), Parkinson's was told to come to the ER for positive urine cultures. As per son who was at bedside his father has been with a Aguilar catheter for the past 9 months and had it removed on Monday 10/28. Patient was brought by family on Saturday for suprapubic pain and dysuria. Patient had labs drawn and was positive for UTI and was told to follow up with his urologist. Patient then had the Aguilar catheter removed on Monday 10/28 and has been urinating but did still have penile burning. Patient received a call from his PCP today stating that his urine cultures were positive and needed to get IV antibiotics. Patient denied any CP, SOB, fevers, chills, N/V/D/C, melena, hematochezia, recent travel, sick contact, pleuritic or positional chest pain.     On ED admission H&H: 12.9/36.9, Plt: 411, Na: 128, Gluc: 112, UA: Large leuks, positive nitrites. Urine culture from 10/26 Klebsellia PNA CRE. In the ED patient was started on IV Vabomere. 73 y/o M with PMH of Dementia, BPH (with recent Aguilar removal on 10/28 and TURP a week ago), Parkinson's (on Sinemet) was told to come to the ER for positive urine cultures. As per son who was at bedside his father has been with a Aguilar catheter for the past 9 months and had it removed on Monday 10/28. Patient was brought by family on Saturday for suprapubic pain and dysuria. Patient had labs drawn and was positive for UTI and was told to follow up with his urologist. Patient then had the Aguilar catheter removed on Monday 10/28 and has been urinating but did still have penile burning. Patient received a call from his PCP today stating that his urine cultures were positive and needed to get IV antibiotics. Patient denied any CP, SOB, fevers, chills, N/V/D/C, melena, hematochezia, recent travel, sick contact, pleuritic or positional chest pain.     On ED admission H&H: 12.9/36.9, Plt: 411, Na: 128, Gluc: 112, UA: Large leuks, positive nitrites. Urine culture from 10/26 Klebsellia PNA CRE. In the ED patient was started on IV Vabomere.

## 2019-10-31 NOTE — H&P ADULT - NSHPSOCIALHISTORY_GEN_ALL_CORE
Lives with son, retired    Never smoked or used any illicit drugs but used to drink 1-2x a week few shots of liquor about 5-6 years ago

## 2019-10-31 NOTE — CONSULT NOTE ADULT - ATTENDING COMMENTS
73 year old with parkinson's and BPH with a chronic pichardo  Prior known colonization with CRE klebsiella  S/p recent TURP  recent presented with dysuria  and urine culture positive for CRE klebsiella  Afebrile with normal WBC    He has dysuria and suprapubic tenderness    Requested sensitivity for vabomere, avycaz, and polymixin    Continue vabomere for now 73 year old with parkinson's and BPH with a chronic pichardo  Prior known colonization with CRE klebsiella  S/p recent TURP  recent presented with dysuria  and urine culture positive for CRE klebsiella  Afebrile with normal WBC    He has dysuria and suprapubic tenderness    Requested sensitivity for vabomere, avycaz, and polymixin    Continue vabomere for now    Contact precautions for CRE

## 2019-10-31 NOTE — H&P ADULT - NEGATIVE ENMT SYMPTOMS
no vertigo/no sinus symptoms/no ear pain/no nasal discharge/no tinnitus/no hearing difficulty/no nasal congestion

## 2019-10-31 NOTE — H&P ADULT - NEGATIVE NEUROLOGICAL SYMPTOMS
no difficulty walking/no hemiparesis/no generalized seizures/no loss of sensation/no focal seizures/no tremors/no vertigo/no transient paralysis/no weakness/no headache/no confusion/no paresthesias/no syncope/no loss of consciousness

## 2019-10-31 NOTE — H&P ADULT - NSHPLABSRESULTS_GEN_ALL_CORE
12.9   9.83  )-----------( 411      ( 30 Oct 2019 22:15 )             36.9     10-30    128<L>  |  91<L>  |  8   ----------------------------<  112<H>  4.7   |  23  |  0.69    Ca    9.4      30 Oct 2019 22:15    TPro  7.9  /  Alb  4.2  /  TBili  0.5  /  DBili  x   /  AST  17  /  ALT  10  /  AlkPhos  86  10-30 12.9   9.83  )-----------( 411      ( 30 Oct 2019 22:15 )             36.9     10-30    128<L>  |  91<L>  |  8   ----------------------------<  112<H>  4.7   |  23  |  0.69    Ca    9.4      30 Oct 2019 22:15    TPro  7.9  /  Alb  4.2  /  TBili  0.5  /  DBili  x   /  AST  17  /  ALT  10  /  AlkPhos  86  10-30    EKG personally reviewed.  NSR at 62 bpm. No acute ischemic changes. QTc 420 ms.

## 2019-11-01 LAB
ANION GAP SERPL CALC-SCNC: 12 MMO/L — SIGNIFICANT CHANGE UP (ref 7–14)
ANION GAP SERPL CALC-SCNC: 13 MMO/L — SIGNIFICANT CHANGE UP (ref 7–14)
BUN SERPL-MCNC: 6 MG/DL — LOW (ref 7–23)
BUN SERPL-MCNC: 9 MG/DL — SIGNIFICANT CHANGE UP (ref 7–23)
CALCIUM SERPL-MCNC: 8.5 MG/DL — SIGNIFICANT CHANGE UP (ref 8.4–10.5)
CALCIUM SERPL-MCNC: 8.8 MG/DL — SIGNIFICANT CHANGE UP (ref 8.4–10.5)
CHLORIDE SERPL-SCNC: 92 MMOL/L — LOW (ref 98–107)
CHLORIDE SERPL-SCNC: 93 MMOL/L — LOW (ref 98–107)
CO2 SERPL-SCNC: 22 MMOL/L — SIGNIFICANT CHANGE UP (ref 22–31)
CO2 SERPL-SCNC: 24 MMOL/L — SIGNIFICANT CHANGE UP (ref 22–31)
CREAT SERPL-MCNC: 0.65 MG/DL — SIGNIFICANT CHANGE UP (ref 0.5–1.3)
CREAT SERPL-MCNC: 0.68 MG/DL — SIGNIFICANT CHANGE UP (ref 0.5–1.3)
GLUCOSE BLDC GLUCOMTR-MCNC: 115 MG/DL — HIGH (ref 70–99)
GLUCOSE BLDC GLUCOMTR-MCNC: 124 MG/DL — HIGH (ref 70–99)
GLUCOSE BLDC GLUCOMTR-MCNC: 144 MG/DL — HIGH (ref 70–99)
GLUCOSE BLDC GLUCOMTR-MCNC: 153 MG/DL — HIGH (ref 70–99)
GLUCOSE SERPL-MCNC: 116 MG/DL — HIGH (ref 70–99)
GLUCOSE SERPL-MCNC: 128 MG/DL — HIGH (ref 70–99)
HCT VFR BLD CALC: 35 % — LOW (ref 39–50)
HGB BLD-MCNC: 12.2 G/DL — LOW (ref 13–17)
MAGNESIUM SERPL-MCNC: 1.9 MG/DL — SIGNIFICANT CHANGE UP (ref 1.6–2.6)
MAGNESIUM SERPL-MCNC: 2 MG/DL — SIGNIFICANT CHANGE UP (ref 1.6–2.6)
MCHC RBC-ENTMCNC: 25.4 PG — LOW (ref 27–34)
MCHC RBC-ENTMCNC: 34.9 % — SIGNIFICANT CHANGE UP (ref 32–36)
MCV RBC AUTO: 72.8 FL — LOW (ref 80–100)
NRBC # FLD: 0 K/UL — SIGNIFICANT CHANGE UP (ref 0–0)
PHOSPHATE SERPL-MCNC: 2.5 MG/DL — SIGNIFICANT CHANGE UP (ref 2.5–4.5)
PHOSPHATE SERPL-MCNC: 3 MG/DL — SIGNIFICANT CHANGE UP (ref 2.5–4.5)
PLATELET # BLD AUTO: 342 K/UL — SIGNIFICANT CHANGE UP (ref 150–400)
PMV BLD: 8.3 FL — SIGNIFICANT CHANGE UP (ref 7–13)
POTASSIUM SERPL-MCNC: 3.7 MMOL/L — SIGNIFICANT CHANGE UP (ref 3.5–5.3)
POTASSIUM SERPL-MCNC: 4.3 MMOL/L — SIGNIFICANT CHANGE UP (ref 3.5–5.3)
POTASSIUM SERPL-SCNC: 3.7 MMOL/L — SIGNIFICANT CHANGE UP (ref 3.5–5.3)
POTASSIUM SERPL-SCNC: 4.3 MMOL/L — SIGNIFICANT CHANGE UP (ref 3.5–5.3)
RBC # BLD: 4.81 M/UL — SIGNIFICANT CHANGE UP (ref 4.2–5.8)
RBC # FLD: 14 % — SIGNIFICANT CHANGE UP (ref 10.3–14.5)
SODIUM SERPL-SCNC: 127 MMOL/L — LOW (ref 135–145)
SODIUM SERPL-SCNC: 129 MMOL/L — LOW (ref 135–145)
SPECIMEN SOURCE: SIGNIFICANT CHANGE UP
WBC # BLD: 8.61 K/UL — SIGNIFICANT CHANGE UP (ref 3.8–10.5)
WBC # FLD AUTO: 8.61 K/UL — SIGNIFICANT CHANGE UP (ref 3.8–10.5)

## 2019-11-01 PROCEDURE — 99233 SBSQ HOSP IP/OBS HIGH 50: CPT

## 2019-11-01 PROCEDURE — 99232 SBSQ HOSP IP/OBS MODERATE 35: CPT

## 2019-11-01 RX ORDER — SODIUM CHLORIDE 9 MG/ML
1 INJECTION INTRAMUSCULAR; INTRAVENOUS; SUBCUTANEOUS THREE TIMES A DAY
Refills: 0 | Status: DISCONTINUED | OUTPATIENT
Start: 2019-11-01 | End: 2019-11-12

## 2019-11-01 RX ORDER — SODIUM CHLORIDE 9 MG/ML
500 INJECTION INTRAMUSCULAR; INTRAVENOUS; SUBCUTANEOUS ONCE
Refills: 0 | Status: COMPLETED | OUTPATIENT
Start: 2019-11-01 | End: 2019-11-01

## 2019-11-01 RX ADMIN — PANTOPRAZOLE SODIUM 40 MILLIGRAM(S): 20 TABLET, DELAYED RELEASE ORAL at 06:04

## 2019-11-01 RX ADMIN — MEROPENEM-VABORBACTAM 83.33 GRAM(S): 1; 1 INJECTION, POWDER, FOR SOLUTION INTRAVENOUS at 00:49

## 2019-11-01 RX ADMIN — HEPARIN SODIUM 5000 UNIT(S): 5000 INJECTION INTRAVENOUS; SUBCUTANEOUS at 18:30

## 2019-11-01 RX ADMIN — SODIUM CHLORIDE 125 MILLILITER(S): 9 INJECTION INTRAMUSCULAR; INTRAVENOUS; SUBCUTANEOUS at 12:03

## 2019-11-01 RX ADMIN — SODIUM CHLORIDE 1 GRAM(S): 9 INJECTION INTRAMUSCULAR; INTRAVENOUS; SUBCUTANEOUS at 06:05

## 2019-11-01 RX ADMIN — SODIUM CHLORIDE 1 GRAM(S): 9 INJECTION INTRAMUSCULAR; INTRAVENOUS; SUBCUTANEOUS at 15:00

## 2019-11-01 RX ADMIN — QUETIAPINE FUMARATE 25 MILLIGRAM(S): 200 TABLET, FILM COATED ORAL at 12:04

## 2019-11-01 RX ADMIN — LATANOPROST 1 DROP(S): 0.05 SOLUTION/ DROPS OPHTHALMIC; TOPICAL at 22:21

## 2019-11-01 RX ADMIN — HEPARIN SODIUM 5000 UNIT(S): 5000 INJECTION INTRAVENOUS; SUBCUTANEOUS at 06:03

## 2019-11-01 RX ADMIN — CARBIDOPA AND LEVODOPA 1 TABLET(S): 25; 100 TABLET ORAL at 15:00

## 2019-11-01 RX ADMIN — ESCITALOPRAM OXALATE 5 MILLIGRAM(S): 10 TABLET, FILM COATED ORAL at 12:04

## 2019-11-01 RX ADMIN — MEROPENEM-VABORBACTAM 83.33 GRAM(S): 1; 1 INJECTION, POWDER, FOR SOLUTION INTRAVENOUS at 09:10

## 2019-11-01 RX ADMIN — Medication 1 TABLET(S): at 18:30

## 2019-11-01 RX ADMIN — CARBIDOPA AND LEVODOPA 1 TABLET(S): 25; 100 TABLET ORAL at 22:21

## 2019-11-01 RX ADMIN — MEROPENEM-VABORBACTAM 83.33 GRAM(S): 1; 1 INJECTION, POWDER, FOR SOLUTION INTRAVENOUS at 19:03

## 2019-11-01 RX ADMIN — TAMSULOSIN HYDROCHLORIDE 0.4 MILLIGRAM(S): 0.4 CAPSULE ORAL at 22:22

## 2019-11-01 RX ADMIN — Medication 1 DROP(S): at 18:29

## 2019-11-01 RX ADMIN — Medication 1 TABLET(S): at 06:04

## 2019-11-01 RX ADMIN — Medication 1 DROP(S): at 06:03

## 2019-11-01 RX ADMIN — DORZOLAMIDE HYDROCHLORIDE TIMOLOL MALEATE 1 DROP(S): 20; 5 SOLUTION/ DROPS OPHTHALMIC at 06:04

## 2019-11-01 RX ADMIN — SODIUM CHLORIDE 1 GRAM(S): 9 INJECTION INTRAMUSCULAR; INTRAVENOUS; SUBCUTANEOUS at 22:29

## 2019-11-01 RX ADMIN — CARBIDOPA AND LEVODOPA 1 TABLET(S): 25; 100 TABLET ORAL at 06:04

## 2019-11-01 RX ADMIN — DONEPEZIL HYDROCHLORIDE 10 MILLIGRAM(S): 10 TABLET, FILM COATED ORAL at 22:21

## 2019-11-01 RX ADMIN — DORZOLAMIDE HYDROCHLORIDE TIMOLOL MALEATE 1 DROP(S): 20; 5 SOLUTION/ DROPS OPHTHALMIC at 18:30

## 2019-11-01 RX ADMIN — Medication 2 MILLIGRAM(S): at 18:31

## 2019-11-01 RX ADMIN — Medication 2 MILLIGRAM(S): at 06:04

## 2019-11-01 RX ADMIN — MEMANTINE HYDROCHLORIDE 10 MILLIGRAM(S): 10 TABLET ORAL at 12:04

## 2019-11-01 NOTE — PROGRESS NOTE ADULT - PROBLEM SELECTOR PLAN 3
H&H revealed 12.9/36.9 with low MCV  -ferritin, TIBC, and retic count noted, , iron level normal , low ferritin, H/H wnl, will continue to monitor

## 2019-11-01 NOTE — PHYSICAL THERAPY INITIAL EVALUATION ADULT - DIAGNOSIS, PT EVAL
Pt admitted for +  urine cultures; pt present with decreased strength, decreased balance, and decreased safety awareness.

## 2019-11-01 NOTE — PHYSICAL THERAPY INITIAL EVALUATION ADULT - PASSIVE RANGE OF MOTION EXAMINATION, REHAB EVAL
bilateral Knees lacking ~20 degrees of extension, left ankle hypomobile/bilateral upper extremity Passive ROM was WFL (within functional limits)

## 2019-11-01 NOTE — PHYSICAL THERAPY INITIAL EVALUATION ADULT - ACTIVE RANGE OF MOTION EXAMINATION, REHAB EVAL
bilateral upper extremity Active ROM was WFL (within functional limits)/bilateral Knees lacking ~20 degrees of extension, left ankle hypomobile

## 2019-11-01 NOTE — PROGRESS NOTE ADULT - ASSESSMENT
73 year old with parkinson's and BPH with a chronic pichardo  Prior known colonization with CRE klebsiella  S/p recent TURP  recent presented with dysuria  and urine culture positive for CRE klebsiella  Afebrile with normal WBC      1) Acute UTI  He has dysuria and suprapubic tenderness    Requested sensitivity for vabomere, avycaz, and polymixin    Continue vabomere for now    2) CRE  Contact precautions for CRE    3) Abdominal pain  thought to be due to uti  If not improving consider CT a/p     Call the ID service with questions or concerns over the weekend.  [phone]

## 2019-11-01 NOTE — PHYSICAL THERAPY INITIAL EVALUATION ADULT - CRITERIA FOR SKILLED THERAPEUTIC INTERVENTIONS
rehab potential/risk reduction/prevention/therapy frequency/impairments found/functional limitations in following categories

## 2019-11-01 NOTE — PHYSICAL THERAPY INITIAL EVALUATION ADULT - ADDITIONAL COMMENTS
Pt and pt's wife are poor historians; therefore information needs to be verified by another family member. As per pt he lives in a private house with his wife, son, and daughter in law with ~4 steps to enter; bedroom/bathroom on first floor. Prior to hospital admission pt reports that he ambulated both independently/ with assistance using either a single axis cane or rolling walker. Pt's last fall was ~3-4 months ago.    Pt left comfortable in bed, NAD, all lines intact, all precautions maintained, with call bell in reach, bed alarm on, wife @bedside, and RN aware of PT evaluation.

## 2019-11-01 NOTE — PHYSICAL THERAPY INITIAL EVALUATION ADULT - PERTINENT HX OF CURRENT PROBLEM, REHAB EVAL
73 y/o M with PMH of Dementia, BPH(with recent Aguilar removal on 10/28 and TURP a week ago), Parkinson's was told to come to the ER for positive urine cultures

## 2019-11-01 NOTE — PHYSICAL THERAPY INITIAL EVALUATION ADULT - PLANNED THERAPY INTERVENTIONS, PT EVAL
bed mobility training/balance training/transfer training/gait training/motor coordination training/strengthening/neuromuscular re-education/postural re-education/ROM

## 2019-11-01 NOTE — PROGRESS NOTE ADULT - SUBJECTIVE AND OBJECTIVE BOX
Follow Up:      Inverval History/ROS:Patient is a 74y old  Male who presents with a chief complaint of Told to come ot the ER for positive urine cultures (31 Oct 2019 17:28)    C/o dysuria  C/o abd pain   No fever      Allergies    No Known Allergies    Intolerances        ANTIMICROBIALS:  meropenem/vaborbactam IVPB 4 every 8 hours      OTHER MEDS:  artificial  tears Solution 1 Drop(s) Both EYES two times a day  carbidopa/levodopa  25/100 1 Tablet(s) Oral three times a day  donepezil 10 milliGRAM(s) Oral at bedtime  dorzolamide 2%/timolol 0.5% Ophthalmic Solution 1 Drop(s) Left EYE two times a day  escitalopram 5 milliGRAM(s) Oral daily  heparin  Injectable 5000 Unit(s) SubCutaneous every 12 hours  influenza   Vaccine 0.5 milliLiter(s) IntraMuscular once  lactobacillus acidophilus 1 Tablet(s) Oral two times a day  latanoprost 0.005% Ophthalmic Solution 1 Drop(s) Right EYE at bedtime  memantine 10 milliGRAM(s) Oral daily  pantoprazole    Tablet 40 milliGRAM(s) Oral before breakfast  QUEtiapine 25 milliGRAM(s) Oral daily  simethicone 80 milliGRAM(s) Chew every 6 hours PRN  sodium chloride 1 Gram(s) Oral three times a day  tamsulosin 0.4 milliGRAM(s) Oral at bedtime  trihexyphenidyl 2 milliGRAM(s) Oral two times a day      Vital Signs Last 24 Hrs  T(C): 36.3 (2019 11:36), Max: 36.7 (2019 06:02)  T(F): 97.3 (2019 11:36), Max: 98.1 (2019 06:02)  HR: 68 (2019 11:36) (62 - 68)  BP: 122/69 (2019 11:36) (113/60 - 122/69)  BP(mean): --  RR: 18 (2019 11:36) (17 - 18)  SpO2: 100% (2019 11:36) (100% - 100%)    PHYSICAL EXAM:  General: [x ] non-toxic  HEAD/EYES: [ ] PERRL [ x] white sclera [ ] icterus  ENT:  [ ] normal [ x] supple [ ] thrush [ ] pharyngeal exudate  Cardiovascular:   [ ] murmur [x ] normal [ ] PPM/AICD  Respiratory:  [x ] clear to ausculation bilaterally  GI:  x[ ] soft, non-tender, normal bowel sounds  :  [ ] pichardo [ x] no CVA tenderness   Musculoskeletal:  [ ] no synovitis  Neurologic:  [ ] non-focal exam   Skin:  [x ] no rash  Lymph: [ ] no lymphadenopathy  Psychiatric:  [ ] appropriate affect [x ] alert & oriented  Lines:  [x ] no phlebitis [ ] central line                                12.2   8.61  )-----------( 342      ( 2019 07:07 )             35.0       11-    127<L>  |  92<L>  |  6<L>  ----------------------------<  116<H>  3.7   |  22  |  0.65    Ca    8.5      2019 07:07  Phos  3.0     11-  Mg     1.9     11-    TPro  7.9  /  Alb  4.2  /  TBili  0.5  /  DBili  x   /  AST  17  /  ALT  10  /  AlkPhos  86  10-30      Urinalysis Basic - ( 30 Oct 2019 22:25 )    Color: LIGHT YELLOW / Appearance: CLEAR / S.009 / pH: 6.5  Gluc: NEGATIVE / Ketone: NEGATIVE  / Bili: NEGATIVE / Urobili: NORMAL   Blood: NEGATIVE / Protein: NEGATIVE / Nitrite: POSITIVE   Leuk Esterase: LARGE / RBC: 0-2 / WBC >50   Sq Epi: OCC / Non Sq Epi: x / Bacteria: MODERATE        MICROBIOLOGY:    RADIOLOGY:

## 2019-11-01 NOTE — PROGRESS NOTE ADULT - PROBLEM SELECTOR PLAN 2
Serum Na in the ED revealed 128 and patient with prior hx of chronic hyponatremia. Currently, euvolemic on exam. Na is 127 today   - urine electrolytes and urine osmolality.. noted  Earlier this month he was seen by both Renal and Endocrine. Hyponatremia thought to be 2/2 SIADH vs. adrenal insufficiency (even though low cortisol level, had a normal cosyntropin test).  -   will  ml over 4 hrs and will increase  sodium chloride 1 gram TID for now. -Will monitor serum Na level closely. Pt asymptomatic   - pt on Lexapro, may be contributing to Hyponatremia

## 2019-11-01 NOTE — PHYSICAL THERAPY INITIAL EVALUATION ADULT - PATIENT PROFILE REVIEW, REHAB EVAL
ACTIVITY: OOB with Assistance; Spoke with RN Prema Anthony prior to PT evaluation--> Pt OK for PT consult/OOB activity/yes

## 2019-11-01 NOTE — PROGRESS NOTE ADULT - PROBLEM SELECTOR PLAN 1
UA revealed large leuks and positive nitrites with urine cultures showing Klebsiella pneumoniae (CRE)  - Patient started on IV Vabromere per ID recs  - ID  eval appreciated, c/w Vabromere ,  f/u sensitivity for vabomere, avycaz, and polymixin   - Urine and Blood cultures sent , Blood NGTD , Urine cx with Kleb pneumoniae   - Started on lactobacillus

## 2019-11-01 NOTE — PROGRESS NOTE ADULT - SUBJECTIVE AND OBJECTIVE BOX
Patient is a 74y old  Male who presents with a chief complaint of Told to come ot the ER for positive urine cultures (31 Oct 2019 17:28)      SUBJECTIVE / OVERNIGHT EVENTS: patient seen and examined by bedside, dysuria still present, but slightly improved , reports mild suprapubic discomfort , denies fever, chills,  headache, dizziness, SOB, CP, Palpitations , N/V/D,     MEDICATIONS  (STANDING):  artificial  tears Solution 1 Drop(s) Both EYES two times a day  carbidopa/levodopa  25/100 1 Tablet(s) Oral three times a day  donepezil 10 milliGRAM(s) Oral at bedtime  dorzolamide 2%/timolol 0.5% Ophthalmic Solution 1 Drop(s) Left EYE two times a day  escitalopram 5 milliGRAM(s) Oral daily  heparin  Injectable 5000 Unit(s) SubCutaneous every 12 hours  influenza   Vaccine 0.5 milliLiter(s) IntraMuscular once  lactobacillus acidophilus 1 Tablet(s) Oral two times a day  latanoprost 0.005% Ophthalmic Solution 1 Drop(s) Right EYE at bedtime  memantine 10 milliGRAM(s) Oral daily  meropenem/vaborbactam IVPB 4 Gram(s) IV Intermittent every 8 hours  pantoprazole    Tablet 40 milliGRAM(s) Oral before breakfast  QUEtiapine 25 milliGRAM(s) Oral daily  sodium chloride 1 Gram(s) Oral three times a day  tamsulosin 0.4 milliGRAM(s) Oral at bedtime  trihexyphenidyl 2 milliGRAM(s) Oral two times a day    MEDICATIONS  (PRN):  simethicone 80 milliGRAM(s) Chew every 6 hours PRN Gas      Vital Signs Last 24 Hrs  T(C): 36.3 (2019 11:36), Max: 36.7 (31 Oct 2019 15:08)  T(F): 97.3 (2019 11:36), Max: 98.1 (31 Oct 2019 15:08)  HR: 68 (2019 11:36) (62 - 75)  BP: 122/69 (2019 11:36) (113/60 - 141/72)  BP(mean): --  RR: 18 (2019 11:36) (17 - 18)  SpO2: 100% (2019 11:36) (96% - 100%)  CAPILLARY BLOOD GLUCOSE      POCT Blood Glucose.: 115 mg/dL (2019 12:36)  POCT Blood Glucose.: 153 mg/dL (2019 08:25)  POCT Blood Glucose.: 119 mg/dL (31 Oct 2019 21:12)  POCT Blood Glucose.: 116 mg/dL (31 Oct 2019 17:27)    I&O's Summary    PHYSICAL EXAM:  GENERAL: NAD, well-developed  HEAD:  Atraumatic, Normocephalic  EYES: EOMI, PERRLA, conjunctiva and sclera clear  NECK: Supple,  CHEST/LUNG: Clear to auscultation bilaterally; No wheeze  HEART: Regular rate and rhythm;  ABDOMEN: Soft ,tenderness in lower abdomen , Nondistended; Bowel sounds present  EXTREMITIES:  2+ Peripheral Pulses, No clubbing, cyanosis, or edema  PSYCH: AAOx2   NEUROLOGY: non-focal  SKIN: No rashes or lesions          LABS:                        12.2   8.61  )-----------( 342      ( 2019 07:07 )             35.0         127<L>  |  92<L>  |  6<L>  ----------------------------<  116<H>  3.7   |  22  |  0.65    Ca    8.5      2019 07:07  Phos  3.0       Mg     1.9         TPro  7.9  /  Alb  4.2  /  TBili  0.5  /  DBili  x   /  AST  17  /  ALT  10  /  AlkPhos  86  10-30          Urinalysis Basic - ( 30 Oct 2019 22:25 )    Color: LIGHT YELLOW / Appearance: CLEAR / S.009 / pH: 6.5  Gluc: NEGATIVE / Ketone: NEGATIVE  / Bili: NEGATIVE / Urobili: NORMAL   Blood: NEGATIVE / Protein: NEGATIVE / Nitrite: POSITIVE   Leuk Esterase: LARGE / RBC: 0-2 / WBC >50   Sq Epi: OCC / Non Sq Epi: x / Bacteria: MODERATE        RADIOLOGY & ADDITIONAL TESTS:    Imaging Personally Reviewed:    Consultant(s) Notes Reviewed:      Care Discussed with Consultants/Other Providers:

## 2019-11-02 LAB
ANION GAP SERPL CALC-SCNC: 13 MMO/L — SIGNIFICANT CHANGE UP (ref 7–14)
BUN SERPL-MCNC: 8 MG/DL — SIGNIFICANT CHANGE UP (ref 7–23)
CALCIUM SERPL-MCNC: 8.9 MG/DL — SIGNIFICANT CHANGE UP (ref 8.4–10.5)
CHLORIDE SERPL-SCNC: 92 MMOL/L — LOW (ref 98–107)
CO2 SERPL-SCNC: 25 MMOL/L — SIGNIFICANT CHANGE UP (ref 22–31)
CREAT SERPL-MCNC: 0.73 MG/DL — SIGNIFICANT CHANGE UP (ref 0.5–1.3)
GLUCOSE BLDC GLUCOMTR-MCNC: 104 MG/DL — HIGH (ref 70–99)
GLUCOSE BLDC GLUCOMTR-MCNC: 109 MG/DL — HIGH (ref 70–99)
GLUCOSE BLDC GLUCOMTR-MCNC: 111 MG/DL — HIGH (ref 70–99)
GLUCOSE BLDC GLUCOMTR-MCNC: 89 MG/DL — SIGNIFICANT CHANGE UP (ref 70–99)
GLUCOSE SERPL-MCNC: 102 MG/DL — HIGH (ref 70–99)
HCT VFR BLD CALC: 36.2 % — LOW (ref 39–50)
HGB BLD-MCNC: 12.5 G/DL — LOW (ref 13–17)
MAGNESIUM SERPL-MCNC: 2 MG/DL — SIGNIFICANT CHANGE UP (ref 1.6–2.6)
MCHC RBC-ENTMCNC: 25.1 PG — LOW (ref 27–34)
MCHC RBC-ENTMCNC: 34.5 % — SIGNIFICANT CHANGE UP (ref 32–36)
MCV RBC AUTO: 72.7 FL — LOW (ref 80–100)
NRBC # FLD: 0 K/UL — SIGNIFICANT CHANGE UP (ref 0–0)
PHOSPHATE SERPL-MCNC: 2.9 MG/DL — SIGNIFICANT CHANGE UP (ref 2.5–4.5)
PLATELET # BLD AUTO: 376 K/UL — SIGNIFICANT CHANGE UP (ref 150–400)
PMV BLD: 8.9 FL — SIGNIFICANT CHANGE UP (ref 7–13)
POTASSIUM SERPL-MCNC: 3.6 MMOL/L — SIGNIFICANT CHANGE UP (ref 3.5–5.3)
POTASSIUM SERPL-SCNC: 3.6 MMOL/L — SIGNIFICANT CHANGE UP (ref 3.5–5.3)
RBC # BLD: 4.98 M/UL — SIGNIFICANT CHANGE UP (ref 4.2–5.8)
RBC # FLD: 14 % — SIGNIFICANT CHANGE UP (ref 10.3–14.5)
SODIUM SERPL-SCNC: 130 MMOL/L — LOW (ref 135–145)
WBC # BLD: 9.61 K/UL — SIGNIFICANT CHANGE UP (ref 3.8–10.5)
WBC # FLD AUTO: 9.61 K/UL — SIGNIFICANT CHANGE UP (ref 3.8–10.5)

## 2019-11-02 PROCEDURE — 99232 SBSQ HOSP IP/OBS MODERATE 35: CPT

## 2019-11-02 RX ADMIN — MEROPENEM-VABORBACTAM 83.33 GRAM(S): 1; 1 INJECTION, POWDER, FOR SOLUTION INTRAVENOUS at 01:42

## 2019-11-02 RX ADMIN — LATANOPROST 1 DROP(S): 0.05 SOLUTION/ DROPS OPHTHALMIC; TOPICAL at 21:50

## 2019-11-02 RX ADMIN — Medication 1 TABLET(S): at 18:21

## 2019-11-02 RX ADMIN — SIMETHICONE 80 MILLIGRAM(S): 80 TABLET, CHEWABLE ORAL at 11:55

## 2019-11-02 RX ADMIN — HEPARIN SODIUM 5000 UNIT(S): 5000 INJECTION INTRAVENOUS; SUBCUTANEOUS at 18:21

## 2019-11-02 RX ADMIN — DORZOLAMIDE HYDROCHLORIDE TIMOLOL MALEATE 1 DROP(S): 20; 5 SOLUTION/ DROPS OPHTHALMIC at 18:22

## 2019-11-02 RX ADMIN — HEPARIN SODIUM 5000 UNIT(S): 5000 INJECTION INTRAVENOUS; SUBCUTANEOUS at 05:34

## 2019-11-02 RX ADMIN — Medication 1 DROP(S): at 05:34

## 2019-11-02 RX ADMIN — MEMANTINE HYDROCHLORIDE 10 MILLIGRAM(S): 10 TABLET ORAL at 11:55

## 2019-11-02 RX ADMIN — CARBIDOPA AND LEVODOPA 1 TABLET(S): 25; 100 TABLET ORAL at 15:20

## 2019-11-02 RX ADMIN — Medication 2 MILLIGRAM(S): at 05:34

## 2019-11-02 RX ADMIN — DONEPEZIL HYDROCHLORIDE 10 MILLIGRAM(S): 10 TABLET, FILM COATED ORAL at 21:49

## 2019-11-02 RX ADMIN — DORZOLAMIDE HYDROCHLORIDE TIMOLOL MALEATE 1 DROP(S): 20; 5 SOLUTION/ DROPS OPHTHALMIC at 05:33

## 2019-11-02 RX ADMIN — Medication 2 MILLIGRAM(S): at 18:21

## 2019-11-02 RX ADMIN — Medication 1 TABLET(S): at 05:34

## 2019-11-02 RX ADMIN — CARBIDOPA AND LEVODOPA 1 TABLET(S): 25; 100 TABLET ORAL at 21:49

## 2019-11-02 RX ADMIN — SODIUM CHLORIDE 1 GRAM(S): 9 INJECTION INTRAMUSCULAR; INTRAVENOUS; SUBCUTANEOUS at 21:50

## 2019-11-02 RX ADMIN — SODIUM CHLORIDE 1 GRAM(S): 9 INJECTION INTRAMUSCULAR; INTRAVENOUS; SUBCUTANEOUS at 05:33

## 2019-11-02 RX ADMIN — SODIUM CHLORIDE 1 GRAM(S): 9 INJECTION INTRAMUSCULAR; INTRAVENOUS; SUBCUTANEOUS at 15:20

## 2019-11-02 RX ADMIN — MEROPENEM-VABORBACTAM 83.33 GRAM(S): 1; 1 INJECTION, POWDER, FOR SOLUTION INTRAVENOUS at 18:21

## 2019-11-02 RX ADMIN — CARBIDOPA AND LEVODOPA 1 TABLET(S): 25; 100 TABLET ORAL at 05:34

## 2019-11-02 RX ADMIN — TAMSULOSIN HYDROCHLORIDE 0.4 MILLIGRAM(S): 0.4 CAPSULE ORAL at 21:50

## 2019-11-02 RX ADMIN — ESCITALOPRAM OXALATE 5 MILLIGRAM(S): 10 TABLET, FILM COATED ORAL at 11:55

## 2019-11-02 RX ADMIN — PANTOPRAZOLE SODIUM 40 MILLIGRAM(S): 20 TABLET, DELAYED RELEASE ORAL at 05:33

## 2019-11-02 RX ADMIN — MEROPENEM-VABORBACTAM 83.33 GRAM(S): 1; 1 INJECTION, POWDER, FOR SOLUTION INTRAVENOUS at 08:37

## 2019-11-02 RX ADMIN — Medication 1 DROP(S): at 18:22

## 2019-11-02 RX ADMIN — QUETIAPINE FUMARATE 25 MILLIGRAM(S): 200 TABLET, FILM COATED ORAL at 11:55

## 2019-11-02 NOTE — PROGRESS NOTE ADULT - SUBJECTIVE AND OBJECTIVE BOX
Patient is a 74y old  Male who presents with a chief complaint of Told to come ot the ER for positive urine cultures (01 Nov 2019 15:16)      SUBJECTIVE / OVERNIGHT EVENTS:    MEDICATIONS  (STANDING):  artificial  tears Solution 1 Drop(s) Both EYES two times a day  carbidopa/levodopa  25/100 1 Tablet(s) Oral three times a day  donepezil 10 milliGRAM(s) Oral at bedtime  dorzolamide 2%/timolol 0.5% Ophthalmic Solution 1 Drop(s) Left EYE two times a day  escitalopram 5 milliGRAM(s) Oral daily  heparin  Injectable 5000 Unit(s) SubCutaneous every 12 hours  influenza   Vaccine 0.5 milliLiter(s) IntraMuscular once  lactobacillus acidophilus 1 Tablet(s) Oral two times a day  latanoprost 0.005% Ophthalmic Solution 1 Drop(s) Right EYE at bedtime  memantine 10 milliGRAM(s) Oral daily  meropenem/vaborbactam IVPB 4 Gram(s) IV Intermittent every 8 hours  pantoprazole    Tablet 40 milliGRAM(s) Oral before breakfast  QUEtiapine 25 milliGRAM(s) Oral daily  sodium chloride 1 Gram(s) Oral three times a day  tamsulosin 0.4 milliGRAM(s) Oral at bedtime  trihexyphenidyl 2 milliGRAM(s) Oral two times a day    MEDICATIONS  (PRN):  simethicone 80 milliGRAM(s) Chew every 6 hours PRN Gas      Vital Signs Last 24 Hrs  T(C): 36.3 (02 Nov 2019 05:31), Max: 36.5 (01 Nov 2019 15:00)  T(F): 97.4 (02 Nov 2019 05:31), Max: 97.7 (01 Nov 2019 15:00)  HR: 76 (02 Nov 2019 05:31) (62 - 76)  BP: 126/61 (02 Nov 2019 05:31) (120/68 - 136/64)  BP(mean): --  RR: 18 (02 Nov 2019 05:31) (18 - 18)  SpO2: 98% (02 Nov 2019 05:31) (98% - 100%)  CAPILLARY BLOOD GLUCOSE      POCT Blood Glucose.: 144 mg/dL (01 Nov 2019 22:09)  POCT Blood Glucose.: 124 mg/dL (01 Nov 2019 17:25)  POCT Blood Glucose.: 115 mg/dL (01 Nov 2019 12:36)  POCT Blood Glucose.: 153 mg/dL (01 Nov 2019 08:25)    I&O's Summary    01 Nov 2019 07:01  -  02 Nov 2019 07:00  --------------------------------------------------------  IN: 0 mL / OUT: 840 mL / NET: -840 mL    PHYSICAL EXAM:  GENERAL: NAD, well-developed  HEAD:  Atraumatic, Normocephalic  EYES: EOMI, PERRLA, conjunctiva and sclera clear  NECK: Supple,  CHEST/LUNG: Clear to auscultation bilaterally; No wheeze  HEART: Regular rate and rhythm;  ABDOMEN: Soft ,tenderness in lower abdomen , Nondistended; Bowel sounds present  EXTREMITIES:  2+ Peripheral Pulses, No clubbing, cyanosis, or edema  PSYCH: AAOx2   NEUROLOGY: non-focal  SKIN: No rashes or lesions        LABS:                        12.5   9.61  )-----------( 376      ( 02 Nov 2019 05:10 )             36.2     11-02    130<L>  |  92<L>  |  8   ----------------------------<  102<H>  3.6   |  25  |  0.73    Ca    8.9      02 Nov 2019 05:10  Phos  2.9     11-02  Mg     2.0     11-02                RADIOLOGY & ADDITIONAL TESTS:    Imaging Personally Reviewed:    Consultant(s) Notes Reviewed:      Care Discussed with Consultants/Other Providers: Patient is a 74y old  Male who presents with a chief complaint of Told to come ot the ER for positive urine cultures (01 Nov 2019 15:16)      SUBJECTIVE / OVERNIGHT EVENTS: patient seen and examined by bedside, feeling better but still have some dysuria specially at the end of micturition , denies fever, chills, , also reports mild suprapubic tenderness       MEDICATIONS  (STANDING):  artificial  tears Solution 1 Drop(s) Both EYES two times a day  carbidopa/levodopa  25/100 1 Tablet(s) Oral three times a day  donepezil 10 milliGRAM(s) Oral at bedtime  dorzolamide 2%/timolol 0.5% Ophthalmic Solution 1 Drop(s) Left EYE two times a day  escitalopram 5 milliGRAM(s) Oral daily  heparin  Injectable 5000 Unit(s) SubCutaneous every 12 hours  influenza   Vaccine 0.5 milliLiter(s) IntraMuscular once  lactobacillus acidophilus 1 Tablet(s) Oral two times a day  latanoprost 0.005% Ophthalmic Solution 1 Drop(s) Right EYE at bedtime  memantine 10 milliGRAM(s) Oral daily  meropenem/vaborbactam IVPB 4 Gram(s) IV Intermittent every 8 hours  pantoprazole    Tablet 40 milliGRAM(s) Oral before breakfast  QUEtiapine 25 milliGRAM(s) Oral daily  sodium chloride 1 Gram(s) Oral three times a day  tamsulosin 0.4 milliGRAM(s) Oral at bedtime  trihexyphenidyl 2 milliGRAM(s) Oral two times a day    MEDICATIONS  (PRN):  simethicone 80 milliGRAM(s) Chew every 6 hours PRN Gas      Vital Signs Last 24 Hrs  T(C): 36.3 (02 Nov 2019 05:31), Max: 36.5 (01 Nov 2019 15:00)  T(F): 97.4 (02 Nov 2019 05:31), Max: 97.7 (01 Nov 2019 15:00)  HR: 76 (02 Nov 2019 05:31) (62 - 76)  BP: 126/61 (02 Nov 2019 05:31) (120/68 - 136/64)  BP(mean): --  RR: 18 (02 Nov 2019 05:31) (18 - 18)  SpO2: 98% (02 Nov 2019 05:31) (98% - 100%)  CAPILLARY BLOOD GLUCOSE      POCT Blood Glucose.: 144 mg/dL (01 Nov 2019 22:09)  POCT Blood Glucose.: 124 mg/dL (01 Nov 2019 17:25)  POCT Blood Glucose.: 115 mg/dL (01 Nov 2019 12:36)  POCT Blood Glucose.: 153 mg/dL (01 Nov 2019 08:25)    I&O's Summary    01 Nov 2019 07:01  -  02 Nov 2019 07:00  --------------------------------------------------------  IN: 0 mL / OUT: 840 mL / NET: -840 mL    PHYSICAL EXAM:  GENERAL: NAD, well-developed  HEAD:  Atraumatic, Normocephalic  EYES: EOMI, PERRLA, conjunctiva and sclera clear  NECK: Supple,  CHEST/LUNG: Clear to auscultation bilaterally; No wheeze  HEART: Regular rate and rhythm;  ABDOMEN: Soft ,mils tenderness in suprapubic region , Nondistended; Bowel sounds present  EXTREMITIES:  2+ Peripheral Pulses, No clubbing, cyanosis, or edema  PSYCH: AAOx2   NEUROLOGY: non-focal  SKIN: No rashes or lesions        LABS:                        12.5   9.61  )-----------( 376      ( 02 Nov 2019 05:10 )             36.2     11-02    130<L>  |  92<L>  |  8   ----------------------------<  102<H>  3.6   |  25  |  0.73    Ca    8.9      02 Nov 2019 05:10  Phos  2.9     11-02  Mg     2.0     11-02                RADIOLOGY & ADDITIONAL TESTS:    Imaging Personally Reviewed:    Consultant(s) Notes Reviewed: ID      Care Discussed with Consultants/Other Providers:

## 2019-11-02 NOTE — PROGRESS NOTE ADULT - PROBLEM SELECTOR PLAN 2
Serum Na in the ED revealed 128 and patient with prior hx of chronic hyponatremia. Currently, euvolemic on exam. Na is 127 today   - urine electrolytes and urine osmolality.. noted  Earlier this month he was seen by both Renal and Endocrine. Hyponatremia thought to be 2/2 SIADH vs. adrenal insufficiency (even though low cortisol level, had a normal cosyntropin test).  -   will  ml over 4 hrs and will increase  sodium chloride 1 gram TID for now. -Will monitor serum Na level closely. Pt asymptomatic   - pt on Lexapro, may be contributing to Hyponatremia Serum Na in the ED revealed 128 and patient with prior hx of chronic hyponatremia. Currently, euvolemic on exam. Na is 130  today   - urine electrolytes and urine osmolality.. noted  Earlier this month he was seen by both Renal and Endocrine. Hyponatremia thought to be 2/2 SIADH vs. adrenal insufficiency (even though low cortisol level, had a normal cosyntropin test).  -   s/p   ml over 4 hrs and increased  sodium chloride 1 gram TID for now. -Will monitor serum Na level closely. Pt asymptomatic   - pt on Lexapro, may be contributing to Hyponatremia

## 2019-11-02 NOTE — PROGRESS NOTE ADULT - PROBLEM SELECTOR PLAN 1
UA revealed large leuks and positive nitrites with urine cultures showing Klebsiella pneumoniae (CRE)  - Patient started on IV Vabromere per ID recs  - ID  eval appreciated, c/w Vabromere ,  f/u sensitivity for vabomere, avycaz, and polymixin   - Urine and Blood cultures sent , Blood NGTD , Urine cx with Kleb pneumoniae   - Started on lactobacillus UA revealed large leuks and positive nitrites with urine cultures showing Klebsiella pneumoniae (CRE)  - Patient started on IV Vabromere per ID recs  - ID  eval appreciated, c/w Vabromere ,  f/u sensitivity for vabomere, avycaz, and polymixin   - Urine and Blood cultures sent , Blood NGTD , Urine cx with Kleb pneumoniae   -ID recommend checking CT A/P and if suprapubic tenderness does not improve   - Started on lactobacillus

## 2019-11-03 LAB
-  AMIKACIN: SIGNIFICANT CHANGE UP
-  AMPICILLIN/SULBACTAM: SIGNIFICANT CHANGE UP
-  AMPICILLIN: SIGNIFICANT CHANGE UP
-  AZTREONAM: SIGNIFICANT CHANGE UP
-  CEFAZOLIN: SIGNIFICANT CHANGE UP
-  CEFEPIME: SIGNIFICANT CHANGE UP
-  CEFOXITIN: SIGNIFICANT CHANGE UP
-  CEFTAZIDIME: SIGNIFICANT CHANGE UP
-  CEFTRIAXONE: SIGNIFICANT CHANGE UP
-  CEFUROXIME: SIGNIFICANT CHANGE UP
-  ERTAPENEM: SIGNIFICANT CHANGE UP
-  GENTAMICIN: SIGNIFICANT CHANGE UP
-  IMIPENEM: SIGNIFICANT CHANGE UP
-  LEVOFLOXACIN: SIGNIFICANT CHANGE UP
-  MEROPENEM: SIGNIFICANT CHANGE UP
-  NITROFURANTOIN: SIGNIFICANT CHANGE UP
-  PIPERACILLIN/TAZOBACTAM: SIGNIFICANT CHANGE UP
-  TIGECYCLINE: SIGNIFICANT CHANGE UP
-  TOBRAMYCIN: SIGNIFICANT CHANGE UP
-  TRIMETHOPRIM/SULFAMETHOXAZOLE: SIGNIFICANT CHANGE UP
ANION GAP SERPL CALC-SCNC: 12 MMO/L — SIGNIFICANT CHANGE UP (ref 7–14)
BACTERIA UR CULT: SIGNIFICANT CHANGE UP
BUN SERPL-MCNC: 8 MG/DL — SIGNIFICANT CHANGE UP (ref 7–23)
CALCIUM SERPL-MCNC: 8.9 MG/DL — SIGNIFICANT CHANGE UP (ref 8.4–10.5)
CHLORIDE SERPL-SCNC: 93 MMOL/L — LOW (ref 98–107)
CO2 SERPL-SCNC: 24 MMOL/L — SIGNIFICANT CHANGE UP (ref 22–31)
CREAT SERPL-MCNC: 0.75 MG/DL — SIGNIFICANT CHANGE UP (ref 0.5–1.3)
GLUCOSE BLDC GLUCOMTR-MCNC: 120 MG/DL — HIGH (ref 70–99)
GLUCOSE BLDC GLUCOMTR-MCNC: 130 MG/DL — HIGH (ref 70–99)
GLUCOSE BLDC GLUCOMTR-MCNC: 133 MG/DL — HIGH (ref 70–99)
GLUCOSE BLDC GLUCOMTR-MCNC: 139 MG/DL — HIGH (ref 70–99)
GLUCOSE SERPL-MCNC: 112 MG/DL — HIGH (ref 70–99)
HCT VFR BLD CALC: 36.7 % — LOW (ref 39–50)
HGB BLD-MCNC: 12.9 G/DL — LOW (ref 13–17)
MAGNESIUM SERPL-MCNC: 2 MG/DL — SIGNIFICANT CHANGE UP (ref 1.6–2.6)
MCHC RBC-ENTMCNC: 25.6 PG — LOW (ref 27–34)
MCHC RBC-ENTMCNC: 35.1 % — SIGNIFICANT CHANGE UP (ref 32–36)
MCV RBC AUTO: 72.8 FL — LOW (ref 80–100)
METHOD TYPE: SIGNIFICANT CHANGE UP
NRBC # FLD: 0 K/UL — SIGNIFICANT CHANGE UP (ref 0–0)
ORGANISM # SPEC MICROSCOPIC CNT: SIGNIFICANT CHANGE UP
ORGANISM # SPEC MICROSCOPIC CNT: SIGNIFICANT CHANGE UP
PHOSPHATE SERPL-MCNC: 2.7 MG/DL — SIGNIFICANT CHANGE UP (ref 2.5–4.5)
PLATELET # BLD AUTO: 341 K/UL — SIGNIFICANT CHANGE UP (ref 150–400)
PMV BLD: 8.3 FL — SIGNIFICANT CHANGE UP (ref 7–13)
POTASSIUM SERPL-MCNC: 3.7 MMOL/L — SIGNIFICANT CHANGE UP (ref 3.5–5.3)
POTASSIUM SERPL-SCNC: 3.7 MMOL/L — SIGNIFICANT CHANGE UP (ref 3.5–5.3)
RBC # BLD: 5.04 M/UL — SIGNIFICANT CHANGE UP (ref 4.2–5.8)
RBC # FLD: 13.9 % — SIGNIFICANT CHANGE UP (ref 10.3–14.5)
SODIUM SERPL-SCNC: 129 MMOL/L — LOW (ref 135–145)
WBC # BLD: 8.66 K/UL — SIGNIFICANT CHANGE UP (ref 3.8–10.5)
WBC # FLD AUTO: 8.66 K/UL — SIGNIFICANT CHANGE UP (ref 3.8–10.5)

## 2019-11-03 PROCEDURE — 99233 SBSQ HOSP IP/OBS HIGH 50: CPT

## 2019-11-03 RX ADMIN — LATANOPROST 1 DROP(S): 0.05 SOLUTION/ DROPS OPHTHALMIC; TOPICAL at 21:14

## 2019-11-03 RX ADMIN — CARBIDOPA AND LEVODOPA 1 TABLET(S): 25; 100 TABLET ORAL at 21:14

## 2019-11-03 RX ADMIN — SODIUM CHLORIDE 1 GRAM(S): 9 INJECTION INTRAMUSCULAR; INTRAVENOUS; SUBCUTANEOUS at 13:12

## 2019-11-03 RX ADMIN — HEPARIN SODIUM 5000 UNIT(S): 5000 INJECTION INTRAVENOUS; SUBCUTANEOUS at 17:42

## 2019-11-03 RX ADMIN — DORZOLAMIDE HYDROCHLORIDE TIMOLOL MALEATE 1 DROP(S): 20; 5 SOLUTION/ DROPS OPHTHALMIC at 17:41

## 2019-11-03 RX ADMIN — Medication 2 MILLIGRAM(S): at 05:30

## 2019-11-03 RX ADMIN — DONEPEZIL HYDROCHLORIDE 10 MILLIGRAM(S): 10 TABLET, FILM COATED ORAL at 21:14

## 2019-11-03 RX ADMIN — HEPARIN SODIUM 5000 UNIT(S): 5000 INJECTION INTRAVENOUS; SUBCUTANEOUS at 05:29

## 2019-11-03 RX ADMIN — Medication 2 MILLIGRAM(S): at 17:42

## 2019-11-03 RX ADMIN — SODIUM CHLORIDE 1 GRAM(S): 9 INJECTION INTRAMUSCULAR; INTRAVENOUS; SUBCUTANEOUS at 05:30

## 2019-11-03 RX ADMIN — SODIUM CHLORIDE 1 GRAM(S): 9 INJECTION INTRAMUSCULAR; INTRAVENOUS; SUBCUTANEOUS at 21:14

## 2019-11-03 RX ADMIN — PANTOPRAZOLE SODIUM 40 MILLIGRAM(S): 20 TABLET, DELAYED RELEASE ORAL at 05:29

## 2019-11-03 RX ADMIN — MEROPENEM-VABORBACTAM 83.33 GRAM(S): 1; 1 INJECTION, POWDER, FOR SOLUTION INTRAVENOUS at 03:24

## 2019-11-03 RX ADMIN — CARBIDOPA AND LEVODOPA 1 TABLET(S): 25; 100 TABLET ORAL at 13:12

## 2019-11-03 RX ADMIN — Medication 1 DROP(S): at 17:43

## 2019-11-03 RX ADMIN — CARBIDOPA AND LEVODOPA 1 TABLET(S): 25; 100 TABLET ORAL at 05:28

## 2019-11-03 RX ADMIN — MEMANTINE HYDROCHLORIDE 10 MILLIGRAM(S): 10 TABLET ORAL at 11:40

## 2019-11-03 RX ADMIN — QUETIAPINE FUMARATE 25 MILLIGRAM(S): 200 TABLET, FILM COATED ORAL at 11:40

## 2019-11-03 RX ADMIN — TAMSULOSIN HYDROCHLORIDE 0.4 MILLIGRAM(S): 0.4 CAPSULE ORAL at 21:15

## 2019-11-03 RX ADMIN — DORZOLAMIDE HYDROCHLORIDE TIMOLOL MALEATE 1 DROP(S): 20; 5 SOLUTION/ DROPS OPHTHALMIC at 05:28

## 2019-11-03 RX ADMIN — Medication 1 TABLET(S): at 17:42

## 2019-11-03 RX ADMIN — ESCITALOPRAM OXALATE 5 MILLIGRAM(S): 10 TABLET, FILM COATED ORAL at 11:41

## 2019-11-03 RX ADMIN — MEROPENEM-VABORBACTAM 83.33 GRAM(S): 1; 1 INJECTION, POWDER, FOR SOLUTION INTRAVENOUS at 08:08

## 2019-11-03 RX ADMIN — Medication 1 TABLET(S): at 05:29

## 2019-11-03 RX ADMIN — Medication 1 DROP(S): at 05:27

## 2019-11-03 RX ADMIN — MEROPENEM-VABORBACTAM 83.33 GRAM(S): 1; 1 INJECTION, POWDER, FOR SOLUTION INTRAVENOUS at 17:40

## 2019-11-03 NOTE — PROGRESS NOTE ADULT - SUBJECTIVE AND OBJECTIVE BOX
Patient is a 74y old  Male who presents with a chief complaint of Told to come ot the ER for positive urine cultures (02 Nov 2019 07:37)      SUBJECTIVE / OVERNIGHT EVENTS: patient seen and examined by bedside, still c/o pain in the lower abdomen and dysuria, denies fever, chills,  headache, dizziness, SOB, CP, Palpitations , N/V/D,         MEDICATIONS  (STANDING):  artificial  tears Solution 1 Drop(s) Both EYES two times a day  carbidopa/levodopa  25/100 1 Tablet(s) Oral three times a day  donepezil 10 milliGRAM(s) Oral at bedtime  dorzolamide 2%/timolol 0.5% Ophthalmic Solution 1 Drop(s) Left EYE two times a day  escitalopram 5 milliGRAM(s) Oral daily  heparin  Injectable 5000 Unit(s) SubCutaneous every 12 hours  influenza   Vaccine 0.5 milliLiter(s) IntraMuscular once  lactobacillus acidophilus 1 Tablet(s) Oral two times a day  latanoprost 0.005% Ophthalmic Solution 1 Drop(s) Right EYE at bedtime  memantine 10 milliGRAM(s) Oral daily  meropenem/vaborbactam IVPB 4 Gram(s) IV Intermittent every 8 hours  pantoprazole    Tablet 40 milliGRAM(s) Oral before breakfast  QUEtiapine 25 milliGRAM(s) Oral daily  sodium chloride 1 Gram(s) Oral three times a day  tamsulosin 0.4 milliGRAM(s) Oral at bedtime  trihexyphenidyl 2 milliGRAM(s) Oral two times a day    MEDICATIONS  (PRN):  simethicone 80 milliGRAM(s) Chew every 6 hours PRN Gas      Vital Signs Last 24 Hrs  T(C): 36.3 (03 Nov 2019 14:47), Max: 36.8 (03 Nov 2019 01:53)  T(F): 97.3 (03 Nov 2019 14:47), Max: 98.3 (03 Nov 2019 01:53)  HR: 68 (03 Nov 2019 09:27) (68 - 76)  BP: 98/54 (03 Nov 2019 14:47) (98/54 - 129/66)  BP(mean): --  RR: 16 (03 Nov 2019 14:47) (16 - 18)  SpO2: 99% (03 Nov 2019 14:47) (98% - 100%)  CAPILLARY BLOOD GLUCOSE      POCT Blood Glucose.: 120 mg/dL (03 Nov 2019 12:06)  POCT Blood Glucose.: 139 mg/dL (03 Nov 2019 08:34)  POCT Blood Glucose.: 109 mg/dL (02 Nov 2019 22:22)  POCT Blood Glucose.: 111 mg/dL (02 Nov 2019 17:28)    I&O's Summary    02 Nov 2019 08:01  -  03 Nov 2019 07:00  --------------------------------------------------------  IN: 0 mL / OUT: 300 mL / NET: -300 mL      PHYSICAL EXAM:  GENERAL: NAD, well-developed  HEAD:  Atraumatic, Normocephalic  EYES: EOMI, PERRLA, conjunctiva and sclera clear  NECK: Supple,  CHEST/LUNG: Clear to auscultation bilaterally; No wheeze  HEART: Regular rate and rhythm;  ABDOMEN: Soft ,mild tenderness in suprapubic region , Nondistended; Bowel sounds present  EXTREMITIES:  2+ Peripheral Pulses, No clubbing, cyanosis, or edema  PSYCH: AAOx2   NEUROLOGY: non-focal  SKIN: No rashes or lesions      LABS:                        12.9   8.66  )-----------( 341      ( 03 Nov 2019 06:21 )             36.7     11-03    129<L>  |  93<L>  |  8   ----------------------------<  112<H>  3.7   |  24  |  0.75    Ca    8.9      03 Nov 2019 06:21  Phos  2.7     11-03  Mg     2.0     11-03                RADIOLOGY & ADDITIONAL TESTS:    Imaging Personally Reviewed:    Consultant(s) Notes Reviewed:      Care Discussed with Consultants/Other Providers:

## 2019-11-03 NOTE — PROGRESS NOTE ADULT - PROBLEM SELECTOR PLAN 2
Serum Na in the ED revealed 128 and patient with prior hx of chronic hyponatremia. Currently, euvolemic on exam. Na is 130  today   - urine electrolytes and urine osmolality.. noted  Earlier this month he was seen by both Renal and Endocrine. Hyponatremia thought to be 2/2 SIADH vs. adrenal insufficiency (even though low cortisol level, had a normal cosyntropin test).  -   s/p   ml over 4 hrs and increased  sodium chloride 1 gram TID for now. -Will monitor serum Na level closely. Pt asymptomatic   - pt on Lexapro, may be contributing to Hyponatremia

## 2019-11-03 NOTE — PROGRESS NOTE ADULT - PROBLEM SELECTOR PLAN 1
UA revealed large leuks and positive nitrites with urine cultures showing Klebsiella pneumoniae (CRE)  - Patient started on IV Vabromere per ID recs  - ID  eval appreciated, c/w Vabromere ,  f/u sensitivity for vabomere, avycaz, and polymixin   - Urine and Blood cultures sent , Blood NGTD , Urine cx with Kleb pneumoniae   -ID recommend checking CT A/P and if suprapubic tenderness does not improve , will do the CT as pt still with significant pain even though on Abx for 4th day   - Started on lactobacillus

## 2019-11-04 LAB
ANION GAP SERPL CALC-SCNC: 14 MMO/L — SIGNIFICANT CHANGE UP (ref 7–14)
BUN SERPL-MCNC: 8 MG/DL — SIGNIFICANT CHANGE UP (ref 7–23)
CALCIUM SERPL-MCNC: 9.2 MG/DL — SIGNIFICANT CHANGE UP (ref 8.4–10.5)
CHLORIDE SERPL-SCNC: 90 MMOL/L — LOW (ref 98–107)
CO2 SERPL-SCNC: 25 MMOL/L — SIGNIFICANT CHANGE UP (ref 22–31)
CREAT SERPL-MCNC: 0.72 MG/DL — SIGNIFICANT CHANGE UP (ref 0.5–1.3)
GLUCOSE BLDC GLUCOMTR-MCNC: 108 MG/DL — HIGH (ref 70–99)
GLUCOSE BLDC GLUCOMTR-MCNC: 131 MG/DL — HIGH (ref 70–99)
GLUCOSE BLDC GLUCOMTR-MCNC: 151 MG/DL — HIGH (ref 70–99)
GLUCOSE BLDC GLUCOMTR-MCNC: 174 MG/DL — HIGH (ref 70–99)
GLUCOSE SERPL-MCNC: 114 MG/DL — HIGH (ref 70–99)
HCT VFR BLD CALC: 38.5 % — LOW (ref 39–50)
HGB BLD-MCNC: 13.1 G/DL — SIGNIFICANT CHANGE UP (ref 13–17)
MAGNESIUM SERPL-MCNC: 2.1 MG/DL — SIGNIFICANT CHANGE UP (ref 1.6–2.6)
MCHC RBC-ENTMCNC: 25 PG — LOW (ref 27–34)
MCHC RBC-ENTMCNC: 34 % — SIGNIFICANT CHANGE UP (ref 32–36)
MCV RBC AUTO: 73.3 FL — LOW (ref 80–100)
NRBC # FLD: 0 K/UL — SIGNIFICANT CHANGE UP (ref 0–0)
PHOSPHATE SERPL-MCNC: 2.7 MG/DL — SIGNIFICANT CHANGE UP (ref 2.5–4.5)
PLATELET # BLD AUTO: 380 K/UL — SIGNIFICANT CHANGE UP (ref 150–400)
PMV BLD: 8.7 FL — SIGNIFICANT CHANGE UP (ref 7–13)
POTASSIUM SERPL-MCNC: 3.9 MMOL/L — SIGNIFICANT CHANGE UP (ref 3.5–5.3)
POTASSIUM SERPL-SCNC: 3.9 MMOL/L — SIGNIFICANT CHANGE UP (ref 3.5–5.3)
RBC # BLD: 5.25 M/UL — SIGNIFICANT CHANGE UP (ref 4.2–5.8)
RBC # FLD: 14.2 % — SIGNIFICANT CHANGE UP (ref 10.3–14.5)
SODIUM SERPL-SCNC: 129 MMOL/L — LOW (ref 135–145)
WBC # BLD: 10.44 K/UL — SIGNIFICANT CHANGE UP (ref 3.8–10.5)
WBC # FLD AUTO: 10.44 K/UL — SIGNIFICANT CHANGE UP (ref 3.8–10.5)

## 2019-11-04 PROCEDURE — 74177 CT ABD & PELVIS W/CONTRAST: CPT | Mod: 26

## 2019-11-04 PROCEDURE — 99233 SBSQ HOSP IP/OBS HIGH 50: CPT

## 2019-11-04 PROCEDURE — 99232 SBSQ HOSP IP/OBS MODERATE 35: CPT

## 2019-11-04 RX ORDER — INSULIN LISPRO 100/ML
VIAL (ML) SUBCUTANEOUS
Refills: 0 | Status: DISCONTINUED | OUTPATIENT
Start: 2019-11-04 | End: 2019-11-12

## 2019-11-04 RX ORDER — DEXTROSE 50 % IN WATER 50 %
12.5 SYRINGE (ML) INTRAVENOUS ONCE
Refills: 0 | Status: DISCONTINUED | OUTPATIENT
Start: 2019-11-04 | End: 2019-11-12

## 2019-11-04 RX ORDER — SODIUM CHLORIDE 9 MG/ML
1000 INJECTION, SOLUTION INTRAVENOUS
Refills: 0 | Status: DISCONTINUED | OUTPATIENT
Start: 2019-11-04 | End: 2019-11-12

## 2019-11-04 RX ORDER — GLUCAGON INJECTION, SOLUTION 0.5 MG/.1ML
1 INJECTION, SOLUTION SUBCUTANEOUS ONCE
Refills: 0 | Status: DISCONTINUED | OUTPATIENT
Start: 2019-11-04 | End: 2019-11-12

## 2019-11-04 RX ORDER — DEXTROSE 50 % IN WATER 50 %
25 SYRINGE (ML) INTRAVENOUS ONCE
Refills: 0 | Status: DISCONTINUED | OUTPATIENT
Start: 2019-11-04 | End: 2019-11-12

## 2019-11-04 RX ORDER — DEXTROSE 50 % IN WATER 50 %
15 SYRINGE (ML) INTRAVENOUS ONCE
Refills: 0 | Status: DISCONTINUED | OUTPATIENT
Start: 2019-11-04 | End: 2019-11-12

## 2019-11-04 RX ORDER — AZTREONAM 2 G
2000 VIAL (EA) INJECTION EVERY 8 HOURS
Refills: 0 | Status: DISCONTINUED | OUTPATIENT
Start: 2019-11-04 | End: 2019-11-12

## 2019-11-04 RX ORDER — PHENAZOPYRIDINE HCL 100 MG
100 TABLET ORAL EVERY 8 HOURS
Refills: 0 | Status: COMPLETED | OUTPATIENT
Start: 2019-11-04 | End: 2019-11-06

## 2019-11-04 RX ORDER — INSULIN LISPRO 100/ML
VIAL (ML) SUBCUTANEOUS AT BEDTIME
Refills: 0 | Status: DISCONTINUED | OUTPATIENT
Start: 2019-11-04 | End: 2019-11-12

## 2019-11-04 RX ADMIN — TAMSULOSIN HYDROCHLORIDE 0.4 MILLIGRAM(S): 0.4 CAPSULE ORAL at 21:27

## 2019-11-04 RX ADMIN — Medication 1 DROP(S): at 18:07

## 2019-11-04 RX ADMIN — CARBIDOPA AND LEVODOPA 1 TABLET(S): 25; 100 TABLET ORAL at 13:36

## 2019-11-04 RX ADMIN — DONEPEZIL HYDROCHLORIDE 10 MILLIGRAM(S): 10 TABLET, FILM COATED ORAL at 21:27

## 2019-11-04 RX ADMIN — MEROPENEM-VABORBACTAM 83.33 GRAM(S): 1; 1 INJECTION, POWDER, FOR SOLUTION INTRAVENOUS at 00:52

## 2019-11-04 RX ADMIN — Medication 100 MILLIGRAM(S): at 21:42

## 2019-11-04 RX ADMIN — MEROPENEM-VABORBACTAM 83.33 GRAM(S): 1; 1 INJECTION, POWDER, FOR SOLUTION INTRAVENOUS at 08:49

## 2019-11-04 RX ADMIN — CARBIDOPA AND LEVODOPA 1 TABLET(S): 25; 100 TABLET ORAL at 05:23

## 2019-11-04 RX ADMIN — Medication 2 MILLIGRAM(S): at 05:26

## 2019-11-04 RX ADMIN — Medication 100 MILLIGRAM(S): at 21:27

## 2019-11-04 RX ADMIN — CARBIDOPA AND LEVODOPA 1 TABLET(S): 25; 100 TABLET ORAL at 21:27

## 2019-11-04 RX ADMIN — Medication 1 TABLET(S): at 18:07

## 2019-11-04 RX ADMIN — HEPARIN SODIUM 5000 UNIT(S): 5000 INJECTION INTRAVENOUS; SUBCUTANEOUS at 05:24

## 2019-11-04 RX ADMIN — Medication 1 DROP(S): at 05:22

## 2019-11-04 RX ADMIN — Medication 1: at 18:07

## 2019-11-04 RX ADMIN — MEMANTINE HYDROCHLORIDE 10 MILLIGRAM(S): 10 TABLET ORAL at 11:21

## 2019-11-04 RX ADMIN — SODIUM CHLORIDE 1 GRAM(S): 9 INJECTION INTRAMUSCULAR; INTRAVENOUS; SUBCUTANEOUS at 13:36

## 2019-11-04 RX ADMIN — Medication 2 MILLIGRAM(S): at 18:07

## 2019-11-04 RX ADMIN — DORZOLAMIDE HYDROCHLORIDE TIMOLOL MALEATE 1 DROP(S): 20; 5 SOLUTION/ DROPS OPHTHALMIC at 05:23

## 2019-11-04 RX ADMIN — PANTOPRAZOLE SODIUM 40 MILLIGRAM(S): 20 TABLET, DELAYED RELEASE ORAL at 05:30

## 2019-11-04 RX ADMIN — LATANOPROST 1 DROP(S): 0.05 SOLUTION/ DROPS OPHTHALMIC; TOPICAL at 21:27

## 2019-11-04 RX ADMIN — Medication 1 TABLET(S): at 05:30

## 2019-11-04 RX ADMIN — DORZOLAMIDE HYDROCHLORIDE TIMOLOL MALEATE 1 DROP(S): 20; 5 SOLUTION/ DROPS OPHTHALMIC at 18:07

## 2019-11-04 RX ADMIN — SODIUM CHLORIDE 1 GRAM(S): 9 INJECTION INTRAMUSCULAR; INTRAVENOUS; SUBCUTANEOUS at 05:25

## 2019-11-04 RX ADMIN — QUETIAPINE FUMARATE 25 MILLIGRAM(S): 200 TABLET, FILM COATED ORAL at 11:21

## 2019-11-04 RX ADMIN — SODIUM CHLORIDE 1 GRAM(S): 9 INJECTION INTRAMUSCULAR; INTRAVENOUS; SUBCUTANEOUS at 21:27

## 2019-11-04 RX ADMIN — ESCITALOPRAM OXALATE 5 MILLIGRAM(S): 10 TABLET, FILM COATED ORAL at 11:21

## 2019-11-04 RX ADMIN — HEPARIN SODIUM 5000 UNIT(S): 5000 INJECTION INTRAVENOUS; SUBCUTANEOUS at 18:07

## 2019-11-04 NOTE — PROGRESS NOTE ADULT - SUBJECTIVE AND OBJECTIVE BOX
Follow Up:      Inverval History/ROS:Patient is a 74y old  Male who presents with a chief complaint of Told to come ot the ER for positive urine cultures (03 Nov 2019 15:30)    C/o suprapubic pain  C/o dysuria.    Allergies    No Known Allergies    Intolerances        ANTIMICROBIALS:  aztreonam  IVPB 2000 every 8 hours  ceftazidime/avibactam IVPB 2.5 every 8 hours      OTHER MEDS:  artificial  tears Solution 1 Drop(s) Both EYES two times a day  carbidopa/levodopa  25/100 1 Tablet(s) Oral three times a day  dextrose 40% Gel 15 Gram(s) Oral once PRN  dextrose 5%. 1000 milliLiter(s) IV Continuous <Continuous>  dextrose 50% Injectable 12.5 Gram(s) IV Push once  dextrose 50% Injectable 25 Gram(s) IV Push once  dextrose 50% Injectable 25 Gram(s) IV Push once  donepezil 10 milliGRAM(s) Oral at bedtime  dorzolamide 2%/timolol 0.5% Ophthalmic Solution 1 Drop(s) Left EYE two times a day  escitalopram 5 milliGRAM(s) Oral daily  glucagon  Injectable 1 milliGRAM(s) IntraMuscular once PRN  heparin  Injectable 5000 Unit(s) SubCutaneous every 12 hours  influenza   Vaccine 0.5 milliLiter(s) IntraMuscular once  insulin lispro (HumaLOG) corrective regimen sliding scale   SubCutaneous three times a day before meals  insulin lispro (HumaLOG) corrective regimen sliding scale   SubCutaneous at bedtime  lactobacillus acidophilus 1 Tablet(s) Oral two times a day  latanoprost 0.005% Ophthalmic Solution 1 Drop(s) Right EYE at bedtime  memantine 10 milliGRAM(s) Oral daily  pantoprazole    Tablet 40 milliGRAM(s) Oral before breakfast  QUEtiapine 25 milliGRAM(s) Oral daily  simethicone 80 milliGRAM(s) Chew every 6 hours PRN  sodium chloride 1 Gram(s) Oral three times a day  tamsulosin 0.4 milliGRAM(s) Oral at bedtime  trihexyphenidyl 2 milliGRAM(s) Oral two times a day      Vital Signs Last 24 Hrs  T(C): 36.2 (04 Nov 2019 13:06), Max: 36.8 (04 Nov 2019 02:28)  T(F): 97.2 (04 Nov 2019 13:06), Max: 98.2 (04 Nov 2019 02:28)  HR: 70 (04 Nov 2019 13:06) (64 - 73)  BP: 136/69 (04 Nov 2019 13:06) (114/64 - 137/81)  BP(mean): --  RR: 18 (04 Nov 2019 13:06) (16 - 18)  SpO2: 100% (04 Nov 2019 13:06) (98% - 100%)    PHYSICAL EXAM:  General: [x ] non-toxic  HEAD/EYES: [ ] PERRL [ ] white sclera [ ] icterus  ENT:  [ ] normal [x ] supple [ ] thrush [ ] pharyngeal exudate  Cardiovascular:   [ ] murmur [x ] normal [ ] PPM/AICD  Respiratory:  [x ] clear to ausculation bilaterally  GI:  [x ] soft, non-tender, normal bowel sounds  :  [ ] pichardo [ ] no CVA tenderness   Musculoskeletal:  [ ] no synovitis  Neurologic:  [ ] non-focal exam   Skin:  [x ] no rash  Lymph: [ ] no lymphadenopathy  Psychiatric:  [ ] appropriate affect [x ] alert & oriented  Lines:  [x ] no phlebitis [ ] central line                                13.1   10.44 )-----------( 380      ( 04 Nov 2019 07:11 )             38.5       11-04    129<L>  |  90<L>  |  8   ----------------------------<  114<H>  3.9   |  25  |  0.72    Ca    9.2      04 Nov 2019 07:11  Phos  2.7     11-04  Mg     2.1     11-04            MICROBIOLOGY:    RADIOLOGY:    < from: CT Abdomen and Pelvis w/ IV Cont (11.04.19 @ 12:24) >  IMPRESSION:     Enlarged prostate.    Mild diffuse nonspecific bladder wall thickening.    < end of copied text >

## 2019-11-04 NOTE — PROGRESS NOTE ADULT - PROBLEM SELECTOR PLAN 1
Urine cultures showing Klebsiella pneumoniae (CRE). CT abd/pelvis completed today, 11/4/19 negative for stones or mass.   - Patient started on IV Vabomere (meropenem/vaborbactam --> transition to ceftazidime/aztreonam as per ID  - ID following, recs reviewed and appreciated.    - Blood NGTD   - if no clinical improvement in 1-2 days, will discuss with GI regarding possible cystoscopy for further evaluation  - start pyridium today

## 2019-11-04 NOTE — PROGRESS NOTE ADULT - ASSESSMENT
73 y/o M with PMH of Dementia, BPH(with recent Aguilar removal on 10/28 and TURP a week ago), Parkinson's disease admitted for positive urine cx growing MDR Klebsiella, not symptomatically improving with IV Vabomere.

## 2019-11-04 NOTE — PROGRESS NOTE ADULT - SUBJECTIVE AND OBJECTIVE BOX
Patient is a 74y old  Male who presents with a chief complaint of Told to come ot the ER for positive urine cultures (04 Nov 2019 16:26)        SUBJECTIVE / OVERNIGHT EVENTS:      MEDICATIONS  (STANDING):  artificial  tears Solution 1 Drop(s) Both EYES two times a day  aztreonam  IVPB 2000 milliGRAM(s) IV Intermittent every 8 hours  carbidopa/levodopa  25/100 1 Tablet(s) Oral three times a day  ceftazidime/avibactam IVPB 2.5 Gram(s) IV Intermittent every 8 hours  dextrose 5%. 1000 milliLiter(s) (50 mL/Hr) IV Continuous <Continuous>  dextrose 50% Injectable 12.5 Gram(s) IV Push once  dextrose 50% Injectable 25 Gram(s) IV Push once  dextrose 50% Injectable 25 Gram(s) IV Push once  donepezil 10 milliGRAM(s) Oral at bedtime  dorzolamide 2%/timolol 0.5% Ophthalmic Solution 1 Drop(s) Left EYE two times a day  escitalopram 5 milliGRAM(s) Oral daily  heparin  Injectable 5000 Unit(s) SubCutaneous every 12 hours  influenza   Vaccine 0.5 milliLiter(s) IntraMuscular once  insulin lispro (HumaLOG) corrective regimen sliding scale   SubCutaneous three times a day before meals  insulin lispro (HumaLOG) corrective regimen sliding scale   SubCutaneous at bedtime  lactobacillus acidophilus 1 Tablet(s) Oral two times a day  latanoprost 0.005% Ophthalmic Solution 1 Drop(s) Right EYE at bedtime  memantine 10 milliGRAM(s) Oral daily  pantoprazole    Tablet 40 milliGRAM(s) Oral before breakfast  QUEtiapine 25 milliGRAM(s) Oral daily  sodium chloride 1 Gram(s) Oral three times a day  tamsulosin 0.4 milliGRAM(s) Oral at bedtime  trihexyphenidyl 2 milliGRAM(s) Oral two times a day    MEDICATIONS  (PRN):  dextrose 40% Gel 15 Gram(s) Oral once PRN Blood Glucose LESS THAN 70 milliGRAM(s)/deciliter  glucagon  Injectable 1 milliGRAM(s) IntraMuscular once PRN Glucose LESS THAN 70 milligrams/deciliter  simethicone 80 milliGRAM(s) Chew every 6 hours PRN Gas      Vital Signs Last 24 Hrs  T(C): 36.2 (04 Nov 2019 13:06), Max: 36.8 (04 Nov 2019 02:28)  T(F): 97.2 (04 Nov 2019 13:06), Max: 98.2 (04 Nov 2019 02:28)  HR: 73 (04 Nov 2019 17:37) (64 - 73)  BP: 155/78 (04 Nov 2019 17:37) (114/64 - 155/78)  BP(mean): --  RR: 18 (04 Nov 2019 17:37) (16 - 18)  SpO2: 100% (04 Nov 2019 17:37) (98% - 100%)  CAPILLARY BLOOD GLUCOSE      POCT Blood Glucose.: 151 mg/dL (04 Nov 2019 17:31)  POCT Blood Glucose.: 131 mg/dL (04 Nov 2019 13:32)  POCT Blood Glucose.: 108 mg/dL (04 Nov 2019 08:26)  POCT Blood Glucose.: 133 mg/dL (03 Nov 2019 22:11)    I&O's Summary    03 Nov 2019 07:01  -  04 Nov 2019 07:00  --------------------------------------------------------  IN: 0 mL / OUT: 600 mL / NET: -600 mL    04 Nov 2019 07:01  -  04 Nov 2019 17:45  --------------------------------------------------------  IN: 83.3 mL / OUT: 0 mL / NET: 83.3 mL          PHYSICAL EXAM  GENERAL: NAD, well-developed  HEAD:  Atraumatic, Normocephalic  EYES: EOMI, PERRLA, conjunctiva and sclera clear  NECK: Supple, No JVD  CHEST/LUNG: Clear to auscultation bilaterally; No wheeze  HEART: Regular rate and rhythm; No murmurs, rubs, or gallops  ABDOMEN: Soft, Nontender, Nondistended; Bowel sounds present  EXTREMITIES:  2+ Peripheral Pulses, No clubbing, cyanosis, or edema  PSYCH: AAOx3  SKIN: No rashes or lesions      LABS:                        13.1   10.44 )-----------( 380      ( 04 Nov 2019 07:11 )             38.5     11-04    129<L>  |  90<L>  |  8   ----------------------------<  114<H>  3.9   |  25  |  0.72    Ca    9.2      04 Nov 2019 07:11  Phos  2.7     11-04  Mg     2.1     11-04                RADIOLOGY & ADDITIONAL TESTS:    Imaging Personally Reviewed:  Consultant(s) Notes Reviewed:    Care Discussed with Consultants/Other Providers: Patient is a 74y old  Male who presents with a chief complaint of Told to come ot the ER for positive urine cultures (04 Nov 2019 16:26)    SUBJECTIVE / OVERNIGHT EVENTS:  [facility]   Patient seen and examined in the evening. Patient still with suprapubic pain. No fever, chills, n/v, SOB or chest pain. Patient's wife present at bedside    MEDICATIONS  (STANDING):  artificial  tears Solution 1 Drop(s) Both EYES two times a day  aztreonam  IVPB 2000 milliGRAM(s) IV Intermittent every 8 hours  carbidopa/levodopa  25/100 1 Tablet(s) Oral three times a day  ceftazidime/avibactam IVPB 2.5 Gram(s) IV Intermittent every 8 hours  dextrose 5%. 1000 milliLiter(s) (50 mL/Hr) IV Continuous <Continuous>  dextrose 50% Injectable 12.5 Gram(s) IV Push once  dextrose 50% Injectable 25 Gram(s) IV Push once  dextrose 50% Injectable 25 Gram(s) IV Push once  donepezil 10 milliGRAM(s) Oral at bedtime  dorzolamide 2%/timolol 0.5% Ophthalmic Solution 1 Drop(s) Left EYE two times a day  escitalopram 5 milliGRAM(s) Oral daily  heparin  Injectable 5000 Unit(s) SubCutaneous every 12 hours  influenza   Vaccine 0.5 milliLiter(s) IntraMuscular once  insulin lispro (HumaLOG) corrective regimen sliding scale   SubCutaneous three times a day before meals  insulin lispro (HumaLOG) corrective regimen sliding scale   SubCutaneous at bedtime  lactobacillus acidophilus 1 Tablet(s) Oral two times a day  latanoprost 0.005% Ophthalmic Solution 1 Drop(s) Right EYE at bedtime  memantine 10 milliGRAM(s) Oral daily  pantoprazole    Tablet 40 milliGRAM(s) Oral before breakfast  QUEtiapine 25 milliGRAM(s) Oral daily  sodium chloride 1 Gram(s) Oral three times a day  tamsulosin 0.4 milliGRAM(s) Oral at bedtime  trihexyphenidyl 2 milliGRAM(s) Oral two times a day    MEDICATIONS  (PRN):  dextrose 40% Gel 15 Gram(s) Oral once PRN Blood Glucose LESS THAN 70 milliGRAM(s)/deciliter  glucagon  Injectable 1 milliGRAM(s) IntraMuscular once PRN Glucose LESS THAN 70 milligrams/deciliter  simethicone 80 milliGRAM(s) Chew every 6 hours PRN Gas      Vital Signs Last 24 Hrs  T(C): 36.2 (04 Nov 2019 13:06), Max: 36.8 (04 Nov 2019 02:28)  T(F): 97.2 (04 Nov 2019 13:06), Max: 98.2 (04 Nov 2019 02:28)  HR: 73 (04 Nov 2019 17:37) (64 - 73)  BP: 155/78 (04 Nov 2019 17:37) (114/64 - 155/78)  BP(mean): --  RR: 18 (04 Nov 2019 17:37) (16 - 18)  SpO2: 100% (04 Nov 2019 17:37) (98% - 100%)  CAPILLARY BLOOD GLUCOSE      POCT Blood Glucose.: 151 mg/dL (04 Nov 2019 17:31)  POCT Blood Glucose.: 131 mg/dL (04 Nov 2019 13:32)  POCT Blood Glucose.: 108 mg/dL (04 Nov 2019 08:26)  POCT Blood Glucose.: 133 mg/dL (03 Nov 2019 22:11)    I&O's Summary    03 Nov 2019 07:01  -  04 Nov 2019 07:00  --------------------------------------------------------  IN: 0 mL / OUT: 600 mL / NET: -600 mL    04 Nov 2019 07:01  -  04 Nov 2019 17:45  --------------------------------------------------------  IN: 83.3 mL / OUT: 0 mL / NET: 83.3 mL          PHYSICAL EXAM  GENERAL: NAD, well-developed  CHEST/LUNG: Clear to auscultation bilaterally; No wheeze  HEART: Regular rate and rhythm; No murmurs, rubs, or gallops  ABDOMEN: Soft, Suprapubic tenderness to palpation, Nondistended; Bowel sounds present  EXTREMITIES:  2+ Peripheral Pulses, No clubbing, cyanosis, or edema  PSYCH: AAOx3        LABS:                        13.1   10.44 )-----------( 380      ( 04 Nov 2019 07:11 )             38.5     11-04    129<L>  |  90<L>  |  8   ----------------------------<  114<H>  3.9   |  25  |  0.72    Ca    9.2      04 Nov 2019 07:11  Phos  2.7     11-04  Mg     2.1     11-04            Consultant(s) Notes Reviewed:  yes  Care Discussed with Consultants/Other Providers:

## 2019-11-04 NOTE — PROGRESS NOTE ADULT - PROBLEM SELECTOR PLAN 2
Chronic and asymptomatic. Urine electrolytes and urine osmolality noted. 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 adrenal insufficiency (even though low cortisol level, had a normal cosyntropin test).  -   c/w NaCl tabs TID   - pt on Lexapro, may be contributing to Hyponatremia

## 2019-11-04 NOTE — PROGRESS NOTE ADULT - ASSESSMENT
73 year old with parkinson's and BPH with a chronic pichardo  Prior known colonization with CRE klebsiella  S/p recent TURP  recent presented with dysuria  and urine culture positive for CRE klebsiella  Afebrile with normal WBC      1) Acute UTI  He has dysuria and suprapubic tenderness  Tx with antibiotics    2) CRE  Contact precautions for CRE  Not responding to vabomere  Sensitivity still pending    Change to avycaz and aztreonam    3) Abdominal pain  thought to be due to uti  CT only with bladder wall thickening    Not febrile/ WBC normal    If not responding, consider urology eval

## 2019-11-04 NOTE — PROGRESS NOTE ADULT - PROBLEM SELECTOR PLAN 3
H&H revealed 12.9/36.9 with low MCV. Stool hemoccult testing negative r/o GI bleed  -ferritin, TIBC, and retic count noted, , iron level normal , low ferritin, H/H wnl, will continue to monitor

## 2019-11-05 LAB
ANION GAP SERPL CALC-SCNC: 11 MMO/L — SIGNIFICANT CHANGE UP (ref 7–14)
BACTERIA BLD CULT: SIGNIFICANT CHANGE UP
BACTERIA BLD CULT: SIGNIFICANT CHANGE UP
BUN SERPL-MCNC: 8 MG/DL — SIGNIFICANT CHANGE UP (ref 7–23)
CALCIUM SERPL-MCNC: 9 MG/DL — SIGNIFICANT CHANGE UP (ref 8.4–10.5)
CHLORIDE SERPL-SCNC: 91 MMOL/L — LOW (ref 98–107)
CO2 SERPL-SCNC: 24 MMOL/L — SIGNIFICANT CHANGE UP (ref 22–31)
CREAT SERPL-MCNC: 0.72 MG/DL — SIGNIFICANT CHANGE UP (ref 0.5–1.3)
GLUCOSE BLDC GLUCOMTR-MCNC: 131 MG/DL — HIGH (ref 70–99)
GLUCOSE BLDC GLUCOMTR-MCNC: 152 MG/DL — HIGH (ref 70–99)
GLUCOSE BLDC GLUCOMTR-MCNC: 168 MG/DL — HIGH (ref 70–99)
GLUCOSE BLDC GLUCOMTR-MCNC: 174 MG/DL — HIGH (ref 70–99)
GLUCOSE SERPL-MCNC: 102 MG/DL — HIGH (ref 70–99)
HCT VFR BLD CALC: 37.1 % — LOW (ref 39–50)
HGB BLD-MCNC: 12.7 G/DL — LOW (ref 13–17)
MCHC RBC-ENTMCNC: 25.1 PG — LOW (ref 27–34)
MCHC RBC-ENTMCNC: 34.2 % — SIGNIFICANT CHANGE UP (ref 32–36)
MCV RBC AUTO: 73.5 FL — LOW (ref 80–100)
METHOD TYPE: SIGNIFICANT CHANGE UP
NRBC # FLD: 0 K/UL — SIGNIFICANT CHANGE UP (ref 0–0)
PLATELET # BLD AUTO: 340 K/UL — SIGNIFICANT CHANGE UP (ref 150–400)
PMV BLD: 8.5 FL — SIGNIFICANT CHANGE UP (ref 7–13)
POTASSIUM SERPL-MCNC: 3.7 MMOL/L — SIGNIFICANT CHANGE UP (ref 3.5–5.3)
POTASSIUM SERPL-SCNC: 3.7 MMOL/L — SIGNIFICANT CHANGE UP (ref 3.5–5.3)
RBC # BLD: 5.05 M/UL — SIGNIFICANT CHANGE UP (ref 4.2–5.8)
RBC # FLD: 14 % — SIGNIFICANT CHANGE UP (ref 10.3–14.5)
SODIUM SERPL-SCNC: 126 MMOL/L — LOW (ref 135–145)
WBC # BLD: 10.58 K/UL — HIGH (ref 3.8–10.5)
WBC # FLD AUTO: 10.58 K/UL — HIGH (ref 3.8–10.5)

## 2019-11-05 PROCEDURE — 99232 SBSQ HOSP IP/OBS MODERATE 35: CPT

## 2019-11-05 PROCEDURE — 99233 SBSQ HOSP IP/OBS HIGH 50: CPT

## 2019-11-05 RX ADMIN — HEPARIN SODIUM 5000 UNIT(S): 5000 INJECTION INTRAVENOUS; SUBCUTANEOUS at 06:03

## 2019-11-05 RX ADMIN — ESCITALOPRAM OXALATE 5 MILLIGRAM(S): 10 TABLET, FILM COATED ORAL at 13:27

## 2019-11-05 RX ADMIN — Medication 100 MILLIGRAM(S): at 21:09

## 2019-11-05 RX ADMIN — Medication 1: at 13:26

## 2019-11-05 RX ADMIN — SODIUM CHLORIDE 1 GRAM(S): 9 INJECTION INTRAMUSCULAR; INTRAVENOUS; SUBCUTANEOUS at 06:02

## 2019-11-05 RX ADMIN — CARBIDOPA AND LEVODOPA 1 TABLET(S): 25; 100 TABLET ORAL at 21:09

## 2019-11-05 RX ADMIN — SODIUM CHLORIDE 1 GRAM(S): 9 INJECTION INTRAMUSCULAR; INTRAVENOUS; SUBCUTANEOUS at 21:09

## 2019-11-05 RX ADMIN — CARBIDOPA AND LEVODOPA 1 TABLET(S): 25; 100 TABLET ORAL at 06:03

## 2019-11-05 RX ADMIN — Medication 1 DROP(S): at 06:04

## 2019-11-05 RX ADMIN — SODIUM CHLORIDE 1 GRAM(S): 9 INJECTION INTRAMUSCULAR; INTRAVENOUS; SUBCUTANEOUS at 13:27

## 2019-11-05 RX ADMIN — PANTOPRAZOLE SODIUM 40 MILLIGRAM(S): 20 TABLET, DELAYED RELEASE ORAL at 06:03

## 2019-11-05 RX ADMIN — HEPARIN SODIUM 5000 UNIT(S): 5000 INJECTION INTRAVENOUS; SUBCUTANEOUS at 18:07

## 2019-11-05 RX ADMIN — QUETIAPINE FUMARATE 25 MILLIGRAM(S): 200 TABLET, FILM COATED ORAL at 13:27

## 2019-11-05 RX ADMIN — Medication 100 MILLIGRAM(S): at 13:27

## 2019-11-05 RX ADMIN — Medication 100 MILLIGRAM(S): at 06:03

## 2019-11-05 RX ADMIN — DONEPEZIL HYDROCHLORIDE 10 MILLIGRAM(S): 10 TABLET, FILM COATED ORAL at 21:11

## 2019-11-05 RX ADMIN — Medication 2 MILLIGRAM(S): at 06:03

## 2019-11-05 RX ADMIN — TAMSULOSIN HYDROCHLORIDE 0.4 MILLIGRAM(S): 0.4 CAPSULE ORAL at 21:09

## 2019-11-05 RX ADMIN — MEMANTINE HYDROCHLORIDE 10 MILLIGRAM(S): 10 TABLET ORAL at 13:27

## 2019-11-05 RX ADMIN — Medication 2 MILLIGRAM(S): at 18:08

## 2019-11-05 RX ADMIN — LATANOPROST 1 DROP(S): 0.05 SOLUTION/ DROPS OPHTHALMIC; TOPICAL at 21:11

## 2019-11-05 RX ADMIN — Medication 1: at 18:07

## 2019-11-05 RX ADMIN — DORZOLAMIDE HYDROCHLORIDE TIMOLOL MALEATE 1 DROP(S): 20; 5 SOLUTION/ DROPS OPHTHALMIC at 18:07

## 2019-11-05 RX ADMIN — Medication 1 TABLET(S): at 06:03

## 2019-11-05 RX ADMIN — Medication 100 MILLIGRAM(S): at 13:49

## 2019-11-05 RX ADMIN — Medication 1 DROP(S): at 18:07

## 2019-11-05 RX ADMIN — CARBIDOPA AND LEVODOPA 1 TABLET(S): 25; 100 TABLET ORAL at 13:27

## 2019-11-05 RX ADMIN — DORZOLAMIDE HYDROCHLORIDE TIMOLOL MALEATE 1 DROP(S): 20; 5 SOLUTION/ DROPS OPHTHALMIC at 06:03

## 2019-11-05 RX ADMIN — Medication 1 TABLET(S): at 18:08

## 2019-11-05 NOTE — PROGRESS NOTE ADULT - ASSESSMENT
73 y/o M with PMH of Dementia, BPH(with recent Aguilar removal on 10/28 and TURP a week ago), Parkinson's disease admitted for positive urine cx growing MDR Klebsiella, initially not improving symptomatically with IV Vabomere. Now on Avycaz and aztreonam.

## 2019-11-05 NOTE — PROGRESS NOTE ADULT - PROBLEM SELECTOR PLAN 2
Chronic and asymptomatic. Urine electrolytes and urine osmolality noted. 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 adrenal insufficiency (even though low cortisol level, had a normal cosyntropin test). Likely SIADH  -   c/w NaCl tabs TID   - fluid restriction  - pt on Lexapro, may be contributing to Hyponatremia

## 2019-11-05 NOTE — PROGRESS NOTE ADULT - ASSESSMENT
73 year old with parkinson's and BPH with a chronic pichardo  Prior known colonization with CRE klebsiella  S/p recent TURP  recent presented with dysuria  and urine culture positive for CRE klebsiella  Afebrile with normal WBC      1) Acute UTI  He has dysuria and suprapubic tenderness  Tx with antibiotics    2) CRE- ? metalobetalactamase  Resistant to vabomere and avybactam    Trial of combination therapy with avycaz and aztreonam    Other option is tigecyline but does not concentrate well in urine  May be okay later in tx course.       3) Abdominal pain  thought to be due to uti  CT only with bladder wall thickening    Not febrile/ WBC normal  Less tender today

## 2019-11-05 NOTE — PROGRESS NOTE ADULT - PROBLEM SELECTOR PLAN 1
Urine cultures showing Klebsiella pneumoniae (CRE). CT abd/pelvis completed today, 11/4/19 negative for stones or mass.   - Patient started on IV Vabomere (meropenem/vaborbactam --> transition to ceftazidime/avibactam and aztreonam (started on 11/4/19) as per ID  - ID following, recs reviewed and appreciated.    - Blood NGTD   - if no significant clinical improvement in 1-2 days, will discuss with  regarding possible cystoscopy for further evaluation  - continue with trial of pyridium today

## 2019-11-05 NOTE — PROGRESS NOTE ADULT - SUBJECTIVE AND OBJECTIVE BOX
Patient is a 74y old  Male who presents with a chief complaint of Told to come ot the ER for positive urine cultures (05 Nov 2019 14:59)    SUBJECTIVE / OVERNIGHT EVENTS:  Patient seen and examined this afternoon. Suprapubic pain still present but less compared to yesterday. Patient eating with normal bowel habits. No fever, n/v, chest pain or SOB.     MEDICATIONS  (STANDING):  artificial  tears Solution 1 Drop(s) Both EYES two times a day  aztreonam  IVPB 2000 milliGRAM(s) IV Intermittent every 8 hours  carbidopa/levodopa  25/100 1 Tablet(s) Oral three times a day  ceftazidime/avibactam IVPB 2.5 Gram(s) IV Intermittent every 8 hours  dextrose 5%. 1000 milliLiter(s) (50 mL/Hr) IV Continuous <Continuous>  dextrose 50% Injectable 12.5 Gram(s) IV Push once  dextrose 50% Injectable 25 Gram(s) IV Push once  dextrose 50% Injectable 25 Gram(s) IV Push once  donepezil 10 milliGRAM(s) Oral at bedtime  dorzolamide 2%/timolol 0.5% Ophthalmic Solution 1 Drop(s) Left EYE two times a day  escitalopram 5 milliGRAM(s) Oral daily  heparin  Injectable 5000 Unit(s) SubCutaneous every 12 hours  influenza   Vaccine 0.5 milliLiter(s) IntraMuscular once  insulin lispro (HumaLOG) corrective regimen sliding scale   SubCutaneous three times a day before meals  insulin lispro (HumaLOG) corrective regimen sliding scale   SubCutaneous at bedtime  lactobacillus acidophilus 1 Tablet(s) Oral two times a day  latanoprost 0.005% Ophthalmic Solution 1 Drop(s) Right EYE at bedtime  memantine 10 milliGRAM(s) Oral daily  pantoprazole    Tablet 40 milliGRAM(s) Oral before breakfast  phenazopyridine 100 milliGRAM(s) Oral every 8 hours  QUEtiapine 25 milliGRAM(s) Oral daily  sodium chloride 1 Gram(s) Oral three times a day  tamsulosin 0.4 milliGRAM(s) Oral at bedtime  trihexyphenidyl 2 milliGRAM(s) Oral two times a day    MEDICATIONS  (PRN):  dextrose 40% Gel 15 Gram(s) Oral once PRN Blood Glucose LESS THAN 70 milliGRAM(s)/deciliter  glucagon  Injectable 1 milliGRAM(s) IntraMuscular once PRN Glucose LESS THAN 70 milligrams/deciliter  simethicone 80 milliGRAM(s) Chew every 6 hours PRN Gas      Vital Signs Last 24 Hrs  T(C): 36.7 (05 Nov 2019 15:35), Max: 36.7 (05 Nov 2019 15:35)  T(F): 98 (05 Nov 2019 15:35), Max: 98 (05 Nov 2019 15:35)  HR: 67 (05 Nov 2019 15:35) (64 - 70)  BP: 113/61 (05 Nov 2019 15:35) (113/61 - 129/66)  BP(mean): --  RR: 17 (05 Nov 2019 15:35) (17 - 17)  SpO2: 98% (05 Nov 2019 15:35) (98% - 98%)  CAPILLARY BLOOD GLUCOSE      POCT Blood Glucose.: 152 mg/dL (05 Nov 2019 17:12)  POCT Blood Glucose.: 168 mg/dL (05 Nov 2019 12:34)  POCT Blood Glucose.: 131 mg/dL (05 Nov 2019 08:13)  POCT Blood Glucose.: 174 mg/dL (04 Nov 2019 22:23)    I&O's Summary    04 Nov 2019 07:01  -  05 Nov 2019 07:00  --------------------------------------------------------  IN: 83.3 mL / OUT: 0 mL / NET: 83.3 mL          PHYSICAL EXAM  GENERAL: NAD, well-developed  CHEST/LUNG: Clear to auscultation bilaterally; No wheeze  HEART: Regular rate and rhythm; No murmurs, rubs, or gallops  ABDOMEN: Soft, Suprapubic tenderness to palpation, Nondistended; Bowel sounds present  EXTREMITIES:  2+ Peripheral Pulses, No clubbing, cyanosis, or edema  PSYCH: AAOx3      LABS:                        12.7   10.58 )-----------( 340      ( 05 Nov 2019 04:58 )             37.1     11-05    126<L>  |  91<L>  |  8   ----------------------------<  102<H>  3.7   |  24  |  0.72    Ca    9.0      05 Nov 2019 04:58          Consultant(s) Notes Reviewed:  Yes  Care Discussed with Consultants/Other Providers:

## 2019-11-05 NOTE — PROGRESS NOTE ADULT - SUBJECTIVE AND OBJECTIVE BOX
Follow Up:      Inverval History/ROS:Patient is a 74y old  Male who presents with a chief complaint of Told to come ot the ER for positive urine cultures (04 Nov 2019 17:45)    Less pain today  Afebrile      Allergies    No Known Allergies    Intolerances        ANTIMICROBIALS:  aztreonam  IVPB 2000 every 8 hours  ceftazidime/avibactam IVPB 2.5 every 8 hours      OTHER MEDS:  artificial  tears Solution 1 Drop(s) Both EYES two times a day  carbidopa/levodopa  25/100 1 Tablet(s) Oral three times a day  dextrose 40% Gel 15 Gram(s) Oral once PRN  dextrose 5%. 1000 milliLiter(s) IV Continuous <Continuous>  dextrose 50% Injectable 12.5 Gram(s) IV Push once  dextrose 50% Injectable 25 Gram(s) IV Push once  dextrose 50% Injectable 25 Gram(s) IV Push once  donepezil 10 milliGRAM(s) Oral at bedtime  dorzolamide 2%/timolol 0.5% Ophthalmic Solution 1 Drop(s) Left EYE two times a day  escitalopram 5 milliGRAM(s) Oral daily  glucagon  Injectable 1 milliGRAM(s) IntraMuscular once PRN  heparin  Injectable 5000 Unit(s) SubCutaneous every 12 hours  influenza   Vaccine 0.5 milliLiter(s) IntraMuscular once  insulin lispro (HumaLOG) corrective regimen sliding scale   SubCutaneous three times a day before meals  insulin lispro (HumaLOG) corrective regimen sliding scale   SubCutaneous at bedtime  lactobacillus acidophilus 1 Tablet(s) Oral two times a day  latanoprost 0.005% Ophthalmic Solution 1 Drop(s) Right EYE at bedtime  memantine 10 milliGRAM(s) Oral daily  pantoprazole    Tablet 40 milliGRAM(s) Oral before breakfast  phenazopyridine 100 milliGRAM(s) Oral every 8 hours  QUEtiapine 25 milliGRAM(s) Oral daily  simethicone 80 milliGRAM(s) Chew every 6 hours PRN  sodium chloride 1 Gram(s) Oral three times a day  tamsulosin 0.4 milliGRAM(s) Oral at bedtime  trihexyphenidyl 2 milliGRAM(s) Oral two times a day      Vital Signs Last 24 Hrs  T(C): 36.3 (05 Nov 2019 05:58), Max: 36.3 (04 Nov 2019 21:14)  T(F): 97.3 (05 Nov 2019 05:58), Max: 97.4 (04 Nov 2019 21:14)  HR: 64 (05 Nov 2019 05:58) (64 - 73)  BP: 126/62 (05 Nov 2019 05:58) (126/62 - 155/78)  BP(mean): --  RR: 17 (05 Nov 2019 05:58) (17 - 18)  SpO2: 98% (05 Nov 2019 05:58) (98% - 100%)    PHYSICAL EXAM:  General: [ ] non-toxic  HEAD/EYES: [ ] PERRL [x ] white sclera [ ] icterus  ENT:  [ ] normal [x ] supple [ ] thrush [ ] pharyngeal exudate  Cardiovascular:   [ ] murmur [x ] normal [ ] PPM/AICD  Respiratory:  [x ] clear to ausculation bilaterally  GI:  [ x] soft, non-tender, normal bowel sounds  :  [ ] pichardo [ ] no CVA tenderness   Musculoskeletal:  [ ] no synovitis  Neurologic:  [ ] non-focal exam   Skin:  [x ] no rash  Lymph: [x ] no lymphadenopathy  Psychiatric:  [x ] appropriate affect [ ] alert & oriented  Lines:  [x ] no phlebitis [ ] central line                                12.7   10.58 )-----------( 340      ( 05 Nov 2019 04:58 )             37.1       11-05    126<L>  |  91<L>  |  8   ----------------------------<  102<H>  3.7   |  24  |  0.72    Ca    9.0      05 Nov 2019 04:58  Phos  2.7     11-04  Mg     2.1     11-04            MICROBIOLOGY:    RADIOLOGY:

## 2019-11-06 LAB
ANION GAP SERPL CALC-SCNC: 12 MMO/L — SIGNIFICANT CHANGE UP (ref 7–14)
BUN SERPL-MCNC: 8 MG/DL — SIGNIFICANT CHANGE UP (ref 7–23)
CALCIUM SERPL-MCNC: 8.5 MG/DL — SIGNIFICANT CHANGE UP (ref 8.4–10.5)
CHLORIDE SERPL-SCNC: 87 MMOL/L — LOW (ref 98–107)
CO2 SERPL-SCNC: 23 MMOL/L — SIGNIFICANT CHANGE UP (ref 22–31)
CREAT SERPL-MCNC: 0.66 MG/DL — SIGNIFICANT CHANGE UP (ref 0.5–1.3)
GLUCOSE BLDC GLUCOMTR-MCNC: 113 MG/DL — HIGH (ref 70–99)
GLUCOSE BLDC GLUCOMTR-MCNC: 132 MG/DL — HIGH (ref 70–99)
GLUCOSE BLDC GLUCOMTR-MCNC: 151 MG/DL — HIGH (ref 70–99)
GLUCOSE BLDC GLUCOMTR-MCNC: 97 MG/DL — SIGNIFICANT CHANGE UP (ref 70–99)
GLUCOSE SERPL-MCNC: 122 MG/DL — HIGH (ref 70–99)
HCT VFR BLD CALC: 36.4 % — LOW (ref 39–50)
HGB BLD-MCNC: 12.5 G/DL — LOW (ref 13–17)
MAGNESIUM SERPL-MCNC: 2.3 MG/DL — SIGNIFICANT CHANGE UP (ref 1.6–2.6)
MCHC RBC-ENTMCNC: 25.2 PG — LOW (ref 27–34)
MCHC RBC-ENTMCNC: 34.3 % — SIGNIFICANT CHANGE UP (ref 32–36)
MCV RBC AUTO: 73.4 FL — LOW (ref 80–100)
NRBC # FLD: 0 K/UL — SIGNIFICANT CHANGE UP (ref 0–0)
PHOSPHATE SERPL-MCNC: 2.2 MG/DL — LOW (ref 2.5–4.5)
PLATELET # BLD AUTO: 329 K/UL — SIGNIFICANT CHANGE UP (ref 150–400)
PMV BLD: 8.3 FL — SIGNIFICANT CHANGE UP (ref 7–13)
POTASSIUM SERPL-MCNC: 3.9 MMOL/L — SIGNIFICANT CHANGE UP (ref 3.5–5.3)
POTASSIUM SERPL-SCNC: 3.9 MMOL/L — SIGNIFICANT CHANGE UP (ref 3.5–5.3)
RBC # BLD: 4.96 M/UL — SIGNIFICANT CHANGE UP (ref 4.2–5.8)
RBC # FLD: 13.7 % — SIGNIFICANT CHANGE UP (ref 10.3–14.5)
SODIUM SERPL-SCNC: 122 MMOL/L — LOW (ref 135–145)
WBC # BLD: 9.16 K/UL — SIGNIFICANT CHANGE UP (ref 3.8–10.5)
WBC # FLD AUTO: 9.16 K/UL — SIGNIFICANT CHANGE UP (ref 3.8–10.5)

## 2019-11-06 PROCEDURE — 99233 SBSQ HOSP IP/OBS HIGH 50: CPT

## 2019-11-06 PROCEDURE — 99232 SBSQ HOSP IP/OBS MODERATE 35: CPT

## 2019-11-06 RX ORDER — GABAPENTIN 400 MG/1
300 CAPSULE ORAL AT BEDTIME
Refills: 0 | Status: DISCONTINUED | OUTPATIENT
Start: 2019-11-06 | End: 2019-11-12

## 2019-11-06 RX ADMIN — Medication 1 DROP(S): at 17:45

## 2019-11-06 RX ADMIN — Medication 1 TABLET(S): at 06:07

## 2019-11-06 RX ADMIN — HEPARIN SODIUM 5000 UNIT(S): 5000 INJECTION INTRAVENOUS; SUBCUTANEOUS at 17:45

## 2019-11-06 RX ADMIN — SODIUM CHLORIDE 1 GRAM(S): 9 INJECTION INTRAMUSCULAR; INTRAVENOUS; SUBCUTANEOUS at 06:07

## 2019-11-06 RX ADMIN — DORZOLAMIDE HYDROCHLORIDE TIMOLOL MALEATE 1 DROP(S): 20; 5 SOLUTION/ DROPS OPHTHALMIC at 17:45

## 2019-11-06 RX ADMIN — DORZOLAMIDE HYDROCHLORIDE TIMOLOL MALEATE 1 DROP(S): 20; 5 SOLUTION/ DROPS OPHTHALMIC at 06:08

## 2019-11-06 RX ADMIN — Medication 100 MILLIGRAM(S): at 13:27

## 2019-11-06 RX ADMIN — Medication 100 MILLIGRAM(S): at 06:06

## 2019-11-06 RX ADMIN — CARBIDOPA AND LEVODOPA 1 TABLET(S): 25; 100 TABLET ORAL at 13:27

## 2019-11-06 RX ADMIN — TAMSULOSIN HYDROCHLORIDE 0.4 MILLIGRAM(S): 0.4 CAPSULE ORAL at 21:33

## 2019-11-06 RX ADMIN — QUETIAPINE FUMARATE 25 MILLIGRAM(S): 200 TABLET, FILM COATED ORAL at 13:28

## 2019-11-06 RX ADMIN — Medication 1 TABLET(S): at 17:45

## 2019-11-06 RX ADMIN — SODIUM CHLORIDE 1 GRAM(S): 9 INJECTION INTRAMUSCULAR; INTRAVENOUS; SUBCUTANEOUS at 21:33

## 2019-11-06 RX ADMIN — Medication 2 MILLIGRAM(S): at 06:07

## 2019-11-06 RX ADMIN — CARBIDOPA AND LEVODOPA 1 TABLET(S): 25; 100 TABLET ORAL at 06:07

## 2019-11-06 RX ADMIN — Medication 1 DROP(S): at 06:06

## 2019-11-06 RX ADMIN — LATANOPROST 1 DROP(S): 0.05 SOLUTION/ DROPS OPHTHALMIC; TOPICAL at 21:33

## 2019-11-06 RX ADMIN — Medication 100 MILLIGRAM(S): at 21:32

## 2019-11-06 RX ADMIN — SODIUM CHLORIDE 1 GRAM(S): 9 INJECTION INTRAMUSCULAR; INTRAVENOUS; SUBCUTANEOUS at 13:27

## 2019-11-06 RX ADMIN — CARBIDOPA AND LEVODOPA 1 TABLET(S): 25; 100 TABLET ORAL at 21:33

## 2019-11-06 RX ADMIN — Medication 100 MILLIGRAM(S): at 06:07

## 2019-11-06 RX ADMIN — ESCITALOPRAM OXALATE 5 MILLIGRAM(S): 10 TABLET, FILM COATED ORAL at 13:27

## 2019-11-06 RX ADMIN — GABAPENTIN 300 MILLIGRAM(S): 400 CAPSULE ORAL at 21:32

## 2019-11-06 RX ADMIN — Medication 2 MILLIGRAM(S): at 17:45

## 2019-11-06 RX ADMIN — MEMANTINE HYDROCHLORIDE 10 MILLIGRAM(S): 10 TABLET ORAL at 13:27

## 2019-11-06 RX ADMIN — HEPARIN SODIUM 5000 UNIT(S): 5000 INJECTION INTRAVENOUS; SUBCUTANEOUS at 06:07

## 2019-11-06 RX ADMIN — Medication 1: at 17:44

## 2019-11-06 RX ADMIN — PANTOPRAZOLE SODIUM 40 MILLIGRAM(S): 20 TABLET, DELAYED RELEASE ORAL at 06:07

## 2019-11-06 NOTE — PROGRESS NOTE ADULT - SUBJECTIVE AND OBJECTIVE BOX
Patient is a 74y old  Male who presents with a chief complaint of Told to come ot the ER for positive urine cultures (06 Nov 2019 15:23)      SUBJECTIVE / OVERNIGHT EVENTS:  Improved suprapubic pain. Patient without n/v. Urinating well. No chest pain or SOB    MEDICATIONS  (STANDING):  artificial  tears Solution 1 Drop(s) Both EYES two times a day  aztreonam  IVPB 2000 milliGRAM(s) IV Intermittent every 8 hours  carbidopa/levodopa  25/100 1 Tablet(s) Oral three times a day  ceftazidime/avibactam IVPB 2.5 Gram(s) IV Intermittent every 8 hours  dextrose 5%. 1000 milliLiter(s) (50 mL/Hr) IV Continuous <Continuous>  dextrose 50% Injectable 12.5 Gram(s) IV Push once  dextrose 50% Injectable 25 Gram(s) IV Push once  dextrose 50% Injectable 25 Gram(s) IV Push once  donepezil 10 milliGRAM(s) Oral at bedtime  dorzolamide 2%/timolol 0.5% Ophthalmic Solution 1 Drop(s) Left EYE two times a day  escitalopram 5 milliGRAM(s) Oral daily  heparin  Injectable 5000 Unit(s) SubCutaneous every 12 hours  influenza   Vaccine 0.5 milliLiter(s) IntraMuscular once  insulin lispro (HumaLOG) corrective regimen sliding scale   SubCutaneous three times a day before meals  insulin lispro (HumaLOG) corrective regimen sliding scale   SubCutaneous at bedtime  lactobacillus acidophilus 1 Tablet(s) Oral two times a day  latanoprost 0.005% Ophthalmic Solution 1 Drop(s) Right EYE at bedtime  memantine 10 milliGRAM(s) Oral daily  pantoprazole    Tablet 40 milliGRAM(s) Oral before breakfast  QUEtiapine 25 milliGRAM(s) Oral daily  sodium chloride 1 Gram(s) Oral three times a day  tamsulosin 0.4 milliGRAM(s) Oral at bedtime  trihexyphenidyl 2 milliGRAM(s) Oral two times a day    MEDICATIONS  (PRN):  dextrose 40% Gel 15 Gram(s) Oral once PRN Blood Glucose LESS THAN 70 milliGRAM(s)/deciliter  glucagon  Injectable 1 milliGRAM(s) IntraMuscular once PRN Glucose LESS THAN 70 milligrams/deciliter  simethicone 80 milliGRAM(s) Chew every 6 hours PRN Gas      Vital Signs Last 24 Hrs  T(C): 36.7 (06 Nov 2019 13:25), Max: 36.7 (06 Nov 2019 06:04)  T(F): 98 (06 Nov 2019 13:25), Max: 98.1 (06 Nov 2019 06:04)  HR: 88 (06 Nov 2019 13:25) (63 - 88)  BP: 142/72 (06 Nov 2019 13:25) (113/59 - 142/72)  BP(mean): --  RR: 18 (06 Nov 2019 13:25) (17 - 18)  SpO2: 100% (06 Nov 2019 13:25) (97% - 100%)  CAPILLARY BLOOD GLUCOSE      POCT Blood Glucose.: 151 mg/dL (06 Nov 2019 17:09)  POCT Blood Glucose.: 97 mg/dL (06 Nov 2019 13:24)  POCT Blood Glucose.: 113 mg/dL (06 Nov 2019 08:28)  POCT Blood Glucose.: 174 mg/dL (05 Nov 2019 22:16)    I&O's Summary        PHYSICAL EXAM  GENERAL: NAD, well-developed  CHEST/LUNG: Clear to auscultation bilaterally; No wheeze  HEART: Regular rate and rhythm; No murmurs, rubs, or gallops  ABDOMEN: Soft, Suprapubic tenderness to palpation, Nondistended; Bowel sounds present  EXTREMITIES:  2+ Peripheral Pulses, No clubbing, cyanosis, or edema  PSYCH: AAOx3          LABS:                        12.5   9.16  )-----------( 329      ( 06 Nov 2019 07:52 )             36.4     11-06    122<L>  |  87<L>  |  8   ----------------------------<  122<H>  3.9   |  23  |  0.66    Ca    8.5      06 Nov 2019 07:50  Phos  2.2     11-06  Mg     2.3     11-06            Consultant(s) Notes Reviewed:  Yes  Care Discussed with Consultants/Other Providers: Patient is a 74y old  Male who presents with a chief complaint of Told to come ot the ER for positive urine cultures (06 Nov 2019 15:23)      SUBJECTIVE / OVERNIGHT EVENTS:  Improved suprapubic pain, but still present with dysuria. Patient without n/v. No chest pain or SOB. Patient admits to drinking a lot of water (greater than 5-10 water bottles) because he feels thirsty. He also complains of burning sensation in his feet that he also had at home.      MEDICATIONS  (STANDING):  artificial  tears Solution 1 Drop(s) Both EYES two times a day  aztreonam  IVPB 2000 milliGRAM(s) IV Intermittent every 8 hours  carbidopa/levodopa  25/100 1 Tablet(s) Oral three times a day  ceftazidime/avibactam IVPB 2.5 Gram(s) IV Intermittent every 8 hours  dextrose 5%. 1000 milliLiter(s) (50 mL/Hr) IV Continuous <Continuous>  dextrose 50% Injectable 12.5 Gram(s) IV Push once  dextrose 50% Injectable 25 Gram(s) IV Push once  dextrose 50% Injectable 25 Gram(s) IV Push once  donepezil 10 milliGRAM(s) Oral at bedtime  dorzolamide 2%/timolol 0.5% Ophthalmic Solution 1 Drop(s) Left EYE two times a day  escitalopram 5 milliGRAM(s) Oral daily  heparin  Injectable 5000 Unit(s) SubCutaneous every 12 hours  influenza   Vaccine 0.5 milliLiter(s) IntraMuscular once  insulin lispro (HumaLOG) corrective regimen sliding scale   SubCutaneous three times a day before meals  insulin lispro (HumaLOG) corrective regimen sliding scale   SubCutaneous at bedtime  lactobacillus acidophilus 1 Tablet(s) Oral two times a day  latanoprost 0.005% Ophthalmic Solution 1 Drop(s) Right EYE at bedtime  memantine 10 milliGRAM(s) Oral daily  pantoprazole    Tablet 40 milliGRAM(s) Oral before breakfast  QUEtiapine 25 milliGRAM(s) Oral daily  sodium chloride 1 Gram(s) Oral three times a day  tamsulosin 0.4 milliGRAM(s) Oral at bedtime  trihexyphenidyl 2 milliGRAM(s) Oral two times a day    MEDICATIONS  (PRN):  dextrose 40% Gel 15 Gram(s) Oral once PRN Blood Glucose LESS THAN 70 milliGRAM(s)/deciliter  glucagon  Injectable 1 milliGRAM(s) IntraMuscular once PRN Glucose LESS THAN 70 milligrams/deciliter  simethicone 80 milliGRAM(s) Chew every 6 hours PRN Gas      Vital Signs Last 24 Hrs  T(C): 36.7 (06 Nov 2019 13:25), Max: 36.7 (06 Nov 2019 06:04)  T(F): 98 (06 Nov 2019 13:25), Max: 98.1 (06 Nov 2019 06:04)  HR: 88 (06 Nov 2019 13:25) (63 - 88)  BP: 142/72 (06 Nov 2019 13:25) (113/59 - 142/72)  BP(mean): --  RR: 18 (06 Nov 2019 13:25) (17 - 18)  SpO2: 100% (06 Nov 2019 13:25) (97% - 100%)      CAPILLARY BLOOD GLUCOSE  POCT Blood Glucose.: 151 mg/dL (06 Nov 2019 17:09)  POCT Blood Glucose.: 97 mg/dL (06 Nov 2019 13:24)  POCT Blood Glucose.: 113 mg/dL (06 Nov 2019 08:28)  POCT Blood Glucose.: 174 mg/dL (05 Nov 2019 22:16)        PHYSICAL EXAM  GENERAL: NAD, well-developed  CHEST/LUNG: Clear to auscultation bilaterally; No wheeze  HEART: Regular rate and rhythm; No murmurs, rubs, or gallops  ABDOMEN: Soft, Suprapubic tenderness to palpation, Nondistended; Bowel sounds present  EXTREMITIES:  2+ Peripheral Pulses, No clubbing, cyanosis, or edema  PSYCH: AAOx3          LABS:                        12.5   9.16  )-----------( 329      ( 06 Nov 2019 07:52 )             36.4     11-06    122<L>  |  87<L>  |  8   ----------------------------<  122<H>  3.9   |  23  |  0.66    Ca    8.5      06 Nov 2019 07:50  Phos  2.2     11-06  Mg     2.3     11-06            Consultant(s) Notes Reviewed:  Yes  Care Discussed with Consultants/Other Providers:

## 2019-11-06 NOTE — PROGRESS NOTE ADULT - PROBLEM SELECTOR PLAN 2
Chronic and asymptomatic. Urine electrolytes and urine osmolality noted. 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 adrenal insufficiency (even though low cortisol level, had a normal cosyntropin test). Likely SIADH  - c/w NaCl tabs TID   - re-enforce fluid restriction  - pt on Lexapro, may be contributing to Hyponatremia Chronic and asymptomatic. Urine electrolytes and urine osmolality noted. 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 adrenal insufficiency (even though low cortisol level, had a normal cosyntropin test). Likely SIADH. Noted decrease in sodium level secondary to increased water intake   - c/w NaCl tabs TID   - re-enforce fluid restriction to 1L  - pt on Lexapro, may be contributing to Hyponatremia (unclear why patient on this medication since pt's family said he was not on that before) Other

## 2019-11-06 NOTE — PROGRESS NOTE ADULT - PROBLEM SELECTOR PLAN 1
Urine cultures showing Klebsiella pneumoniae (CRE). CT abd/pelvis completed today, 11/4/19 negative for stones or mass.   - Patient started on IV Vabomere (meropenem/vaborbactam --> now with clinical improvement since starting ceftazidime/avibactam and aztreonam (started on 11/4/19) as per ID. Will continue  - ID following, recs reviewed and appreciated.    - Blood NGTD   - if no significant clinical improvement in 1-2 days, will discuss with  regarding possible cystoscopy for further evaluation  - continue with trial of pyridium today

## 2019-11-06 NOTE — PROGRESS NOTE ADULT - SUBJECTIVE AND OBJECTIVE BOX
Follow Up:      Inverval History/ROS:Patient is a 74y old  Male who presents with a chief complaint of Told to come ot the ER for positive urine cultures (05 Nov 2019 18:32)    no fever    C/o occasional psurapubic pain.  But better than at presentation    Day 2 current abx regimen    Allergies    No Known Allergies    Intolerances        ANTIMICROBIALS:  aztreonam  IVPB 2000 every 8 hours  ceftazidime/avibactam IVPB 2.5 every 8 hours      OTHER MEDS:  artificial  tears Solution 1 Drop(s) Both EYES two times a day  carbidopa/levodopa  25/100 1 Tablet(s) Oral three times a day  dextrose 40% Gel 15 Gram(s) Oral once PRN  dextrose 5%. 1000 milliLiter(s) IV Continuous <Continuous>  dextrose 50% Injectable 12.5 Gram(s) IV Push once  dextrose 50% Injectable 25 Gram(s) IV Push once  dextrose 50% Injectable 25 Gram(s) IV Push once  donepezil 10 milliGRAM(s) Oral at bedtime  dorzolamide 2%/timolol 0.5% Ophthalmic Solution 1 Drop(s) Left EYE two times a day  escitalopram 5 milliGRAM(s) Oral daily  glucagon  Injectable 1 milliGRAM(s) IntraMuscular once PRN  heparin  Injectable 5000 Unit(s) SubCutaneous every 12 hours  influenza   Vaccine 0.5 milliLiter(s) IntraMuscular once  insulin lispro (HumaLOG) corrective regimen sliding scale   SubCutaneous three times a day before meals  insulin lispro (HumaLOG) corrective regimen sliding scale   SubCutaneous at bedtime  lactobacillus acidophilus 1 Tablet(s) Oral two times a day  latanoprost 0.005% Ophthalmic Solution 1 Drop(s) Right EYE at bedtime  memantine 10 milliGRAM(s) Oral daily  pantoprazole    Tablet 40 milliGRAM(s) Oral before breakfast  QUEtiapine 25 milliGRAM(s) Oral daily  simethicone 80 milliGRAM(s) Chew every 6 hours PRN  sodium chloride 1 Gram(s) Oral three times a day  tamsulosin 0.4 milliGRAM(s) Oral at bedtime  trihexyphenidyl 2 milliGRAM(s) Oral two times a day      Vital Signs Last 24 Hrs  T(C): 36.7 (06 Nov 2019 13:25), Max: 36.7 (05 Nov 2019 15:35)  T(F): 98 (06 Nov 2019 13:25), Max: 98.1 (06 Nov 2019 06:04)  HR: 88 (06 Nov 2019 13:25) (63 - 88)  BP: 142/72 (06 Nov 2019 13:25) (113/59 - 142/72)  BP(mean): --  RR: 18 (06 Nov 2019 13:25) (17 - 18)  SpO2: 100% (06 Nov 2019 13:25) (97% - 100%)    PHYSICAL EXAM:  General: [x ] non-toxic  HEAD/EYES: [ ] PERRL [x ] white sclera [ ] icterus  ENT:  [ ] normal [ x] supple [ ] thrush [ ] pharyngeal exudate  Cardiovascular:   [ ] murmur [ x] normal [ ] PPM/AICD  Respiratory:  [ x] clear to ausculation bilaterally  GI:  x[ ] soft, non-tender, normal bowel sounds  :  [ ] pichardo [ x] no CVA tenderness   Musculoskeletal:  [ ] no synovitis  Neurologic:  [ ] non-focal exam   Skin:  [x ]x no rash  Lymph: [ ] no lymphadenopathy  Psychiatric:  [x ] appropriate affect [ ] alert & oriented  Lines:  x[ ] no phlebitis [ ] central line                                12.5   9.16  )-----------( 329      ( 06 Nov 2019 07:52 )             36.4       11-06    122<L>  |  87<L>  |  8   ----------------------------<  122<H>  3.9   |  23  |  0.66    Ca    8.5      06 Nov 2019 07:50  Phos  2.2     11-06  Mg     2.3     11-06            MICROBIOLOGY:    RADIOLOGY:

## 2019-11-07 ENCOUNTER — APPOINTMENT (OUTPATIENT)
Dept: INTERNAL MEDICINE | Facility: CLINIC | Age: 74
End: 2019-11-07

## 2019-11-07 LAB
ANION GAP SERPL CALC-SCNC: 11 MMO/L — SIGNIFICANT CHANGE UP (ref 7–14)
BUN SERPL-MCNC: 10 MG/DL — SIGNIFICANT CHANGE UP (ref 7–23)
CALCIUM SERPL-MCNC: 8.9 MG/DL — SIGNIFICANT CHANGE UP (ref 8.4–10.5)
CHLORIDE SERPL-SCNC: 93 MMOL/L — LOW (ref 98–107)
CO2 SERPL-SCNC: 24 MMOL/L — SIGNIFICANT CHANGE UP (ref 22–31)
CREAT SERPL-MCNC: 0.71 MG/DL — SIGNIFICANT CHANGE UP (ref 0.5–1.3)
GLUCOSE BLDC GLUCOMTR-MCNC: 122 MG/DL — HIGH (ref 70–99)
GLUCOSE BLDC GLUCOMTR-MCNC: 131 MG/DL — HIGH (ref 70–99)
GLUCOSE BLDC GLUCOMTR-MCNC: 141 MG/DL — HIGH (ref 70–99)
GLUCOSE BLDC GLUCOMTR-MCNC: 148 MG/DL — HIGH (ref 70–99)
GLUCOSE SERPL-MCNC: 127 MG/DL — HIGH (ref 70–99)
HCT VFR BLD CALC: 36.3 % — LOW (ref 39–50)
HGB BLD-MCNC: 12.6 G/DL — LOW (ref 13–17)
MAGNESIUM SERPL-MCNC: 2.4 MG/DL — SIGNIFICANT CHANGE UP (ref 1.6–2.6)
MCHC RBC-ENTMCNC: 25.6 PG — LOW (ref 27–34)
MCHC RBC-ENTMCNC: 34.7 % — SIGNIFICANT CHANGE UP (ref 32–36)
MCV RBC AUTO: 73.8 FL — LOW (ref 80–100)
NRBC # FLD: 0 K/UL — SIGNIFICANT CHANGE UP (ref 0–0)
PHOSPHATE SERPL-MCNC: 2.7 MG/DL — SIGNIFICANT CHANGE UP (ref 2.5–4.5)
PLATELET # BLD AUTO: 321 K/UL — SIGNIFICANT CHANGE UP (ref 150–400)
PMV BLD: 8.2 FL — SIGNIFICANT CHANGE UP (ref 7–13)
POTASSIUM SERPL-MCNC: 3.9 MMOL/L — SIGNIFICANT CHANGE UP (ref 3.5–5.3)
POTASSIUM SERPL-SCNC: 3.9 MMOL/L — SIGNIFICANT CHANGE UP (ref 3.5–5.3)
RBC # BLD: 4.92 M/UL — SIGNIFICANT CHANGE UP (ref 4.2–5.8)
RBC # FLD: 13.6 % — SIGNIFICANT CHANGE UP (ref 10.3–14.5)
SODIUM SERPL-SCNC: 128 MMOL/L — LOW (ref 135–145)
WBC # BLD: 7.77 K/UL — SIGNIFICANT CHANGE UP (ref 3.8–10.5)
WBC # FLD AUTO: 7.77 K/UL — SIGNIFICANT CHANGE UP (ref 3.8–10.5)

## 2019-11-07 PROCEDURE — 99233 SBSQ HOSP IP/OBS HIGH 50: CPT

## 2019-11-07 PROCEDURE — 99232 SBSQ HOSP IP/OBS MODERATE 35: CPT

## 2019-11-07 RX ADMIN — ESCITALOPRAM OXALATE 5 MILLIGRAM(S): 10 TABLET, FILM COATED ORAL at 13:05

## 2019-11-07 RX ADMIN — HEPARIN SODIUM 5000 UNIT(S): 5000 INJECTION INTRAVENOUS; SUBCUTANEOUS at 18:03

## 2019-11-07 RX ADMIN — SODIUM CHLORIDE 1 GRAM(S): 9 INJECTION INTRAMUSCULAR; INTRAVENOUS; SUBCUTANEOUS at 13:05

## 2019-11-07 RX ADMIN — DORZOLAMIDE HYDROCHLORIDE TIMOLOL MALEATE 1 DROP(S): 20; 5 SOLUTION/ DROPS OPHTHALMIC at 06:10

## 2019-11-07 RX ADMIN — Medication 2 MILLIGRAM(S): at 18:03

## 2019-11-07 RX ADMIN — CARBIDOPA AND LEVODOPA 1 TABLET(S): 25; 100 TABLET ORAL at 13:05

## 2019-11-07 RX ADMIN — GABAPENTIN 300 MILLIGRAM(S): 400 CAPSULE ORAL at 21:43

## 2019-11-07 RX ADMIN — DORZOLAMIDE HYDROCHLORIDE TIMOLOL MALEATE 1 DROP(S): 20; 5 SOLUTION/ DROPS OPHTHALMIC at 18:03

## 2019-11-07 RX ADMIN — Medication 1 DROP(S): at 06:10

## 2019-11-07 RX ADMIN — TAMSULOSIN HYDROCHLORIDE 0.4 MILLIGRAM(S): 0.4 CAPSULE ORAL at 21:43

## 2019-11-07 RX ADMIN — CARBIDOPA AND LEVODOPA 1 TABLET(S): 25; 100 TABLET ORAL at 21:43

## 2019-11-07 RX ADMIN — Medication 100 MILLIGRAM(S): at 13:05

## 2019-11-07 RX ADMIN — PANTOPRAZOLE SODIUM 40 MILLIGRAM(S): 20 TABLET, DELAYED RELEASE ORAL at 06:09

## 2019-11-07 RX ADMIN — LATANOPROST 1 DROP(S): 0.05 SOLUTION/ DROPS OPHTHALMIC; TOPICAL at 21:43

## 2019-11-07 RX ADMIN — Medication 1 DROP(S): at 18:03

## 2019-11-07 RX ADMIN — Medication 1 TABLET(S): at 06:09

## 2019-11-07 RX ADMIN — Medication 100 MILLIGRAM(S): at 06:11

## 2019-11-07 RX ADMIN — CARBIDOPA AND LEVODOPA 1 TABLET(S): 25; 100 TABLET ORAL at 06:09

## 2019-11-07 RX ADMIN — HEPARIN SODIUM 5000 UNIT(S): 5000 INJECTION INTRAVENOUS; SUBCUTANEOUS at 06:10

## 2019-11-07 RX ADMIN — Medication 2 MILLIGRAM(S): at 06:09

## 2019-11-07 RX ADMIN — SODIUM CHLORIDE 1 GRAM(S): 9 INJECTION INTRAMUSCULAR; INTRAVENOUS; SUBCUTANEOUS at 21:43

## 2019-11-07 RX ADMIN — Medication 1 TABLET(S): at 18:03

## 2019-11-07 RX ADMIN — QUETIAPINE FUMARATE 25 MILLIGRAM(S): 200 TABLET, FILM COATED ORAL at 13:05

## 2019-11-07 RX ADMIN — SODIUM CHLORIDE 1 GRAM(S): 9 INJECTION INTRAMUSCULAR; INTRAVENOUS; SUBCUTANEOUS at 06:10

## 2019-11-07 RX ADMIN — Medication 100 MILLIGRAM(S): at 21:43

## 2019-11-07 NOTE — PROGRESS NOTE ADULT - ASSESSMENT
75 y/o M with PMH of Dementia, BPH(with recent Aguilar removal on 10/28 and TURP a week ago), Parkinson's disease admitted for positive urine cx growing MDR Klebsiella, initially not improving symptomatically with IV Vabomere. Now on Avycaz and aztreonam.

## 2019-11-07 NOTE — PROGRESS NOTE ADULT - ASSESSMENT
73 year old with parkinson's and BPH with a chronic pichardo  Prior known colonization with CRE klebsiella  S/p recent TURP  recent presented with dysuria  and urine culture positive for CRE klebsiella  Afebrile with normal WBC      1) Acute UTI  He has dysuria and suprapubic tenderness  Tx with antibiotics    2) CRE- ? metalobetalactamase  Resistant to vabomere and avybactam    Trial of combination therapy with avycaz and aztreonam- plan 7 d coruse    Other option is tigecyline but does not concentrate well in urine  May be okay later in tx course.       3) Abdominal pain  thought to be due to uti  CT only with bladder wall thickening    Not febrile/ WBC normal  Less tender today

## 2019-11-07 NOTE — CONSULT NOTE ADULT - SUBJECTIVE AND OBJECTIVE BOX
HPI:  73 y/o M with PMH of Dementia, BPH (with recent Aguilar removal on 10/28 and TURP a week ago), Parkinson's (on Sinemet) was told to come to the ER for positive urine cultures.  As per son who was at bedside his father has been with a Aguilar catheter for the past 9 months and had it removed on Monday 10/28. He was recently admitted for epididymoorchitis and scrotal swelling/pain, treated with 2 week course of antibiotics.      His UCx is growing MDR Klebsiella, carbapenem resistant.      Urology consulted for persistent suprapubic pain.  He states that it is a constant pressure feeling in suprapubic region.  he denies any fever/chills, feels like he is emptying his bladder; however cannot be sure.       PAST MEDICAL & SURGICAL HISTORY:  Dementia  Parkinson disease  BPH (benign prostatic hyperplasia)  No significant past surgical history    MEDICATIONS  (STANDING):  artificial  tears Solution 1 Drop(s) Both EYES two times a day  aztreonam  IVPB 2000 milliGRAM(s) IV Intermittent every 8 hours  carbidopa/levodopa  25/100 1 Tablet(s) Oral three times a day  ceftazidime/avibactam IVPB 2.5 Gram(s) IV Intermittent every 8 hours  dextrose 5%. 1000 milliLiter(s) (50 mL/Hr) IV Continuous <Continuous>  dextrose 50% Injectable 12.5 Gram(s) IV Push once  dextrose 50% Injectable 25 Gram(s) IV Push once  dextrose 50% Injectable 25 Gram(s) IV Push once  dorzolamide 2%/timolol 0.5% Ophthalmic Solution 1 Drop(s) Left EYE two times a day  escitalopram 5 milliGRAM(s) Oral daily  gabapentin 300 milliGRAM(s) Oral at bedtime  heparin  Injectable 5000 Unit(s) SubCutaneous every 12 hours  influenza   Vaccine 0.5 milliLiter(s) IntraMuscular once  insulin lispro (HumaLOG) corrective regimen sliding scale   SubCutaneous three times a day before meals  insulin lispro (HumaLOG) corrective regimen sliding scale   SubCutaneous at bedtime  lactobacillus acidophilus 1 Tablet(s) Oral two times a day  latanoprost 0.005% Ophthalmic Solution 1 Drop(s) Right EYE at bedtime  pantoprazole    Tablet 40 milliGRAM(s) Oral before breakfast  QUEtiapine 25 milliGRAM(s) Oral daily  sodium chloride 1 Gram(s) Oral three times a day  tamsulosin 0.4 milliGRAM(s) Oral at bedtime  trihexyphenidyl 2 milliGRAM(s) Oral two times a day    MEDICATIONS  (PRN):  dextrose 40% Gel 15 Gram(s) Oral once PRN Blood Glucose LESS THAN 70 milliGRAM(s)/deciliter  glucagon  Injectable 1 milliGRAM(s) IntraMuscular once PRN Glucose LESS THAN 70 milligrams/deciliter  simethicone 80 milliGRAM(s) Chew every 6 hours PRN Gas    FAMILY HISTORY:  No pertinent family history in first degree relatives    Allergies    No Known Allergies    Intolerances      SOCIAL HISTORY:   Tobacco hx:    REVIEW OF SYSTEMS: Pertinent positives and negatives as stated in HPI, otherwise negative    Vital signs  T(C): 37.1, Max: 37.1 (11-07 @ 12:37)  HR: 72  BP: 115/67  SpO2: 99%    Output    UOP    Physical Exam  Gen: NAD  Pulm: No respiratory distress, no subcostal retractions  Abd: Soft, mild suprapubic pain, no CVAT  : Uncircumcised, no lesions.  No discharge or blood at urethral meatus.  Testes descended bilaterally.  Testes and epididymis nontender bilaterally.    LABS:          11-07 @ 07:15    WBC 7.77  / Hct 36.3  / SCr 0.71     11-06 @ 07:52    WBC 9.16  / Hct 36.4  / SCr --       11-07    128<L>  |  93<L>  |  10  ----------------------------<  127<H>  3.9   |  24  |  0.71    Ca    8.9      07 Nov 2019 07:15  Phos  2.7     11-07  Mg     2.4     11-07            Urine Cx:   Blood Cx:    RADIOLOGY: HPI:  75 y/o M with PMH of Dementia, BPH (with recent Aguilar removal on 10/28 and Transurethral Microwave Therapy a week ago), Parkinson's (on Sinemet) was told to come to the ER for positive urine cultures.  As per son who was at bedside his father has been with a Aguilar catheter for the past 9 months and had it removed on Monday 10/28. He was recently admitted for epididymoorchitis and scrotal swelling/pain, treated with 2 week course of antibiotics.      His UCx is growing MDR Klebsiella, carbapenem resistant.      Urology consulted for persistent suprapubic pain.  He states that it is a constant pressure feeling in suprapubic region.  he denies any fever/chills, feels like he is emptying his bladder; however cannot be sure.       PAST MEDICAL & SURGICAL HISTORY:  Dementia  Parkinson disease  BPH (benign prostatic hyperplasia)  No significant past surgical history    MEDICATIONS  (STANDING):  artificial  tears Solution 1 Drop(s) Both EYES two times a day  aztreonam  IVPB 2000 milliGRAM(s) IV Intermittent every 8 hours  carbidopa/levodopa  25/100 1 Tablet(s) Oral three times a day  ceftazidime/avibactam IVPB 2.5 Gram(s) IV Intermittent every 8 hours  dextrose 5%. 1000 milliLiter(s) (50 mL/Hr) IV Continuous <Continuous>  dextrose 50% Injectable 12.5 Gram(s) IV Push once  dextrose 50% Injectable 25 Gram(s) IV Push once  dextrose 50% Injectable 25 Gram(s) IV Push once  dorzolamide 2%/timolol 0.5% Ophthalmic Solution 1 Drop(s) Left EYE two times a day  escitalopram 5 milliGRAM(s) Oral daily  gabapentin 300 milliGRAM(s) Oral at bedtime  heparin  Injectable 5000 Unit(s) SubCutaneous every 12 hours  influenza   Vaccine 0.5 milliLiter(s) IntraMuscular once  insulin lispro (HumaLOG) corrective regimen sliding scale   SubCutaneous three times a day before meals  insulin lispro (HumaLOG) corrective regimen sliding scale   SubCutaneous at bedtime  lactobacillus acidophilus 1 Tablet(s) Oral two times a day  latanoprost 0.005% Ophthalmic Solution 1 Drop(s) Right EYE at bedtime  pantoprazole    Tablet 40 milliGRAM(s) Oral before breakfast  QUEtiapine 25 milliGRAM(s) Oral daily  sodium chloride 1 Gram(s) Oral three times a day  tamsulosin 0.4 milliGRAM(s) Oral at bedtime  trihexyphenidyl 2 milliGRAM(s) Oral two times a day    MEDICATIONS  (PRN):  dextrose 40% Gel 15 Gram(s) Oral once PRN Blood Glucose LESS THAN 70 milliGRAM(s)/deciliter  glucagon  Injectable 1 milliGRAM(s) IntraMuscular once PRN Glucose LESS THAN 70 milligrams/deciliter  simethicone 80 milliGRAM(s) Chew every 6 hours PRN Gas    FAMILY HISTORY:  No pertinent family history in first degree relatives    Allergies    No Known Allergies    Intolerances      SOCIAL HISTORY:   Tobacco hx:    REVIEW OF SYSTEMS: Pertinent positives and negatives as stated in HPI, otherwise negative    Vital signs  T(C): 37.1, Max: 37.1 (11-07 @ 12:37)  HR: 72  BP: 115/67  SpO2: 99%    Output    UOP    Physical Exam  Gen: NAD  Pulm: No respiratory distress, no subcostal retractions  Abd: Soft, mild suprapubic pain, no CVAT  : Uncircumcised, no lesions.  No discharge or blood at urethral meatus.  Testes descended bilaterally.  Testes and epididymis nontender bilaterally.    LABS:          11-07 @ 07:15    WBC 7.77  / Hct 36.3  / SCr 0.71     11-06 @ 07:52    WBC 9.16  / Hct 36.4  / SCr --       11-07    128<L>  |  93<L>  |  10  ----------------------------<  127<H>  3.9   |  24  |  0.71    Ca    8.9      07 Nov 2019 07:15  Phos  2.7     11-07  Mg     2.4     11-07            Urine Cx:   Blood Cx:    RADIOLOGY:

## 2019-11-07 NOTE — PROGRESS NOTE ADULT - SUBJECTIVE AND OBJECTIVE BOX
Follow Up:      Inverval History/ROS:Patient is a 74y old  Male who presents with a chief complaint of Told to come ot the ER for positive urine cultures (06 Nov 2019 20:23)    C/o suprapubic pain but overall much improved.  pain is less frequent/ less intense    Allergies    No Known Allergies    Intolerances        ANTIMICROBIALS:  aztreonam  IVPB 2000 every 8 hours  ceftazidime/avibactam IVPB 2.5 every 8 hours      OTHER MEDS:  artificial  tears Solution 1 Drop(s) Both EYES two times a day  carbidopa/levodopa  25/100 1 Tablet(s) Oral three times a day  dextrose 40% Gel 15 Gram(s) Oral once PRN  dextrose 5%. 1000 milliLiter(s) IV Continuous <Continuous>  dextrose 50% Injectable 12.5 Gram(s) IV Push once  dextrose 50% Injectable 25 Gram(s) IV Push once  dextrose 50% Injectable 25 Gram(s) IV Push once  dorzolamide 2%/timolol 0.5% Ophthalmic Solution 1 Drop(s) Left EYE two times a day  escitalopram 5 milliGRAM(s) Oral daily  gabapentin 300 milliGRAM(s) Oral at bedtime  glucagon  Injectable 1 milliGRAM(s) IntraMuscular once PRN  heparin  Injectable 5000 Unit(s) SubCutaneous every 12 hours  influenza   Vaccine 0.5 milliLiter(s) IntraMuscular once  insulin lispro (HumaLOG) corrective regimen sliding scale   SubCutaneous three times a day before meals  insulin lispro (HumaLOG) corrective regimen sliding scale   SubCutaneous at bedtime  lactobacillus acidophilus 1 Tablet(s) Oral two times a day  latanoprost 0.005% Ophthalmic Solution 1 Drop(s) Right EYE at bedtime  pantoprazole    Tablet 40 milliGRAM(s) Oral before breakfast  QUEtiapine 25 milliGRAM(s) Oral daily  simethicone 80 milliGRAM(s) Chew every 6 hours PRN  sodium chloride 1 Gram(s) Oral three times a day  tamsulosin 0.4 milliGRAM(s) Oral at bedtime  trihexyphenidyl 2 milliGRAM(s) Oral two times a day      Vital Signs Last 24 Hrs  T(C): 37.1 (07 Nov 2019 12:37), Max: 37.1 (07 Nov 2019 12:37)  T(F): 98.8 (07 Nov 2019 12:37), Max: 98.8 (07 Nov 2019 12:37)  HR: 72 (07 Nov 2019 12:37) (65 - 74)  BP: 115/67 (07 Nov 2019 12:37) (115/60 - 140/75)  BP(mean): --  RR: 17 (07 Nov 2019 12:37) (17 - 18)  SpO2: 99% (07 Nov 2019 12:37) (98% - 100%)    PHYSICAL EXAM:  General: [x ] non-toxic  HEAD/EYES: [ ] PERRL [x ] white sclera [ ] icterus  ENT:  [ ] normal [x ] supple [ ] thrush [ ] pharyngeal exudate  Cardiovascular:   [ ] murmur [ x] normal [ ] PPM/AICD  Respiratory:  x[ ] clear to ausculation bilaterally  GI:  [x ] soft, non-tender, normal bowel sounds  :  [ ] pichardo [ ] no CVA tenderness   Musculoskeletal:  [ ] no synovitis  Neurologic:  [ ] non-focal exam   Skin:  [x ] no rash  Lymph: [ x] no lymphadenopathy  Psychiatric:  [x ] appropriate affect [ ] alert & oriented  Lines:  [ x] no phlebitis [ ] central line                                12.6   7.77  )-----------( 321      ( 07 Nov 2019 07:15 )             36.3       11-07    128<L>  |  93<L>  |  10  ----------------------------<  127<H>  3.9   |  24  |  0.71    Ca    8.9      07 Nov 2019 07:15  Phos  2.7     11-07  Mg     2.4     11-07            MICROBIOLOGY:    RADIOLOGY:

## 2019-11-07 NOTE — PROGRESS NOTE ADULT - PROBLEM SELECTOR PLAN 1
Urine cultures showing Klebsiella pneumoniae (CRE). CT abd/pelvis completed today, 11/4/19 negative for stones or mass.   - Patient started on IV Vabomere (meropenem/vaborbactam --> now with clinical improvement since starting ceftazidime/avibactam and aztreonam (started on 11/4/19) as per ID. Will continue to complete 7 day course as per ID  - ID following, recs reviewed and appreciated.    - Blood NGTD   - d/c pyridium

## 2019-11-07 NOTE — PROGRESS NOTE ADULT - SUBJECTIVE AND OBJECTIVE BOX
Patient is a 74y old  Male who presents with a chief complaint of Told to come ot the ER for positive urine cultures (07 Nov 2019 17:36)    SUBJECTIVE / OVERNIGHT EVENTS:  Patient with improved lower abdomen pain. No fever or chills. Eating well.     MEDICATIONS  (STANDING):  artificial  tears Solution 1 Drop(s) Both EYES two times a day  aztreonam  IVPB 2000 milliGRAM(s) IV Intermittent every 8 hours  carbidopa/levodopa  25/100 1 Tablet(s) Oral three times a day  ceftazidime/avibactam IVPB 2.5 Gram(s) IV Intermittent every 8 hours  dextrose 5%. 1000 milliLiter(s) (50 mL/Hr) IV Continuous <Continuous>  dextrose 50% Injectable 12.5 Gram(s) IV Push once  dextrose 50% Injectable 25 Gram(s) IV Push once  dextrose 50% Injectable 25 Gram(s) IV Push once  dorzolamide 2%/timolol 0.5% Ophthalmic Solution 1 Drop(s) Left EYE two times a day  escitalopram 5 milliGRAM(s) Oral daily  gabapentin 300 milliGRAM(s) Oral at bedtime  heparin  Injectable 5000 Unit(s) SubCutaneous every 12 hours  influenza   Vaccine 0.5 milliLiter(s) IntraMuscular once  insulin lispro (HumaLOG) corrective regimen sliding scale   SubCutaneous three times a day before meals  insulin lispro (HumaLOG) corrective regimen sliding scale   SubCutaneous at bedtime  lactobacillus acidophilus 1 Tablet(s) Oral two times a day  latanoprost 0.005% Ophthalmic Solution 1 Drop(s) Right EYE at bedtime  pantoprazole    Tablet 40 milliGRAM(s) Oral before breakfast  QUEtiapine 25 milliGRAM(s) Oral daily  sodium chloride 1 Gram(s) Oral three times a day  tamsulosin 0.4 milliGRAM(s) Oral at bedtime  trihexyphenidyl 2 milliGRAM(s) Oral two times a day    MEDICATIONS  (PRN):  dextrose 40% Gel 15 Gram(s) Oral once PRN Blood Glucose LESS THAN 70 milliGRAM(s)/deciliter  glucagon  Injectable 1 milliGRAM(s) IntraMuscular once PRN Glucose LESS THAN 70 milligrams/deciliter  simethicone 80 milliGRAM(s) Chew every 6 hours PRN Gas      Vital Signs Last 24 Hrs  T(C): 37.1 (07 Nov 2019 12:37), Max: 37.1 (07 Nov 2019 12:37)  T(F): 98.8 (07 Nov 2019 12:37), Max: 98.8 (07 Nov 2019 12:37)  HR: 72 (07 Nov 2019 12:37) (65 - 74)  BP: 115/67 (07 Nov 2019 12:37) (115/60 - 140/75)  BP(mean): --  RR: 17 (07 Nov 2019 12:37) (17 - 18)  SpO2: 99% (07 Nov 2019 12:37) (98% - 100%)  CAPILLARY BLOOD GLUCOSE      POCT Blood Glucose.: 141 mg/dL (07 Nov 2019 17:03)  POCT Blood Glucose.: 148 mg/dL (07 Nov 2019 12:07)  POCT Blood Glucose.: 122 mg/dL (07 Nov 2019 08:15)  POCT Blood Glucose.: 132 mg/dL (06 Nov 2019 22:20)        PHYSICAL EXAM  GENERAL: NAD, well-developed  CHEST/LUNG: Clear to auscultation bilaterally; No wheeze  HEART: Regular rate and rhythm; No murmurs, rubs, or gallops  ABDOMEN: Soft, Suprapubic tenderness to palpation, Nondistended; Bowel sounds present  EXTREMITIES:  2+ Peripheral Pulses, No clubbing, cyanosis, or edema  PSYCH: AAOx3        LABS:                        12.6   7.77  )-----------( 321      ( 07 Nov 2019 07:15 )             36.3     11-07    128<L>  |  93<L>  |  10  ----------------------------<  127<H>  3.9   |  24  |  0.71    Ca    8.9      07 Nov 2019 07:15  Phos  2.7     11-07  Mg     2.4     11-07          Consultant(s) Notes Reviewed:    Care Discussed with Consultants/Other Providers:

## 2019-11-07 NOTE — PROGRESS NOTE ADULT - PROBLEM SELECTOR PLAN 2
Chronic and asymptomatic. Urine electrolytes and urine osmolality noted. 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 adrenal insufficiency (even though low cortisol level, had a normal cosyntropin test). Likely SIADH. Noted decrease in sodium level secondary to increased water intake. Now improved today  - c/w NaCl tabs TID   - re-enforce fluid restriction to 1L  - pt on Lexapro, may be contributing to Hyponatremia (unclear why patient on this medication since pt's family said he was not on that before)

## 2019-11-07 NOTE — CHART NOTE - NSCHARTNOTEFT_GEN_A_CORE
patient still with suprapubic tenderness, reports voiding small amounts of urine, and still with burning upon urination.  Spoke with Dr. Maravilla, patient to c/w trial of pyridium, ABX per DI recs, and flomax. Bladder scan pvr ordered. Urology consulted for further eval ?possible cysto, will follow up with official recs.

## 2019-11-07 NOTE — CONSULT NOTE ADULT - REASON FOR ADMISSION
Told to come ot the ER for positive urine cultures
Told to come ot the ER for positive urine cultures

## 2019-11-07 NOTE — CONSULT NOTE ADULT - ASSESSMENT
A/P: 75yo M with PMH BPH, urinary retention who presents with suprapubic pain, MDR CR Klebsiella UTI.      - Continue abx per ID; aztreonam/avycaz  - Recommend post-void residual, patient has long-standing history of urinary retention, pichardo removed 10/28.  If retaining, please place pichardo catheter.  - Bowel regimen  - No plans for cystoscopy with active UTI present, may be scheduled outpatient once infection adequately treated, UCx negative A/P: 75yo M with PMH BPH, urinary retention who presents with suprapubic pain, MDR CR Klebsiella UTI.      - Continue abx per ID; aztreonam/avycaz  - Recommend post-void residual, patient has long-standing history of urinary retention, pichardo removed 10/28.  If retaining, please place pichardo catheter.  - Bowel regimen  - No plans for cystoscopy with active UTI present, may be scheduled outpatient once infection adequately treated, UCx negative  - Pyridium for bladder pain A/P: 73yo M with PMH BPH, urinary retention who presents with suprapubic pain, MDR CR Klebsiella UTI.      - Continue abx per ID; aztreonam/avycaz  - Recommend post-void residual, patient has long-standing history of urinary retention, pichrado removed 10/28.  If retaining, please place pichardo catheter.  - Bowel regimen  - No plans for cystoscopy with active UTI present, may be scheduled outpatient once infection adequately treated, UCx negative  - Pyridium for suprapubic pain  - Pain control (likely 2/2 TUMT at OSH)

## 2019-11-08 ENCOUNTER — TRANSCRIPTION ENCOUNTER (OUTPATIENT)
Age: 74
End: 2019-11-08

## 2019-11-08 LAB
ANION GAP SERPL CALC-SCNC: 12 MMO/L — SIGNIFICANT CHANGE UP (ref 7–14)
BUN SERPL-MCNC: 11 MG/DL — SIGNIFICANT CHANGE UP (ref 7–23)
CALCIUM SERPL-MCNC: 8.5 MG/DL — SIGNIFICANT CHANGE UP (ref 8.4–10.5)
CHLORIDE SERPL-SCNC: 93 MMOL/L — LOW (ref 98–107)
CO2 SERPL-SCNC: 22 MMOL/L — SIGNIFICANT CHANGE UP (ref 22–31)
CREAT SERPL-MCNC: 0.72 MG/DL — SIGNIFICANT CHANGE UP (ref 0.5–1.3)
GLUCOSE BLDC GLUCOMTR-MCNC: 104 MG/DL — HIGH (ref 70–99)
GLUCOSE BLDC GLUCOMTR-MCNC: 148 MG/DL — HIGH (ref 70–99)
GLUCOSE BLDC GLUCOMTR-MCNC: 149 MG/DL — HIGH (ref 70–99)
GLUCOSE BLDC GLUCOMTR-MCNC: 182 MG/DL — HIGH (ref 70–99)
GLUCOSE SERPL-MCNC: 103 MG/DL — HIGH (ref 70–99)
HCT VFR BLD CALC: 35.3 % — LOW (ref 39–50)
HGB BLD-MCNC: 12.3 G/DL — LOW (ref 13–17)
MAGNESIUM SERPL-MCNC: 2.3 MG/DL — SIGNIFICANT CHANGE UP (ref 1.6–2.6)
MCHC RBC-ENTMCNC: 26 PG — LOW (ref 27–34)
MCHC RBC-ENTMCNC: 34.8 % — SIGNIFICANT CHANGE UP (ref 32–36)
MCV RBC AUTO: 74.6 FL — LOW (ref 80–100)
NRBC # FLD: 0 K/UL — SIGNIFICANT CHANGE UP (ref 0–0)
PHOSPHATE SERPL-MCNC: 2.9 MG/DL — SIGNIFICANT CHANGE UP (ref 2.5–4.5)
PLATELET # BLD AUTO: 325 K/UL — SIGNIFICANT CHANGE UP (ref 150–400)
PMV BLD: 8.8 FL — SIGNIFICANT CHANGE UP (ref 7–13)
POTASSIUM SERPL-MCNC: 3.9 MMOL/L — SIGNIFICANT CHANGE UP (ref 3.5–5.3)
POTASSIUM SERPL-SCNC: 3.9 MMOL/L — SIGNIFICANT CHANGE UP (ref 3.5–5.3)
RBC # BLD: 4.73 M/UL — SIGNIFICANT CHANGE UP (ref 4.2–5.8)
RBC # FLD: 13.7 % — SIGNIFICANT CHANGE UP (ref 10.3–14.5)
SODIUM SERPL-SCNC: 127 MMOL/L — LOW (ref 135–145)
WBC # BLD: 8.11 K/UL — SIGNIFICANT CHANGE UP (ref 3.8–10.5)
WBC # FLD AUTO: 8.11 K/UL — SIGNIFICANT CHANGE UP (ref 3.8–10.5)

## 2019-11-08 PROCEDURE — 99232 SBSQ HOSP IP/OBS MODERATE 35: CPT

## 2019-11-08 PROCEDURE — 99233 SBSQ HOSP IP/OBS HIGH 50: CPT

## 2019-11-08 RX ORDER — PHENAZOPYRIDINE HCL 100 MG
100 TABLET ORAL EVERY 8 HOURS
Refills: 0 | Status: DISCONTINUED | OUTPATIENT
Start: 2019-11-08 | End: 2019-11-08

## 2019-11-08 RX ORDER — SENNA PLUS 8.6 MG/1
2 TABLET ORAL AT BEDTIME
Refills: 0 | Status: DISCONTINUED | OUTPATIENT
Start: 2019-11-08 | End: 2019-11-12

## 2019-11-08 RX ORDER — POLYETHYLENE GLYCOL 3350 17 G/17G
17 POWDER, FOR SOLUTION ORAL DAILY
Refills: 0 | Status: DISCONTINUED | OUTPATIENT
Start: 2019-11-08 | End: 2019-11-12

## 2019-11-08 RX ADMIN — CARBIDOPA AND LEVODOPA 1 TABLET(S): 25; 100 TABLET ORAL at 06:05

## 2019-11-08 RX ADMIN — Medication 1 TABLET(S): at 06:05

## 2019-11-08 RX ADMIN — CARBIDOPA AND LEVODOPA 1 TABLET(S): 25; 100 TABLET ORAL at 21:14

## 2019-11-08 RX ADMIN — Medication 1 DROP(S): at 06:05

## 2019-11-08 RX ADMIN — PANTOPRAZOLE SODIUM 40 MILLIGRAM(S): 20 TABLET, DELAYED RELEASE ORAL at 06:06

## 2019-11-08 RX ADMIN — Medication 100 MILLIGRAM(S): at 21:15

## 2019-11-08 RX ADMIN — ESCITALOPRAM OXALATE 5 MILLIGRAM(S): 10 TABLET, FILM COATED ORAL at 12:15

## 2019-11-08 RX ADMIN — SENNA PLUS 2 TABLET(S): 8.6 TABLET ORAL at 21:14

## 2019-11-08 RX ADMIN — DORZOLAMIDE HYDROCHLORIDE TIMOLOL MALEATE 1 DROP(S): 20; 5 SOLUTION/ DROPS OPHTHALMIC at 06:05

## 2019-11-08 RX ADMIN — HEPARIN SODIUM 5000 UNIT(S): 5000 INJECTION INTRAVENOUS; SUBCUTANEOUS at 17:32

## 2019-11-08 RX ADMIN — Medication 1 TABLET(S): at 17:33

## 2019-11-08 RX ADMIN — HEPARIN SODIUM 5000 UNIT(S): 5000 INJECTION INTRAVENOUS; SUBCUTANEOUS at 06:05

## 2019-11-08 RX ADMIN — TAMSULOSIN HYDROCHLORIDE 0.4 MILLIGRAM(S): 0.4 CAPSULE ORAL at 21:14

## 2019-11-08 RX ADMIN — CARBIDOPA AND LEVODOPA 1 TABLET(S): 25; 100 TABLET ORAL at 14:44

## 2019-11-08 RX ADMIN — LATANOPROST 1 DROP(S): 0.05 SOLUTION/ DROPS OPHTHALMIC; TOPICAL at 21:14

## 2019-11-08 RX ADMIN — QUETIAPINE FUMARATE 25 MILLIGRAM(S): 200 TABLET, FILM COATED ORAL at 12:15

## 2019-11-08 RX ADMIN — Medication 100 MILLIGRAM(S): at 14:44

## 2019-11-08 RX ADMIN — SODIUM CHLORIDE 1 GRAM(S): 9 INJECTION INTRAMUSCULAR; INTRAVENOUS; SUBCUTANEOUS at 14:44

## 2019-11-08 RX ADMIN — GABAPENTIN 300 MILLIGRAM(S): 400 CAPSULE ORAL at 21:14

## 2019-11-08 RX ADMIN — Medication 2 MILLIGRAM(S): at 12:15

## 2019-11-08 RX ADMIN — DORZOLAMIDE HYDROCHLORIDE TIMOLOL MALEATE 1 DROP(S): 20; 5 SOLUTION/ DROPS OPHTHALMIC at 17:33

## 2019-11-08 RX ADMIN — Medication 1: at 12:15

## 2019-11-08 RX ADMIN — Medication 2 MILLIGRAM(S): at 21:15

## 2019-11-08 RX ADMIN — SODIUM CHLORIDE 1 GRAM(S): 9 INJECTION INTRAMUSCULAR; INTRAVENOUS; SUBCUTANEOUS at 06:05

## 2019-11-08 RX ADMIN — Medication 1 DROP(S): at 17:32

## 2019-11-08 RX ADMIN — Medication 100 MILLIGRAM(S): at 06:05

## 2019-11-08 RX ADMIN — SODIUM CHLORIDE 1 GRAM(S): 9 INJECTION INTRAMUSCULAR; INTRAVENOUS; SUBCUTANEOUS at 21:14

## 2019-11-08 NOTE — DISCHARGE NOTE PROVIDER - NSDCMRMEDTOKEN_GEN_ALL_CORE_FT
baclofen 10 mg oral tablet: 1 tab(s) orally 2 times a day  carbidopa-levodopa 25 mg-100 mg oral tablet: 1 tab(s) orally 3 times a day  donepezil 10 mg oral tablet: 1 tab(s) orally once a day (at bedtime)  escitalopram 5 mg oral tablet: 1 tab(s) orally once a day  GenTeal ophthalmic gel: 1 drop(s) to each affected eye 2 times a day  latanoprost 0.005% ophthalmic solution: 1 drop(s) to each affected eye once a day (in the evening) to the right eye  melatonin 3 mg oral tablet: 1 tab(s) orally once a day (at bedtime), As Needed   memantine 10 mg oral tablet: 1 tab(s) orally once a day  pantoprazole 40 mg oral delayed release tablet: 1 tab(s) orally once a day  SEROquel 25 mg oral tablet: 1 tab(s) orally once a day  Sodium Chloride 1 g oral tablet: 1 tab(s) orally 2 times a day  timolol-dorzolamide 0.5%-2% preservative-free ophthalmic solution: 1 drop(s) to each affected eye 2 times a day right eye  trihexyphenidyl 2 mg oral tablet: 1 tab(s) orally 2 times a day baclofen 10 mg oral tablet: 1 tab(s) orally 2 times a day  carbidopa-levodopa 25 mg-100 mg oral tablet: 1 tab(s) orally 3 times a day  donepezil 10 mg oral tablet: 1 tab(s) orally once a day (at bedtime)  escitalopram 5 mg oral tablet: 1 tab(s) orally once a day  GenTeal ophthalmic gel: 1 drop(s) to each affected eye 2 times a day  latanoprost 0.005% ophthalmic solution: 1 drop(s) to each affected eye once a day (in the evening) to the right eye  melatonin 3 mg oral tablet: 1 tab(s) orally once a day (at bedtime), As Needed   memantine 10 mg oral tablet: 1 tab(s) orally once a day  methenamine hippurate 1 g oral tablet: 1 tab(s) orally 2 times a day   pantoprazole 40 mg oral delayed release tablet: 1 tab(s) orally once a day  SEROquel 25 mg oral tablet: 1 tab(s) orally once a day  Sodium Chloride 1 g oral tablet: 1 tab(s) orally 2 times a day  timolol-dorzolamide 0.5%-2% preservative-free ophthalmic solution: 1 drop(s) to each affected eye 2 times a day right eye  trihexyphenidyl 2 mg oral tablet: 1 tab(s) orally 2 times a day baclofen 10 mg oral tablet: 1 tab(s) orally 2 times a day  carbidopa-levodopa 25 mg-100 mg oral tablet: 1 tab(s) orally 3 times a day  donepezil 10 mg oral tablet: 1 tab(s) orally once a day (at bedtime)  escitalopram 5 mg oral tablet: 1 tab(s) orally once a day  gabapentin 300 mg oral capsule: 1 cap(s) orally once a day (at bedtime)  GenTeal ophthalmic gel: 1 drop(s) to each affected eye 2 times a day  lactobacillus acidophilus oral capsule: 1 tab(s) orally 3 times a day   latanoprost 0.005% ophthalmic solution: 1 drop(s) to each affected eye once a day (in the evening) to the right eye  melatonin 3 mg oral tablet: 1 tab(s) orally once a day (at bedtime), As Needed   memantine 10 mg oral tablet: 1 tab(s) orally once a day  methenamine hippurate 1 g oral tablet: 1 tab(s) orally 2 times a day   pantoprazole 40 mg oral delayed release tablet: 1 tab(s) orally once a day  polyethylene glycol 3350 oral powder for reconstitution: 17 gram(s) orally once a day  senna oral tablet: 2 tab(s) orally once a day (at bedtime), As Needed   SEROquel 25 mg oral tablet: 1 tab(s) orally once a day  simethicone 80 mg oral tablet, chewable: 1 tab(s) orally every 6 hours, As needed, Gas  Sodium Chloride 1 g oral tablet: 1 tab(s) orally 2 times a day  tamsulosin 0.4 mg oral capsule: 1 cap(s) orally once a day (at bedtime)  timolol-dorzolamide 0.5%-2% preservative-free ophthalmic solution: 1 drop(s) to each affected eye 2 times a day right eye  trihexyphenidyl 2 mg oral tablet: 1 tab(s) orally 2 times a day baclofen 10 mg oral tablet: 1 tab(s) orally 2 times a day  carbidopa-levodopa 25 mg-100 mg oral tablet: 1 tab(s) orally 3 times a day  donepezil 10 mg oral tablet: 1 tab(s) orally once a day (at bedtime)  escitalopram 5 mg oral tablet: 1 tab(s) orally once a day  gabapentin 300 mg oral capsule: 1 cap(s) orally once a day (at bedtime)  GenTeal ophthalmic gel: 1 drop(s) to each affected eye 2 times a day  lactobacillus acidophilus oral capsule: 1 tab(s) orally 3 times a day   latanoprost 0.005% ophthalmic solution: 1 drop(s) to each affected eye once a day (in the evening) to the right eye  melatonin 3 mg oral tablet: 1 tab(s) orally once a day (at bedtime), As Needed   memantine 10 mg oral tablet: 1 tab(s) orally once a day  methenamine hippurate 1 g oral tablet: 1 tab(s) orally 2 times a day   pantoprazole 40 mg oral delayed release tablet: 1 tab(s) orally once a day  phenazopyridine 100 mg oral tablet: 1 tab(s) orally every 8 hours  polyethylene glycol 3350 oral powder for reconstitution: 17 gram(s) orally once a day  senna oral tablet: 2 tab(s) orally once a day (at bedtime), As Needed   SEROquel 25 mg oral tablet: 1 tab(s) orally once a day  simethicone 80 mg oral tablet, chewable: 1 tab(s) orally every 6 hours, As needed, Gas  Sodium Chloride 1 g oral tablet: 1 tab(s) orally 2 times a day  tamsulosin 0.4 mg oral capsule: 1 cap(s) orally once a day (at bedtime)  timolol-dorzolamide 0.5%-2% preservative-free ophthalmic solution: 1 drop(s) to each affected eye 2 times a day right eye  trihexyphenidyl 2 mg oral tablet: 1 tab(s) orally 2 times a day

## 2019-11-08 NOTE — DISCHARGE NOTE PROVIDER - PROVIDER TOKENS
PROVIDER:[TOKEN:[[id number]]] PROVIDER:[TOKEN:[[id number]]],PROVIDER:[TOKEN:[[id number]]]

## 2019-11-08 NOTE — PROGRESS NOTE ADULT - SUBJECTIVE AND OBJECTIVE BOX
Follow Up:      [place] History/ROS:Patient is a 74y old  Male who presents with a chief complaint of Told to come ot the ER for positive urine cultures (07 Nov 2019 20:20)      Overall feels much better  Still has some dysuria and suprapubic pain.    He did state that this intermittent pain has been present for months.      Allergies    No Known Allergies    Intolerances        ANTIMICROBIALS:  aztreonam  IVPB 2000 every 8 hours  ceftazidime/avibactam IVPB 2.5 every 8 hours      OTHER MEDS:  artificial  tears Solution 1 Drop(s) Both EYES two times a day  carbidopa/levodopa  25/100 1 Tablet(s) Oral three times a day  dextrose 40% Gel 15 Gram(s) Oral once PRN  dextrose 5%. 1000 milliLiter(s) IV Continuous <Continuous>  dextrose 50% Injectable 12.5 Gram(s) IV Push once  dextrose 50% Injectable 25 Gram(s) IV Push once  dextrose 50% Injectable 25 Gram(s) IV Push once  dorzolamide 2%/timolol 0.5% Ophthalmic Solution 1 Drop(s) Left EYE two times a day  escitalopram 5 milliGRAM(s) Oral daily  gabapentin 300 milliGRAM(s) Oral at bedtime  glucagon  Injectable 1 milliGRAM(s) IntraMuscular once PRN  heparin  Injectable 5000 Unit(s) SubCutaneous every 12 hours  influenza   Vaccine 0.5 milliLiter(s) IntraMuscular once  insulin lispro (HumaLOG) corrective regimen sliding scale   SubCutaneous three times a day before meals  insulin lispro (HumaLOG) corrective regimen sliding scale   SubCutaneous at bedtime  lactobacillus acidophilus 1 Tablet(s) Oral two times a day  latanoprost 0.005% Ophthalmic Solution 1 Drop(s) Right EYE at bedtime  pantoprazole    Tablet 40 milliGRAM(s) Oral before breakfast  polyethylene glycol 3350 17 Gram(s) Oral daily  QUEtiapine 25 milliGRAM(s) Oral daily  senna 2 Tablet(s) Oral at bedtime  simethicone 80 milliGRAM(s) Chew every 6 hours PRN  sodium chloride 1 Gram(s) Oral three times a day  tamsulosin 0.4 milliGRAM(s) Oral at bedtime  trihexyphenidyl 2 milliGRAM(s) Oral two times a day      Vital Signs Last 24 Hrs  T(C): 37.1 (08 Nov 2019 12:06), Max: 37.1 (08 Nov 2019 12:06)  T(F): 98.7 (08 Nov 2019 12:06), Max: 98.7 (08 Nov 2019 12:06)  HR: 67 (08 Nov 2019 12:06) (62 - 67)  BP: 114/64 (08 Nov 2019 12:06) (113/64 - 117/71)  BP(mean): --  RR: 17 (08 Nov 2019 12:06) (17 - 17)  SpO2: 99% (08 Nov 2019 12:06) (99% - 99%)    PHYSICAL EXAM:  General: [ x] non-toxic  HEAD/EYES: [ ] PERRL [ ] white sclera [ ] icterus  ENT:  [ ] normal [ x] supple [ ] thrush [ ] pharyngeal exudate  Cardiovascular:   [ ] murmur [ x] normal [ ] PPM/AICD  Respiratory:  [ x] clear to ausculation bilaterally  GI:  [ x] soft, non-tender, normal bowel sounds  :  [ ] pichardo [ x] no CVA tenderness   Musculoskeletal:  [ ] no synovitis  Neurologic:  [ ] non-focal exam   Skin:  [x ] no rash  Lymph: [ ] no lymphadenopathy  Psychiatric:  [ ] appropriate affect [ x] alert & oriented  Lines:  [ x] no phlebitis [ ] central line                                12.3   8.11  )-----------( 325      ( 08 Nov 2019 05:41 )             35.3       11-08    127<L>  |  93<L>  |  11  ----------------------------<  103<H>  3.9   |  22  |  0.72    Ca    8.5      08 Nov 2019 05:41  Phos  2.9     11-08  Mg     2.3     11-08            MICROBIOLOGY:    RADIOLOGY:

## 2019-11-08 NOTE — DISCHARGE NOTE PROVIDER - CARE PROVIDER_API CALL
Schuyler Guaman)  Infectious Disease  [address] [place] [address]  Phone: [phone]  Fax: [phone]  Follow Up Time: Schuyler Guaman)  Infectious Disease  [address] [place] [address]  Phone: [phone]  Fax: [phone]  Follow Up Time:     Freddy Valverde)  Internal Medicine  [address] [place] [address]  Phone: [phone]  Fax: [phone]  Follow Up Time:

## 2019-11-08 NOTE — PROGRESS NOTE ADULT - SUBJECTIVE AND OBJECTIVE BOX
Patient is a 74y old  Male who presents with a chief complaint of Told to come ot the ER for positive urine cultures (08 Nov 2019 13:25)        SUBJECTIVE / OVERNIGHT EVENTS:      MEDICATIONS  (STANDING):  artificial  tears Solution 1 Drop(s) Both EYES two times a day  aztreonam  IVPB 2000 milliGRAM(s) IV Intermittent every 8 hours  carbidopa/levodopa  25/100 1 Tablet(s) Oral three times a day  ceftazidime/avibactam IVPB 2.5 Gram(s) IV Intermittent every 8 hours  dextrose 5%. 1000 milliLiter(s) (50 mL/Hr) IV Continuous <Continuous>  dextrose 50% Injectable 12.5 Gram(s) IV Push once  dextrose 50% Injectable 25 Gram(s) IV Push once  dextrose 50% Injectable 25 Gram(s) IV Push once  dorzolamide 2%/timolol 0.5% Ophthalmic Solution 1 Drop(s) Left EYE two times a day  escitalopram 5 milliGRAM(s) Oral daily  gabapentin 300 milliGRAM(s) Oral at bedtime  heparin  Injectable 5000 Unit(s) SubCutaneous every 12 hours  influenza   Vaccine 0.5 milliLiter(s) IntraMuscular once  insulin lispro (HumaLOG) corrective regimen sliding scale   SubCutaneous three times a day before meals  insulin lispro (HumaLOG) corrective regimen sliding scale   SubCutaneous at bedtime  lactobacillus acidophilus 1 Tablet(s) Oral two times a day  latanoprost 0.005% Ophthalmic Solution 1 Drop(s) Right EYE at bedtime  pantoprazole    Tablet 40 milliGRAM(s) Oral before breakfast  polyethylene glycol 3350 17 Gram(s) Oral daily  QUEtiapine 25 milliGRAM(s) Oral daily  senna 2 Tablet(s) Oral at bedtime  sodium chloride 1 Gram(s) Oral three times a day  tamsulosin 0.4 milliGRAM(s) Oral at bedtime  trihexyphenidyl 2 milliGRAM(s) Oral two times a day    MEDICATIONS  (PRN):  dextrose 40% Gel 15 Gram(s) Oral once PRN Blood Glucose LESS THAN 70 milliGRAM(s)/deciliter  glucagon  Injectable 1 milliGRAM(s) IntraMuscular once PRN Glucose LESS THAN 70 milligrams/deciliter  simethicone 80 milliGRAM(s) Chew every 6 hours PRN Gas      Vital Signs Last 24 Hrs  T(C): 37.1 (08 Nov 2019 12:06), Max: 37.1 (08 Nov 2019 12:06)  T(F): 98.7 (08 Nov 2019 12:06), Max: 98.7 (08 Nov 2019 12:06)  HR: 67 (08 Nov 2019 12:06) (62 - 67)  BP: 114/64 (08 Nov 2019 12:06) (113/64 - 117/71)  BP(mean): --  RR: 17 (08 Nov 2019 12:06) (17 - 17)  SpO2: 99% (08 Nov 2019 12:06) (99% - 99%)  CAPILLARY BLOOD GLUCOSE      POCT Blood Glucose.: 182 mg/dL (08 Nov 2019 12:05)  POCT Blood Glucose.: 104 mg/dL (08 Nov 2019 08:16)  POCT Blood Glucose.: 131 mg/dL (07 Nov 2019 22:06)  POCT Blood Glucose.: 141 mg/dL (07 Nov 2019 17:03)    I&O's Summary    08 Nov 2019 07:01  -  08 Nov 2019 13:37  --------------------------------------------------------  IN: 0 mL / OUT: 200 mL / NET: -200 mL          PHYSICAL EXAM  GENERAL: NAD, well-developed  HEAD:  Atraumatic, Normocephalic  EYES: EOMI, PERRLA, conjunctiva and sclera clear  NECK: Supple, No JVD  CHEST/LUNG: Clear to auscultation bilaterally; No wheeze  HEART: Regular rate and rhythm; No murmurs, rubs, or gallops  ABDOMEN: Soft, Nontender, Nondistended; Bowel sounds present  EXTREMITIES:  2+ Peripheral Pulses, No clubbing, cyanosis, or edema  PSYCH: AAOx3  SKIN: No rashes or lesions    LABS:                        12.3   8.11  )-----------( 325      ( 08 Nov 2019 05:41 )             35.3     11-08    127<L>  |  93<L>  |  11  ----------------------------<  103<H>  3.9   |  22  |  0.72    Ca    8.5      08 Nov 2019 05:41  Phos  2.9     11-08  Mg     2.3     11-08                RADIOLOGY & ADDITIONAL TESTS:    Imaging Personally Reviewed:  Consultant(s) Notes Reviewed:    Care Discussed with Consultants/Other Providers: Patient is a 74y old  Male who presents with a chief complaint of Told to come ot the ER for positive urine cultures (08 Nov 2019 13:25)    SUBJECTIVE / OVERNIGHT EVENTS:  Patient's suprapubic pain slowing improving day by day. Patient is trying to monitor and limit how much water he drinks daily as advised. No SOB or chest pain.     MEDICATIONS  (STANDING):  artificial  tears Solution 1 Drop(s) Both EYES two times a day  aztreonam  IVPB 2000 milliGRAM(s) IV Intermittent every 8 hours  carbidopa/levodopa  25/100 1 Tablet(s) Oral three times a day  ceftazidime/avibactam IVPB 2.5 Gram(s) IV Intermittent every 8 hours  dextrose 5%. 1000 milliLiter(s) (50 mL/Hr) IV Continuous <Continuous>  dextrose 50% Injectable 12.5 Gram(s) IV Push once  dextrose 50% Injectable 25 Gram(s) IV Push once  dextrose 50% Injectable 25 Gram(s) IV Push once  dorzolamide 2%/timolol 0.5% Ophthalmic Solution 1 Drop(s) Left EYE two times a day  escitalopram 5 milliGRAM(s) Oral daily  gabapentin 300 milliGRAM(s) Oral at bedtime  heparin  Injectable 5000 Unit(s) SubCutaneous every 12 hours  influenza   Vaccine 0.5 milliLiter(s) IntraMuscular once  insulin lispro (HumaLOG) corrective regimen sliding scale   SubCutaneous three times a day before meals  insulin lispro (HumaLOG) corrective regimen sliding scale   SubCutaneous at bedtime  lactobacillus acidophilus 1 Tablet(s) Oral two times a day  latanoprost 0.005% Ophthalmic Solution 1 Drop(s) Right EYE at bedtime  pantoprazole    Tablet 40 milliGRAM(s) Oral before breakfast  polyethylene glycol 3350 17 Gram(s) Oral daily  QUEtiapine 25 milliGRAM(s) Oral daily  senna 2 Tablet(s) Oral at bedtime  sodium chloride 1 Gram(s) Oral three times a day  tamsulosin 0.4 milliGRAM(s) Oral at bedtime  trihexyphenidyl 2 milliGRAM(s) Oral two times a day    MEDICATIONS  (PRN):  dextrose 40% Gel 15 Gram(s) Oral once PRN Blood Glucose LESS THAN 70 milliGRAM(s)/deciliter  glucagon  Injectable 1 milliGRAM(s) IntraMuscular once PRN Glucose LESS THAN 70 milligrams/deciliter  simethicone 80 milliGRAM(s) Chew every 6 hours PRN Gas      Vital Signs Last 24 Hrs  T(C): 37.1 (08 Nov 2019 12:06), Max: 37.1 (08 Nov 2019 12:06)  T(F): 98.7 (08 Nov 2019 12:06), Max: 98.7 (08 Nov 2019 12:06)  HR: 67 (08 Nov 2019 12:06) (62 - 67)  BP: 114/64 (08 Nov 2019 12:06) (113/64 - 117/71)  BP(mean): --  RR: 17 (08 Nov 2019 12:06) (17 - 17)  SpO2: 99% (08 Nov 2019 12:06) (99% - 99%)  CAPILLARY BLOOD GLUCOSE      POCT Blood Glucose.: 182 mg/dL (08 Nov 2019 12:05)  POCT Blood Glucose.: 104 mg/dL (08 Nov 2019 08:16)  POCT Blood Glucose.: 131 mg/dL (07 Nov 2019 22:06)  POCT Blood Glucose.: 141 mg/dL (07 Nov 2019 17:03)    I&O's Summary    08 Nov 2019 07:01  -  08 Nov 2019 13:37  --------------------------------------------------------  IN: 0 mL / OUT: 200 mL / NET: -200 mL          PHYSICAL EXAM  GENERAL: NAD, well-developed  CHEST/LUNG: Clear to auscultation bilaterally; No wheeze  HEART: Regular rate and rhythm; No murmurs, rubs, or gallops  ABDOMEN: Soft, Suprapubic tenderness to palpation, Nondistended; Bowel sounds present  EXTREMITIES:  2+ Peripheral Pulses, No clubbing, cyanosis, or edema  PSYCH: AAOx3        LABS:                        12.3   8.11  )-----------( 325      ( 08 Nov 2019 05:41 )             35.3     11-08    127<L>  |  93<L>  |  11  ----------------------------<  103<H>  3.9   |  22  |  0.72    Ca    8.5      08 Nov 2019 05:41  Phos  2.9     11-08  Mg     2.3     11-08          Consultant(s) Notes Reviewed:  yes  Care Discussed with Consultants/Other Providers:

## 2019-11-08 NOTE — DISCHARGE NOTE PROVIDER - CARE PROVIDERS DIRECT ADDRESSES
[email or web address]

## 2019-11-08 NOTE — DISCHARGE NOTE PROVIDER - NSFOLLOWUPCLINICS_GEN_ALL_ED_FT
[facility] - Urology  Urology  [address], [address] & [address]th [place] [address]  Phone: [phone]  Fax:   Follow Up Time: 2 weeks

## 2019-11-08 NOTE — DISCHARGE NOTE PROVIDER - HOSPITAL COURSE
74 M PMHx dementia, DM (A1c 7), BPH S/P TURP 1 week ago, S/P Pichardo x9 months (removed 10/28) p/w positive UCx 10/26 growing MDR Klebsiella, initially not improving symptomatically with IV Vabomere. Now on Avycaz and aztreonam. Labs with Na 128, fluid restriction 1200ml currently.         Hospital Course:     UTI due to Klebsiella species.        -Ucx kleb pnuemoniae - sensitive to amikacin and tigecycline (however amikacin is nephro toxic & tigecycline does not penetrate the bladder)-->therefore did not choose these abx    -ID Consulted - s/p Vabromere --> changed to Azactam and Avycaz on 11/4 -total 7days    -c/w Lactobacillus     -CT abd/pelvis to eval suprapubic pain: enlarged prostate. mild diffuse non specific bladder wall thickening. Neg for stones or mass.     -BCx (10/31)- negative     -Urology consulted 11/7 - patient with long-standing history of urinary retention, pichardo removed 10/28. If retaining, please place pichardo catheter.    Bowel regimen started. Cystoscopy deferred as OP until infection resolves, rec for Trial of pyridium for bladder pain,     -pvr bladder scan (11/7)- 182cc, monitor UP        Hyponatremia, chronic    -Chronic and asymptomatic. Urine electrolytes and urine osmolality noted. 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 adrenal insufficiency (even though low cortisol level, had a normal cosyntropin test). Likely SIADH. Noted decrease in sodium level secondary to increased water intake     -c/w NaCl tabs TID     -fluid restriction 1200mcc, strict I/O    -pt on Lexapro, may be contributing to Hyponatremia.         Microcytic anemia.     -No signs of active bleeding     -Iron studies: decreased ferritin, remained of iron studies wnl         Parkinson disease.     - Continue with Sinemet and Artane daily    - Fall risk protocol, Aspiration precautions.        Dementia.      - Continue with Seroquel, Namenda, and Aricept daily. QTc on admission 420 ms        Hyperglycemia 2/2 DM2    -Sugar on admission noted to be 112. HgA1C from 09/19 noted to be 7% and patient was started on low-dose insulin sliding scale.     -c/w FS TID and at bedtime for now.         BPH    - c/w Flomax QD    - outpatient urology follow up         PT - home with home PT        On ___________, case discussed with  ___________, patient is medically cleared and optimized for discharge to home today. All medications were reviewed and sent to mutually agreed upon pharmayc. Patient to follow up with PCP, urology 74 M PMHx dementia, DM (A1c 7), BPH S/P TURP 1 week ago, S/P Pichardo x9 months (removed 10/28) p/w positive UCx 10/26 growing MDR Klebsiella, initially not improving symptomatically with IV Vabomere. Now on Avycaz and aztreonam. Labs with Na 128, fluid restriction 1200ml currently.         Hospital Course:     UTI due to Klebsiella species.        -Ucx kleb pnuemoniae - sensitive to amikacin and tigecycline (however amikacin is nephro toxic & tigecycline does not penetrate the bladder)-->therefore did not choose these abx    -ID Consulted - s/p Vabromere --> changed to Azactam and Avycaz on 11/4 -total 7days    -c/w Lactobacillus     -CT abd/pelvis to eval suprapubic pain: enlarged prostate. mild diffuse non specific bladder wall thickening. Neg for stones or mass.     -BCx (10/31)- negative     -Urology consulted 11/7 - patient with long-standing history of urinary retention, pichardo removed 10/28. If retaining, please place pichardo catheter.    Bowel regimen started. Cystoscopy deferred as OP until infection resolves, rec for Trial of pyridium for bladder pain,     -pvr bladder scan (11/7)- 182cc, monitor UP    -pt with urinary pain however as per urology most likley 2/2 TURP    -Continue Hiprex x 3 months as per ID             Hyponatremia, chronic    -Chronic and asymptomatic. Urine electrolytes and urine osmolality noted. 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 adrenal insufficiency (even though low cortisol level, had a normal cosyntropin test). Likely SIADH. Noted decrease in sodium level secondary to increased water intake     -c/w NaCl tabs TID     -fluid restriction 1200mcc, strict I/O    -pt on Lexapro, may be contributing to Hyponatremia.         Microcytic anemia.     -No signs of active bleeding     -Iron studies: decreased ferritin, remained of iron studies wnl         Parkinson disease.     - Continue with Sinemet and Artane daily    - Fall risk protocol, Aspiration precautions.        Dementia.      - Continue with Seroquel, Namenda, and Aricept daily. QTc on admission 420 ms        Hyperglycemia 2/2 DM2    -Sugar on admission noted to be 112. HgA1C from 09/19 noted to be 7% and patient was started on low-dose insulin sliding scale.     -c/w FS TID and at bedtime for now.         BPH    - c/w Flomax QD    - outpatient urology follow up         PT - home with home PT        On ___________, case discussed with  ___________, patient is medically cleared and optimized for discharge to home today. All medications were reviewed and sent to mutually agreed upon pharmayc. Patient to follow up with PCP, urology 74 M PMHx dementia, DM (A1c 7), BPH S/P TURP 1 week ago, S/P Pichardo x9 months (removed 10/28) p/w positive UCx 10/26 growing MDR Klebsiella, initially not improving symptomatically with IV Vabomere. Now on Avycaz and aztreonam. Labs with Na 128, fluid restriction 1200ml currently.         Hospital Course:     UTI due to Klebsiella species.        -Ucx kleb pnuemoniae - sensitive to amikacin and tigecycline (however amikacin is nephro toxic & tigecycline does not penetrate the bladder)-->therefore did not choose these abx    -ID Consulted - s/p Vabromere --> changed to Azactam and Avycaz on 11/4 -total 7days    -c/w Lactobacillus     -CT abd/pelvis to eval suprapubic pain: enlarged prostate. mild diffuse non specific bladder wall thickening. Neg for stones or mass.     -BCx (10/31)- negative     -Urology consulted 11/7 - patient with long-standing history of urinary retention, pichardo removed 10/28. If retaining, please place pichardo catheter.    Bowel regimen started. Cystoscopy deferred as OP until infection resolves, rec for Trial of pyridium for bladder pain,     -pvr bladder scan (11/7)- 182cc, monitor UP    -pt with urinary pain however as per urology most likley 2/2 TURP    -Continue Hiprex x 3 months as per ID             Hyponatremia, chronic    -Chronic and asymptomatic. Urine electrolytes and urine osmolality noted. 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 adrenal insufficiency (even though low cortisol level, had a normal cosyntropin test). Likely SIADH. Noted decrease in sodium level secondary to increased water intake     -c/w NaCl tabs TID     -fluid restriction 1200mcc, strict I/O    -pt on Lexapro, may be contributing to Hyponatremia.         Microcytic anemia.     -No signs of active bleeding     -Iron studies: decreased ferritin, remained of iron studies wnl         Parkinson disease.     - Continue with Sinemet and Artane daily    - Fall risk protocol, Aspiration precautions.        Dementia.      - Continue with Seroquel, Namenda, and Aricept daily. QTc on admission 420 ms        Hyperglycemia 2/2 DM2    -Sugar on admission noted to be 112. HgA1C from 09/19 noted to be 7% and patient was started on low-dose insulin sliding scale.     -c/w FS TID and at bedtime for now.         BPH    - c/w Flomax QD    - outpatient urology follow up         PT - Rehab family refused: Dc home with Home PT         On 11/12, case discussed with Dr. Smith , patient is medically cleared and optimized for discharge to home today. All medications were reviewed and sent to mutually agreed upon pharmayc. Patient to follow up with PCP, urology 74 M PMHx dementia, DM (A1c 7), BPH S/P TURP 1 week ago, S/P Pichardo x9 months (removed 10/28) p/w positive UCx 10/26 growing MDR Klebsiella, initially not improving symptomatically with IV Vabomere. Now on Avycaz and aztreonam. Labs with Na 128, fluid restriction 1200ml currently.         Hospital Course:     UTI due to Klebsiella species.        -Ucx kleb pnuemoniae - sensitive to amikacin and tigecycline (however amikacin is nephro toxic & tigecycline does not penetrate the bladder)-->therefore did not choose these abx    -ID Consulted - s/p Vabromere --> changed to Azactam and Avycaz on 11/4 -total 7days    -c/w Lactobacillus     -CT abd/pelvis to eval suprapubic pain: enlarged prostate. mild diffuse non specific bladder wall thickening. Neg for stones or mass.     -BCx (10/31)- negative     -Urology consulted 11/7 - patient with long-standing history of urinary retention, pichardo removed 10/28. If retaining, please place pichardo catheter.    Bowel regimen started. Cystoscopy deferred as OP until infection resolves, rec for Trial of pyridium for bladder pain,     -pvr bladder scan (11/7)- 182cc, monitor UP    -pt with urinary pain however as per urology most likley 2/2 TURP    -Continue Hiprex x 3 months as per ID             Hyponatremia, chronic    -Chronic and asymptomatic. Urine electrolytes and urine osmolality noted. 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 adrenal insufficiency (even though low cortisol level, had a normal cosyntropin test). Likely SIADH. Noted decrease in sodium level secondary to increased water intake     -c/w NaCl tabs TID     -fluid restriction 1200mcc, strict I/O    -pt on Lexapro, may be contributing to Hyponatremia.         Microcytic anemia.     -No signs of active bleeding     -Iron studies: decreased ferritin, remained of iron studies wnl         Parkinson disease.     - Continue with Sinemet and Artane daily    - Fall risk protocol, Aspiration precautions.        Dementia.      - Continue with Seroquel, Namenda, and Aricept daily. QTc on admission 420 ms        Hyperglycemia 2/2 DM2    -Sugar on admission noted to be 112. HgA1C from 09/19 noted to be 7% and patient was started on low-dose insulin sliding scale.     -c/w FS TID and at bedtime for now.         BPH    - c/w Flomax QD    - outpatient urology follow up         PT - Rehab : CM offered home with home PT: family refused        On 11/12, case discussed with Dr. Smith , patient is medically cleared and optimized for discharge to home today. All medications were reviewed and sent to mutually agreed upon pharmayc. Patient to follow up with PCP, urology

## 2019-11-08 NOTE — PROGRESS NOTE ADULT - PROBLEM SELECTOR PLAN 1
Urine cultures showing Klebsiella pneumoniae (CRE). CT abd/pelvis completed today, 11/4/19 negative for stones or mass.   - Patient started on IV Vabomere (meropenem/vaborbactam --> now with clinical improvement since starting ceftazidime/avibactam and aztreonam (started on 11/4/19) as per ID. Will continue to complete 7 day course as per ID - anticipated end date 11/11/19  - ID following, recs reviewed and appreciated.    - Blood NGTD

## 2019-11-08 NOTE — DISCHARGE NOTE PROVIDER - NSDCCPCAREPLAN_GEN_ALL_CORE_FT
PRINCIPAL DISCHARGE DIAGNOSIS  Diagnosis: UTI due to Klebsiella species  Assessment and Plan of Treatment: You were started on antibiotics for a urinary infection. To help prevent future infections maintain healthy hygiene and avoid taking long baths. Wipe from front to back and empty your bladder frequently by keeping yourself properly hydrated. Monitor for signs/symptoms of infection, such as, fever/chills, burning/pain with urination, urinary frequency/hesitancy, cloudy urine, or blood in urine and follow-up with your primary care provider as outpatient for further care.   ****You were seen by the urology doctor who recommend outpatient follow up with urologist for possible cystoscopy once your urine infection resolved.      SECONDARY DISCHARGE DIAGNOSES  Diagnosis: Hyponatremia  Assessment and Plan of Treatment: Chronic low levels of salt in your blood, now improving. Maintain 1200ml fluid restriction (about 2-3 water bottles in 24hours). Continue with sodium tablets.    Diagnosis: Dementia  Assessment and Plan of Treatment: Stable, Please continue your medications as prescribed and allow help with daily acitivities of living. Maintain a safe environment and make movements in a careful manner to prevent falls. Ensure that you are eating and drinking adequately and maintaining a healthy sleep cycle. If you are in need of assistance with medication adjustment you can follow-up with your outpatient provider or refer to the [facility] Geriatric Psychiatry clinic by calling [phone].      Diagnosis: BPH (benign prostatic hyperplasia)  Assessment and Plan of Treatment: Continue flomax, follow up with outpatient urologist for further management    Diagnosis: Parkinson disease  Assessment and Plan of Treatment: Continue medications as prescribed, continue with physical therapy program    Diagnosis: Microcytic anemia  Assessment and Plan of Treatment: Stable, Follow-up with your outpatient provider for further care/recommendations. Monitor for signs/symptoms indicating worsening of disease, such as, easy bleeding/bruising, pale skin, fatigue, dizziness, increased heart rate, or chest pain. PRINCIPAL DISCHARGE DIAGNOSIS  Diagnosis: UTI due to Klebsiella species  Assessment and Plan of Treatment: You were started on antibiotics for a urinary infection. To help prevent future infections maintain healthy hygiene and avoid taking long baths. Wipe from front to back and empty your bladder frequently by keeping yourself properly hydrated. Monitor for signs/symptoms of infection, such as, fever/chills, burning/pain with urination, urinary frequency/hesitancy, cloudy urine, or blood in urine and follow-up with your primary care provider as outpatient for further care.   Please continue with Hiprex for 3 months as ordered. Follow up with infectious disease Dr. Guaman outpatient for further medical recommendations regarding antibiotics.  ****You were seen by the urology doctor who recommend outpatient follow up with urologist for possible cystoscopy once your urine infection resolved.      SECONDARY DISCHARGE DIAGNOSES  Diagnosis: Parkinson disease  Assessment and Plan of Treatment: Continue medications as prescribed, continue with physical therapy program    Diagnosis: Dementia  Assessment and Plan of Treatment: Stable, Please continue your medications as prescribed and allow help with daily acitivities of living. Maintain a safe environment and make movements in a careful manner to prevent falls. Ensure that you are eating and drinking adequately and maintaining a healthy sleep cycle. If you are in need of assistance with medication adjustment you can follow-up with your outpatient provider or refer to the [facility] Geriatric Psychiatry clinic by calling [phone].      Diagnosis: BPH (benign prostatic hyperplasia)  Assessment and Plan of Treatment: Continue flomax, follow up with outpatient urologist for further management    Diagnosis: Microcytic anemia  Assessment and Plan of Treatment: Stable, Follow-up with your outpatient provider for further care/recommendations. Monitor for signs/symptoms indicating worsening of disease, such as, easy bleeding/bruising, pale skin, fatigue, dizziness, increased heart rate, or chest pain.      Diagnosis: Hyponatremia  Assessment and Plan of Treatment: Chronic low levels of salt in your blood, now improving. Maintain 1200ml fluid restriction (about 2-3 water bottles in 24hours). Continue with sodium tablets. PRINCIPAL DISCHARGE DIAGNOSIS  Diagnosis: UTI due to Klebsiella species  Assessment and Plan of Treatment: You were started on antibiotics for a urinary infection and completed your course of antibiotics while you were admitted to the hospital.  To help prevent future infections maintain healthy hygiene and avoid taking long baths. Wipe from front to back and empty your bladder frequently by keeping yourself properly hydrated. Monitor for signs/symptoms of infection, such as, fever/chills, burning/pain with urination, urinary frequency/hesitancy, cloudy urine, or blood in urine and follow-up with your primary care provider as outpatient for further care.   Please continue with Hiprex for 3 months as ordered. Follow up with infectious disease Dr. Guaman outpatient for further medical recommendations regarding antibiotics.  ****You were seen by the urology doctor who recommend outpatient follow up with your urologist for possible cystoscopy once your urine infection resolved. Please call to make an appointment within 1 week of discharge [phone] at the urology clinic      SECONDARY DISCHARGE DIAGNOSES  Diagnosis: Parkinson disease  Assessment and Plan of Treatment: Please follow up at the [facility] Continue medications as prescribed, continue with physical therapy program    Diagnosis: Dementia  Assessment and Plan of Treatment: Stable, Please continue your medications as prescribed and allow help with daily acitivities of living. Maintain a safe environment and make movements in a careful manner to prevent falls. Ensure that you are eating and drinking adequately and maintaining a healthy sleep cycle. If you are in need of assistance with medication adjustment you can follow-up with your outpatient provider or refer to the [facility] Geriatric Psychiatry clinic by calling [phone].      Diagnosis: BPH (benign prostatic hyperplasia)  Assessment and Plan of Treatment: Continue flomax, follow up with outpatient urologist for further management    Diagnosis: Microcytic anemia  Assessment and Plan of Treatment: Stable, Follow-up with your outpatient provider for further care/recommendations. Monitor for signs/symptoms indicating worsening of disease, such as, easy bleeding/bruising, pale skin, fatigue, dizziness, increased heart rate, or chest pain.      Diagnosis: Hyponatremia  Assessment and Plan of Treatment: Chronic low levels of salt in your blood, now improving. Maintain 1200ml fluid restriction (about 2-3 water bottles in 24hours). Continue with sodium tablets. PRINCIPAL DISCHARGE DIAGNOSIS  Diagnosis: UTI due to Klebsiella species  Assessment and Plan of Treatment: Please follow up at the [facility] with Dr. Valverde at 1:20pm on November 13, 2019  You were started on antibiotics for a urinary infection and completed your course of antibiotics while you were admitted to the hospital.  To help prevent future infections maintain healthy hygiene and avoid taking long baths. Wipe from front to back and empty your bladder frequently by keeping yourself properly hydrated. Monitor for signs/symptoms of infection, such as, fever/chills, burning/pain with urination, urinary frequency/hesitancy, cloudy urine, or blood in urine and follow-up with your primary care provider as outpatient for further care.   Please continue with Hiprex for 3 months as ordered. Follow up with infectious disease Dr. Guaman outpatient for further medical recommendations regarding antibiotics.  ****You were seen by the urology doctor who recommend outpatient follow up with your urologist for possible cystoscopy once your urine infection resolved. Please call to make an appointment within 1 week of discharge [phone] at the urology clinic      SECONDARY DISCHARGE DIAGNOSES  Diagnosis: Parkinson disease  Assessment and Plan of Treatment: Please follow up at the [facility] with Dr. Valverde at 1:20pm on November 13, 2019  Continue medications as prescribed, continue with physical therapy program    Diagnosis: Dementia  Assessment and Plan of Treatment: Please follow up at the [facility] with Dr. Valverde at 1:20pm on November 13, 2019  Stable, Please continue your medications as prescribed and allow help with daily acitivities of living. Maintain a safe environment and make movements in a careful manner to prevent falls. Ensure that you are eating and drinking adequately and maintaining a healthy sleep cycle. If you are in need of assistance with medication adjustment you can follow-up with your outpatient provider or refer to the [facility] Geriatric Psychiatry clinic by calling [phone].      Diagnosis: BPH (benign prostatic hyperplasia)  Assessment and Plan of Treatment: Please follow up at the [facility] with Dr. Valverde at 1:20pm on November 13, 2019  Continue flomax, follow up with outpatient urologist for further management    Diagnosis: Microcytic anemia  Assessment and Plan of Treatment: Please follow up at the [facility] with Dr. Valverde at 1:20pm on November 13, 2019  Stable, Follow-up with your outpatient provider for further care/recommendations. Monitor for signs/symptoms indicating worsening of disease, such as, easy bleeding/bruising, pale skin, fatigue, dizziness, increased heart rate, or chest pain.      Diagnosis: Hyperglycemia  Assessment and Plan of Treatment: Please follow up at the [facility] with Dr. Valverde at 1:20pm on November 13, 2019  -Sugar on admission noted to be 112. HgA1C from 09/19 noted to be 7% and patient was started on low-dose insulin sliding scale.   -c/w FS TID and at bedtime for now.   Please continue diet control       Diagnosis: Hyponatremia  Assessment and Plan of Treatment: Please follow up at the [facility] with Dr. Valverde at 1:20pm on November 13, 2019  Chronic low levels of salt in your blood, now improving. Maintain 1200ml fluid restriction (about 2-3 water bottles in 24hours). Continue with sodium tablets.

## 2019-11-08 NOTE — PROGRESS NOTE ADULT - ASSESSMENT
73 year old with parkinson's and BPH with a chronic pichardo  Prior known colonization with CRE klebsiella  S/p recent TURP  recent presented with dysuria  and urine culture positive for CRE klebsiella  Afebrile with normal WBC      1) Acute UTI  He has dysuria and suprapubic tenderness  Tx with antibiotics    2) CRE- ? metalobetalactamase  Resistant to vabomere and avybactam    Trial of combination therapy with avycaz and aztreonam- plan 7 d course    After this course, consider Hipprex 1 gram po q 12 for 3 months       3) Abdominal pain  Improved  I think there is chronic pain that is not due to UTI  Not clear that antimicrobials will resolve all of his symptoms

## 2019-11-08 NOTE — PROGRESS NOTE ADULT - PROBLEM SELECTOR PLAN 2
Chronic and asymptomatic. Urine electrolytes and urine osmolality noted. 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 adrenal insufficiency (even though low cortisol level, had a normal cosyntropin test). Likely SIADH.   - c/w NaCl tabs TID   - re-enforce fluid restriction to 1L  - pt on Lexapro, may be contributing to Hyponatremia (unclear why patient on this medication since pt's family said he was not on that before)

## 2019-11-08 NOTE — DISCHARGE NOTE PROVIDER - NSDCFUSCHEDAPPT_GEN_ALL_CORE_FT
JEANE JEFFERSON ; 11/13/2019 ; NPP Intmed OP [id number] [facility] JEANE JEFFERSON ; 11/13/2019 ; NPP Intmed OP [id number] [facility] JEANE JEFFERSON ; 11/13/2019 ; NPP Intmed OP [id number] [facility] JEANE JEFFERSON ; 11/13/2019 ; [id number] ; 11/13/2019 ; [id number] [facility] ; [id number] [facility]

## 2019-11-09 LAB
ANION GAP SERPL CALC-SCNC: 12 MMO/L — SIGNIFICANT CHANGE UP (ref 7–14)
BUN SERPL-MCNC: 11 MG/DL — SIGNIFICANT CHANGE UP (ref 7–23)
CALCIUM SERPL-MCNC: 8.9 MG/DL — SIGNIFICANT CHANGE UP (ref 8.4–10.5)
CHLORIDE SERPL-SCNC: 95 MMOL/L — LOW (ref 98–107)
CO2 SERPL-SCNC: 23 MMOL/L — SIGNIFICANT CHANGE UP (ref 22–31)
CREAT SERPL-MCNC: 0.66 MG/DL — SIGNIFICANT CHANGE UP (ref 0.5–1.3)
GLUCOSE BLDC GLUCOMTR-MCNC: 101 MG/DL — HIGH (ref 70–99)
GLUCOSE BLDC GLUCOMTR-MCNC: 139 MG/DL — HIGH (ref 70–99)
GLUCOSE BLDC GLUCOMTR-MCNC: 151 MG/DL — HIGH (ref 70–99)
GLUCOSE BLDC GLUCOMTR-MCNC: 168 MG/DL — HIGH (ref 70–99)
GLUCOSE SERPL-MCNC: 136 MG/DL — HIGH (ref 70–99)
HCT VFR BLD CALC: 36.2 % — LOW (ref 39–50)
HGB BLD-MCNC: 12.2 G/DL — LOW (ref 13–17)
MAGNESIUM SERPL-MCNC: 2.1 MG/DL — SIGNIFICANT CHANGE UP (ref 1.6–2.6)
MCHC RBC-ENTMCNC: 25.1 PG — LOW (ref 27–34)
MCHC RBC-ENTMCNC: 33.7 % — SIGNIFICANT CHANGE UP (ref 32–36)
MCV RBC AUTO: 74.3 FL — LOW (ref 80–100)
NRBC # FLD: 0 K/UL — SIGNIFICANT CHANGE UP (ref 0–0)
PHOSPHATE SERPL-MCNC: 2.8 MG/DL — SIGNIFICANT CHANGE UP (ref 2.5–4.5)
PLATELET # BLD AUTO: 332 K/UL — SIGNIFICANT CHANGE UP (ref 150–400)
PMV BLD: 8.8 FL — SIGNIFICANT CHANGE UP (ref 7–13)
POTASSIUM SERPL-MCNC: 3.9 MMOL/L — SIGNIFICANT CHANGE UP (ref 3.5–5.3)
POTASSIUM SERPL-SCNC: 3.9 MMOL/L — SIGNIFICANT CHANGE UP (ref 3.5–5.3)
RBC # BLD: 4.87 M/UL — SIGNIFICANT CHANGE UP (ref 4.2–5.8)
RBC # FLD: 14 % — SIGNIFICANT CHANGE UP (ref 10.3–14.5)
SODIUM SERPL-SCNC: 130 MMOL/L — LOW (ref 135–145)
WBC # BLD: 8.67 K/UL — SIGNIFICANT CHANGE UP (ref 3.8–10.5)
WBC # FLD AUTO: 8.67 K/UL — SIGNIFICANT CHANGE UP (ref 3.8–10.5)

## 2019-11-09 PROCEDURE — 99232 SBSQ HOSP IP/OBS MODERATE 35: CPT

## 2019-11-09 RX ORDER — LANOLIN ALCOHOL/MO/W.PET/CERES
3 CREAM (GRAM) TOPICAL ONCE
Refills: 0 | Status: COMPLETED | OUTPATIENT
Start: 2019-11-09 | End: 2019-11-09

## 2019-11-09 RX ORDER — LANOLIN ALCOHOL/MO/W.PET/CERES
3 CREAM (GRAM) TOPICAL AT BEDTIME
Refills: 0 | Status: DISCONTINUED | OUTPATIENT
Start: 2019-11-09 | End: 2019-11-12

## 2019-11-09 RX ADMIN — Medication 100 MILLIGRAM(S): at 21:48

## 2019-11-09 RX ADMIN — CARBIDOPA AND LEVODOPA 1 TABLET(S): 25; 100 TABLET ORAL at 05:06

## 2019-11-09 RX ADMIN — Medication 2 MILLIGRAM(S): at 05:06

## 2019-11-09 RX ADMIN — SENNA PLUS 2 TABLET(S): 8.6 TABLET ORAL at 21:39

## 2019-11-09 RX ADMIN — HEPARIN SODIUM 5000 UNIT(S): 5000 INJECTION INTRAVENOUS; SUBCUTANEOUS at 17:46

## 2019-11-09 RX ADMIN — ESCITALOPRAM OXALATE 5 MILLIGRAM(S): 10 TABLET, FILM COATED ORAL at 11:35

## 2019-11-09 RX ADMIN — Medication 3 MILLIGRAM(S): at 00:43

## 2019-11-09 RX ADMIN — CARBIDOPA AND LEVODOPA 1 TABLET(S): 25; 100 TABLET ORAL at 21:38

## 2019-11-09 RX ADMIN — GABAPENTIN 300 MILLIGRAM(S): 400 CAPSULE ORAL at 21:38

## 2019-11-09 RX ADMIN — Medication 100 MILLIGRAM(S): at 13:25

## 2019-11-09 RX ADMIN — DORZOLAMIDE HYDROCHLORIDE TIMOLOL MALEATE 1 DROP(S): 20; 5 SOLUTION/ DROPS OPHTHALMIC at 17:46

## 2019-11-09 RX ADMIN — Medication 1 TABLET(S): at 05:06

## 2019-11-09 RX ADMIN — SODIUM CHLORIDE 1 GRAM(S): 9 INJECTION INTRAMUSCULAR; INTRAVENOUS; SUBCUTANEOUS at 05:06

## 2019-11-09 RX ADMIN — SODIUM CHLORIDE 1 GRAM(S): 9 INJECTION INTRAMUSCULAR; INTRAVENOUS; SUBCUTANEOUS at 13:25

## 2019-11-09 RX ADMIN — Medication 2 MILLIGRAM(S): at 17:47

## 2019-11-09 RX ADMIN — Medication 1 DROP(S): at 17:45

## 2019-11-09 RX ADMIN — TAMSULOSIN HYDROCHLORIDE 0.4 MILLIGRAM(S): 0.4 CAPSULE ORAL at 21:39

## 2019-11-09 RX ADMIN — Medication 100 MILLIGRAM(S): at 05:05

## 2019-11-09 RX ADMIN — CARBIDOPA AND LEVODOPA 1 TABLET(S): 25; 100 TABLET ORAL at 13:24

## 2019-11-09 RX ADMIN — Medication 1 DROP(S): at 05:05

## 2019-11-09 RX ADMIN — DORZOLAMIDE HYDROCHLORIDE TIMOLOL MALEATE 1 DROP(S): 20; 5 SOLUTION/ DROPS OPHTHALMIC at 05:05

## 2019-11-09 RX ADMIN — PANTOPRAZOLE SODIUM 40 MILLIGRAM(S): 20 TABLET, DELAYED RELEASE ORAL at 05:06

## 2019-11-09 RX ADMIN — SODIUM CHLORIDE 1 GRAM(S): 9 INJECTION INTRAMUSCULAR; INTRAVENOUS; SUBCUTANEOUS at 21:39

## 2019-11-09 RX ADMIN — Medication 1 TABLET(S): at 17:47

## 2019-11-09 RX ADMIN — Medication 3 MILLIGRAM(S): at 21:38

## 2019-11-09 RX ADMIN — HEPARIN SODIUM 5000 UNIT(S): 5000 INJECTION INTRAVENOUS; SUBCUTANEOUS at 05:06

## 2019-11-09 RX ADMIN — QUETIAPINE FUMARATE 25 MILLIGRAM(S): 200 TABLET, FILM COATED ORAL at 11:35

## 2019-11-09 RX ADMIN — Medication 1: at 13:14

## 2019-11-09 RX ADMIN — LATANOPROST 1 DROP(S): 0.05 SOLUTION/ DROPS OPHTHALMIC; TOPICAL at 21:39

## 2019-11-09 NOTE — PROGRESS NOTE ADULT - ASSESSMENT
75 y/o M with PMH of Dementia, BPH(with recent Aguilar removal on 10/28 and TURP a week ago), Parkinson's disease admitted for positive urine cx growing MDR Klebsiella, initially not improving symptomatically with IV Vabomere. Now on Avycaz and aztreonam. 75 y/o M with PMH of Dementia, BPH(with recent Aguilar removal on 10/28 and TURP a week ago), Parkinson's disease admitted for positive urine cx growing MDR Klebsiella, initially not improving symptomatically with IV Vabomere. Now on Avycaz and aztreonam with clinical response.

## 2019-11-09 NOTE — PROGRESS NOTE ADULT - PROBLEM SELECTOR PLAN 2
Chronic and asymptomatic. Urine electrolytes and urine osmolality noted. 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 adrenal insufficiency (even though low cortisol level, had a normal cosyntropin test). Likely SIADH.   - c/w NaCl tabs TID   - re-enforce fluid restriction to 1L  - pt on Lexapro, may be contributing to Hyponatremia (unclear why patient on this medication since pt's family said he was not on that before) Chronic and asymptomatic. Urine electrolytes and urine osmolality noted. 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 adrenal insufficiency (even though low cortisol level, had a normal cosyntropin test). Likely SIADH. Sodium levels improved  - c/w NaCl tabs TID   - re-enforce fluid restriction to 1L  - pt on Lexapro, may be contributing to Hyponatremia (unclear why patient on this medication since pt's family said he was not on that before)

## 2019-11-09 NOTE — PROGRESS NOTE ADULT - PROBLEM SELECTOR PLAN 1
Urine cultures showing Klebsiella pneumoniae (CRE). CT abd/pelvis completed today, 11/4/19 negative for stones or mass.   - Patient started on IV Vabomere (meropenem/vaborbactam --> now with clinical improvement since starting ceftazidime/avibactam and aztreonam (started on 11/4/19) as per ID. Will continue to complete 7 day course as per ID - anticipated end date 11/11/19  - ID following, recs reviewed and appreciated.    - Blood NGTD Urine cultures showing Klebsiella pneumoniae (CRE). CT abd/pelvis completed on 11/4/19 negative for stones or mass.   - Patient started on IV Vabomere (meropenem/vaborbactam initially without significant improvement --> pt with clinical response on ceftazidime/avibactam and aztreonam (started on 11/4/19) as per ID. Will continue to complete 7 day course as per ID - anticipated end date 11/11/19  - ID following, recs reviewed and appreciated.    - Blood NGTD

## 2019-11-09 NOTE — PROGRESS NOTE ADULT - SUBJECTIVE AND OBJECTIVE BOX
Patient is a 74y old  Male who presents with a chief complaint of Told to come ot the ER for positive urine cultures (08 Nov 2019 18:56)    SUBJECTIVE / OVERNIGHT EVENTS:      MEDICATIONS  (STANDING):  artificial  tears Solution 1 Drop(s) Both EYES two times a day  aztreonam  IVPB 2000 milliGRAM(s) IV Intermittent every 8 hours  carbidopa/levodopa  25/100 1 Tablet(s) Oral three times a day  ceftazidime/avibactam IVPB 2.5 Gram(s) IV Intermittent every 8 hours  dextrose 5%. 1000 milliLiter(s) (50 mL/Hr) IV Continuous <Continuous>  dextrose 50% Injectable 12.5 Gram(s) IV Push once  dextrose 50% Injectable 25 Gram(s) IV Push once  dextrose 50% Injectable 25 Gram(s) IV Push once  dorzolamide 2%/timolol 0.5% Ophthalmic Solution 1 Drop(s) Left EYE two times a day  escitalopram 5 milliGRAM(s) Oral daily  gabapentin 300 milliGRAM(s) Oral at bedtime  heparin  Injectable 5000 Unit(s) SubCutaneous every 12 hours  influenza   Vaccine 0.5 milliLiter(s) IntraMuscular once  insulin lispro (HumaLOG) corrective regimen sliding scale   SubCutaneous three times a day before meals  insulin lispro (HumaLOG) corrective regimen sliding scale   SubCutaneous at bedtime  lactobacillus acidophilus 1 Tablet(s) Oral two times a day  latanoprost 0.005% Ophthalmic Solution 1 Drop(s) Right EYE at bedtime  melatonin 3 milliGRAM(s) Oral at bedtime  pantoprazole    Tablet 40 milliGRAM(s) Oral before breakfast  polyethylene glycol 3350 17 Gram(s) Oral daily  QUEtiapine 25 milliGRAM(s) Oral daily  senna 2 Tablet(s) Oral at bedtime  sodium chloride 1 Gram(s) Oral three times a day  tamsulosin 0.4 milliGRAM(s) Oral at bedtime  trihexyphenidyl 2 milliGRAM(s) Oral two times a day    MEDICATIONS  (PRN):  dextrose 40% Gel 15 Gram(s) Oral once PRN Blood Glucose LESS THAN 70 milliGRAM(s)/deciliter  glucagon  Injectable 1 milliGRAM(s) IntraMuscular once PRN Glucose LESS THAN 70 milligrams/deciliter  simethicone 80 milliGRAM(s) Chew every 6 hours PRN Gas      Vital Signs Last 24 Hrs  T(C): 36.8 (09 Nov 2019 05:01), Max: 36.8 (09 Nov 2019 05:01)  T(F): 98.3 (09 Nov 2019 05:01), Max: 98.3 (09 Nov 2019 05:01)  HR: 65 (09 Nov 2019 05:01) (65 - 68)  BP: 111/57 (09 Nov 2019 05:01) (111/57 - 120/61)  BP(mean): --  RR: 17 (09 Nov 2019 05:01) (17 - 17)  SpO2: 99% (09 Nov 2019 05:01) (99% - 99%)  CAPILLARY BLOOD GLUCOSE      POCT Blood Glucose.: 151 mg/dL (09 Nov 2019 12:16)  POCT Blood Glucose.: 101 mg/dL (09 Nov 2019 08:34)  POCT Blood Glucose.: 149 mg/dL (08 Nov 2019 22:29)  POCT Blood Glucose.: 148 mg/dL (08 Nov 2019 16:58)    I&O's Summary    08 Nov 2019 07:01  -  09 Nov 2019 07:00  --------------------------------------------------------  IN: 200 mL / OUT: 1150 mL / NET: -950 mL          PHYSICAL EXAM      LABS:                        12.2   8.67  )-----------( 332      ( 09 Nov 2019 06:30 )             36.2     11-09    130<L>  |  95<L>  |  11  ----------------------------<  136<H>  3.9   |  23  |  0.66    Ca    8.9      09 Nov 2019 06:30  Phos  2.8     11-09  Mg     2.1     11-09                RADIOLOGY & ADDITIONAL TESTS:    Imaging Personally Reviewed:  Consultant(s) Notes Reviewed:    Care Discussed with Consultants/Other Providers: Patient is a 74y old  Male who presents with a chief complaint of Told to come ot the ER for positive urine cultures (08 Nov 2019 18:56)    SUBJECTIVE / OVERNIGHT EVENTS:  No acute events overnight. No acute complaints.     MEDICATIONS  (STANDING):  artificial  tears Solution 1 Drop(s) Both EYES two times a day  aztreonam  IVPB 2000 milliGRAM(s) IV Intermittent every 8 hours  carbidopa/levodopa  25/100 1 Tablet(s) Oral three times a day  ceftazidime/avibactam IVPB 2.5 Gram(s) IV Intermittent every 8 hours  dextrose 5%. 1000 milliLiter(s) (50 mL/Hr) IV Continuous <Continuous>  dextrose 50% Injectable 12.5 Gram(s) IV Push once  dextrose 50% Injectable 25 Gram(s) IV Push once  dextrose 50% Injectable 25 Gram(s) IV Push once  dorzolamide 2%/timolol 0.5% Ophthalmic Solution 1 Drop(s) Left EYE two times a day  escitalopram 5 milliGRAM(s) Oral daily  gabapentin 300 milliGRAM(s) Oral at bedtime  heparin  Injectable 5000 Unit(s) SubCutaneous every 12 hours  influenza   Vaccine 0.5 milliLiter(s) IntraMuscular once  insulin lispro (HumaLOG) corrective regimen sliding scale   SubCutaneous three times a day before meals  insulin lispro (HumaLOG) corrective regimen sliding scale   SubCutaneous at bedtime  lactobacillus acidophilus 1 Tablet(s) Oral two times a day  latanoprost 0.005% Ophthalmic Solution 1 Drop(s) Right EYE at bedtime  melatonin 3 milliGRAM(s) Oral at bedtime  pantoprazole    Tablet 40 milliGRAM(s) Oral before breakfast  polyethylene glycol 3350 17 Gram(s) Oral daily  QUEtiapine 25 milliGRAM(s) Oral daily  senna 2 Tablet(s) Oral at bedtime  sodium chloride 1 Gram(s) Oral three times a day  tamsulosin 0.4 milliGRAM(s) Oral at bedtime  trihexyphenidyl 2 milliGRAM(s) Oral two times a day    MEDICATIONS  (PRN):  dextrose 40% Gel 15 Gram(s) Oral once PRN Blood Glucose LESS THAN 70 milliGRAM(s)/deciliter  glucagon  Injectable 1 milliGRAM(s) IntraMuscular once PRN Glucose LESS THAN 70 milligrams/deciliter  simethicone 80 milliGRAM(s) Chew every 6 hours PRN Gas      Vital Signs Last 24 Hrs  T(C): 36.8 (09 Nov 2019 05:01), Max: 36.8 (09 Nov 2019 05:01)  T(F): 98.3 (09 Nov 2019 05:01), Max: 98.3 (09 Nov 2019 05:01)  HR: 65 (09 Nov 2019 05:01) (65 - 68)  BP: 111/57 (09 Nov 2019 05:01) (111/57 - 120/61)  BP(mean): --  RR: 17 (09 Nov 2019 05:01) (17 - 17)  SpO2: 99% (09 Nov 2019 05:01) (99% - 99%)  CAPILLARY BLOOD GLUCOSE      POCT Blood Glucose.: 151 mg/dL (09 Nov 2019 12:16)  POCT Blood Glucose.: 101 mg/dL (09 Nov 2019 08:34)  POCT Blood Glucose.: 149 mg/dL (08 Nov 2019 22:29)  POCT Blood Glucose.: 148 mg/dL (08 Nov 2019 16:58)    I&O's Summary    08 Nov 2019 07:01  -  09 Nov 2019 07:00  --------------------------------------------------------  IN: 200 mL / OUT: 1150 mL / NET: -950 mL          PHYSICAL EXAM  GENERAL: NAD, well-developed  CHEST/LUNG: Clear to auscultation bilaterally; No wheeze  HEART: Regular rate and rhythm; No murmurs, rubs, or gallops  ABDOMEN: Soft, Suprapubic tenderness to palpation, Nondistended; Bowel sounds present  EXTREMITIES:  2+ Peripheral Pulses, No clubbing, cyanosis, or edema  PSYCH: AAOx3          LABS:                        12.2   8.67  )-----------( 332      ( 09 Nov 2019 06:30 )             36.2     11-09    130<L>  |  95<L>  |  11  ----------------------------<  136<H>  3.9   |  23  |  0.66    Ca    8.9      09 Nov 2019 06:30  Phos  2.8     11-09  Mg     2.1     11-09

## 2019-11-10 LAB
ANION GAP SERPL CALC-SCNC: 13 MMO/L — SIGNIFICANT CHANGE UP (ref 7–14)
BUN SERPL-MCNC: 10 MG/DL — SIGNIFICANT CHANGE UP (ref 7–23)
CALCIUM SERPL-MCNC: 8.8 MG/DL — SIGNIFICANT CHANGE UP (ref 8.4–10.5)
CHLORIDE SERPL-SCNC: 96 MMOL/L — LOW (ref 98–107)
CO2 SERPL-SCNC: 23 MMOL/L — SIGNIFICANT CHANGE UP (ref 22–31)
CREAT SERPL-MCNC: 0.67 MG/DL — SIGNIFICANT CHANGE UP (ref 0.5–1.3)
GLUCOSE BLDC GLUCOMTR-MCNC: 118 MG/DL — HIGH (ref 70–99)
GLUCOSE BLDC GLUCOMTR-MCNC: 121 MG/DL — HIGH (ref 70–99)
GLUCOSE BLDC GLUCOMTR-MCNC: 138 MG/DL — HIGH (ref 70–99)
GLUCOSE BLDC GLUCOMTR-MCNC: 176 MG/DL — HIGH (ref 70–99)
GLUCOSE SERPL-MCNC: 104 MG/DL — HIGH (ref 70–99)
HCT VFR BLD CALC: 32.7 % — LOW (ref 39–50)
HGB BLD-MCNC: 11.4 G/DL — LOW (ref 13–17)
MAGNESIUM SERPL-MCNC: 2 MG/DL — SIGNIFICANT CHANGE UP (ref 1.6–2.6)
MCHC RBC-ENTMCNC: 26 PG — LOW (ref 27–34)
MCHC RBC-ENTMCNC: 34.9 % — SIGNIFICANT CHANGE UP (ref 32–36)
MCV RBC AUTO: 74.5 FL — LOW (ref 80–100)
NRBC # FLD: 0 K/UL — SIGNIFICANT CHANGE UP (ref 0–0)
PHOSPHATE SERPL-MCNC: 3.5 MG/DL — SIGNIFICANT CHANGE UP (ref 2.5–4.5)
PLATELET # BLD AUTO: 309 K/UL — SIGNIFICANT CHANGE UP (ref 150–400)
PMV BLD: 8.7 FL — SIGNIFICANT CHANGE UP (ref 7–13)
POTASSIUM SERPL-MCNC: 4 MMOL/L — SIGNIFICANT CHANGE UP (ref 3.5–5.3)
POTASSIUM SERPL-SCNC: 4 MMOL/L — SIGNIFICANT CHANGE UP (ref 3.5–5.3)
RBC # BLD: 4.39 M/UL — SIGNIFICANT CHANGE UP (ref 4.2–5.8)
RBC # FLD: 14 % — SIGNIFICANT CHANGE UP (ref 10.3–14.5)
SODIUM SERPL-SCNC: 132 MMOL/L — LOW (ref 135–145)
WBC # BLD: 8.37 K/UL — SIGNIFICANT CHANGE UP (ref 3.8–10.5)
WBC # FLD AUTO: 8.37 K/UL — SIGNIFICANT CHANGE UP (ref 3.8–10.5)

## 2019-11-10 PROCEDURE — 99232 SBSQ HOSP IP/OBS MODERATE 35: CPT

## 2019-11-10 RX ADMIN — SODIUM CHLORIDE 1 GRAM(S): 9 INJECTION INTRAMUSCULAR; INTRAVENOUS; SUBCUTANEOUS at 13:51

## 2019-11-10 RX ADMIN — SODIUM CHLORIDE 1 GRAM(S): 9 INJECTION INTRAMUSCULAR; INTRAVENOUS; SUBCUTANEOUS at 21:22

## 2019-11-10 RX ADMIN — Medication 100 MILLIGRAM(S): at 13:50

## 2019-11-10 RX ADMIN — GABAPENTIN 300 MILLIGRAM(S): 400 CAPSULE ORAL at 21:22

## 2019-11-10 RX ADMIN — CARBIDOPA AND LEVODOPA 1 TABLET(S): 25; 100 TABLET ORAL at 13:50

## 2019-11-10 RX ADMIN — ESCITALOPRAM OXALATE 5 MILLIGRAM(S): 10 TABLET, FILM COATED ORAL at 12:45

## 2019-11-10 RX ADMIN — SENNA PLUS 2 TABLET(S): 8.6 TABLET ORAL at 21:22

## 2019-11-10 RX ADMIN — Medication 1 TABLET(S): at 18:42

## 2019-11-10 RX ADMIN — QUETIAPINE FUMARATE 25 MILLIGRAM(S): 200 TABLET, FILM COATED ORAL at 12:45

## 2019-11-10 RX ADMIN — Medication 1 TABLET(S): at 05:38

## 2019-11-10 RX ADMIN — DORZOLAMIDE HYDROCHLORIDE TIMOLOL MALEATE 1 DROP(S): 20; 5 SOLUTION/ DROPS OPHTHALMIC at 18:42

## 2019-11-10 RX ADMIN — HEPARIN SODIUM 5000 UNIT(S): 5000 INJECTION INTRAVENOUS; SUBCUTANEOUS at 05:38

## 2019-11-10 RX ADMIN — Medication 1 DROP(S): at 18:41

## 2019-11-10 RX ADMIN — CARBIDOPA AND LEVODOPA 1 TABLET(S): 25; 100 TABLET ORAL at 21:22

## 2019-11-10 RX ADMIN — Medication 3 MILLIGRAM(S): at 21:22

## 2019-11-10 RX ADMIN — SODIUM CHLORIDE 1 GRAM(S): 9 INJECTION INTRAMUSCULAR; INTRAVENOUS; SUBCUTANEOUS at 05:38

## 2019-11-10 RX ADMIN — LATANOPROST 1 DROP(S): 0.05 SOLUTION/ DROPS OPHTHALMIC; TOPICAL at 21:22

## 2019-11-10 RX ADMIN — TAMSULOSIN HYDROCHLORIDE 0.4 MILLIGRAM(S): 0.4 CAPSULE ORAL at 21:22

## 2019-11-10 RX ADMIN — Medication 1 DROP(S): at 05:37

## 2019-11-10 RX ADMIN — Medication 2 MILLIGRAM(S): at 18:42

## 2019-11-10 RX ADMIN — Medication 100 MILLIGRAM(S): at 05:38

## 2019-11-10 RX ADMIN — Medication 2 MILLIGRAM(S): at 05:38

## 2019-11-10 RX ADMIN — PANTOPRAZOLE SODIUM 40 MILLIGRAM(S): 20 TABLET, DELAYED RELEASE ORAL at 05:38

## 2019-11-10 RX ADMIN — CARBIDOPA AND LEVODOPA 1 TABLET(S): 25; 100 TABLET ORAL at 05:38

## 2019-11-10 RX ADMIN — Medication 100 MILLIGRAM(S): at 21:27

## 2019-11-10 RX ADMIN — HEPARIN SODIUM 5000 UNIT(S): 5000 INJECTION INTRAVENOUS; SUBCUTANEOUS at 18:42

## 2019-11-10 RX ADMIN — DORZOLAMIDE HYDROCHLORIDE TIMOLOL MALEATE 1 DROP(S): 20; 5 SOLUTION/ DROPS OPHTHALMIC at 05:37

## 2019-11-10 RX ADMIN — Medication 1: at 09:40

## 2019-11-10 NOTE — PROGRESS NOTE ADULT - PROBLEM SELECTOR PLAN 1
Urine cultures showing Klebsiella pneumoniae (CRE). CT abd/pelvis completed on 11/4/19 negative for stones or mass.   - Patient started on IV Vabomere (meropenem/vaborbactam initially without significant improvement --> pt with clinical response on ceftazidime/avibactam and aztreonam (started on 11/4/19) as per ID. Will continue to complete 7 day course as per ID - anticipated end date 11/11/19  - ID following, recs reviewed and appreciated.    - Blood NGTD

## 2019-11-10 NOTE — PROGRESS NOTE ADULT - SUBJECTIVE AND OBJECTIVE BOX
Patient is a 74y old  Male who presents with a chief complaint of Told to come ot the ER for positive urine cultures (09 Nov 2019 12:52)        SUBJECTIVE / OVERNIGHT EVENTS:      MEDICATIONS  (STANDING):  artificial  tears Solution 1 Drop(s) Both EYES two times a day  aztreonam  IVPB 2000 milliGRAM(s) IV Intermittent every 8 hours  carbidopa/levodopa  25/100 1 Tablet(s) Oral three times a day  ceftazidime/avibactam IVPB 2.5 Gram(s) IV Intermittent every 8 hours  dextrose 5%. 1000 milliLiter(s) (50 mL/Hr) IV Continuous <Continuous>  dextrose 50% Injectable 12.5 Gram(s) IV Push once  dextrose 50% Injectable 25 Gram(s) IV Push once  dextrose 50% Injectable 25 Gram(s) IV Push once  dorzolamide 2%/timolol 0.5% Ophthalmic Solution 1 Drop(s) Left EYE two times a day  escitalopram 5 milliGRAM(s) Oral daily  gabapentin 300 milliGRAM(s) Oral at bedtime  heparin  Injectable 5000 Unit(s) SubCutaneous every 12 hours  influenza   Vaccine 0.5 milliLiter(s) IntraMuscular once  insulin lispro (HumaLOG) corrective regimen sliding scale   SubCutaneous three times a day before meals  insulin lispro (HumaLOG) corrective regimen sliding scale   SubCutaneous at bedtime  lactobacillus acidophilus 1 Tablet(s) Oral two times a day  latanoprost 0.005% Ophthalmic Solution 1 Drop(s) Right EYE at bedtime  melatonin 3 milliGRAM(s) Oral at bedtime  pantoprazole    Tablet 40 milliGRAM(s) Oral before breakfast  polyethylene glycol 3350 17 Gram(s) Oral daily  QUEtiapine 25 milliGRAM(s) Oral daily  senna 2 Tablet(s) Oral at bedtime  sodium chloride 1 Gram(s) Oral three times a day  tamsulosin 0.4 milliGRAM(s) Oral at bedtime  trihexyphenidyl 2 milliGRAM(s) Oral two times a day    MEDICATIONS  (PRN):  dextrose 40% Gel 15 Gram(s) Oral once PRN Blood Glucose LESS THAN 70 milliGRAM(s)/deciliter  glucagon  Injectable 1 milliGRAM(s) IntraMuscular once PRN Glucose LESS THAN 70 milligrams/deciliter  simethicone 80 milliGRAM(s) Chew every 6 hours PRN Gas      Vital Signs Last 24 Hrs  T(C): 36.6 (10 Nov 2019 05:35), Max: 36.6 (10 Nov 2019 05:35)  T(F): 97.9 (10 Nov 2019 05:35), Max: 97.9 (10 Nov 2019 05:35)  HR: 67 (10 Nov 2019 05:35) (66 - 67)  BP: 100/63 (10 Nov 2019 05:35) (100/63 - 105/65)  BP(mean): --  RR: 17 (10 Nov 2019 05:35) (17 - 17)  SpO2: 99% (10 Nov 2019 05:35) (99% - 99%)  CAPILLARY BLOOD GLUCOSE      POCT Blood Glucose.: 121 mg/dL (10 Nov 2019 17:09)  POCT Blood Glucose.: 138 mg/dL (10 Nov 2019 12:26)  POCT Blood Glucose.: 176 mg/dL (10 Nov 2019 09:21)  POCT Blood Glucose.: 168 mg/dL (09 Nov 2019 22:10)    I&O's Summary    09 Nov 2019 07:01  -  10 Nov 2019 07:00  --------------------------------------------------------  IN: 1000 mL / OUT: 1050 mL / NET: -50 mL    10 Nov 2019 07:01  -  10 Nov 2019 17:29  --------------------------------------------------------  IN: 0 mL / OUT: 400 mL / NET: -400 mL          PHYSICAL EXAM  GENERAL: NAD, well-developed  HEAD:  Atraumatic, Normocephalic  EYES: EOMI, PERRLA, conjunctiva and sclera clear  NECK: Supple, No JVD  CHEST/LUNG: Clear to auscultation bilaterally; No wheeze  HEART: Regular rate and rhythm; No murmurs, rubs, or gallops  ABDOMEN: Soft, Nontender, Nondistended; Bowel sounds present  EXTREMITIES:  2+ Peripheral Pulses, No clubbing, cyanosis, or edema  PSYCH: AAOx3  SKIN: No rashes or lesions    LABS:                        11.4   8.37  )-----------( 309      ( 10 Nov 2019 05:12 )             32.7     11-10    132<L>  |  96<L>  |  10  ----------------------------<  104<H>  4.0   |  23  |  0.67    Ca    8.8      10 Nov 2019 05:12  Phos  3.5     11-10  Mg     2.0     11-10                RADIOLOGY & ADDITIONAL TESTS:    Imaging Personally Reviewed:  Consultant(s) Notes Reviewed:    Care Discussed with Consultants/Other Providers: Patient is a 74y old  Male who presents with a chief complaint of Told to come ot the ER for positive urine cultures (09 Nov 2019 12:52)    SUBJECTIVE / OVERNIGHT EVENTS:  [employer]  utilized for [employer], ID# [id number]  Patient feels great. He has no complaints. No dysuria. Eating well. No n/v or abdominal pain.     MEDICATIONS  (STANDING):  artificial  tears Solution 1 Drop(s) Both EYES two times a day  aztreonam  IVPB 2000 milliGRAM(s) IV Intermittent every 8 hours  carbidopa/levodopa  25/100 1 Tablet(s) Oral three times a day  ceftazidime/avibactam IVPB 2.5 Gram(s) IV Intermittent every 8 hours  dextrose 5%. 1000 milliLiter(s) (50 mL/Hr) IV Continuous <Continuous>  dextrose 50% Injectable 12.5 Gram(s) IV Push once  dextrose 50% Injectable 25 Gram(s) IV Push once  dextrose 50% Injectable 25 Gram(s) IV Push once  dorzolamide 2%/timolol 0.5% Ophthalmic Solution 1 Drop(s) Left EYE two times a day  escitalopram 5 milliGRAM(s) Oral daily  gabapentin 300 milliGRAM(s) Oral at bedtime  heparin  Injectable 5000 Unit(s) SubCutaneous every 12 hours  influenza   Vaccine 0.5 milliLiter(s) IntraMuscular once  insulin lispro (HumaLOG) corrective regimen sliding scale   SubCutaneous three times a day before meals  insulin lispro (HumaLOG) corrective regimen sliding scale   SubCutaneous at bedtime  lactobacillus acidophilus 1 Tablet(s) Oral two times a day  latanoprost 0.005% Ophthalmic Solution 1 Drop(s) Right EYE at bedtime  melatonin 3 milliGRAM(s) Oral at bedtime  pantoprazole    Tablet 40 milliGRAM(s) Oral before breakfast  polyethylene glycol 3350 17 Gram(s) Oral daily  QUEtiapine 25 milliGRAM(s) Oral daily  senna 2 Tablet(s) Oral at bedtime  sodium chloride 1 Gram(s) Oral three times a day  tamsulosin 0.4 milliGRAM(s) Oral at bedtime  trihexyphenidyl 2 milliGRAM(s) Oral two times a day    MEDICATIONS  (PRN):  dextrose 40% Gel 15 Gram(s) Oral once PRN Blood Glucose LESS THAN 70 milliGRAM(s)/deciliter  glucagon  Injectable 1 milliGRAM(s) IntraMuscular once PRN Glucose LESS THAN 70 milligrams/deciliter  simethicone 80 milliGRAM(s) Chew every 6 hours PRN Gas      Vital Signs Last 24 Hrs  T(C): 36.6 (10 Nov 2019 05:35), Max: 36.6 (10 Nov 2019 05:35)  T(F): 97.9 (10 Nov 2019 05:35), Max: 97.9 (10 Nov 2019 05:35)  HR: 67 (10 Nov 2019 05:35) (66 - 67)  BP: 100/63 (10 Nov 2019 05:35) (100/63 - 105/65)  BP(mean): --  RR: 17 (10 Nov 2019 05:35) (17 - 17)  SpO2: 99% (10 Nov 2019 05:35) (99% - 99%)  CAPILLARY BLOOD GLUCOSE      POCT Blood Glucose.: 121 mg/dL (10 Nov 2019 17:09)  POCT Blood Glucose.: 138 mg/dL (10 Nov 2019 12:26)  POCT Blood Glucose.: 176 mg/dL (10 Nov 2019 09:21)  POCT Blood Glucose.: 168 mg/dL (09 Nov 2019 22:10)    I&O's Summary    09 Nov 2019 07:01  -  10 Nov 2019 07:00  --------------------------------------------------------  IN: 1000 mL / OUT: 1050 mL / NET: -50 mL    10 Nov 2019 07:01  -  10 Nov 2019 17:29  --------------------------------------------------------  IN: 0 mL / OUT: 400 mL / NET: -400 mL          PHYSICAL EXAM  GENERAL: NAD, well-developed  CHEST/LUNG: Clear to auscultation bilaterally; No wheeze  HEART: Regular rate and rhythm; No murmurs, rubs, or gallops  ABDOMEN: Soft, Suprapubic tenderness to palpation, Nondistended; Bowel sounds present  EXTREMITIES:  2+ Peripheral Pulses, No clubbing, cyanosis, or edema  PSYCH: AAOx3        LABS:                        11.4   8.37  )-----------( 309      ( 10 Nov 2019 05:12 )             32.7     11-10    132<L>  |  96<L>  |  10  ----------------------------<  104<H>  4.0   |  23  |  0.67    Ca    8.8      10 Nov 2019 05:12  Phos  3.5     11-10  Mg     2.0     11-10

## 2019-11-10 NOTE — PROGRESS NOTE ADULT - PROBLEM SELECTOR PLAN 2
Chronic and asymptomatic. Urine electrolytes and urine osmolality noted. 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 adrenal insufficiency (even though low cortisol level, had a normal cosyntropin test). Likely SIADH. Sodium levels improved  - c/w NaCl tabs TID   - re-enforce fluid restriction to 1L  - pt on Lexapro, may be contributing to Hyponatremia (unclear why patient on this medication since pt's family said he was not on that before) Chronic and asymptomatic. Urine electrolytes and urine osmolality noted. 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 adrenal insufficiency (even though low cortisol level, had a normal cosyntropin test). Likely SIADH. Sodium levels improved  - c/w NaCl tabs TID   - re-enforce fluid restriction to 1L  - pt on Lexapro, may be contributing to Hyponatremia (unclear why patient on this medication since pt's family said he was not on that before). However, pt has been tolerating medication inpatient

## 2019-11-10 NOTE — PROGRESS NOTE ADULT - ASSESSMENT
75 y/o M with PMH of Dementia, BPH(with recent Aguilar removal on 10/28 and TURP a week ago), Parkinson's disease admitted for positive urine cx growing MDR Klebsiella, initially not improving symptomatically with IV Vabomere. Now on Avycaz and aztreonam with clinical response. 73 y/o M with PMH of Dementia, BPH(with recent Aguilar removal on 10/28 and TURP a week ago), Parkinson's disease admitted for positive urine cx growing MDR Klebsiella, initially not improving symptomatically with IV Vabomere. Now on Avycaz and aztreonam with clinical response.

## 2019-11-11 LAB
ANION GAP SERPL CALC-SCNC: 12 MMO/L — SIGNIFICANT CHANGE UP (ref 7–14)
BUN SERPL-MCNC: 10 MG/DL — SIGNIFICANT CHANGE UP (ref 7–23)
CALCIUM SERPL-MCNC: 9.4 MG/DL — SIGNIFICANT CHANGE UP (ref 8.4–10.5)
CHLORIDE SERPL-SCNC: 94 MMOL/L — LOW (ref 98–107)
CO2 SERPL-SCNC: 25 MMOL/L — SIGNIFICANT CHANGE UP (ref 22–31)
CREAT SERPL-MCNC: 0.69 MG/DL — SIGNIFICANT CHANGE UP (ref 0.5–1.3)
GLUCOSE BLDC GLUCOMTR-MCNC: 119 MG/DL — HIGH (ref 70–99)
GLUCOSE BLDC GLUCOMTR-MCNC: 128 MG/DL — HIGH (ref 70–99)
GLUCOSE BLDC GLUCOMTR-MCNC: 137 MG/DL — HIGH (ref 70–99)
GLUCOSE BLDC GLUCOMTR-MCNC: 212 MG/DL — HIGH (ref 70–99)
GLUCOSE SERPL-MCNC: 116 MG/DL — HIGH (ref 70–99)
HCT VFR BLD CALC: 37.4 % — LOW (ref 39–50)
HGB BLD-MCNC: 12.6 G/DL — LOW (ref 13–17)
MAGNESIUM SERPL-MCNC: 1.9 MG/DL — SIGNIFICANT CHANGE UP (ref 1.6–2.6)
MCHC RBC-ENTMCNC: 25.2 PG — LOW (ref 27–34)
MCHC RBC-ENTMCNC: 33.7 % — SIGNIFICANT CHANGE UP (ref 32–36)
MCV RBC AUTO: 74.8 FL — LOW (ref 80–100)
NRBC # FLD: 0 K/UL — SIGNIFICANT CHANGE UP (ref 0–0)
PHOSPHATE SERPL-MCNC: 3.6 MG/DL — SIGNIFICANT CHANGE UP (ref 2.5–4.5)
PLATELET # BLD AUTO: 361 K/UL — SIGNIFICANT CHANGE UP (ref 150–400)
PMV BLD: 9 FL — SIGNIFICANT CHANGE UP (ref 7–13)
POTASSIUM SERPL-MCNC: 4 MMOL/L — SIGNIFICANT CHANGE UP (ref 3.5–5.3)
POTASSIUM SERPL-SCNC: 4 MMOL/L — SIGNIFICANT CHANGE UP (ref 3.5–5.3)
RBC # BLD: 5 M/UL — SIGNIFICANT CHANGE UP (ref 4.2–5.8)
RBC # FLD: 14.4 % — SIGNIFICANT CHANGE UP (ref 10.3–14.5)
SODIUM SERPL-SCNC: 131 MMOL/L — LOW (ref 135–145)
WBC # BLD: 9.67 K/UL — SIGNIFICANT CHANGE UP (ref 3.8–10.5)
WBC # FLD AUTO: 9.67 K/UL — SIGNIFICANT CHANGE UP (ref 3.8–10.5)

## 2019-11-11 PROCEDURE — 99232 SBSQ HOSP IP/OBS MODERATE 35: CPT

## 2019-11-11 RX ORDER — METHENAMINE MANDELATE 1 G
1 TABLET ORAL
Qty: 180 | Refills: 0
Start: 2019-11-11 | End: 2020-02-08

## 2019-11-11 RX ORDER — PHENAZOPYRIDINE HCL 100 MG
100 TABLET ORAL EVERY 8 HOURS
Refills: 0 | Status: DISCONTINUED | OUTPATIENT
Start: 2019-11-11 | End: 2019-11-12

## 2019-11-11 RX ADMIN — TAMSULOSIN HYDROCHLORIDE 0.4 MILLIGRAM(S): 0.4 CAPSULE ORAL at 22:07

## 2019-11-11 RX ADMIN — DORZOLAMIDE HYDROCHLORIDE TIMOLOL MALEATE 1 DROP(S): 20; 5 SOLUTION/ DROPS OPHTHALMIC at 17:05

## 2019-11-11 RX ADMIN — Medication 100 MILLIGRAM(S): at 06:31

## 2019-11-11 RX ADMIN — Medication 1 TABLET(S): at 17:06

## 2019-11-11 RX ADMIN — DORZOLAMIDE HYDROCHLORIDE TIMOLOL MALEATE 1 DROP(S): 20; 5 SOLUTION/ DROPS OPHTHALMIC at 06:31

## 2019-11-11 RX ADMIN — SENNA PLUS 2 TABLET(S): 8.6 TABLET ORAL at 22:06

## 2019-11-11 RX ADMIN — SODIUM CHLORIDE 1 GRAM(S): 9 INJECTION INTRAMUSCULAR; INTRAVENOUS; SUBCUTANEOUS at 22:07

## 2019-11-11 RX ADMIN — HEPARIN SODIUM 5000 UNIT(S): 5000 INJECTION INTRAVENOUS; SUBCUTANEOUS at 17:06

## 2019-11-11 RX ADMIN — Medication 100 MILLIGRAM(S): at 15:34

## 2019-11-11 RX ADMIN — Medication 2 MILLIGRAM(S): at 06:31

## 2019-11-11 RX ADMIN — SODIUM CHLORIDE 1 GRAM(S): 9 INJECTION INTRAMUSCULAR; INTRAVENOUS; SUBCUTANEOUS at 06:31

## 2019-11-11 RX ADMIN — Medication 3 MILLIGRAM(S): at 22:07

## 2019-11-11 RX ADMIN — ESCITALOPRAM OXALATE 5 MILLIGRAM(S): 10 TABLET, FILM COATED ORAL at 14:02

## 2019-11-11 RX ADMIN — CARBIDOPA AND LEVODOPA 1 TABLET(S): 25; 100 TABLET ORAL at 06:31

## 2019-11-11 RX ADMIN — Medication 2: at 17:48

## 2019-11-11 RX ADMIN — Medication 1 TABLET(S): at 06:31

## 2019-11-11 RX ADMIN — CARBIDOPA AND LEVODOPA 1 TABLET(S): 25; 100 TABLET ORAL at 22:06

## 2019-11-11 RX ADMIN — PANTOPRAZOLE SODIUM 40 MILLIGRAM(S): 20 TABLET, DELAYED RELEASE ORAL at 06:31

## 2019-11-11 RX ADMIN — CARBIDOPA AND LEVODOPA 1 TABLET(S): 25; 100 TABLET ORAL at 14:02

## 2019-11-11 RX ADMIN — GABAPENTIN 300 MILLIGRAM(S): 400 CAPSULE ORAL at 22:07

## 2019-11-11 RX ADMIN — Medication 1 DROP(S): at 17:04

## 2019-11-11 RX ADMIN — SODIUM CHLORIDE 1 GRAM(S): 9 INJECTION INTRAMUSCULAR; INTRAVENOUS; SUBCUTANEOUS at 14:02

## 2019-11-11 RX ADMIN — Medication 1 DROP(S): at 06:31

## 2019-11-11 RX ADMIN — HEPARIN SODIUM 5000 UNIT(S): 5000 INJECTION INTRAVENOUS; SUBCUTANEOUS at 06:31

## 2019-11-11 RX ADMIN — Medication 100 MILLIGRAM(S): at 22:07

## 2019-11-11 RX ADMIN — QUETIAPINE FUMARATE 25 MILLIGRAM(S): 200 TABLET, FILM COATED ORAL at 14:02

## 2019-11-11 RX ADMIN — LATANOPROST 1 DROP(S): 0.05 SOLUTION/ DROPS OPHTHALMIC; TOPICAL at 22:07

## 2019-11-11 RX ADMIN — Medication 100 MILLIGRAM(S): at 15:33

## 2019-11-11 RX ADMIN — Medication 2 MILLIGRAM(S): at 17:06

## 2019-11-11 NOTE — PROGRESS NOTE ADULT - SUBJECTIVE AND OBJECTIVE BOX
Patient is a 74y old  Male who presents with a chief complaint of Told to come ot the ER for positive urine cultures (10 Nov 2019 17:28)      SUBJECTIVE / OVERNIGHT EVENTS: Pt seen and examined at 12:10pm, no overnight events, Son at bedside translating per pt's request, per son pt is having pain in his suprapubic area, no other new complaints. Per nsg no other issues.    MEDICATIONS  (STANDING):  artificial  tears Solution 1 Drop(s) Both EYES two times a day  aztreonam  IVPB 2000 milliGRAM(s) IV Intermittent every 8 hours  carbidopa/levodopa  25/100 1 Tablet(s) Oral three times a day  ceftazidime/avibactam IVPB 2.5 Gram(s) IV Intermittent every 8 hours  dextrose 5%. 1000 milliLiter(s) (50 mL/Hr) IV Continuous <Continuous>  dextrose 50% Injectable 12.5 Gram(s) IV Push once  dextrose 50% Injectable 25 Gram(s) IV Push once  dextrose 50% Injectable 25 Gram(s) IV Push once  dorzolamide 2%/timolol 0.5% Ophthalmic Solution 1 Drop(s) Left EYE two times a day  escitalopram 5 milliGRAM(s) Oral daily  gabapentin 300 milliGRAM(s) Oral at bedtime  heparin  Injectable 5000 Unit(s) SubCutaneous every 12 hours  influenza   Vaccine 0.5 milliLiter(s) IntraMuscular once  insulin lispro (HumaLOG) corrective regimen sliding scale   SubCutaneous three times a day before meals  insulin lispro (HumaLOG) corrective regimen sliding scale   SubCutaneous at bedtime  lactobacillus acidophilus 1 Tablet(s) Oral two times a day  latanoprost 0.005% Ophthalmic Solution 1 Drop(s) Right EYE at bedtime  melatonin 3 milliGRAM(s) Oral at bedtime  pantoprazole    Tablet 40 milliGRAM(s) Oral before breakfast  phenazopyridine 100 milliGRAM(s) Oral every 8 hours  polyethylene glycol 3350 17 Gram(s) Oral daily  QUEtiapine 25 milliGRAM(s) Oral daily  senna 2 Tablet(s) Oral at bedtime  sodium chloride 1 Gram(s) Oral three times a day  tamsulosin 0.4 milliGRAM(s) Oral at bedtime  trihexyphenidyl 2 milliGRAM(s) Oral two times a day    MEDICATIONS  (PRN):  dextrose 40% Gel 15 Gram(s) Oral once PRN Blood Glucose LESS THAN 70 milliGRAM(s)/deciliter  glucagon  Injectable 1 milliGRAM(s) IntraMuscular once PRN Glucose LESS THAN 70 milligrams/deciliter  simethicone 80 milliGRAM(s) Chew every 6 hours PRN Gas      Vital Signs Last 24 Hrs  T(C): 36.8 (11 Nov 2019 15:29), Max: 36.8 (10 Nov 2019 21:22)  T(F): 98.3 (11 Nov 2019 15:29), Max: 98.3 (11 Nov 2019 15:29)  HR: 85 (11 Nov 2019 15:29) (68 - 85)  BP: 143/65 (11 Nov 2019 15:29) (115/62 - 143/65)  BP(mean): --  RR: 18 (11 Nov 2019 15:29) (18 - 18)  SpO2: 100% (11 Nov 2019 15:29) (98% - 100%)  CAPILLARY BLOOD GLUCOSE      POCT Blood Glucose.: 128 mg/dL (11 Nov 2019 12:19)  POCT Blood Glucose.: 119 mg/dL (11 Nov 2019 08:34)  POCT Blood Glucose.: 118 mg/dL (10 Nov 2019 22:59)  POCT Blood Glucose.: 121 mg/dL (10 Nov 2019 17:09)    I&O's Summary    10 Nov 2019 07:01  -  11 Nov 2019 07:00  --------------------------------------------------------  IN: 500 mL / OUT: 400 mL / NET: 100 mL        PHYSICAL EXAM:  GENERAL: NAD, well-developed  CHEST/LUNG: Clear to auscultation bilaterally; No wheeze  HEART: Regular rate and rhythm; No murmurs, rubs, or gallops  ABDOMEN: Soft, Suprapubic tenderness to palpation, Nondistended  EXTREMITIES: no LE edema  PSYCH: Calm  Neuro: Alert awake answers questions appropriately      LABS:                        12.6   9.67  )-----------( 361      ( 11 Nov 2019 06:50 )             37.4     11-11    131<L>  |  94<L>  |  10  ----------------------------<  116<H>  4.0   |  25  |  0.69    Ca    9.4      11 Nov 2019 06:50  Phos  3.6     11-11  Mg     1.9     11-11                RADIOLOGY & ADDITIONAL TESTS:    Imaging Personally Reviewed:    Consultant(s) Notes Reviewed:      Care Discussed with Consultants/Other Providers:

## 2019-11-11 NOTE — PROGRESS NOTE ADULT - PROBLEM SELECTOR PLAN 1
Urine cultures showing Klebsiella pneumoniae (CRE). CT abd/pelvis completed on 11/4/19 negative for stones or mass.   - Patient started on IV Vabomere (meropenem/vaborbactam initially without significant improvement --> pt with clinical response on ceftazidime/avibactam and aztreonam (started on 11/4/19) as per ID. Will continue to complete 7 day course as per ID - anticipated end date 11/11/19  - ID following, recs reviewed and appreciated.    - Blood NGTD  Per ID not to skip the last dose since already getting a short course of abx, to start on hippurex after abx completion, and since pt with suprapubic pain urology to revaluate pt.  If no other recs from Urology, d/c planning for tomorrow discussed with ACP  f/u urology recs Urine cultures showing Klebsiella pneumoniae (CRE). CT abd/pelvis completed on 11/4/19 negative for stones or mass.   - Patient started on IV Vabomere (meropenem/vaborbactam initially without significant improvement --> pt with clinical response on ceftazidime/avibactam and aztreonam (started on 11/4/19) as per ID. Will continue to complete 7 day course as per ID - anticipated end date 11/11/19  - ID following, recs reviewed and appreciated.    - Blood NGTD   ID does not recommend to skip the last dose since already getting a short course of abx, to start on hippurex after abx completion, and since pt with suprapubic pain urology to revaluate pt.  If no other recs from Urology, d/c planning for tomorrow discussed with ACP  f/u urology recs

## 2019-11-11 NOTE — PROGRESS NOTE ADULT - ASSESSMENT
73 year old with parkinson's and BPH with a chronic pichardo  Prior known colonization with CRE klebsiella  S/p recent TURP  recent presented with dysuria  and urine culture positive for CRE klebsiella  Afebrile with normal WBC      1) Acute UTI  He has dysuria and suprapubic tenderness  Tx with antibiotics    2) CRE- ? metalobetalactamase  Resistant to vabomere and avybactam    Trial of combination therapy with avycaz and aztreonam- plan 7 d course to finish tonight    After this course, consider Hipprex 1 gram po q 12 for 3 months       3) Abdominal pain  Improved  I think there is chronic pain that is not due to UTI  Not clear that antimicrobials will resolve all of his symptoms 73 year old with parkinson's and BPH with a chronic pichardo  Prior known colonization with CRE klebsiella  S/p recent TURP  recent presented with dysuria  and urine culture positive for CRE klebsiella  Afebrile with normal WBC      1) Acute UTI  He has dysuria and suprapubic tenderness  Tx with antibiotics    2) CRE- ? metalobetalactamase  Resistant to vabomere and avybactam    Trial of combination therapy with avycaz and aztreonam- plan 7 d course to finish tonight    After this course, consider Hipprex 1 gram po q 12 for 3 months       3) Abdominal pain  Improved  I think there is chronic pain that is not due to UTI  Not clear that antimicrobials will resolve all of his symptoms  May be due to ablation    Will sign off

## 2019-11-11 NOTE — PROGRESS NOTE ADULT - SUBJECTIVE AND OBJECTIVE BOX
Follow Up:      Inverval History/ROS:Patient is a 74y old  Male who presents with a chief complaint of Told to come ot the ER for positive urine cultures (11 Nov 2019 15:44)    c/o suprapubic pain  No fever    Allergies    No Known Allergies    Intolerances        ANTIMICROBIALS:  aztreonam  IVPB 2000 every 8 hours  ceftazidime/avibactam IVPB 2.5 every 8 hours      OTHER MEDS:  artificial  tears Solution 1 Drop(s) Both EYES two times a day  carbidopa/levodopa  25/100 1 Tablet(s) Oral three times a day  dextrose 40% Gel 15 Gram(s) Oral once PRN  dextrose 5%. 1000 milliLiter(s) IV Continuous <Continuous>  dextrose 50% Injectable 12.5 Gram(s) IV Push once  dextrose 50% Injectable 25 Gram(s) IV Push once  dextrose 50% Injectable 25 Gram(s) IV Push once  dorzolamide 2%/timolol 0.5% Ophthalmic Solution 1 Drop(s) Left EYE two times a day  escitalopram 5 milliGRAM(s) Oral daily  gabapentin 300 milliGRAM(s) Oral at bedtime  glucagon  Injectable 1 milliGRAM(s) IntraMuscular once PRN  heparin  Injectable 5000 Unit(s) SubCutaneous every 12 hours  influenza   Vaccine 0.5 milliLiter(s) IntraMuscular once  insulin lispro (HumaLOG) corrective regimen sliding scale   SubCutaneous three times a day before meals  insulin lispro (HumaLOG) corrective regimen sliding scale   SubCutaneous at bedtime  lactobacillus acidophilus 1 Tablet(s) Oral two times a day  latanoprost 0.005% Ophthalmic Solution 1 Drop(s) Right EYE at bedtime  melatonin 3 milliGRAM(s) Oral at bedtime  pantoprazole    Tablet 40 milliGRAM(s) Oral before breakfast  phenazopyridine 100 milliGRAM(s) Oral every 8 hours  polyethylene glycol 3350 17 Gram(s) Oral daily  QUEtiapine 25 milliGRAM(s) Oral daily  senna 2 Tablet(s) Oral at bedtime  simethicone 80 milliGRAM(s) Chew every 6 hours PRN  sodium chloride 1 Gram(s) Oral three times a day  tamsulosin 0.4 milliGRAM(s) Oral at bedtime  trihexyphenidyl 2 milliGRAM(s) Oral two times a day      Vital Signs Last 24 Hrs  T(C): 36.8 (11 Nov 2019 15:29), Max: 36.8 (10 Nov 2019 21:22)  T(F): 98.3 (11 Nov 2019 15:29), Max: 98.3 (11 Nov 2019 15:29)  HR: 85 (11 Nov 2019 15:29) (68 - 85)  BP: 143/65 (11 Nov 2019 15:29) (115/62 - 143/65)  BP(mean): --  RR: 18 (11 Nov 2019 15:29) (18 - 18)  SpO2: 100% (11 Nov 2019 15:29) (98% - 100%)    PHYSICAL EXAM:  General: [x ] non-toxic  HEAD/EYES: [ ] PERRL [x ] white sclera [ ] icterus  ENT:  [ ] normal [ ] supple [ ] thrush [ ] pharyngeal exudate  Cardiovascular:   [ ] murmur [x ] normal [ ] PPM/AICD  Respiratory:  [x ] clear to ausculation bilaterally  GI:  [ x] soft, non-tender, normal bowel sounds  :  [ ] pichardo [ ] no CVA tenderness   Musculoskeletal:  [ ] no synovitis  Neurologic:  [ ] non-focal exam   Skin:  [x ] no rash  Lymph: [ ] no lymphadenopathy  Psychiatric:  [ ] appropriate affect [ x] alert & oriented  Lines:  [x ] no phlebitis [ ] central line                                12.6   9.67  )-----------( 361      ( 11 Nov 2019 06:50 )             37.4       11-11    131<L>  |  94<L>  |  10  ----------------------------<  116<H>  4.0   |  25  |  0.69    Ca    9.4      11 Nov 2019 06:50  Phos  3.6     11-11  Mg     1.9     11-11            MICROBIOLOGY:    RADIOLOGY:

## 2019-11-11 NOTE — PROGRESS NOTE ADULT - SUBJECTIVE AND OBJECTIVE BOX
Patient improved somewhat, still with suprapubic pain.   Discussed with Dr. Henriquez, did not have TURP, had microwave ablation procedure    T(F): 98.3, Max: 98.3 (11-11-19 @ 15:29)  HR: 85  BP: 143/65  SpO2: 100%    OUT:    Voided: 400 mL  Total OUT: 400 mL      Gen NAD  Abd soft, mild SP tenderness   uncircumcised, no penile lesions/tenderness, b/l mild testicular/epididymal tenderness but no induration, no erythema      11-11 @ 06:50    WBC 9.67  / Hct 37.4  / SCr 0.69     11-10 @ 05:12    WBC 8.37  / Hct 32.7  / SCr 0.67

## 2019-11-11 NOTE — PROGRESS NOTE ADULT - PROBLEM SELECTOR PLAN 2
Chronic and asymptomatic. Urine electrolytes and urine osmolality noted. 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 adrenal insufficiency (even though low cortisol level, had a normal cosyntropin test). Likely SIADH. Sodium levels improved  - c/w NaCl tabs TID   - re-enforce fluid restriction to 1L  - pt on Lexapro, may be contributing to Hyponatremia (unclear why patient on this medication since pt's family said he was not on that before). However, pt has been tolerating medication inpatient

## 2019-11-11 NOTE — PROGRESS NOTE ADULT - ASSESSMENT
73 yo M s/p microwave ablation of prostate with Dr. Henriquez    - discharge per ID  - pain likely 2/2 to recent procedure and involution of prostatic tissue  - has follow-up monday already scheduled  -  earlier in hospital stay  - recheck PVR today if <200 ok  - pyridium for 1-2 additional days  - please call with further questions

## 2019-11-11 NOTE — HISTORY OF PRESENT ILLNESS
[FreeTextEntry1] : Mr Vega is a 74-year old gentleman with Parkinson's disease/ mild dementia, BPH with chronic indwelling pichardo who was referred to [facility]phrology [facility] for hyponatremia.\par \par Hyponatremia:\par Mr Vega has chronic hyponatremia. His serum sodium had been in the 124-128 since September 2019. In October, he was admitted to [facility] for urinary retention requiring pichardo replacement. He was found to have a serum sodium of 121. The cause of hyponatremia was attributed to urinary retention as the serum sodium corrected to 129 after the relieve of urinary retention. \par Serum osm 266 [10-01-19]\par Urine Sodium 53      [10-01-19 @ 22:20]\par Urine Potassium 9.7      [09-30-19 @ 23:44]\par Urine Chloride 28      [09-30-19 @ 23:44]\par Urine Osmolality 276      [10-01-19 @ 22:20]\par \par Since discharge, patient still has an indwelling pichardo catheter. \par \par \par \par Previously he was on depakote. His PCP had discontinued \par \par

## 2019-11-11 NOTE — PROGRESS NOTE ADULT - ASSESSMENT
75 y/o M with PMH of Dementia, BPH(with recent Aguilar removal on 10/28 and TURP a week ago), Parkinson's disease admitted for positive urine cx growing MDR Klebsiella, initially not improving symptomatically with IV Vabomere. Now on Avycaz and aztreonam with clinical response.

## 2019-11-12 ENCOUNTER — TRANSCRIPTION ENCOUNTER (OUTPATIENT)
Age: 74
End: 2019-11-12

## 2019-11-12 VITALS
TEMPERATURE: 98 F | DIASTOLIC BLOOD PRESSURE: 59 MMHG | RESPIRATION RATE: 18 BRPM | HEART RATE: 66 BPM | OXYGEN SATURATION: 100 % | SYSTOLIC BLOOD PRESSURE: 108 MMHG

## 2019-11-12 LAB
ANION GAP SERPL CALC-SCNC: 14 MMO/L — SIGNIFICANT CHANGE UP (ref 7–14)
BUN SERPL-MCNC: 11 MG/DL — SIGNIFICANT CHANGE UP (ref 7–23)
CALCIUM SERPL-MCNC: 9.1 MG/DL — SIGNIFICANT CHANGE UP (ref 8.4–10.5)
CHLORIDE SERPL-SCNC: 94 MMOL/L — LOW (ref 98–107)
CO2 SERPL-SCNC: 23 MMOL/L — SIGNIFICANT CHANGE UP (ref 22–31)
CREAT SERPL-MCNC: 0.64 MG/DL — SIGNIFICANT CHANGE UP (ref 0.5–1.3)
GLUCOSE BLDC GLUCOMTR-MCNC: 128 MG/DL — HIGH (ref 70–99)
GLUCOSE BLDC GLUCOMTR-MCNC: 99 MG/DL — SIGNIFICANT CHANGE UP (ref 70–99)
GLUCOSE SERPL-MCNC: 116 MG/DL — HIGH (ref 70–99)
MAGNESIUM SERPL-MCNC: 1.8 MG/DL — SIGNIFICANT CHANGE UP (ref 1.6–2.6)
PHOSPHATE SERPL-MCNC: 3.8 MG/DL — SIGNIFICANT CHANGE UP (ref 2.5–4.5)
POTASSIUM SERPL-MCNC: 4 MMOL/L — SIGNIFICANT CHANGE UP (ref 3.5–5.3)
POTASSIUM SERPL-SCNC: 4 MMOL/L — SIGNIFICANT CHANGE UP (ref 3.5–5.3)
SODIUM SERPL-SCNC: 131 MMOL/L — LOW (ref 135–145)

## 2019-11-12 PROCEDURE — 99239 HOSP IP/OBS DSCHRG MGMT >30: CPT

## 2019-11-12 RX ORDER — PHENAZOPYRIDINE HCL 100 MG
1 TABLET ORAL
Qty: 6 | Refills: 0
Start: 2019-11-12 | End: 2019-11-13

## 2019-11-12 RX ORDER — POLYETHYLENE GLYCOL 3350 17 G/17G
17 POWDER, FOR SOLUTION ORAL
Qty: 30 | Refills: 0
Start: 2019-11-12 | End: 2019-12-11

## 2019-11-12 RX ORDER — TAMSULOSIN HYDROCHLORIDE 0.4 MG/1
1 CAPSULE ORAL
Qty: 30 | Refills: 0
Start: 2019-11-12 | End: 2019-12-11

## 2019-11-12 RX ORDER — SIMETHICONE 80 MG/1
1 TABLET, CHEWABLE ORAL
Qty: 0 | Refills: 0 | DISCHARGE
Start: 2019-11-12

## 2019-11-12 RX ORDER — LACTOBACILLUS ACIDOPHILUS 100MM CELL
1 CAPSULE ORAL
Qty: 90 | Refills: 0
Start: 2019-11-12 | End: 2019-12-11

## 2019-11-12 RX ORDER — GABAPENTIN 400 MG/1
1 CAPSULE ORAL
Qty: 30 | Refills: 0
Start: 2019-11-12 | End: 2019-12-11

## 2019-11-12 RX ORDER — SENNA PLUS 8.6 MG/1
2 TABLET ORAL
Qty: 60 | Refills: 0
Start: 2019-11-12 | End: 2019-12-11

## 2019-11-12 RX ORDER — METHENAMINE MANDELATE 1 G
1 TABLET ORAL
Qty: 180 | Refills: 0
Start: 2019-11-12 | End: 2020-02-09

## 2019-11-12 RX ADMIN — Medication 1 TABLET(S): at 05:15

## 2019-11-12 RX ADMIN — Medication 100 MILLIGRAM(S): at 14:46

## 2019-11-12 RX ADMIN — DORZOLAMIDE HYDROCHLORIDE TIMOLOL MALEATE 1 DROP(S): 20; 5 SOLUTION/ DROPS OPHTHALMIC at 05:16

## 2019-11-12 RX ADMIN — CARBIDOPA AND LEVODOPA 1 TABLET(S): 25; 100 TABLET ORAL at 14:46

## 2019-11-12 RX ADMIN — HEPARIN SODIUM 5000 UNIT(S): 5000 INJECTION INTRAVENOUS; SUBCUTANEOUS at 05:15

## 2019-11-12 RX ADMIN — QUETIAPINE FUMARATE 25 MILLIGRAM(S): 200 TABLET, FILM COATED ORAL at 12:42

## 2019-11-12 RX ADMIN — Medication 100 MILLIGRAM(S): at 05:15

## 2019-11-12 RX ADMIN — Medication 100 MILLIGRAM(S): at 00:12

## 2019-11-12 RX ADMIN — SODIUM CHLORIDE 1 GRAM(S): 9 INJECTION INTRAMUSCULAR; INTRAVENOUS; SUBCUTANEOUS at 05:15

## 2019-11-12 RX ADMIN — SODIUM CHLORIDE 1 GRAM(S): 9 INJECTION INTRAMUSCULAR; INTRAVENOUS; SUBCUTANEOUS at 14:46

## 2019-11-12 RX ADMIN — Medication 1 DROP(S): at 05:15

## 2019-11-12 RX ADMIN — CARBIDOPA AND LEVODOPA 1 TABLET(S): 25; 100 TABLET ORAL at 05:15

## 2019-11-12 RX ADMIN — PANTOPRAZOLE SODIUM 40 MILLIGRAM(S): 20 TABLET, DELAYED RELEASE ORAL at 05:15

## 2019-11-12 RX ADMIN — Medication 2 MILLIGRAM(S): at 05:15

## 2019-11-12 RX ADMIN — ESCITALOPRAM OXALATE 5 MILLIGRAM(S): 10 TABLET, FILM COATED ORAL at 12:42

## 2019-11-12 RX ADMIN — POLYETHYLENE GLYCOL 3350 17 GRAM(S): 17 POWDER, FOR SOLUTION ORAL at 12:42

## 2019-11-12 NOTE — PROGRESS NOTE ADULT - PROBLEM SELECTOR PROBLEM 7
BPH (benign prostatic hyperplasia)

## 2019-11-12 NOTE — PROGRESS NOTE ADULT - PROBLEM SELECTOR PLAN 7
- Continue with Flomax daily

## 2019-11-12 NOTE — PROGRESS NOTE ADULT - PROBLEM SELECTOR PROBLEM 3
Microcytic anemia

## 2019-11-12 NOTE — PROGRESS NOTE ADULT - PROBLEM SELECTOR PLAN 4
Sugar on admission noted to be 112. HgA1C from 09/19 noted to be 7% and patient was started on low-dose insulin sliding scale.   - Will check FS TID and at bedtime for now
Sugar on admission noted to be 112. HgA1C from 09/19 noted to be 7% and patient was started on low-dose insulin sliding scale.   - check FS TID and at bedtime for now
Sugar on admission noted to be 112. HgA1C from 09/19 noted to be 7% and patient was started on low-dose insulin sliding scale.   - check FS TID and at bedtime for now
Sugar on admission noted to be 112. HgA1C from 09/19 noted to be 7% and patient was started on low-dose insulin sliding scale.   - check FS daily
Sugar on admission noted to be 112. HgA1C from 09/19 noted to be 7% and patient was started on low-dose insulin sliding scale.   - check FS TID and at bedtime for now
Sugar on admission noted to be 112. HgA1C from 09/19 noted to be 7% and patient was started on low-dose insulin sliding scale.   - check FS daily
Sugar on admission noted to be 112. HgA1C from 09/19 noted to be 7% and patient was started on low-dose insulin sliding scale.   - Will check FS TID and at bedtime for now

## 2019-11-12 NOTE — PROGRESS NOTE ADULT - PROBLEM SELECTOR PLAN 5
- Continue with Seroquel  - discontinued Namenda, and Aricept on 11/6/19 since likely getting no benefit
- Continue with Seroquel  - discontinued Namenda, and Aricept on 11/6/19 since likely getting no benefit  - will recommend outpt f/u with PMD
- Continue with Seroquel, Namenda, and Aricept daily. QTc on admission 420 ms.
- Continue with Seroquel  - discontinued Namenda, and Aricept on 11/6/19 since likely getting no benefit
- Continue with Seroquel, Namenda, and Aricept daily. QTc on admission 420 ms.
- Continue with Seroquel, Namenda, and Aricept daily. QTc on admission 420 ms.

## 2019-11-12 NOTE — PROGRESS NOTE ADULT - PROBLEM SELECTOR PROBLEM 6
Parkinson disease

## 2019-11-12 NOTE — PROGRESS NOTE ADULT - PROBLEM SELECTOR PROBLEM 5
Dementia

## 2019-11-12 NOTE — PROGRESS NOTE ADULT - ATTENDING COMMENTS
PT eval recommend rehab
case d/w Family, refusing rehab and Home care   will DC home today  Patient hemodynamically stable for discharge home  Time spent in discharge process is 50 min
PT eval

## 2019-11-12 NOTE — PROGRESS NOTE ADULT - PROBLEM SELECTOR PROBLEM 9
Discharge planning issues

## 2019-11-12 NOTE — PROGRESS NOTE ADULT - PROBLEM SELECTOR PLAN 8
- On heparin sq 5000U q12hrs

## 2019-11-12 NOTE — PROGRESS NOTE ADULT - PROBLEM SELECTOR PLAN 1
Urine cultures showing Klebsiella pneumoniae (CRE). CT abd/pelvis completed on 11/4/19 negative for stones or mass.   - Patient started on IV Vabomere (meropenem/vaborbactam initially without significant improvement --> pt with clinical response on ceftazidime/avibactam and aztreonam (started on 11/4/19) as per ID. completed  7 day course as per ID on 11/11/19  - ID following, recs reviewed and appreciated.    - Blood NGTD   -ID recommend to  start on hippurex after abx completion, and since pt with suprapubic pain   -urology  f/u appreciated,   , oupt f/u on Monday with Urology, already scheduled   As per Urology pain likely to  recent procedure and involution of prostatic tissue  Recommend pyridium for 1-2 additional days

## 2019-11-12 NOTE — PROGRESS NOTE ADULT - PROBLEM SELECTOR PROBLEM 4
Hyperglycemia

## 2019-11-12 NOTE — PROGRESS NOTE ADULT - SUBJECTIVE AND OBJECTIVE BOX
Patient is a 74y old  Male who presents with a chief complaint of Told to come ot the ER for positive urine cultures (11 Nov 2019 16:49)      SUBJECTIVE / OVERNIGHT EVENTS: patient seen and examined by bedside at 10:10 Am, pt feeling better, still has mild suprapubic tenderness , denies headache, dizziness, SOB, CP, Palpitations , N/V/D        MEDICATIONS  (STANDING):  artificial  tears Solution 1 Drop(s) Both EYES two times a day  carbidopa/levodopa  25/100 1 Tablet(s) Oral three times a day  dextrose 5%. 1000 milliLiter(s) (50 mL/Hr) IV Continuous <Continuous>  dextrose 50% Injectable 12.5 Gram(s) IV Push once  dextrose 50% Injectable 25 Gram(s) IV Push once  dextrose 50% Injectable 25 Gram(s) IV Push once  dorzolamide 2%/timolol 0.5% Ophthalmic Solution 1 Drop(s) Left EYE two times a day  escitalopram 5 milliGRAM(s) Oral daily  gabapentin 300 milliGRAM(s) Oral at bedtime  heparin  Injectable 5000 Unit(s) SubCutaneous every 12 hours  influenza   Vaccine 0.5 milliLiter(s) IntraMuscular once  insulin lispro (HumaLOG) corrective regimen sliding scale   SubCutaneous three times a day before meals  insulin lispro (HumaLOG) corrective regimen sliding scale   SubCutaneous at bedtime  lactobacillus acidophilus 1 Tablet(s) Oral two times a day  latanoprost 0.005% Ophthalmic Solution 1 Drop(s) Right EYE at bedtime  melatonin 3 milliGRAM(s) Oral at bedtime  pantoprazole    Tablet 40 milliGRAM(s) Oral before breakfast  phenazopyridine 100 milliGRAM(s) Oral every 8 hours  polyethylene glycol 3350 17 Gram(s) Oral daily  QUEtiapine 25 milliGRAM(s) Oral daily  senna 2 Tablet(s) Oral at bedtime  sodium chloride 1 Gram(s) Oral three times a day  tamsulosin 0.4 milliGRAM(s) Oral at bedtime  trihexyphenidyl 2 milliGRAM(s) Oral two times a day    MEDICATIONS  (PRN):  dextrose 40% Gel 15 Gram(s) Oral once PRN Blood Glucose LESS THAN 70 milliGRAM(s)/deciliter  glucagon  Injectable 1 milliGRAM(s) IntraMuscular once PRN Glucose LESS THAN 70 milligrams/deciliter  simethicone 80 milliGRAM(s) Chew every 6 hours PRN Gas      Vital Signs Last 24 Hrs  T(C): 36.5 (12 Nov 2019 05:15), Max: 36.7 (11 Nov 2019 22:00)  T(F): 97.7 (12 Nov 2019 05:15), Max: 98 (11 Nov 2019 22:00)  HR: 66 (12 Nov 2019 05:15) (66 - 75)  BP: 108/59 (12 Nov 2019 05:15) (108/59 - 123/61)  BP(mean): --  RR: 18 (12 Nov 2019 05:15) (18 - 18)  SpO2: 100% (12 Nov 2019 05:15) (99% - 100%)  CAPILLARY BLOOD GLUCOSE      POCT Blood Glucose.: 128 mg/dL (12 Nov 2019 11:46)  POCT Blood Glucose.: 99 mg/dL (12 Nov 2019 08:35)  POCT Blood Glucose.: 137 mg/dL (11 Nov 2019 21:59)  POCT Blood Glucose.: 212 mg/dL (11 Nov 2019 17:15)    I&O's Summary        PHYSICAL EXAM:  GENERAL: NAD, well-developed  CHEST/LUNG: Clear to auscultation bilaterally; No wheeze  HEART: Regular rate and rhythm; No murmurs, rubs, or gallops  ABDOMEN: Soft, Suprapubic tenderness to palpation, Nondistended  EXTREMITIES: no LE edema  PSYCH: Calm  Neuro: Alert awake answers questions appropriately      LABS:                        12.6   9.67  )-----------( 361      ( 11 Nov 2019 06:50 )             37.4     11-12    131<L>  |  94<L>  |  11  ----------------------------<  116<H>  4.0   |  23  |  0.64    Ca    9.1      12 Nov 2019 06:27  Phos  3.8     11-12  Mg     1.8     11-12                RADIOLOGY & ADDITIONAL TESTS:    Imaging Personally Reviewed:    Consultant(s) Notes Reviewed:  Urology, ID     Care Discussed with Consultants/Other Providers:

## 2019-11-12 NOTE — PROGRESS NOTE ADULT - PROBLEM SELECTOR PLAN 6
- Continue with Sinemet and Artane daily  - Fall risk protocol  - Aspiration precautions

## 2019-11-12 NOTE — PROGRESS NOTE ADULT - ASSESSMENT
73 y/o M with PMH of Dementia, BPH(with recent Aguilar removal on 10/28 and TURP a week ago), Parkinson's disease admitted for positive urine cx growing MDR Klebsiella, initially not improving symptomatically with IV Vabomere. Now on Avycaz and aztreonam with clinical response.

## 2019-11-12 NOTE — PROGRESS NOTE ADULT - PROBLEM SELECTOR PROBLEM 1
UTI due to Klebsiella species

## 2019-11-13 ENCOUNTER — APPOINTMENT (OUTPATIENT)
Dept: INTERNAL MEDICINE | Facility: CLINIC | Age: 74
End: 2019-11-13

## 2019-11-16 LAB — METHOD TYPE: SIGNIFICANT CHANGE UP

## 2019-11-18 LAB
-  AMIKACIN: SIGNIFICANT CHANGE UP
-  AMPICILLIN/SULBACTAM: SIGNIFICANT CHANGE UP
-  AMPICILLIN: SIGNIFICANT CHANGE UP
-  AZTREONAM: SIGNIFICANT CHANGE UP
-  CEFAZOLIN: SIGNIFICANT CHANGE UP
-  CEFEPIME: SIGNIFICANT CHANGE UP
-  CEFOXITIN: SIGNIFICANT CHANGE UP
-  CEFTAZIDIME/AVIBACTAM: SIGNIFICANT CHANGE UP
-  CEFTAZIDIME: SIGNIFICANT CHANGE UP
-  CEFTRIAXONE: SIGNIFICANT CHANGE UP
-  CEFUROXIME: SIGNIFICANT CHANGE UP
-  ERTAPENEM: SIGNIFICANT CHANGE UP
-  GENTAMICIN: SIGNIFICANT CHANGE UP
-  IMIPENEM: SIGNIFICANT CHANGE UP
-  LEVOFLOXACIN: SIGNIFICANT CHANGE UP
-  MEROPENEM: SIGNIFICANT CHANGE UP
-  NITROFURANTOIN: SIGNIFICANT CHANGE UP
-  PIPERACILLIN/TAZOBACTAM: SIGNIFICANT CHANGE UP
-  TIGECYCLINE: SIGNIFICANT CHANGE UP
-  TOBRAMYCIN: SIGNIFICANT CHANGE UP
-  TRIMETHOPRIM/SULFAMETHOXAZOLE: SIGNIFICANT CHANGE UP
BACTERIA UR CULT: SIGNIFICANT CHANGE UP
METHOD TYPE: SIGNIFICANT CHANGE UP

## 2019-11-19 ENCOUNTER — OUTPATIENT (OUTPATIENT)
Dept: OUTPATIENT SERVICES | Facility: HOSPITAL | Age: 74
LOS: 1 days | End: 2019-11-19

## 2019-11-19 ENCOUNTER — APPOINTMENT (OUTPATIENT)
Dept: INTERNAL MEDICINE | Facility: CLINIC | Age: 74
End: 2019-11-19
Payer: MEDICAID

## 2019-11-19 ENCOUNTER — APPOINTMENT (OUTPATIENT)
Dept: INTERNAL MEDICINE | Facility: CLINIC | Age: 74
End: 2019-11-19

## 2019-11-19 ENCOUNTER — LABORATORY RESULT (OUTPATIENT)
Age: 74
End: 2019-11-19

## 2019-11-19 VITALS — DIASTOLIC BLOOD PRESSURE: 52 MMHG | HEART RATE: 72 BPM | SYSTOLIC BLOOD PRESSURE: 92 MMHG

## 2019-11-19 VITALS — WEIGHT: 146 LBS | BODY MASS INDEX: 23.46 KG/M2 | HEIGHT: 66 IN

## 2019-11-19 DIAGNOSIS — N40.1 BENIGN PROSTATIC HYPERPLASIA WITH LOWER URINARY TRACT SYMPTOMS: ICD-10-CM

## 2019-11-19 DIAGNOSIS — R10.2 PELVIC AND PERINEAL PAIN: ICD-10-CM

## 2019-11-19 DIAGNOSIS — Z96.0 PRESENCE OF UROGENITAL IMPLANTS: ICD-10-CM

## 2019-11-19 DIAGNOSIS — R77.8 OTHER SPECIFIED ABNORMALITIES OF PLASMA PROTEINS: ICD-10-CM

## 2019-11-19 DIAGNOSIS — R63.4 ABNORMAL WEIGHT LOSS: ICD-10-CM

## 2019-11-19 DIAGNOSIS — E87.1 HYPO-OSMOLALITY AND HYPONATREMIA: ICD-10-CM

## 2019-11-19 DIAGNOSIS — R14.1 GAS PAIN: ICD-10-CM

## 2019-11-19 DIAGNOSIS — F32.89 OTHER SPECIFIED DEPRESSIVE EPISODES: ICD-10-CM

## 2019-11-19 DIAGNOSIS — Z51.81 ENCOUNTER FOR THERAPEUTIC DRUG LVL MONITORING: ICD-10-CM

## 2019-11-19 LAB
ANION GAP SERPL CALC-SCNC: 12 MMO/L — SIGNIFICANT CHANGE UP (ref 7–14)
BUN SERPL-MCNC: 10 MG/DL — SIGNIFICANT CHANGE UP (ref 7–23)
CALCIUM SERPL-MCNC: 9.1 MG/DL — SIGNIFICANT CHANGE UP (ref 8.4–10.5)
CHLORIDE SERPL-SCNC: 98 MMOL/L — SIGNIFICANT CHANGE UP (ref 98–107)
CO2 SERPL-SCNC: 24 MMOL/L — SIGNIFICANT CHANGE UP (ref 22–31)
CREAT SERPL-MCNC: 0.65 MG/DL — SIGNIFICANT CHANGE UP (ref 0.5–1.3)
GLUCOSE BLDC GLUCOMTR-MCNC: 127
GLUCOSE SERPL-MCNC: 144 MG/DL — HIGH (ref 70–99)
POTASSIUM SERPL-MCNC: 4.4 MMOL/L — SIGNIFICANT CHANGE UP (ref 3.5–5.3)
POTASSIUM SERPL-SCNC: 4.4 MMOL/L — SIGNIFICANT CHANGE UP (ref 3.5–5.3)
SODIUM SERPL-SCNC: 134 MMOL/L — LOW (ref 135–145)

## 2019-11-19 PROCEDURE — 99214 OFFICE O/P EST MOD 30 MIN: CPT

## 2019-11-19 RX ORDER — BIOTIN/FOLIC AC/VIT BCOMP,C/ZN 3MG-0.8MG
3 TABLET ORAL
Qty: 30 | Refills: 0 | Status: DISCONTINUED | COMMUNITY
Start: 2019-09-17 | End: 2019-11-19

## 2019-11-19 RX ORDER — SIMETHICONE 125 MG/1
125 TABLET, CHEWABLE ORAL EVERY 6 HOURS
Qty: 120 | Refills: 0 | Status: DISCONTINUED | COMMUNITY
Start: 2019-10-16 | End: 2019-11-19

## 2019-11-19 RX ORDER — DONEPEZIL HYDROCHLORIDE 5 MG/1
5 TABLET ORAL
Qty: 30 | Refills: 0 | Status: DISCONTINUED | COMMUNITY
Start: 2019-09-17 | End: 2019-11-19

## 2019-11-19 NOTE — HISTORY OF PRESENT ILLNESS
[Post-hospitalization from ___ Hospital] : Post-hospitalization from [unfilled] Hospital [Admitted on: ___] : The patient was admitted on [unfilled] [Patient Contacted By: ____] : and contacted by [unfilled] [Discharged on ___] : discharged on [unfilled] [Med Reconciliation] : medication reconciliation has been completed [FreeTextEntry2] : 74 year old man with history of Parkinson's disease, dementia, BPH s/p thermal prostate reduction, hyponatremia who was recently admitted to [facility] for UTI - UCx with MDR kleb, now s/p antibiotics and afebrile, though dysuria is better it is not completely resolved and he still has mild suprapubic tenderness, was unable to  phenazopyridine as not covered by insurance. Saw urology (outside [facility]) and urine testing sent yesterday. Also was started on escitalopram 5mg while in the hospital, without significant dip in Na. Remains on salt tabs.  d/c Na was 131 on 11/11/19. No other new complaints, denies dizziness though BP was low on check today. No falls, eating well, sleeping well on melatonin 5mg, having normal BM without miralax. \par \par Review of Systems: \par Denies night sweats, chills, recent change in weight, sore throat, cough, shortness of breath, chest pain, orthopnea, lower extremity edema, PND, nausea, vomiting, diarrhea, constipation, heartburn, blood in stool or black stools,  skin rash, headache, insomnia, bleeding, or bruising.\par

## 2019-11-19 NOTE — PHYSICAL EXAM
[No Acute Distress] : no acute distress [Normal Sclera/Conjunctiva] : normal sclera/conjunctiva [EOMI] : extraocular movements intact [Normal Outer Ear/Nose] : the outer ears and nose were normal in appearance [No JVD] : no jugular venous distention [No Lymphadenopathy] : no lymphadenopathy [No Respiratory Distress] : no respiratory distress  [No Accessory Muscle Use] : no accessory muscle use [Clear to Auscultation] : lungs were clear to auscultation bilaterally [Soft] : abdomen soft [Non-distended] : non-distended [No CVA Tenderness] : no CVA  tenderness [No Rash] : no rash [de-identified] : very mild tenderness to suprapubic deep palpation [de-identified] : A+Ox2 (person, place)

## 2019-11-24 LAB — MISCELLANEOUS - CHEM: SIGNIFICANT CHANGE UP

## 2019-12-02 NOTE — DISCUSSION/SUMMARY
[ER: _____] : ER: [unfilled] [ED] : a call from ED [Follow Up Appointment] : follow up appointment with provider [Communication with patient's family] : communication with patient's family [Home] : patient was discharged to home [FreeTextEntry1] : patient went to the ED for penile burning and suprapubic tenderness

## 2019-12-13 ENCOUNTER — LABORATORY RESULT (OUTPATIENT)
Age: 74
End: 2019-12-13

## 2019-12-13 ENCOUNTER — OUTPATIENT (OUTPATIENT)
Dept: OUTPATIENT SERVICES | Facility: HOSPITAL | Age: 74
LOS: 1 days | End: 2019-12-13

## 2019-12-13 ENCOUNTER — APPOINTMENT (OUTPATIENT)
Dept: INTERNAL MEDICINE | Facility: CLINIC | Age: 74
End: 2019-12-13
Payer: MEDICAID

## 2019-12-13 VITALS
DIASTOLIC BLOOD PRESSURE: 70 MMHG | HEIGHT: 66 IN | BODY MASS INDEX: 23.95 KG/M2 | OXYGEN SATURATION: 98 % | SYSTOLIC BLOOD PRESSURE: 100 MMHG | HEART RATE: 98 BPM | WEIGHT: 149 LBS

## 2019-12-13 LAB
ANION GAP SERPL CALC-SCNC: 14 MMO/L — SIGNIFICANT CHANGE UP (ref 7–14)
APPEARANCE UR: CLEAR — SIGNIFICANT CHANGE UP
BILIRUB UR-MCNC: NEGATIVE — SIGNIFICANT CHANGE UP
BLOOD UR QL VISUAL: NEGATIVE — SIGNIFICANT CHANGE UP
BUN SERPL-MCNC: 12 MG/DL — SIGNIFICANT CHANGE UP (ref 7–23)
CALCIUM SERPL-MCNC: 9.7 MG/DL — SIGNIFICANT CHANGE UP (ref 8.4–10.5)
CHLORIDE SERPL-SCNC: 97 MMOL/L — LOW (ref 98–107)
CO2 SERPL-SCNC: 26 MMOL/L — SIGNIFICANT CHANGE UP (ref 22–31)
COLOR SPEC: SIGNIFICANT CHANGE UP
CREAT SERPL-MCNC: 0.84 MG/DL — SIGNIFICANT CHANGE UP (ref 0.5–1.3)
GLUCOSE SERPL-MCNC: 94 MG/DL — SIGNIFICANT CHANGE UP (ref 70–99)
GLUCOSE UR-MCNC: 30 — SIGNIFICANT CHANGE UP
KETONES UR-MCNC: NEGATIVE — SIGNIFICANT CHANGE UP
LEUKOCYTE ESTERASE UR-ACNC: NEGATIVE — SIGNIFICANT CHANGE UP
NITRITE UR-MCNC: NEGATIVE — SIGNIFICANT CHANGE UP
PH UR: 6 — SIGNIFICANT CHANGE UP (ref 5–8)
POTASSIUM SERPL-MCNC: 4.4 MMOL/L — SIGNIFICANT CHANGE UP (ref 3.5–5.3)
POTASSIUM SERPL-SCNC: 4.4 MMOL/L — SIGNIFICANT CHANGE UP (ref 3.5–5.3)
PROT UR-MCNC: NEGATIVE — SIGNIFICANT CHANGE UP
SODIUM SERPL-SCNC: 137 MMOL/L — SIGNIFICANT CHANGE UP (ref 135–145)
SP GR SPEC: 1.01 — SIGNIFICANT CHANGE UP (ref 1–1.04)
UROBILINOGEN FLD QL: NORMAL — SIGNIFICANT CHANGE UP

## 2019-12-13 PROCEDURE — 99214 OFFICE O/P EST MOD 30 MIN: CPT

## 2019-12-13 RX ORDER — MELATONIN 5 MG
5 CAPSULE ORAL AT BEDTIME
Qty: 90 | Refills: 0 | Status: DISCONTINUED | COMMUNITY
Start: 2019-10-16 | End: 2019-12-13

## 2019-12-13 NOTE — HEALTH RISK ASSESSMENT
[No] : No [1] : 2) Feeling down, depressed, or hopeless for several days (1) [No falls in past year] : Patient reported no falls in the past year [No Retinopathy] : No retinopathy [EyeExamDate] : 11/19 [] : No [de-identified] : none [AWI2Vbtbl] : 2

## 2019-12-13 NOTE — REVIEW OF SYSTEMS
[Fever] : no fever [Fatigue] : no fatigue [Vision Problems] : no vision problems [Hearing Loss] : no hearing loss [Chest Pain] : no chest pain [Shortness Of Breath] : no shortness of breath [Abdominal Pain] : no abdominal pain [Nausea] : no nausea [Constipation] : no constipation [Diarrhea] : no diarrhea [Vomiting] : no vomiting [Melena] : no melena [Dysuria] : dysuria [Incontinence] : incontinence [Hesitancy] : no hesitancy [Nocturia] : nocturia [Hematuria] : no hematuria [Frequency] : frequency [Skin Rash] : no skin rash [Headache] : no headache [Fainting] : no fainting [Confusion] : no confusion [Suicidal] : not suicidal [Insomnia] : no insomnia [Anxiety] : no anxiety [Depression] : depression [FreeTextEntry8] : no testicular pain/swelling [de-identified] : generalized weakness, occasional dizziness

## 2019-12-13 NOTE — HISTORY OF PRESENT ILLNESS
[Other: _____] : [unfilled] [FreeTextEntry1] : follow up chronic medical conditions [de-identified] : 74 year old man with history of Parkinson's disease, dementia, BPH s/p thermal prostate reduction, hyponatremia, T2DM (A1c 7.0% while off meds in 9/2017) who presents for follow up of chronic medical conditions and c/o malodorous urine/frequency\par -reports that he is still taking tamsulosin, with urinary frequency, going every 1.5-2h and also with nocturia\par -still with pain on urination, was unable to start phenazopyridine as it is not covered but recently also malodorous, denies fevers, upper back pain, or testicular pain/swelling\par needs refills on gabapentin which he has been taking nightly and his son reports that he is sleeping too much. stopped the melatonin a couple of weeks ago\par no constipation, but using senna to go, needs refills\par son also states that he ran out of the salt tabs a few days ago and he has been feeling weak for a couple of days\par \par

## 2019-12-13 NOTE — ASSESSMENT
[FreeTextEntry1] : HCM-\par flu shot today, not given as inpatient\par PCV13 today\par tdap/shingrix discussion next visit\par then PPSV23 next year 12/2020\par given HCP form as he would like son to make his decisions for him if he is unable, we discussed at length today\par

## 2019-12-13 NOTE — PHYSICAL EXAM
[No Acute Distress] : no acute distress [Well Developed] : well developed [Well Nourished] : well nourished [Normal Sclera/Conjunctiva] : normal sclera/conjunctiva [No JVD] : no jugular venous distention [Normal Outer Ear/Nose] : the outer ears and nose were normal in appearance [EOMI] : extraocular movements intact [No Respiratory Distress] : no respiratory distress  [No Accessory Muscle Use] : no accessory muscle use [Normal Rate] : normal rate  [Clear to Auscultation] : lungs were clear to auscultation bilaterally [Regular Rhythm] : with a regular rhythm [Normal S1, S2] : normal S1 and S2 [No Varicosities] : no varicosities [No Edema] : there was no peripheral edema [No Extremity Clubbing/Cyanosis] : no extremity clubbing/cyanosis [Soft] : abdomen soft [Non-distended] : non-distended [No Masses] : no abdominal mass palpated [No Rash] : no rash [No CVA Tenderness] : no CVA  tenderness [Right Foot Was Examined] : Right foot ~C was examined [Left Foot Was Examined] : left foot ~C was examined [None] : no ulcers in either foot were found [] : with full ROM [de-identified] : flat affect, hypomimia [de-identified] : 1+ DP pulses bilaterally  [de-identified] : +mild tenderness to palpation of suprapubic region [de-identified] : small stepped shuffling gait, independent [de-identified] : +onychomycosis bilaterally [TWNoteComboBox1] : +2 [de-identified] : b/l feet without plantar sensation at all [TWNoteComboBox2] : +2 [TWNoteComboBox3] : +2 [TWNoteComboBox5] : +1 [TWNoteComboBox6] : +1 [TWNoteComboBox4] : +1

## 2019-12-14 LAB
CREAT UR-MCNC: 57 MG/DL — SIGNIFICANT CHANGE UP
MICROALBUMIN UR-MCNC: < 1.2 MG/DL — SIGNIFICANT CHANGE UP

## 2019-12-16 DIAGNOSIS — R63.4 ABNORMAL WEIGHT LOSS: ICD-10-CM

## 2019-12-16 DIAGNOSIS — Z23 ENCOUNTER FOR IMMUNIZATION: ICD-10-CM

## 2019-12-16 DIAGNOSIS — E11.9 TYPE 2 DIABETES MELLITUS WITHOUT COMPLICATIONS: ICD-10-CM

## 2019-12-16 DIAGNOSIS — E87.1 HYPO-OSMOLALITY AND HYPONATREMIA: ICD-10-CM

## 2019-12-16 DIAGNOSIS — N40.1 BENIGN PROSTATIC HYPERPLASIA WITH LOWER URINARY TRACT SYMPTOMS: ICD-10-CM

## 2020-01-26 NOTE — REVIEW OF SYSTEMS
[Abdominal Pain] : abdominal pain 9 [Incontinence] : incontinence [Frequency] : frequency [Fever] : no fever [Night Sweats] : no night sweats [Recent Change In Weight] : ~T no recent weight change [Vision Problems] : no vision problems [Earache] : no earache [Chest Pain] : no chest pain [Palpitations] : no palpitations [Lower Ext Edema] : no lower extremity edema [Shortness Of Breath] : no shortness of breath [Dyspnea on Exertion] : not dyspnea on exertion [Dysuria] : no dysuria [Hesitancy] : no hesitancy [Hematuria] : no hematuria [Joint Pain] : no joint pain [Skin Rash] : no skin rash [Headache] : no headache [Dizziness] : no dizziness [de-identified] : no seizures, no increased lethargy

## 2020-01-27 ENCOUNTER — APPOINTMENT (OUTPATIENT)
Dept: INTERNAL MEDICINE | Facility: CLINIC | Age: 75
End: 2020-01-27
Payer: MEDICAID

## 2020-01-27 ENCOUNTER — OUTPATIENT (OUTPATIENT)
Dept: OUTPATIENT SERVICES | Facility: HOSPITAL | Age: 75
LOS: 1 days | End: 2020-01-27

## 2020-01-27 VITALS
WEIGHT: 148 LBS | SYSTOLIC BLOOD PRESSURE: 100 MMHG | BODY MASS INDEX: 23.78 KG/M2 | OXYGEN SATURATION: 99 % | HEART RATE: 94 BPM | DIASTOLIC BLOOD PRESSURE: 60 MMHG | HEIGHT: 66 IN

## 2020-01-27 LAB — GLUCOSE BLDC GLUCOMTR-MCNC: 149

## 2020-01-27 PROCEDURE — 99214 OFFICE O/P EST MOD 30 MIN: CPT

## 2020-01-27 NOTE — ASSESSMENT
[FreeTextEntry1] : HCM-\par tdap today\par shingrix discussion completed, they will consider for next visit\par then PPSV23 next year 12/2020 given PCV13 in Dec 2019\par given HCP form last visit as he would like son to make his decisions for him if he is unable, we discussed at length today again but they have not brought the form back-they do not want to complete a new form, will bring to next visit\par dental referral placed, routine care

## 2020-01-27 NOTE — REVIEW OF SYSTEMS
[Fever] : no fever [Fatigue] : no fatigue [Vision Problems] : no vision problems [Hearing Loss] : no hearing loss [Chest Pain] : no chest pain [Shortness Of Breath] : no shortness of breath [Abdominal Pain] : no abdominal pain [Nausea] : no nausea [Constipation] : no constipation [Diarrhea] : no diarrhea [Vomiting] : no vomiting [Melena] : no melena [Incontinence] : incontinence [Hesitancy] : no hesitancy [Nocturia] : nocturia [Hematuria] : no hematuria [Frequency] : frequency [Skin Rash] : no skin rash [Headache] : no headache [Fainting] : no fainting [Confusion] : no confusion [Suicidal] : not suicidal [Depression] : depression [FreeTextEntry8] : no testicular pain/swelling [de-identified] : generalized weakness [de-identified] : Sleeps during the daytime

## 2020-01-27 NOTE — HISTORY OF PRESENT ILLNESS
[Other: _____] : [unfilled] [FreeTextEntry1] : follow up chronic medical conditions [de-identified] : 74 year old man with history of Parkinson's disease, dementia, BPH s/p thermal prostate reduction, hyponatremia, T2DM (A1c 7.0% while off meds in 9/2017) who presents for follow up of chronic medical conditions and requesting refills of pantoprazole. He is here with his son today, who states that his memory is getting worse recently. He also gets angry, but is not behaving aggressively towards others. His family - his wife, daughter in law, son have been caring for him around the clock. He has been taking methenamine, which has curbed his urinary symptoms. He was given ID follow up after last hospitalization, appt is in two days - to f/u recurrent UTI after clearing urinary obstruction. \par Otherwise, reports that he continues to have low back pain - sometimes with leg pain, but he is urinating regularly at home. No fecal incontinence.

## 2020-01-27 NOTE — PHYSICAL EXAM
[No Acute Distress] : no acute distress [Well Developed] : well developed [Ill-Appearing] : ill-appearing [Normal Sclera/Conjunctiva] : normal sclera/conjunctiva [EOMI] : extraocular movements intact [Normal Outer Ear/Nose] : the outer ears and nose were normal in appearance [No JVD] : no jugular venous distention [No Respiratory Distress] : no respiratory distress  [No Accessory Muscle Use] : no accessory muscle use [Clear to Auscultation] : lungs were clear to auscultation bilaterally [Normal Rate] : normal rate  [Regular Rhythm] : with a regular rhythm [Normal S1, S2] : normal S1 and S2 [No Murmur] : no murmur heard [No Edema] : there was no peripheral edema [Soft] : abdomen soft [Non Tender] : non-tender [Non-distended] : non-distended [Normal Bowel Sounds] : normal bowel sounds [No CVA Tenderness] : no CVA  tenderness [No Rash] : no rash [de-identified] : +mild TTP over lower lumbar spine [de-identified] : able to raise both thighs off exam table while seated against resistance

## 2020-01-28 ENCOUNTER — RX RENEWAL (OUTPATIENT)
Age: 75
End: 2020-01-28

## 2020-01-28 DIAGNOSIS — R63.4 ABNORMAL WEIGHT LOSS: ICD-10-CM

## 2020-01-28 DIAGNOSIS — G20 PARKINSON'S DISEASE: ICD-10-CM

## 2020-01-28 DIAGNOSIS — E87.1 HYPO-OSMOLALITY AND HYPONATREMIA: ICD-10-CM

## 2020-01-28 DIAGNOSIS — Z23 ENCOUNTER FOR IMMUNIZATION: ICD-10-CM

## 2020-01-28 DIAGNOSIS — Z00.00 ENCOUNTER FOR GENERAL ADULT MEDICAL EXAMINATION WITHOUT ABNORMAL FINDINGS: ICD-10-CM

## 2020-01-28 DIAGNOSIS — E11.9 TYPE 2 DIABETES MELLITUS WITHOUT COMPLICATIONS: ICD-10-CM

## 2020-01-28 DIAGNOSIS — K21.9 GASTRO-ESOPHAGEAL REFLUX DISEASE WITHOUT ESOPHAGITIS: ICD-10-CM

## 2020-01-29 ENCOUNTER — OUTPATIENT (OUTPATIENT)
Dept: OUTPATIENT SERVICES | Facility: HOSPITAL | Age: 75
LOS: 1 days | End: 2020-01-29
Payer: MEDICAID

## 2020-01-29 ENCOUNTER — APPOINTMENT (OUTPATIENT)
Dept: INFECTIOUS DISEASE | Facility: CLINIC | Age: 75
End: 2020-01-29
Payer: MEDICAID

## 2020-01-29 VITALS
DIASTOLIC BLOOD PRESSURE: 76 MMHG | BODY MASS INDEX: 23.78 KG/M2 | OXYGEN SATURATION: 98 % | RESPIRATION RATE: 18 BRPM | TEMPERATURE: 97.8 F | HEIGHT: 66 IN | HEART RATE: 73 BPM | SYSTOLIC BLOOD PRESSURE: 138 MMHG | WEIGHT: 148 LBS

## 2020-01-29 DIAGNOSIS — B97.89 OTHER VIRAL AGENTS AS THE CAUSE OF DISEASES CLASSIFIED ELSEWHERE: ICD-10-CM

## 2020-01-29 DIAGNOSIS — Z16.35 RESISTANCE TO MULTIPLE ANTIMICROBIAL DRUGS: ICD-10-CM

## 2020-01-29 DIAGNOSIS — R82.71 BACTERIURIA: ICD-10-CM

## 2020-01-29 DIAGNOSIS — R10.2 PELVIC AND PERINEAL PAIN: ICD-10-CM

## 2020-01-29 PROCEDURE — G0463: CPT

## 2020-01-29 PROCEDURE — 99214 OFFICE O/P EST MOD 30 MIN: CPT

## 2020-01-29 NOTE — ASSESSMENT
[FreeTextEntry1] : 74 year old with parkinson's with chronic pichardo with chronic bacteruria/ frequent uti/ MDR organisms\par \par \par 1) Bacteruria\par \par Distinguishing between colonization and UTI in this gentleman is difficult.\par \par He has some degree of chronic abdominal/  symptoms. He notes pain with passing urine with each episode over a two year period.  I would not use this chronic pain as a symptom of an acute uti. \par \par I would only test his urine if her has a change from his baseline symptoms.- increased pain, pain that persists after urination, pain associated with fever/n/v/, ect... 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 Low threshold for admission for IV abx if he is systemically ill.\par  \par \par \par RTC as needed.\par \par Can get hiprex with me or from pmd\par

## 2020-01-29 NOTE — PHYSICAL EXAM
[General Appearance - In No Acute Distress] : in no acute distress [General Appearance - Alert] : alert [Sclera] : the sclera and conjunctiva were normal [Neck Cervical Mass (___cm)] : no neck mass was observed [Neck Appearance] : the appearance of the neck was normal [Outer Ear] : the ears and nose were normal in appearance [Exaggerated Use Of Accessory Muscles For Inspiration] : no accessory muscle use [Heart Rate And Rhythm] : heart rate was normal and rhythm regular [Bowel Sounds] : normal bowel sounds [Abdomen Soft] : soft [FreeTextEntry1] : mild supra-pubic pain [Abdomen Tenderness] : non-tender [No Palpable Adenopathy] : no palpable adenopathy [Musculoskeletal - Swelling] : no joint swelling [Skin Color & Pigmentation] : normal skin color and pigmentation [] : no rash [Oriented To Time, Place, And Person] : oriented to person, place, and time [Affect] : the affect was normal

## 2020-01-29 NOTE — HISTORY OF PRESENT ILLNESS
[FreeTextEntry1] : 73 year old with parkinson's and BPH with a chronic pichardo. \par Prior known colonization with CRE klebsiella\par S/p recent TURP\par recent tx for CRE klebsiella- suspected mdr metallobetalactamase. \par Given hipprex after abx coure. \par presents for follow up\par \par He notes pain when he passes urine.  This symptom has been present on a daily basis for two years. \par He denies fever.  His pain resolves after he passes urine.\par \par He does not appear uncomfortable. \par \par

## 2020-01-30 DIAGNOSIS — R82.71 BACTERIURIA: ICD-10-CM

## 2020-01-30 DIAGNOSIS — Z16.35 RESISTANCE TO MULTIPLE ANTIMICROBIAL DRUGS: ICD-10-CM

## 2020-01-30 DIAGNOSIS — R10.2 PELVIC AND PERINEAL PAIN: ICD-10-CM

## 2020-03-25 RX ORDER — METHENAMINE HIPPURATE 1 G/1
1 TABLET ORAL TWICE DAILY
Qty: 60 | Refills: 5 | Status: DISCONTINUED | COMMUNITY
Start: 2019-11-19 | End: 2020-03-25

## 2020-03-30 ENCOUNTER — APPOINTMENT (OUTPATIENT)
Dept: INTERNAL MEDICINE | Facility: CLINIC | Age: 75
End: 2020-03-30

## 2020-07-02 ENCOUNTER — RX RENEWAL (OUTPATIENT)
Age: 75
End: 2020-07-02

## 2020-08-03 ENCOUNTER — APPOINTMENT (OUTPATIENT)
Dept: INTERNAL MEDICINE | Facility: CLINIC | Age: 75
End: 2020-08-03
Payer: MEDICAID

## 2020-08-03 ENCOUNTER — OUTPATIENT (OUTPATIENT)
Dept: OUTPATIENT SERVICES | Facility: HOSPITAL | Age: 75
LOS: 1 days | End: 2020-08-03

## 2020-08-03 VITALS — BODY MASS INDEX: 23.78 KG/M2 | WEIGHT: 148 LBS | HEIGHT: 66 IN

## 2020-08-03 DIAGNOSIS — F03.90 UNSPECIFIED DEMENTIA W/OUT BEHAVIORAL DISTURBANCE: ICD-10-CM

## 2020-08-03 DIAGNOSIS — K21.9 GASTRO-ESOPHAGEAL REFLUX DISEASE W/OUT ESOPHAGITIS: ICD-10-CM

## 2020-08-03 DIAGNOSIS — D50.9 IRON DEFICIENCY ANEMIA, UNSPECIFIED: ICD-10-CM

## 2020-08-03 DIAGNOSIS — G89.29 LOW BACK PAIN: ICD-10-CM

## 2020-08-03 DIAGNOSIS — D64.9 ANEMIA, UNSPECIFIED: ICD-10-CM

## 2020-08-03 DIAGNOSIS — M54.5 LOW BACK PAIN: ICD-10-CM

## 2020-08-03 PROCEDURE — 99213 OFFICE O/P EST LOW 20 MIN: CPT | Mod: GE,95

## 2020-08-03 RX ORDER — SENNOSIDES 8.6 MG TABLETS 8.6 MG/1
8.6 TABLET ORAL
Qty: 180 | Refills: 1 | Status: DISCONTINUED | COMMUNITY
Start: 2019-12-13 | End: 2020-08-03

## 2020-08-03 RX ORDER — GABAPENTIN 300 MG/1
300 CAPSULE ORAL
Qty: 90 | Refills: 1 | Status: DISCONTINUED | COMMUNITY
Start: 2019-11-19 | End: 2020-08-03

## 2020-08-03 RX ORDER — PANTOPRAZOLE 40 MG/1
40 TABLET, DELAYED RELEASE ORAL
Qty: 90 | Refills: 1 | Status: DISCONTINUED | COMMUNITY
Start: 2019-11-19 | End: 2020-08-03

## 2020-08-03 NOTE — PHYSICAL EXAM
[No Acute Distress] : no acute distress [Well-Appearing] : well-appearing [Well Nourished] : well nourished [EOMI] : extraocular movements intact [Well Developed] : well developed [No Respiratory Distress] : no respiratory distress  [Normal Outer Ear/Nose] : the outer ears and nose were normal in appearance [Speech Grossly Normal] : speech grossly normal [Normal Mood] : the mood was normal [Kyphosis] : no kyphosis [Scoliosis] : no scoliosis

## 2020-08-03 NOTE — HISTORY OF PRESENT ILLNESS
[Home] : at home, [unfilled] , at the time of the visit. [Other Location: e.g. 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 Pt also requests that his HHA form be filled out, which he plans to bring physically to the office. \par \par Pt was last seen by Dr Valverde in 1/2020. Visit was done with his son, who reports that his father is well but continues to have lower back pain, mostly when rising from a chair. Reports that the pain is localized to the midline, above his sacrum and does not radiate anywhere. Reports that his father had an occupational injury 5yrs prior where he fell and has since been having this pain daily, for which he was receiving injections of some sort in [place]. Denies fevers/chills/numbness/tingling/incontinence. Wishes to try injections again. Has been prescribed baclofen by neurologist and wishes to increase dose to  10mg from 5mg BID. Was previously prescribed gabapentin but reports that he has not been taking it. \par \par For chronic urinary pain, pt has been taking methenamine, which has been helping. No fevers/chills/flank pain. \par \par Pt is also requesting refill on all other meds. Informed him that he should have enough of a supply to last until end of september. For neuro meds (carbido-levodopa, memantine, baclofen, trihexyphenidyl), informed pt that he should discuss continuation of these meds with his prescribing neurologist. \par Visit was done with his son today, who states that his memory is getting worse recently. He also gets angry, but is not behaving aggressively towards others. His family - his wife, daughter in law, son have been caring for him around the clock. He has been taking methenamine, which has curbed his urinary symptoms. He was given ID follow up after last hospitalization, appt is in two days - to f/u recurrent UTI after clearing urinary obstruction. \par Otherwise, reports that he continues to have low back pain - sometimes with leg pain, but he is urinating regularly at home. No fecal incontinence. \par  [FreeTextEntry1] : followup of chronic medical conditions, needs HHA form filled out

## 2020-08-03 NOTE — ASSESSMENT
[FreeTextEntry1] : HCM\par \par tdap UTD\par shingrix discussion completed, they will consider for next visit\par then PPSV23 12/2020 given PCV13 in Dec 2019\par given HCP form previous visit as he would like son to make his decisions for him if he is unable, we discussed at length today again but they have not brought the form back-they do not want to complete a new form, will bring to next visit\par \par RTC in 4-5months w/ PCP Dr Valverde\par Case d/w Dr Terry\par Leonel Voss, PGY2\par F1

## 2020-08-05 DIAGNOSIS — M54.5 LOW BACK PAIN: ICD-10-CM

## 2020-08-05 DIAGNOSIS — F32.89 OTHER SPECIFIED DEPRESSIVE EPISODES: ICD-10-CM

## 2020-08-05 DIAGNOSIS — E11.9 TYPE 2 DIABETES MELLITUS WITHOUT COMPLICATIONS: ICD-10-CM

## 2020-08-05 DIAGNOSIS — D50.9 IRON DEFICIENCY ANEMIA, UNSPECIFIED: ICD-10-CM

## 2020-08-05 DIAGNOSIS — F03.90 UNSPECIFIED DEMENTIA WITHOUT BEHAVIORAL DISTURBANCE: ICD-10-CM

## 2020-08-05 DIAGNOSIS — E86.1 HYPOVOLEMIA: ICD-10-CM

## 2020-08-05 DIAGNOSIS — D64.9 ANEMIA, UNSPECIFIED: ICD-10-CM

## 2020-08-05 DIAGNOSIS — G20 PARKINSON'S DISEASE: ICD-10-CM

## 2020-09-04 ENCOUNTER — APPOINTMENT (OUTPATIENT)
Dept: ORTHOPEDIC SURGERY | Facility: CLINIC | Age: 75
End: 2020-09-04
Payer: MEDICAID

## 2020-09-04 VITALS
SYSTOLIC BLOOD PRESSURE: 128 MMHG | HEIGHT: 66 IN | OXYGEN SATURATION: 98 % | TEMPERATURE: 96.7 F | DIASTOLIC BLOOD PRESSURE: 76 MMHG | WEIGHT: 148 LBS | BODY MASS INDEX: 23.78 KG/M2 | HEART RATE: 59 BPM

## 2020-09-04 DIAGNOSIS — M47.817 SPONDYLOSIS W/OUT MYELOPATHY OR RADICULOPATHY, LUMBOSACRAL REGION: ICD-10-CM

## 2020-09-04 PROCEDURE — 72100 X-RAY EXAM L-S SPINE 2/3 VWS: CPT

## 2020-09-04 PROCEDURE — 99203 OFFICE O/P NEW LOW 30 MIN: CPT

## 2020-09-04 NOTE — HISTORY OF PRESENT ILLNESS
[de-identified] : Mr. JEANE JEFFERSON  is a 75 year old male who presents with a chronic history of low back pain for the past 5 years after falling from a height of 10 feet.  His back pain has not improved.  More recently he has numbness in both calves.  Normal bowel and bladder control.   Denies any recent fevers, chills, sweats, weight loss, or infection.  He has done physical therapy in [place] without any relief. \par \par The patients past medical history, past surgical history, medications, allergies, and social history were reviewed by me today with the patient and documented accordingly.  In addition, the patient's family history, which is noncontributory to their visit, was also reviewed.\par

## 2020-09-04 NOTE — PHYSICAL EXAM
[Stooped] : stooped [Antalgic] : not antalgic [de-identified] : Examination of the lumbar spine reveals no midline tenderness palpation, step-offs, or skin lesions. Decreased range of motion with respect to flexion, extension, lateral bending, and rotation. No tenderness to palpation of the sciatic notch. No tenderness palpation of the bilateral greater trochanters. No pain with passive internal/external rotation of the hips. No instability of bilateral lower extremities.  Negative MICKEY. Negative straight leg raise bilaterally. No bowstring. Negative femoral stretch. 5 out of 5 iliopsoas, hip abductors, hips adductors, quadriceps, hamstrings, gastrocsoleus, tibialis anterior, extensor hallucis longus, peroneals. Grossly intact sensation to light touch bilateral lower extremities. 1+ patellar and Achilles reflexes. Downgoing Babinski. No clonus. Intact proprioception. Palpable pulses. No skin lesion and no edema on the right and left lower extremities. [de-identified] : AP lateral lumbar x-rays reveal some spondylosis.  Questionable old L1 fracture.

## 2020-09-09 ENCOUNTER — APPOINTMENT (OUTPATIENT)
Dept: INTERNAL MEDICINE | Facility: CLINIC | Age: 75
End: 2020-09-09

## 2020-10-05 ENCOUNTER — RX RENEWAL (OUTPATIENT)
Age: 75
End: 2020-10-05

## 2020-11-13 ENCOUNTER — APPOINTMENT (OUTPATIENT)
Dept: INTERNAL MEDICINE | Facility: CLINIC | Age: 75
End: 2020-11-13

## 2020-11-18 ENCOUNTER — APPOINTMENT (OUTPATIENT)
Dept: INTERNAL MEDICINE | Facility: CLINIC | Age: 75
End: 2020-11-18
Payer: MEDICAID

## 2020-11-18 ENCOUNTER — OUTPATIENT (OUTPATIENT)
Dept: OUTPATIENT SERVICES | Facility: HOSPITAL | Age: 75
LOS: 1 days | End: 2020-11-18

## 2020-11-18 ENCOUNTER — LABORATORY RESULT (OUTPATIENT)
Age: 75
End: 2020-11-18

## 2020-11-18 ENCOUNTER — MED ADMIN CHARGE (OUTPATIENT)
Age: 75
End: 2020-11-18

## 2020-11-18 VITALS
OXYGEN SATURATION: 98 % | HEART RATE: 82 BPM | WEIGHT: 148 LBS | DIASTOLIC BLOOD PRESSURE: 80 MMHG | SYSTOLIC BLOOD PRESSURE: 130 MMHG | BODY MASS INDEX: 23.78 KG/M2 | HEIGHT: 66 IN

## 2020-11-18 VITALS — TEMPERATURE: 95.1 F

## 2020-11-18 DIAGNOSIS — E87.1 HYPO-OSMOLALITY AND HYPONATREMIA: ICD-10-CM

## 2020-11-18 DIAGNOSIS — G20 PARKINSON'S DISEASE: ICD-10-CM

## 2020-11-18 DIAGNOSIS — E11.9 TYPE 2 DIABETES MELLITUS WITHOUT COMPLICATIONS: ICD-10-CM

## 2020-11-18 DIAGNOSIS — N40.1 BENIGN PROSTATIC HYPERPLASIA WITH LOWER URINARY TRACT SYMPTOMS: ICD-10-CM

## 2020-11-18 DIAGNOSIS — M54.5 LOW BACK PAIN: ICD-10-CM

## 2020-11-18 PROCEDURE — 99213 OFFICE O/P EST LOW 20 MIN: CPT

## 2020-11-18 RX ORDER — MEMANTINE HYDROCHLORIDE 5 MG/1
5 TABLET, FILM COATED ORAL
Qty: 90 | Refills: 0 | Status: ACTIVE | COMMUNITY
Start: 2019-09-17

## 2020-11-18 RX ORDER — BACLOFEN 10 MG/1
10 TABLET ORAL 3 TIMES DAILY
Qty: 90 | Refills: 0 | Status: DISCONTINUED | COMMUNITY
Start: 2019-09-17 | End: 2020-11-18

## 2020-11-18 RX ORDER — TRIHEXYPHENIDYL HYDROCHLORIDE 2 MG/1
2 TABLET ORAL TWICE DAILY
Qty: 60 | Refills: 0 | Status: DISCONTINUED | COMMUNITY
Start: 2019-09-17 | End: 2020-11-18

## 2020-11-19 LAB
CREAT UR-MCNC: 64 MG/DL — SIGNIFICANT CHANGE UP
HBA1C MFR BLD HPLC: 6.2
MICROALBUMIN UR-MCNC: 1.2 MG/DL — SIGNIFICANT CHANGE UP
MICROALBUMIN/CREAT UR-RTO: 19 MG/G — SIGNIFICANT CHANGE UP (ref 0–30)

## 2020-12-18 NOTE — PHYSICAL EXAM
[No Acute Distress] : no acute distress [Ill-Appearing] : ill-appearing [Normal Sclera/Conjunctiva] : normal sclera/conjunctiva [Normal Outer Ear/Nose] : the outer ears and nose were normal in appearance [No JVD] : no jugular venous distention [No Respiratory Distress] : no respiratory distress  [No Accessory Muscle Use] : no accessory muscle use [Normal Rate] : normal rate  [Regular Rhythm] : with a regular rhythm [Normal S1, S2] : normal S1 and S2 [No Edema] : there was no peripheral edema [Soft] : abdomen soft [Non Tender] : non-tender [Non-distended] : non-distended [No CVA Tenderness] : no CVA  tenderness [No Rash] : no rash [de-identified] : Excess salivation

## 2020-12-18 NOTE — ASSESSMENT
[FreeTextEntry1] : Healthcare maintenance:\par Influenza vaccination today\par PPSV23 due next visit\par Discussed Shingrix vaccination again, they are not ready to have this done\par We discussed healthcare proxy, paperwork given to family.  They will complete and bring to next visit.\par Return to office in 2 months for face-to-face visit with Dr. Valverde

## 2020-12-18 NOTE — HISTORY OF PRESENT ILLNESS
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  He is here with his granddaughter Tesha Lindquist today and he requests that she interpret for him.  Still having some anger but no aggression.  Has trouble sleeping.  Drooling a lot, especially when he is trying to talk, and is having increased difficulty speaking.  Family is concerned because his sodium is dropping again, he was having outside labs, sodium was 129 on November 6, was repeated and was 129 again on November 12.  He still has increased nocturia without drinking a lot of water.  Saw neurology 2 months ago, has regular follow-up.\par Ophthalmology: Kirti Ward\par [facility]: [phone]

## 2020-12-18 NOTE — HEALTH RISK ASSESSMENT
[Independent] : feeding [Some assistance needed] : managing medications [Full assistance needed] : managing finances

## 2020-12-24 ENCOUNTER — EMERGENCY (EMERGENCY)
Facility: HOSPITAL | Age: 75
LOS: 1 days | Discharge: HOME | End: 2020-12-24
Attending: STUDENT IN AN ORGANIZED HEALTH CARE EDUCATION/TRAINING PROGRAM | Admitting: STUDENT IN AN ORGANIZED HEALTH CARE EDUCATION/TRAINING PROGRAM
Payer: MEDICAID

## 2020-12-24 VITALS
HEART RATE: 57 BPM | TEMPERATURE: 98 F | DIASTOLIC BLOOD PRESSURE: 71 MMHG | SYSTOLIC BLOOD PRESSURE: 123 MMHG | RESPIRATION RATE: 18 BRPM | OXYGEN SATURATION: 100 %

## 2020-12-24 VITALS
HEART RATE: 61 BPM | OXYGEN SATURATION: 100 % | RESPIRATION RATE: 16 BRPM | HEIGHT: 65 IN | SYSTOLIC BLOOD PRESSURE: 128 MMHG | DIASTOLIC BLOOD PRESSURE: 60 MMHG | TEMPERATURE: 99 F

## 2020-12-24 LAB
ALBUMIN SERPL ELPH-MCNC: 3.7 G/DL — SIGNIFICANT CHANGE UP (ref 3.3–5)
ALP SERPL-CCNC: 63 U/L — SIGNIFICANT CHANGE UP (ref 40–120)
ALT FLD-CCNC: 5 U/L — SIGNIFICANT CHANGE UP (ref 4–41)
ANION GAP SERPL CALC-SCNC: 9 MMOL/L — SIGNIFICANT CHANGE UP (ref 7–14)
APPEARANCE UR: CLEAR — SIGNIFICANT CHANGE UP
AST SERPL-CCNC: 10 U/L — SIGNIFICANT CHANGE UP (ref 4–40)
BASOPHILS # BLD AUTO: 0.03 K/UL — SIGNIFICANT CHANGE UP (ref 0–0.2)
BASOPHILS NFR BLD AUTO: 0.4 % — SIGNIFICANT CHANGE UP (ref 0–2)
BILIRUB SERPL-MCNC: 0.4 MG/DL — SIGNIFICANT CHANGE UP (ref 0.2–1.2)
BILIRUB UR-MCNC: NEGATIVE — SIGNIFICANT CHANGE UP
BLOOD GAS VENOUS COMPREHENSIVE RESULT: SIGNIFICANT CHANGE UP
BUN SERPL-MCNC: 11 MG/DL — SIGNIFICANT CHANGE UP (ref 7–23)
CALCIUM SERPL-MCNC: 8.6 MG/DL — SIGNIFICANT CHANGE UP (ref 8.4–10.5)
CHLORIDE SERPL-SCNC: 98 MMOL/L — SIGNIFICANT CHANGE UP (ref 98–107)
CO2 SERPL-SCNC: 24 MMOL/L — SIGNIFICANT CHANGE UP (ref 22–31)
COLOR SPEC: SIGNIFICANT CHANGE UP
CREAT SERPL-MCNC: 0.69 MG/DL — SIGNIFICANT CHANGE UP (ref 0.5–1.3)
DIFF PNL FLD: NEGATIVE — SIGNIFICANT CHANGE UP
EOSINOPHIL # BLD AUTO: 0.25 K/UL — SIGNIFICANT CHANGE UP (ref 0–0.5)
EOSINOPHIL NFR BLD AUTO: 3.3 % — SIGNIFICANT CHANGE UP (ref 0–6)
GLUCOSE SERPL-MCNC: 107 MG/DL — HIGH (ref 70–99)
GLUCOSE UR QL: NEGATIVE — SIGNIFICANT CHANGE UP
HCT VFR BLD CALC: 36 % — LOW (ref 39–50)
HGB BLD-MCNC: 12.1 G/DL — LOW (ref 13–17)
IANC: 3.48 K/UL — SIGNIFICANT CHANGE UP (ref 1.5–8.5)
IMM GRANULOCYTES NFR BLD AUTO: 0.1 % — SIGNIFICANT CHANGE UP (ref 0–1.5)
KETONES UR-MCNC: NEGATIVE — SIGNIFICANT CHANGE UP
LEUKOCYTE ESTERASE UR-ACNC: NEGATIVE — SIGNIFICANT CHANGE UP
LYMPHOCYTES # BLD AUTO: 3.39 K/UL — HIGH (ref 1–3.3)
LYMPHOCYTES # BLD AUTO: 44.7 % — HIGH (ref 13–44)
MCHC RBC-ENTMCNC: 25.9 PG — LOW (ref 27–34)
MCHC RBC-ENTMCNC: 33.6 GM/DL — SIGNIFICANT CHANGE UP (ref 32–36)
MCV RBC AUTO: 77.1 FL — LOW (ref 80–100)
MONOCYTES # BLD AUTO: 0.42 K/UL — SIGNIFICANT CHANGE UP (ref 0–0.9)
MONOCYTES NFR BLD AUTO: 5.5 % — SIGNIFICANT CHANGE UP (ref 2–14)
NEUTROPHILS # BLD AUTO: 3.48 K/UL — SIGNIFICANT CHANGE UP (ref 1.8–7.4)
NEUTROPHILS NFR BLD AUTO: 46 % — SIGNIFICANT CHANGE UP (ref 43–77)
NITRITE UR-MCNC: NEGATIVE — SIGNIFICANT CHANGE UP
NRBC # BLD: 0 /100 WBCS — SIGNIFICANT CHANGE UP
NRBC # FLD: 0 K/UL — SIGNIFICANT CHANGE UP
PH UR: 6 — SIGNIFICANT CHANGE UP (ref 5–8)
PLATELET # BLD AUTO: 320 K/UL — SIGNIFICANT CHANGE UP (ref 150–400)
POTASSIUM SERPL-MCNC: 4.1 MMOL/L — SIGNIFICANT CHANGE UP (ref 3.5–5.3)
POTASSIUM SERPL-SCNC: 4.1 MMOL/L — SIGNIFICANT CHANGE UP (ref 3.5–5.3)
PROT SERPL-MCNC: 6.9 G/DL — SIGNIFICANT CHANGE UP (ref 6–8.3)
PROT UR-MCNC: NEGATIVE — SIGNIFICANT CHANGE UP
RBC # BLD: 4.67 M/UL — SIGNIFICANT CHANGE UP (ref 4.2–5.8)
RBC # FLD: 13.4 % — SIGNIFICANT CHANGE UP (ref 10.3–14.5)
SARS-COV-2 RNA SPEC QL NAA+PROBE: SIGNIFICANT CHANGE UP
SODIUM SERPL-SCNC: 131 MMOL/L — LOW (ref 135–145)
SP GR SPEC: 1.01 — SIGNIFICANT CHANGE UP (ref 1.01–1.02)
UROBILINOGEN FLD QL: SIGNIFICANT CHANGE UP
WBC # BLD: 7.58 K/UL — SIGNIFICANT CHANGE UP (ref 3.8–10.5)
WBC # FLD AUTO: 7.58 K/UL — SIGNIFICANT CHANGE UP (ref 3.8–10.5)

## 2020-12-24 PROCEDURE — 71045 X-RAY EXAM CHEST 1 VIEW: CPT | Mod: 26

## 2020-12-24 PROCEDURE — 93010 ELECTROCARDIOGRAM REPORT: CPT

## 2020-12-24 PROCEDURE — 99284 EMERGENCY DEPT VISIT MOD MDM: CPT | Mod: 25

## 2020-12-24 NOTE — PHYSICAL THERAPY INITIAL EVALUATION ADULT - ADDITIONAL COMMENTS
Patient is a poor historian. Per son at bedside, patient lives with son in a private house, 4 steps to enter. Per son, patient was previously independent in all ADLs and did not use an assistive device for ambulation until ~2 weeks ago when he began to decline.    Patient was left semi-supine on stretcher as found, all lines/tubes intact and call bell within reach

## 2020-12-24 NOTE — PROVIDER CONTACT NOTE (OTHER) - ASSESSMENT
Received a call from JODY Mabry, patient being discharged from ED today.  Physical therapy evaluated patient and recommended home PT.  Spoke with patient and son Tyshawn Shukla to arrange home PT, and adamantly declined home PT services, and will take full responsibility of father.  Son states father has a rolling walker at home, and will transport patient home today.  JODY Mabry notified of PT declination.  CM will continue to remain available for a safe discharge.

## 2020-12-24 NOTE — ED PROVIDER NOTE - OBJECTIVE STATEMENT
74 M PMHx Parkinsons dementia, BPH, hx of MDR Klebsiella UTIs, hx hyponatremia on salt tabs, sent in by urology for low sodium of 127 done on outpatient labs. Hx obtained from son 2/2 hx of dementia. Son states he has noticed pt with decreased PO intake and generalized weakness, pt continues to c/o of dysuria (chronic), which is the reason they went to see the urologist. Pt admits to suprapubic pain, no other complaints. No known COVID exposure. No cough or any other complaints.

## 2020-12-24 NOTE — ED ADULT NURSE NOTE - OBJECTIVE STATEMENT
received pt in bed A and Ox 2 c/o lower abd pain, denies fever, chills, as per son at bedside, pt sent in for low sodium levels,  abd soft non distended non tender, BS present 4 quadrats, WNL, lung sounds clear cardiac sounds WNL, pt well nourished,  skin turgor WNL, orders noted and completed.

## 2020-12-24 NOTE — ED PROVIDER NOTE - ATTENDING CONTRIBUTION TO CARE
74yo M with parkinsons, bph, klebsiella utis, pw dysuria and generalized weakness and found to have low sodium by pmd  no fevers, chills, nausea vomiting diarrhea  on exam suprapubic tenderness  generalized weakness  will rpt cmp, ua, likely admit

## 2020-12-24 NOTE — ED PROVIDER NOTE - PATIENT PORTAL LINK FT
You can access the FollowMyHealth Patient Portal offered by [facility] by registering at the following website: [email or web address]. By joining "[employer]’s FollowMyHealth portal, you will also be able to view your health information using other applications (apps) compatible with our system.

## 2020-12-24 NOTE — PHYSICAL THERAPY INITIAL EVALUATION ADULT - RANGE OF MOTION EXAMINATION, REHAB EVAL
Yes bilateral upper extremity ROM was WFL (within functional limits)/bilateral lower extremity ROM was WFL (within functional limits)

## 2020-12-24 NOTE — PHYSICAL THERAPY INITIAL EVALUATION ADULT - GENERAL OBSERVATIONS, REHAB EVAL
Patient was received semi-supine on stretcher in NAD. Patient was received semi-supine on stretcher in NAD, son at bedside.

## 2020-12-24 NOTE — ED PROVIDER NOTE - NSFOLLOWUPINSTRUCTIONS_ED_ALL_ED_FT
See your primary care doctor within 24-48 hours. See your neurologist this week for further evaluation, bring copies of all reports with you. Our  will help arrange home physical therapy to help improve your strength. Return to the ER for worsening symptoms, cough, chest pain, passing out, fever, vomiting, or any other concerns.

## 2020-12-24 NOTE — PHYSICAL THERAPY INITIAL EVALUATION ADULT - PERTINENT HX OF CURRENT PROBLEM, REHAB EVAL
Patient is a 75  year old male admitted to [facility] on 12/24 sent in by urology for low sodium of 127 done on outpatient labs. PMH: Parkinsons dementia, BPH, hx of MDR Klebsiella UTIs, hx hyponatremia on salt tabs.

## 2020-12-24 NOTE — ED PROVIDER NOTE - CLINICAL SUMMARY MEDICAL DECISION MAKING FREE TEXT BOX
74 M PMHx Parkinsons dementia, BPH, hx of MDR Klebsiella UTIs, hx hyponatremia on salt tabs, sent in by urology for low sodium of 127 done on outpatient labs. C/o dysuria. Will recheck labs, check ua/uc given hx of MDR UTIs, reassess

## 2020-12-24 NOTE — ED PROVIDER NOTE - PROGRESS NOTE DETAILS
SRI Devi: Sodium 131 , UA negative. Son c/o weakness and intermittent drooling, explained these may be 2/2 progressing parkinsons. Pts son states they had an appt today to see their neurologist but came to the ED instead. PT consult called, per PT son stated pts ambulation is at baseline. Will dc and have case management arrange for home PT. Spoke w/  Eve, will get son's best contact info to help arrange for home PT. SRI Devi: Pts son now refusing home PT, states when pt gets this weak they know which exercises to do.

## 2020-12-25 LAB
CULTURE RESULTS: NO GROWTH — SIGNIFICANT CHANGE UP
SPECIMEN SOURCE: SIGNIFICANT CHANGE UP

## 2020-12-27 ENCOUNTER — INPATIENT (INPATIENT)
Facility: HOSPITAL | Age: 75
LOS: 8 days | Discharge: HOME HEALTH | End: 2021-01-05
Attending: INTERNAL MEDICINE | Admitting: INTERNAL MEDICINE
Payer: MEDICAID

## 2020-12-27 VITALS
RESPIRATION RATE: 16 BRPM | HEART RATE: 80 BPM | SYSTOLIC BLOOD PRESSURE: 128 MMHG | HEIGHT: 65 IN | DIASTOLIC BLOOD PRESSURE: 73 MMHG | OXYGEN SATURATION: 99 % | TEMPERATURE: 98 F

## 2020-12-27 DIAGNOSIS — E87.1 HYPO-OSMOLALITY AND HYPONATREMIA: ICD-10-CM

## 2020-12-27 DIAGNOSIS — K11.7 DISTURBANCES OF SALIVARY SECRETION: ICD-10-CM

## 2020-12-27 DIAGNOSIS — R33.9 RETENTION OF URINE, UNSPECIFIED: ICD-10-CM

## 2020-12-27 DIAGNOSIS — Z29.9 ENCOUNTER FOR PROPHYLACTIC MEASURES, UNSPECIFIED: ICD-10-CM

## 2020-12-27 DIAGNOSIS — N40.1 BENIGN PROSTATIC HYPERPLASIA WITH LOWER URINARY TRACT SYMPTOMS: ICD-10-CM

## 2020-12-27 DIAGNOSIS — R59.1 GENERALIZED ENLARGED LYMPH NODES: ICD-10-CM

## 2020-12-27 DIAGNOSIS — G20 PARKINSON'S DISEASE: ICD-10-CM

## 2020-12-27 LAB
ALBUMIN SERPL ELPH-MCNC: 4.1 G/DL — SIGNIFICANT CHANGE UP (ref 3.3–5)
ALP SERPL-CCNC: 70 U/L — SIGNIFICANT CHANGE UP (ref 40–120)
ALT FLD-CCNC: <5 U/L — SIGNIFICANT CHANGE UP (ref 4–41)
ANION GAP SERPL CALC-SCNC: 13 MMOL/L — SIGNIFICANT CHANGE UP (ref 7–14)
APPEARANCE UR: CLEAR — SIGNIFICANT CHANGE UP
AST SERPL-CCNC: 12 U/L — SIGNIFICANT CHANGE UP (ref 4–40)
BASOPHILS # BLD AUTO: 0.04 K/UL — SIGNIFICANT CHANGE UP (ref 0–0.2)
BASOPHILS NFR BLD AUTO: 0.4 % — SIGNIFICANT CHANGE UP (ref 0–2)
BILIRUB SERPL-MCNC: 0.7 MG/DL — SIGNIFICANT CHANGE UP (ref 0.2–1.2)
BILIRUB UR-MCNC: NEGATIVE — SIGNIFICANT CHANGE UP
BUN SERPL-MCNC: 12 MG/DL — SIGNIFICANT CHANGE UP (ref 7–23)
CALCIUM SERPL-MCNC: 9 MG/DL — SIGNIFICANT CHANGE UP (ref 8.4–10.5)
CHLORIDE SERPL-SCNC: 88 MMOL/L — LOW (ref 98–107)
CO2 SERPL-SCNC: 21 MMOL/L — LOW (ref 22–31)
COLOR SPEC: SIGNIFICANT CHANGE UP
CREAT SERPL-MCNC: 0.77 MG/DL — SIGNIFICANT CHANGE UP (ref 0.5–1.3)
DIFF PNL FLD: NEGATIVE — SIGNIFICANT CHANGE UP
EOSINOPHIL # BLD AUTO: 0.22 K/UL — SIGNIFICANT CHANGE UP (ref 0–0.5)
EOSINOPHIL NFR BLD AUTO: 2.1 % — SIGNIFICANT CHANGE UP (ref 0–6)
GLUCOSE SERPL-MCNC: 104 MG/DL — HIGH (ref 70–99)
GLUCOSE UR QL: ABNORMAL
HCT VFR BLD CALC: 36.2 % — LOW (ref 39–50)
HGB BLD-MCNC: 12.5 G/DL — LOW (ref 13–17)
IANC: 6.37 K/UL — SIGNIFICANT CHANGE UP (ref 1.5–8.5)
IMM GRANULOCYTES NFR BLD AUTO: 0.3 % — SIGNIFICANT CHANGE UP (ref 0–1.5)
KETONES UR-MCNC: NEGATIVE — SIGNIFICANT CHANGE UP
LEUKOCYTE ESTERASE UR-ACNC: NEGATIVE — SIGNIFICANT CHANGE UP
LYMPHOCYTES # BLD AUTO: 3.19 K/UL — SIGNIFICANT CHANGE UP (ref 1–3.3)
LYMPHOCYTES # BLD AUTO: 30.6 % — SIGNIFICANT CHANGE UP (ref 13–44)
MAGNESIUM SERPL-MCNC: 1.9 MG/DL — SIGNIFICANT CHANGE UP (ref 1.6–2.6)
MCHC RBC-ENTMCNC: 25.5 PG — LOW (ref 27–34)
MCHC RBC-ENTMCNC: 34.5 GM/DL — SIGNIFICANT CHANGE UP (ref 32–36)
MCV RBC AUTO: 73.9 FL — LOW (ref 80–100)
MONOCYTES # BLD AUTO: 0.56 K/UL — SIGNIFICANT CHANGE UP (ref 0–0.9)
MONOCYTES NFR BLD AUTO: 5.4 % — SIGNIFICANT CHANGE UP (ref 2–14)
NEUTROPHILS # BLD AUTO: 6.37 K/UL — SIGNIFICANT CHANGE UP (ref 1.8–7.4)
NEUTROPHILS NFR BLD AUTO: 61.2 % — SIGNIFICANT CHANGE UP (ref 43–77)
NITRITE UR-MCNC: NEGATIVE — SIGNIFICANT CHANGE UP
NRBC # BLD: 0 /100 WBCS — SIGNIFICANT CHANGE UP
NRBC # FLD: 0 K/UL — SIGNIFICANT CHANGE UP
PH UR: 6.5 — SIGNIFICANT CHANGE UP (ref 5–8)
PHOSPHATE SERPL-MCNC: 3.8 MG/DL — SIGNIFICANT CHANGE UP (ref 2.5–4.5)
PLATELET # BLD AUTO: 343 K/UL — SIGNIFICANT CHANGE UP (ref 150–400)
POTASSIUM SERPL-MCNC: 4 MMOL/L — SIGNIFICANT CHANGE UP (ref 3.5–5.3)
POTASSIUM SERPL-SCNC: 4 MMOL/L — SIGNIFICANT CHANGE UP (ref 3.5–5.3)
PROT SERPL-MCNC: 7.3 G/DL — SIGNIFICANT CHANGE UP (ref 6–8.3)
PROT UR-MCNC: ABNORMAL
RBC # BLD: 4.9 M/UL — SIGNIFICANT CHANGE UP (ref 4.2–5.8)
RBC # FLD: 13.2 % — SIGNIFICANT CHANGE UP (ref 10.3–14.5)
SARS-COV-2 RNA SPEC QL NAA+PROBE: SIGNIFICANT CHANGE UP
SODIUM SERPL-SCNC: 122 MMOL/L — LOW (ref 135–145)
SP GR SPEC: 1.02 — SIGNIFICANT CHANGE UP (ref 1.01–1.02)
UROBILINOGEN FLD QL: SIGNIFICANT CHANGE UP
WBC # BLD: 10.41 K/UL — SIGNIFICANT CHANGE UP (ref 3.8–10.5)
WBC # FLD AUTO: 10.41 K/UL — SIGNIFICANT CHANGE UP (ref 3.8–10.5)

## 2020-12-27 PROCEDURE — 99285 EMERGENCY DEPT VISIT HI MDM: CPT

## 2020-12-27 PROCEDURE — 74176 CT ABD & PELVIS W/O CONTRAST: CPT | Mod: 26

## 2020-12-27 PROCEDURE — 99223 1ST HOSP IP/OBS HIGH 75: CPT

## 2020-12-27 RX ORDER — ESCITALOPRAM OXALATE 10 MG/1
5 TABLET, FILM COATED ORAL DAILY
Refills: 0 | Status: DISCONTINUED | OUTPATIENT
Start: 2020-12-27 | End: 2020-12-28

## 2020-12-27 RX ORDER — ENOXAPARIN SODIUM 100 MG/ML
40 INJECTION SUBCUTANEOUS DAILY
Refills: 0 | Status: DISCONTINUED | OUTPATIENT
Start: 2020-12-27 | End: 2021-01-05

## 2020-12-27 RX ORDER — ACETAMINOPHEN 500 MG
975 TABLET ORAL EVERY 8 HOURS
Refills: 0 | Status: COMPLETED | OUTPATIENT
Start: 2020-12-27 | End: 2020-12-30

## 2020-12-27 RX ORDER — LATANOPROST 0.05 MG/ML
1 SOLUTION/ DROPS OPHTHALMIC; TOPICAL
Qty: 0 | Refills: 0 | DISCHARGE

## 2020-12-27 RX ORDER — ROBINUL 0.2 MG/ML
1 INJECTION INTRAMUSCULAR; INTRAVENOUS
Qty: 0 | Refills: 0 | DISCHARGE

## 2020-12-27 RX ORDER — SODIUM CHLORIDE 9 MG/ML
1 INJECTION INTRAMUSCULAR; INTRAVENOUS; SUBCUTANEOUS THREE TIMES A DAY
Refills: 0 | Status: COMPLETED | OUTPATIENT
Start: 2020-12-27 | End: 2020-12-28

## 2020-12-27 RX ORDER — MEMANTINE HYDROCHLORIDE 10 MG/1
1 TABLET ORAL
Qty: 0 | Refills: 0 | DISCHARGE

## 2020-12-27 RX ORDER — TAMSULOSIN HYDROCHLORIDE 0.4 MG/1
0.4 CAPSULE ORAL AT BEDTIME
Refills: 0 | Status: DISCONTINUED | OUTPATIENT
Start: 2020-12-27 | End: 2021-01-05

## 2020-12-27 RX ORDER — CARBIDOPA AND LEVODOPA 25; 100 MG/1; MG/1
1 TABLET ORAL THREE TIMES A DAY
Refills: 0 | Status: DISCONTINUED | OUTPATIENT
Start: 2020-12-27 | End: 2021-01-05

## 2020-12-27 RX ORDER — QUETIAPINE FUMARATE 200 MG/1
1 TABLET, FILM COATED ORAL
Qty: 0 | Refills: 0 | DISCHARGE

## 2020-12-27 RX ORDER — DONEPEZIL HYDROCHLORIDE 10 MG/1
1 TABLET, FILM COATED ORAL
Qty: 0 | Refills: 0 | DISCHARGE

## 2020-12-27 RX ORDER — MEMANTINE HYDROCHLORIDE 10 MG/1
10 TABLET ORAL DAILY
Refills: 0 | Status: DISCONTINUED | OUTPATIENT
Start: 2020-12-27 | End: 2021-01-05

## 2020-12-27 RX ORDER — PANTOPRAZOLE SODIUM 20 MG/1
1 TABLET, DELAYED RELEASE ORAL
Qty: 0 | Refills: 0 | DISCHARGE

## 2020-12-27 RX ORDER — DORZOLAMIDE HYDROCHLORIDE TIMOLOL MALEATE 20; 5 MG/ML; MG/ML
1 SOLUTION/ DROPS OPHTHALMIC
Qty: 0 | Refills: 0 | DISCHARGE

## 2020-12-27 RX ORDER — PHENAZOPYRIDINE HCL 100 MG
100 TABLET ORAL EVERY 8 HOURS
Refills: 0 | Status: DISCONTINUED | OUTPATIENT
Start: 2020-12-27 | End: 2021-01-05

## 2020-12-27 RX ADMIN — TAMSULOSIN HYDROCHLORIDE 0.4 MILLIGRAM(S): 0.4 CAPSULE ORAL at 22:40

## 2020-12-27 RX ADMIN — Medication 975 MILLIGRAM(S): at 18:01

## 2020-12-27 RX ADMIN — CARBIDOPA AND LEVODOPA 1 TABLET(S): 25; 100 TABLET ORAL at 22:40

## 2020-12-27 RX ADMIN — Medication 975 MILLIGRAM(S): at 22:39

## 2020-12-27 RX ADMIN — Medication 975 MILLIGRAM(S): at 23:39

## 2020-12-27 RX ADMIN — Medication 100 MILLIGRAM(S): at 20:05

## 2020-12-27 NOTE — H&P ADULT - NSHPPHYSICALEXAM_GEN_ALL_CORE
Vital Signs Last 24 Hrs  T(C): 36.7 (27 Dec 2020 11:16), Max: 36.7 (27 Dec 2020 11:16)  T(F): 98 (27 Dec 2020 11:16), Max: 98 (27 Dec 2020 11:16)  HR: 80 (27 Dec 2020 12:06) (80 - 80)  BP: 124/64 (27 Dec 2020 12:06) (124/64 - 128/73)  BP(mean): --  RR: 16 (27 Dec 2020 12:06) (16 - 16)  SpO2: 100% (27 Dec 2020 12:06) (99% - 100%)    General: mod distress due to suprapubic pain, cachetic, able to follow complex commands  Neurology: A&Ox 2 to name and place, not time, 5/5 UE and LE strength b/l  Eyes: PERRLA/ EOMI, Gross vision intact  ENT/Neck: Neck supple, trachea midline, No JVD, Gross hearing intact  Respiratory: CTA B/L, No wheezing, rales, rhonchi  CV: RRR, S1S2, no murmurs, rubs or gallops  Abdominal: suprapubic TTP, nl BS, no rebound tenderness  Extremities: No edema, + peripheral pulses  Skin: No Rashes, Hematoma, Ecchymosis  MSK: No joint effusion, erythema, TTP

## 2020-12-27 NOTE — ED PROVIDER NOTE - PHYSICAL EXAMINATION
General: elderly male in NAD on RA   HEENT: NCAT.  PERRL.  EOMI.  No scleral icterus or injection.  Moist MM.  No oropharyngeal exudates.   Heart: RRR.  Normal S1 and S2.  No murmurs, rubs, or gallops. No JVD   Lungs: CTAB. No wheezes, crackles, or rhonchi.    Abdomen: soft, distended suprapubically, TTP with active guarding suprapubically, normoactive, no rebound, rigidity, no HSM, neg CVA bl   Skin: Warm and dry.  No rashes.  Extremities: No edema   Musculoskeletal: No deformities.  No spinal or paraspinal tenderness.  Neuro: A&Ox3.  CN II-XII intact.  5/5 strength in UE and LE b/l.  Tactile sensation intact in UE and LE b/l.

## 2020-12-27 NOTE — ED ADULT NURSE NOTE - CHIEF COMPLAINT QUOTE
Pt c/o difficulty urinating and pain on urination.  Hx: UTI, Parkinson's. Pt is Aox3, salvador speaking. Denies fever, hematuria.  Franco (grandson) [phone], translated for pt in tirage.

## 2020-12-27 NOTE — ED PROVIDER NOTE - NS ED ROS FT
REVIEW OF SYSTEMS:    CONSTITUTIONAL: No weakness, fevers, +chills   EYES/ENT: No visual changes;  No vertigo or throat pain   NECK: No pain or stiffness  RESPIRATORY: No cough, wheezing, hemoptysis; No shortness of breath  CARDIOVASCULAR: No chest pain or palpitations  GASTROINTESTINAL: per HPI   GENITOURINARY: per HPI   NEUROLOGICAL: No numbness or weakness  SKIN: No itching, burning, rashes, or lesions   All other review of systems is negative unless indicated above.

## 2020-12-27 NOTE — ED PROVIDER NOTE - OBJECTIVE STATEMENT
76 yo male with a PMHx Parkinson's dementia, T2DM, BPH w/ hx of prior indwelling pichardo, MDR Klebsiella UTI (previously treated with Vabomere, Avycaz, Aztreonam) presenting with dysuria and difficulty urinating. Of note, seen at [facility] ED 12/24 for hyponatremia to 124. Was sent in by urologist office at that time where he was seen for chronic dysuria. UA and UCx 12/24 NGTD. 76 yo male with a PMHx Parkinson's dementia, T2DM, BPH w/ hx of prior indwelling pichardo, MDR Klebsiella UTI (previously treated with Vabomere, Avycaz, Aztreonam) presenting with dysuria and difficulty urinating. Of note, seen at [facility] ED 12/24 for hyponatremia to 124, repeat in . Was sent in by urologist office at that time where he was seen for chronic dysuria. UA and UCx 12/24 NGTD. 76 yo male with a PMHx Parkinson's dementia, T2DM, BPH w/ hx of prior indwelling pichardo (d/jesus 2019), MDR Klebsiella UTI (previously treated with Vabomere, Avycaz, Aztreonam) presenting with dysuria and difficulty urinating and suprapubic pain, which is severe 10/10. He describes his urine as "dribbling" currently and at other times leaking out. Of note, seen by urologist on 12/24 for chronic dysuria and sent in to [facility] ED for hyponatremia. UA and UCx 12/24 NGTD. Per patient, testing done at urologist [place] (?[place] was negative at that time. Denies fevers, chills, cough, cp, sob, back pain, n/v, constipation, saddle anesthesia. Endorses chills. VSS in triage stable. Bedside US revealing urinary retention and ?R hydro. 76 yo male with a PMHx Parkinson's dementia, T2DM, BPH w/ hx of obstruction s/p indwelling pichardo (d/jesus 2019), MDR Klebsiella UTI (previously treated with Vabomere, Avycaz, Aztreonam) presenting with dysuria (chronic) and difficulty urinating w/ severe suprapubic pain x 1 day. He describes his urine as "dribbling" currently and at other times leaking out. Of note, seen by urologist on 12/24 for chronic dysuria and sent in to [facility] ED for hyponatremia. UA and UCx 12/24 NGTD. Per patient, testing done at urologist office (?[place] was negative at that time. Denies fevers, chills, cough, cp, sob, back pain, n/v, constipation, saddle anesthesia. Endorses chills. VSS in triage stable. Bedside US revealing urinary retention and ?R hydro.

## 2020-12-27 NOTE — H&P ADULT - PROBLEM SELECTOR PLAN 2
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

## 2020-12-27 NOTE — ED PROVIDER NOTE - PMH
BPH (benign prostatic hyperplasia)    Dementia    Parkinson disease    T2DM (type 2 diabetes mellitus)    UTI (urinary tract infection)

## 2020-12-27 NOTE — H&P ADULT - PROBLEM SELECTOR PLAN 1
drug induced (d/t anticholinergic med - glycopyrrolate) vs progression of BPH  retained 550cc s/p indwelling pichardo catheter draining clear urine  CT Abdomen showed b.l mild hydroureteronephrosis, BPH, distended bladder despite pichardo  - resume tamsulosin, start acetaminophen 975mg TID x 3 days pyridium x 2 days  - avoid belladonna given anticholinergic property  - UA neg, f/u Ucx, non toxic monitor off abx  - TOV prior to dc, consider urology if urinary retention doesn't improve

## 2020-12-27 NOTE — H&P ADULT - HISTORY OF PRESENT ILLNESS
75 y/o salvador speaking M with PMH of Parkinsons dementia AAO x 2 at baseline, BPH(s/p microwave ablation), hx of multiple MDR Klebsiella UTIs presents to ED for worsening suprapubic pain. History obtained from grandson since pt is a poor historian (spoke with pt in native language). Per grandson, pt was recently evaluated in ED for "low sodium" although w/u was negative and he was discharged. Since then he has been experiencing worsening suprapubic pain, dysuria, increased nocturia, poor appetite. Denies fever, chills, N/V, flank pain, diarrhea. He was recently evaluated by his PCP in november and at that time he endorsed sialorrhea due to progression of parkinsons for which he was started on glycopyrrolate 1 mg TID. Of note he has prior hx of urinary retention requiring chronic pichardo (most recently dc'ed in 2019).    In the ED pt's vitals stable, straight cath with 550 cc s/p indwelling pichardo catheter. Na 121, Scr at baseline, CT Abdomen shows b/l mild hydroureteronephrosis, mod distended bladder despite pichardo and prostate enlargement.

## 2020-12-27 NOTE — ED ADULT TRIAGE NOTE - CHIEF COMPLAINT QUOTE
Pt c/o difficulty urinating and pain on urination.  Hx: UTI, Parkinson's. Pt is Aox3, salvador speaking. Denies fever, hematuria.  Franco (grandson) [phone], translated for pt in tirage.

## 2020-12-27 NOTE — H&P ADULT - NSHPSOCIALHISTORY_GEN_ALL_CORE
Lives with grandson. Independent in ADLS, depending on IADLS, has HHA. Denies etoh, smoking, other illicit drug use

## 2020-12-27 NOTE — ED PROVIDER NOTE - CLINICAL SUMMARY MEDICAL DECISION MAKING FREE TEXT BOX
74 yo male with a PMHx Parkinson's dementia, T2DM, BPH w/ hx of prior indwelling pichardo (d/jesus 2019), MDR Klebsiella UTI (previously treated with Vabomere, Avycaz, Aztreonam) presenting with dysuria, difficulty urinating and suprapubic pain and fullness c/w urinary retention. Will check CBC, CMP, UA, UCx and place pichardo. Low c/f UTI at this time. CTAP to r/o obstruction. Likely admit to medicine for further mgt, TOV. 76 yo male with a PMHx Parkinson's dementia, T2DM, BPH w/ hx of obstruction s/p indwelling pichardo (d/jesus 2019), MDR Klebsiella UTI (previously treated with Vabomere, Avycaz, Aztreonam) presenting with dysuria (chronic) and difficulty urinating w/ severe suprapubic pain x 1 day found to have suprapubic fullness c/f urinary retention. Will check CBC, CMP, UA, UCx and place pichardo. Low c/f UTI at this time. CTAP to r/o obstruction. Likely admit to medicine for further mgt, TOV. 76 yo male with a PMHx Parkinson's dementia, T2DM, BPH w/ hx of obstruction s/p indwelling pichardo (d/jesus 2019), MDR Klebsiella UTI (previously treated with Vabomere, Avycaz, Aztreonam) presenting with dysuria (chronic) and difficulty urinating w/ severe suprapubic pain x 1 day found to have suprapubic fullness c/f urinary retention. Will check CBC, CMP, UA, UCx and place pichardo. Low c/f UTI at this time. CTAP to r/o obstructionstone. Likely admit to medicine for further mgt, TOV.

## 2020-12-27 NOTE — H&P ADULT - PROBLEM SELECTOR PLAN 6
Hazy mesenteric fat along the SMA distribution, increased in prominence from prior, with small mesenteric lymph nodes, non specific, incidentally seen on CT A/P  can follow up outpatient

## 2020-12-27 NOTE — ED ADULT NURSE NOTE - OBJECTIVE STATEMENT
Pt presenting to room 12 AxOx3 ambulatory at baseline with c/o trickling urine, pelvic pain, burning with urination. PMH DM, BPH, dementia. On arrival to ED pt's breathing is even and unlabored. Palor/diaphoresis not noted. Bladder distended. Abdomen soft and nontender. VSS and as noted. IV established with 20g in LAC. Labs drawn and sent. MD at bedside, will continue to monitor.

## 2020-12-27 NOTE — ED PROCEDURE NOTE - PROCEDURE ADDITIONAL DETAILS
Emergency Department Focused Ultrasound performed at patient's bedside for educational purposes. The study will have a follow up study performed. Emergency Department Focused Ultrasound performed at patient's bedside for educational purposes. The study will have a follow up study performed.   bladder volume 490s cc

## 2020-12-27 NOTE — H&P ADULT - NSHPREVIEWOFSYSTEMS_GEN_ALL_CORE
ROS:    Constitutional: [ ] fevers [ ] chills [ ] weight loss [ ] weight gain  HEENT: [ ] dry eyes [ ] eye irritation [ ] postnasal drip [ ] nasal congestion  CV: [ ] chest pain [ ] orthopnea [ ] palpitations [ ] murmur  Resp: [ ] cough [ ] shortness of breath [ ] dyspnea [ ] wheezing [ ] sputum [ ] hemoptysis  GI: [ ] nausea [ ] vomiting [ ] diarrhea [ ] constipation [ ] abd pain [ ] dysphagia   : [ ] dysuria [ X] nocturia [ ] hematuria [ ] increased urinary frequency  Musculoskeletal: [ ] back pain [ ] myalgias [ ] arthralgias [ ] fracture  Skin: [ ] rash [ ] itch  Neurological: [ ] headache [ ] dizziness [ ] syncope [ ] weakness [ ] numbness  Psychiatric: [ ] anxiety [ ] depression  Endocrine: [ ] diabetes [ ] thyroid problem  Hematologic/Lymphatic: [ ] anemia [ ] bleeding problem  Allergic/Immunologic: [ ] itchy eyes [ ] nasal discharge [ ] hives [ ] angioedema  [ ] All other systems negative  [ ] Unable to assess ROS because ________

## 2020-12-27 NOTE — H&P ADULT - PROBLEM SELECTOR PLAN 3
secondary to complication of parkinson's  hold off on glycopyrrolate  obtain speech and swallow eval, in interim start mechanical soft diet

## 2020-12-27 NOTE — H&P ADULT - ASSESSMENT
75 y/o salvador speaking M with PMH of Parkinsons dementia AAO x 2 at baseline, sialorrhea, BPH(s/p microwave ablation), hx of multiple MDR Klebsiella UTIs admitted for drug induced acute urinary retention vs BPH c/b acute hyponatremia.

## 2020-12-27 NOTE — ED PROVIDER NOTE - CHIEF COMPLAINT
The patient is a 75y Male complaining of  The patient is a 75y Male complaining of dysuria and suprapubic pain

## 2020-12-27 NOTE — ED PROVIDER NOTE - ATTENDING CONTRIBUTION TO CARE
I performed a history and physical exam of the patient and discussed their management with the resident. I reviewed the resident's note and agree with the documented findings and plan of care. I have edited as appropriate. My medical decision making and observations are found above.  NAD, +suprapub ttp

## 2020-12-28 LAB
ANION GAP SERPL CALC-SCNC: 11 MMOL/L — SIGNIFICANT CHANGE UP (ref 7–14)
BUN SERPL-MCNC: 12 MG/DL — SIGNIFICANT CHANGE UP (ref 7–23)
CALCIUM SERPL-MCNC: 8.8 MG/DL — SIGNIFICANT CHANGE UP (ref 8.4–10.5)
CHLORIDE SERPL-SCNC: 93 MMOL/L — LOW (ref 98–107)
CO2 SERPL-SCNC: 22 MMOL/L — SIGNIFICANT CHANGE UP (ref 22–31)
CREAT SERPL-MCNC: 0.67 MG/DL — SIGNIFICANT CHANGE UP (ref 0.5–1.3)
CULTURE RESULTS: NO GROWTH — SIGNIFICANT CHANGE UP
GLUCOSE SERPL-MCNC: 80 MG/DL — SIGNIFICANT CHANGE UP (ref 70–99)
HCT VFR BLD CALC: 35.9 % — LOW (ref 39–50)
HGB BLD-MCNC: 12.4 G/DL — LOW (ref 13–17)
MAGNESIUM SERPL-MCNC: 2 MG/DL — SIGNIFICANT CHANGE UP (ref 1.6–2.6)
MCHC RBC-ENTMCNC: 25.6 PG — LOW (ref 27–34)
MCHC RBC-ENTMCNC: 34.5 GM/DL — SIGNIFICANT CHANGE UP (ref 32–36)
MCV RBC AUTO: 74.2 FL — LOW (ref 80–100)
NRBC # BLD: 0 /100 WBCS — SIGNIFICANT CHANGE UP
NRBC # FLD: 0 K/UL — SIGNIFICANT CHANGE UP
OSMOLALITY SERPL: 266 MOSM/KG — LOW (ref 275–295)
OSMOLALITY UR: 415 MOSM/KG — SIGNIFICANT CHANGE UP (ref 50–1200)
PHOSPHATE SERPL-MCNC: 4.7 MG/DL — HIGH (ref 2.5–4.5)
PLATELET # BLD AUTO: 304 K/UL — SIGNIFICANT CHANGE UP (ref 150–400)
POTASSIUM SERPL-MCNC: 3.9 MMOL/L — SIGNIFICANT CHANGE UP (ref 3.5–5.3)
POTASSIUM SERPL-SCNC: 3.9 MMOL/L — SIGNIFICANT CHANGE UP (ref 3.5–5.3)
RBC # BLD: 4.84 M/UL — SIGNIFICANT CHANGE UP (ref 4.2–5.8)
RBC # FLD: 13.2 % — SIGNIFICANT CHANGE UP (ref 10.3–14.5)
SODIUM SERPL-SCNC: 126 MMOL/L — LOW (ref 135–145)
SPECIMEN SOURCE: SIGNIFICANT CHANGE UP
TSH SERPL-MCNC: 2.87 UIU/ML — SIGNIFICANT CHANGE UP (ref 0.27–4.2)
WBC # BLD: 6.79 K/UL — SIGNIFICANT CHANGE UP (ref 3.8–10.5)
WBC # FLD AUTO: 6.79 K/UL — SIGNIFICANT CHANGE UP (ref 3.8–10.5)

## 2020-12-28 RX ORDER — INFLUENZA VIRUS VACCINE 15; 15; 15; 15 UG/.5ML; UG/.5ML; UG/.5ML; UG/.5ML
0.5 SUSPENSION INTRAMUSCULAR ONCE
Refills: 0 | Status: DISCONTINUED | OUTPATIENT
Start: 2020-12-28 | End: 2020-12-28

## 2020-12-28 RX ORDER — FINASTERIDE 5 MG/1
5 TABLET, FILM COATED ORAL DAILY
Refills: 0 | Status: DISCONTINUED | OUTPATIENT
Start: 2020-12-28 | End: 2021-01-05

## 2020-12-28 RX ORDER — INFLUENZA VIRUS VACCINE 15; 15; 15; 15 UG/.5ML; UG/.5ML; UG/.5ML; UG/.5ML
0.7 SUSPENSION INTRAMUSCULAR ONCE
Refills: 0 | Status: DISCONTINUED | OUTPATIENT
Start: 2020-12-28 | End: 2021-01-05

## 2020-12-28 RX ADMIN — Medication 975 MILLIGRAM(S): at 12:31

## 2020-12-28 RX ADMIN — FINASTERIDE 5 MILLIGRAM(S): 5 TABLET, FILM COATED ORAL at 14:23

## 2020-12-28 RX ADMIN — MEMANTINE HYDROCHLORIDE 10 MILLIGRAM(S): 10 TABLET ORAL at 11:53

## 2020-12-28 RX ADMIN — CARBIDOPA AND LEVODOPA 1 TABLET(S): 25; 100 TABLET ORAL at 22:33

## 2020-12-28 RX ADMIN — Medication 975 MILLIGRAM(S): at 23:04

## 2020-12-28 RX ADMIN — Medication 975 MILLIGRAM(S): at 05:46

## 2020-12-28 RX ADMIN — Medication 100 MILLIGRAM(S): at 14:24

## 2020-12-28 RX ADMIN — SODIUM CHLORIDE 1 GRAM(S): 9 INJECTION INTRAMUSCULAR; INTRAVENOUS; SUBCUTANEOUS at 01:29

## 2020-12-28 RX ADMIN — SODIUM CHLORIDE 1 GRAM(S): 9 INJECTION INTRAMUSCULAR; INTRAVENOUS; SUBCUTANEOUS at 05:47

## 2020-12-28 RX ADMIN — ENOXAPARIN SODIUM 40 MILLIGRAM(S): 100 INJECTION SUBCUTANEOUS at 11:53

## 2020-12-28 RX ADMIN — CARBIDOPA AND LEVODOPA 1 TABLET(S): 25; 100 TABLET ORAL at 14:25

## 2020-12-28 RX ADMIN — Medication 975 MILLIGRAM(S): at 22:34

## 2020-12-28 RX ADMIN — Medication 100 MILLIGRAM(S): at 22:33

## 2020-12-28 RX ADMIN — Medication 975 MILLIGRAM(S): at 12:35

## 2020-12-28 RX ADMIN — CARBIDOPA AND LEVODOPA 1 TABLET(S): 25; 100 TABLET ORAL at 05:46

## 2020-12-28 RX ADMIN — TAMSULOSIN HYDROCHLORIDE 0.4 MILLIGRAM(S): 0.4 CAPSULE ORAL at 22:33

## 2020-12-28 RX ADMIN — SODIUM CHLORIDE 1 GRAM(S): 9 INJECTION INTRAMUSCULAR; INTRAVENOUS; SUBCUTANEOUS at 16:04

## 2020-12-28 RX ADMIN — SODIUM CHLORIDE 1 GRAM(S): 9 INJECTION INTRAMUSCULAR; INTRAVENOUS; SUBCUTANEOUS at 22:33

## 2020-12-28 RX ADMIN — Medication 100 MILLIGRAM(S): at 05:46

## 2020-12-28 NOTE — SWALLOW BEDSIDE ASSESSMENT ADULT - COMMENTS
Per charting, the patient is a "75 y/o salvador speaking M with PMH of Parkinsons dementia AAO x 2 at baseline, sialorrhea, BPH(s/p microwave ablation), hx of multiple MDR Klebsiella UTIs admitted for drug induced acute urinary retention vs BPH c/b acute hyponatremia."    XR CHEST 12/24: "Clear lungs. No pleural effusions or pneumothorax. Cardiac and mediastinal silhouettes within normal limits for the projection. Trachea midline. Unremarkable osseous structures."    Consult received and chart reviewed. Patient seen chairside this morning for an assessment of swallow function, at which time he was awake/alert and oriented to self and place. [employer] interpretation device (ID# [id number]) utilized to communicate in patient's primary language. The patient was able to follow simple directives, answer yes/no questions and communicate basic wants/needs.

## 2020-12-28 NOTE — PHYSICAL THERAPY INITIAL EVALUATION ADULT - ADDITIONAL COMMENTS
Pt lives with his son, there are 10 steps to get to his bedroom. Pt was using cane prior to admission for ambulation. Pt was requiring assist for ADL by his son.

## 2020-12-28 NOTE — SWALLOW BEDSIDE ASSESSMENT ADULT - SWALLOW EVAL: DIAGNOSIS
1. The pt demonstrated functional oral management of puree, nectar thick liquid and thin liquids marked by adequate bolus collection, transfer and posterior transport. 2. Mild oral dysphagia for mechanical soft and regular solid textures marked by prolonged manipulation/increased mastication time resulting in delayed bolus collection, transfer and posterior transport. Mild lingual residue subsequent to deglutition for regular solids which cleared with a liquid wash. 3. Pharyngeal skills for puree, mechanical soft, nectar thick liquid and thin liquids characterized by initiation of swallow trigger with + hyolaryngeal elevation upon digital palpation without evidence of airway penetration. 4. Moderate-severe pharyngeal dysphagia for regular solids marked by suspect delayed initiation of swallow trigger with + hyolaryngeal elevation upon digital palpation with evidence of cough suggestive of airway penetration. 5. Recommend mechanical soft with thin liquid diet + aspiration precautions.

## 2020-12-28 NOTE — SWALLOW BEDSIDE ASSESSMENT ADULT - ORAL PHASE
Within functional limits Decreased anterior-posterior movement of the bolus/Delayed oral transit time Decreased anterior-posterior movement of the bolus/Delayed oral transit time/Lingual stasis

## 2020-12-28 NOTE — CONSULT NOTE ADULT - ATTENDING COMMENTS
[facility] NEPHROLOGY  Karan Kessler M.D.  Jay Flores D.O.  Kori Paris M.D.  Lina Vallecillo, MSN, ANP-C    Telephone: [phone]  Facsimile: [phone]    [address] [place] [address]

## 2020-12-29 LAB
ANION GAP SERPL CALC-SCNC: 12 MMOL/L — SIGNIFICANT CHANGE UP (ref 7–14)
BUN SERPL-MCNC: 13 MG/DL — SIGNIFICANT CHANGE UP (ref 7–23)
CALCIUM SERPL-MCNC: 9 MG/DL — SIGNIFICANT CHANGE UP (ref 8.4–10.5)
CHLORIDE SERPL-SCNC: 93 MMOL/L — LOW (ref 98–107)
CO2 SERPL-SCNC: 23 MMOL/L — SIGNIFICANT CHANGE UP (ref 22–31)
CORTIS AM PEAK SERPL-MCNC: 8.1 UG/DL — SIGNIFICANT CHANGE UP (ref 6–18.4)
CREAT SERPL-MCNC: 0.64 MG/DL — SIGNIFICANT CHANGE UP (ref 0.5–1.3)
GLUCOSE SERPL-MCNC: 111 MG/DL — HIGH (ref 70–99)
HCT VFR BLD CALC: 37.1 % — LOW (ref 39–50)
HGB BLD-MCNC: 12.8 G/DL — LOW (ref 13–17)
MCHC RBC-ENTMCNC: 25.5 PG — LOW (ref 27–34)
MCHC RBC-ENTMCNC: 34.5 GM/DL — SIGNIFICANT CHANGE UP (ref 32–36)
MCV RBC AUTO: 74.1 FL — LOW (ref 80–100)
NRBC # BLD: 0 /100 WBCS — SIGNIFICANT CHANGE UP
NRBC # FLD: 0 K/UL — SIGNIFICANT CHANGE UP
PLATELET # BLD AUTO: 340 K/UL — SIGNIFICANT CHANGE UP (ref 150–400)
POTASSIUM SERPL-MCNC: 3.9 MMOL/L — SIGNIFICANT CHANGE UP (ref 3.5–5.3)
POTASSIUM SERPL-SCNC: 3.9 MMOL/L — SIGNIFICANT CHANGE UP (ref 3.5–5.3)
RBC # BLD: 5.01 M/UL — SIGNIFICANT CHANGE UP (ref 4.2–5.8)
RBC # FLD: 13.1 % — SIGNIFICANT CHANGE UP (ref 10.3–14.5)
SODIUM SERPL-SCNC: 128 MMOL/L — LOW (ref 135–145)
URATE SERPL-MCNC: 3.1 MG/DL — LOW (ref 3.4–8.8)
WBC # BLD: 6.44 K/UL — SIGNIFICANT CHANGE UP (ref 3.8–10.5)
WBC # FLD AUTO: 6.44 K/UL — SIGNIFICANT CHANGE UP (ref 3.8–10.5)

## 2020-12-29 PROCEDURE — 76770 US EXAM ABDO BACK WALL COMP: CPT | Mod: 26

## 2020-12-29 RX ORDER — POLYETHYLENE GLYCOL 3350 17 G/17G
17 POWDER, FOR SOLUTION ORAL DAILY
Refills: 0 | Status: DISCONTINUED | OUTPATIENT
Start: 2020-12-29 | End: 2021-01-05

## 2020-12-29 RX ORDER — SODIUM CHLORIDE 9 MG/ML
1 INJECTION INTRAMUSCULAR; INTRAVENOUS; SUBCUTANEOUS THREE TIMES A DAY
Refills: 0 | Status: DISCONTINUED | OUTPATIENT
Start: 2020-12-29 | End: 2020-12-30

## 2020-12-29 RX ORDER — SIMETHICONE 80 MG/1
80 TABLET, CHEWABLE ORAL ONCE
Refills: 0 | Status: COMPLETED | OUTPATIENT
Start: 2020-12-29 | End: 2020-12-29

## 2020-12-29 RX ADMIN — ENOXAPARIN SODIUM 40 MILLIGRAM(S): 100 INJECTION SUBCUTANEOUS at 13:20

## 2020-12-29 RX ADMIN — Medication 100 MILLIGRAM(S): at 06:06

## 2020-12-29 RX ADMIN — SODIUM CHLORIDE 1 GRAM(S): 9 INJECTION INTRAMUSCULAR; INTRAVENOUS; SUBCUTANEOUS at 21:23

## 2020-12-29 RX ADMIN — Medication 975 MILLIGRAM(S): at 06:06

## 2020-12-29 RX ADMIN — POLYETHYLENE GLYCOL 3350 17 GRAM(S): 17 POWDER, FOR SOLUTION ORAL at 21:22

## 2020-12-29 RX ADMIN — SODIUM CHLORIDE 1 GRAM(S): 9 INJECTION INTRAMUSCULAR; INTRAVENOUS; SUBCUTANEOUS at 13:38

## 2020-12-29 RX ADMIN — TAMSULOSIN HYDROCHLORIDE 0.4 MILLIGRAM(S): 0.4 CAPSULE ORAL at 21:23

## 2020-12-29 RX ADMIN — CARBIDOPA AND LEVODOPA 1 TABLET(S): 25; 100 TABLET ORAL at 06:05

## 2020-12-29 RX ADMIN — CARBIDOPA AND LEVODOPA 1 TABLET(S): 25; 100 TABLET ORAL at 13:38

## 2020-12-29 RX ADMIN — Medication 100 MILLIGRAM(S): at 13:20

## 2020-12-29 RX ADMIN — Medication 975 MILLIGRAM(S): at 13:19

## 2020-12-29 RX ADMIN — SIMETHICONE 80 MILLIGRAM(S): 80 TABLET, CHEWABLE ORAL at 18:44

## 2020-12-29 RX ADMIN — FINASTERIDE 5 MILLIGRAM(S): 5 TABLET, FILM COATED ORAL at 13:19

## 2020-12-29 RX ADMIN — Medication 975 MILLIGRAM(S): at 21:52

## 2020-12-29 RX ADMIN — Medication 975 MILLIGRAM(S): at 14:32

## 2020-12-29 RX ADMIN — Medication 975 MILLIGRAM(S): at 21:22

## 2020-12-29 RX ADMIN — MEMANTINE HYDROCHLORIDE 10 MILLIGRAM(S): 10 TABLET ORAL at 13:19

## 2020-12-29 RX ADMIN — CARBIDOPA AND LEVODOPA 1 TABLET(S): 25; 100 TABLET ORAL at 21:22

## 2020-12-29 RX ADMIN — Medication 975 MILLIGRAM(S): at 06:36

## 2020-12-30 LAB
ANION GAP SERPL CALC-SCNC: 10 MMOL/L — SIGNIFICANT CHANGE UP (ref 7–14)
BUN SERPL-MCNC: 12 MG/DL — SIGNIFICANT CHANGE UP (ref 7–23)
CALCIUM SERPL-MCNC: 9.1 MG/DL — SIGNIFICANT CHANGE UP (ref 8.4–10.5)
CHLORIDE SERPL-SCNC: 92 MMOL/L — LOW (ref 98–107)
CO2 SERPL-SCNC: 25 MMOL/L — SIGNIFICANT CHANGE UP (ref 22–31)
CREAT SERPL-MCNC: 0.69 MG/DL — SIGNIFICANT CHANGE UP (ref 0.5–1.3)
GLUCOSE SERPL-MCNC: 132 MG/DL — HIGH (ref 70–99)
HCT VFR BLD CALC: 35.9 % — LOW (ref 39–50)
HGB BLD-MCNC: 12.4 G/DL — LOW (ref 13–17)
MCHC RBC-ENTMCNC: 25.7 PG — LOW (ref 27–34)
MCHC RBC-ENTMCNC: 34.5 GM/DL — SIGNIFICANT CHANGE UP (ref 32–36)
MCV RBC AUTO: 74.5 FL — LOW (ref 80–100)
NRBC # BLD: 0 /100 WBCS — SIGNIFICANT CHANGE UP
NRBC # FLD: 0 K/UL — SIGNIFICANT CHANGE UP
OSMOLALITY UR: 503 MOSM/KG — SIGNIFICANT CHANGE UP (ref 50–1200)
PLATELET # BLD AUTO: 336 K/UL — SIGNIFICANT CHANGE UP (ref 150–400)
POTASSIUM SERPL-MCNC: 4.1 MMOL/L — SIGNIFICANT CHANGE UP (ref 3.5–5.3)
POTASSIUM SERPL-SCNC: 4.1 MMOL/L — SIGNIFICANT CHANGE UP (ref 3.5–5.3)
RBC # BLD: 4.82 M/UL — SIGNIFICANT CHANGE UP (ref 4.2–5.8)
RBC # FLD: 13.1 % — SIGNIFICANT CHANGE UP (ref 10.3–14.5)
SODIUM SERPL-SCNC: 127 MMOL/L — LOW (ref 135–145)
SODIUM UR-SCNC: 69 MMOL/L — SIGNIFICANT CHANGE UP
WBC # BLD: 5.46 K/UL — SIGNIFICANT CHANGE UP (ref 3.8–10.5)
WBC # FLD AUTO: 5.46 K/UL — SIGNIFICANT CHANGE UP (ref 3.8–10.5)

## 2020-12-30 RX ORDER — LANOLIN ALCOHOL/MO/W.PET/CERES
3 CREAM (GRAM) TOPICAL AT BEDTIME
Refills: 0 | Status: DISCONTINUED | OUTPATIENT
Start: 2020-12-30 | End: 2021-01-05

## 2020-12-30 RX ORDER — SODIUM CHLORIDE 9 MG/ML
2 INJECTION INTRAMUSCULAR; INTRAVENOUS; SUBCUTANEOUS
Refills: 0 | Status: DISCONTINUED | OUTPATIENT
Start: 2020-12-30 | End: 2021-01-01

## 2020-12-30 RX ORDER — ATROPINE SULFATE 1 %
2 DROPS OPHTHALMIC (EYE) THREE TIMES A DAY
Refills: 0 | Status: DISCONTINUED | OUTPATIENT
Start: 2020-12-30 | End: 2021-01-02

## 2020-12-30 RX ADMIN — TAMSULOSIN HYDROCHLORIDE 0.4 MILLIGRAM(S): 0.4 CAPSULE ORAL at 22:53

## 2020-12-30 RX ADMIN — Medication 2 DROP(S): at 22:52

## 2020-12-30 RX ADMIN — SODIUM CHLORIDE 1 GRAM(S): 9 INJECTION INTRAMUSCULAR; INTRAVENOUS; SUBCUTANEOUS at 05:39

## 2020-12-30 RX ADMIN — CARBIDOPA AND LEVODOPA 1 TABLET(S): 25; 100 TABLET ORAL at 14:51

## 2020-12-30 RX ADMIN — FINASTERIDE 5 MILLIGRAM(S): 5 TABLET, FILM COATED ORAL at 11:58

## 2020-12-30 RX ADMIN — CARBIDOPA AND LEVODOPA 1 TABLET(S): 25; 100 TABLET ORAL at 22:53

## 2020-12-30 RX ADMIN — CARBIDOPA AND LEVODOPA 1 TABLET(S): 25; 100 TABLET ORAL at 05:39

## 2020-12-30 RX ADMIN — SODIUM CHLORIDE 2 GRAM(S): 9 INJECTION INTRAMUSCULAR; INTRAVENOUS; SUBCUTANEOUS at 12:07

## 2020-12-30 RX ADMIN — Medication 975 MILLIGRAM(S): at 06:39

## 2020-12-30 RX ADMIN — ENOXAPARIN SODIUM 40 MILLIGRAM(S): 100 INJECTION SUBCUTANEOUS at 11:58

## 2020-12-30 RX ADMIN — SODIUM CHLORIDE 2 GRAM(S): 9 INJECTION INTRAMUSCULAR; INTRAVENOUS; SUBCUTANEOUS at 19:00

## 2020-12-30 RX ADMIN — Medication 3 MILLIGRAM(S): at 22:56

## 2020-12-30 RX ADMIN — POLYETHYLENE GLYCOL 3350 17 GRAM(S): 17 POWDER, FOR SOLUTION ORAL at 12:03

## 2020-12-30 RX ADMIN — Medication 975 MILLIGRAM(S): at 05:39

## 2020-12-30 RX ADMIN — Medication 975 MILLIGRAM(S): at 14:50

## 2020-12-30 RX ADMIN — MEMANTINE HYDROCHLORIDE 10 MILLIGRAM(S): 10 TABLET ORAL at 11:58

## 2020-12-30 RX ADMIN — Medication 975 MILLIGRAM(S): at 15:45

## 2020-12-30 NOTE — CONSULT NOTE ADULT - ASSESSMENT
75y Male with history of dementia presents with lower abdominal pain. Nephrology consulted for hyponatremia.    1) Hyponatremia: Hypotonic hyponatremia likely due to SIADH possibly secondary to lexapro versus CA given CT findings. Serum Na improving. Continue with sodium chloride 1 gram TID and start ensure with meals to increase osmolar intake. Check urine sodium (would add on to urine osm) and check serum uric acid and cortisol. Monitor serum Na.    2) Urinary retention: No with gross hematuria likely due to traumatic pichardo. Continue with flomax and pichardo (would not attempt TOV). Start proscar 5 mg daily. Outpatient urology follow up.    3) Bilateral hydronephrosis: Due to urinary retention as above s/p pichardo. Would repeat renal US in 24-48 hours to ensure resolution.     4) Depression: Would hold lexapro pending urine study results as likely cause of hyponatremia.
73 y/o salvador speaking M with PMH of Parkinsons dementia AAO x 2 at baseline, BPH(s/p microwave ablation), hx of multiple MDR Klebsiella UTIs presents to ED for worsening suprapubic pain/urinary retention    1. urinary retention  -medication induced versus BPH  -abdominal CT results noted  -cont pichardo drainge  -urology eval    2. Hyponatremia  -possible SIADH  -mgmt per medicine  -renal eval    DVT ppx
73 y/o salvador speaking M with PMH of Parkinsons dementia AAO x 2 at baseline, BPH(s/p microwave ablation), hx of multiple MDR Klebsiella UTIs presents to ED for worsening suprapubic pain found to have enlarge mesenteric LNs     Enlarge LNs    Ct shows   - Mild bilateral hydroureteronephrosis with dilatation of the ureters to the level of the urinary bladder.Enlarged prostate gland.  Moderately distended urinary bladder despite the presence of a Aguilar catheter.  Hazy mesenteric fat along the SMA distribution, increased in prominence from prior, with small mesenteric lymph nodes. This is a nonspecific finding. However, neoplasms including lymphoma should be considered.  - hg is 12.4 with normal uric acid   - would obtain LDH  and PSA   - LNs are not specific,   - would obtain CT chest   - would consider repeat CT in 3 months to follow LNs   - will need outpatient follow up   - will cont to follow     d/w Dr Donaldo Low MD  Hematology Oncology   O:   C: [phone]

## 2020-12-30 NOTE — CONSULT NOTE ADULT - SUBJECTIVE AND OBJECTIVE BOX
Reason for consult: enlarge mesenteric LNs     Pt with parkinsons. Chart review.   HPI:  73 y/o salvador speaking M with PMH of Parkinsons dementia AAO x 2 at baseline, BPH(s/p microwave ablation), hx of multiple MDR Klebsiella UTIs presents to ED for worsening suprapubic pain. History obtained from grandson since pt is a poor historian (spoke with pt in native language). Per grandson, pt was recently evaluated in ED for "low sodium" although w/u was negative and he was discharged. Since then he has been experiencing worsening suprapubic pain, dysuria, increased nocturia, poor appetite. Denies fever, chills, N/V, flank pain, diarrhea. He was recently evaluated by his PCP in november and at that time he endorsed sialorrhea due to progression of parkinsons for which he was started on glycopyrrolate 1 mg TID. Of note he has prior hx of urinary retention requiring chronic pichardo (most recently AR'ed in 2019).    Oncology consulted for enlarge mesenteric LNs       PAST MEDICAL & SURGICAL HISTORY:  UTI (urinary tract infection)    T2DM (type 2 diabetes mellitus)    Dementia    Parkinson disease    BPH (benign prostatic hyperplasia)    No significant past surgical history        FAMILY HISTORY:  No pertinent family history in first degree relatives        Alochol: Denied  Smoking: Nonsmoker  Drug Use: Denied  Marital Status:         Allergies    No Known Allergies    Intolerances        MEDICATIONS  (STANDING):  acetaminophen   Tablet .. 975 milliGRAM(s) Oral every 8 hours  carbidopa/levodopa  25/100 1 Tablet(s) Oral three times a day  enoxaparin Injectable 40 milliGRAM(s) SubCutaneous daily  finasteride 5 milliGRAM(s) Oral daily  influenza  Vaccine (HIGH DOSE) 0.7 milliLiter(s) IntraMuscular once  memantine 10 milliGRAM(s) Oral daily  phenazopyridine 100 milliGRAM(s) Oral every 8 hours  polyethylene glycol 3350 17 Gram(s) Oral daily  sodium chloride 2 Gram(s) Oral two times a day  tamsulosin 0.4 milliGRAM(s) Oral at bedtime    MEDICATIONS  (PRN):      ROS:   unable to obtain       PHYSICAL EXAM:     GENERAL:  Appears stated age, well-groomed  HEENT:  NC/AT,    CHEST:  CTA b/l   HEART:  s1s2+   ABDOMEN:  mild distention of abd, + pain on suprapubic palaption   EXTEREMITIES:  no cyanosis,clubbing or edema    LN: no palpable Lymphadenopathy                           12.4   5.46  )-----------( 336      ( 30 Dec 2020 07:22 )             35.9       12-30    127<L>  |  92<L>  |  12  ----------------------------<  132<H>  4.1   |  25  |  0.69    Ca    9.1      30 Dec 2020 07:22    
[facility]), [facility]        Patient is a 75y old  Male who presents with a chief complaint of urinary retention (29 Dec 2020 10:39)    Excerpt from H&P,"   73 y/o salvador speaking M with PMH of Parkinsons dementia AAO x 2 at baseline, BPH(s/p microwave ablation), hx of multiple MDR Klebsiella UTIs presents to ED for worsening suprapubic pain. History obtained from grandson since pt is a poor historian (spoke with pt in native language). Per grandson, pt was recently evaluated in ED for "low sodium" although w/u was negative and he was discharged. Since then he has been experiencing worsening suprapubic pain, dysuria, increased nocturia, poor appetite. Denies fever, chills, N/V, flank pain, diarrhea. He was recently evaluated by his PCP in november and at that time he endorsed sialorrhea due to progression of parkinsons for which he was started on glycopyrrolate 1 mg TID. Of note he has prior hx of urinary retention requiring chronic pichardo (most recently ND'ed in ).            *****PAST MEDICAL / Surgical  HISTORY:  PAST MEDICAL & SURGICAL HISTORY:  UTI (urinary tract infection)    T2DM (type 2 diabetes mellitus)    Dementia    Parkinson disease    BPH (benign prostatic hyperplasia)    No significant past surgical history             *****FAMILY HISTORY:  FAMILY HISTORY:  No pertinent family history in first degree relatives             *****SOCIAL HISTORY:  Alcohol: None  Smoking: None         *****ALLERGIES:   Allergies    No Known Allergies    Intolerances             *****MEDICATIONS: current medication reviewed and documented.   MEDICATIONS  (STANDING):  acetaminophen   Tablet .. 975 milliGRAM(s) Oral every 8 hours  carbidopa/levodopa  25/100 1 Tablet(s) Oral three times a day  enoxaparin Injectable 40 milliGRAM(s) SubCutaneous daily  finasteride 5 milliGRAM(s) Oral daily  influenza  Vaccine (HIGH DOSE) 0.7 milliLiter(s) IntraMuscular once  memantine 10 milliGRAM(s) Oral daily  phenazopyridine 100 milliGRAM(s) Oral every 8 hours  sodium chloride 1 Gram(s) Oral three times a day  tamsulosin 0.4 milliGRAM(s) Oral at bedtime    MEDICATIONS  (PRN):           *****REVIEW OF SYSTEM:  GEN: no fever, no chills, no pain  RESP: no SOB, no cough, no sputum  CVS: no chest pain, no palpitations, no edema  GI: no abdominal pain, no nausea, no vomiting, no constipation, no diarrhea  : no dysurea, no frequency, no hematurea  Neuro: no headache, no dizziness  PSYCH: no anxiety, no depression  Derm : no itching, no rash         *****VITAL SIGNS:  T(C): 36.6 (20 @ 13:24), Max: 36.7 (20 @ 14:37)  HR: 87 (20 @ 13:24) (66 - 87)  BP: 143/68 (20 @ 13:24) (117/65 - 152/87)  RR: 18 (20 @ 06:03) (16 - 19)  SpO2: 99% (20 @ 13:24) (98% - 100%)  Wt(kg): --     @ 07:01  -   @ 07:00  --------------------------------------------------------  IN: 120 mL / OUT: 1600 mL / NET: -1480 mL             *****PHYSICAL EXAM:   Alert oriented x 3   Attention comprehension are fair. Able to name, repeat, read without any difficulty.   Able to follow 3 step commands.     EOMI fundi not visualized,  VFF to confrontration  No facial asymmetry   Tongue is midline   Palate elevates symmetrically   Moving all 4 ext symmetrically no pronator drift   Reflexes are symmetric throughout   sensation is grossly symmetric  Gait : not assessed.  B/L down going toes               *****LAB AND IMAGIN.8   6.44  )-----------( 340      ( 29 Dec 2020 07:53 )             37.1                   128<L>  |  93<L>  |  13  ----------------------------<  111<H>  3.9   |  23  |  0.64    Ca    9.0      29 Dec 2020 07:53  Phos  4.7     12-28  Mg     2.0     12-28                                  [All pertinent recent Imaging reports reviewed]         *****A S S E S S M E N T   A N D   P L A N :        73 y/o salvador speaking M with PMH of Parkinsons dementia AAO x 2 at baseline, BPH(s/p microwave ablation), hx of multiple MDR Klebsiella UTIs presents to ED for worsening suprapubic pain. History obtained from grandson since pt is a poor historian (spoke with pt in native language). Per grandson, pt was recently evaluated in ED for "low sodium" although w/u was negative and he was discharged. Since then he has been experiencing worsening suprapubic pain, dysuria, increased nocturia, poor appetite. Denies fever, chills, N/V, flank pain, diarrhea. He was recently evaluated by his PCP in november and at that time he endorsed sialorrhea due to progression of parkinsons for which he was started on glycopyrrolate 1 mg TID. Of note he has prior hx of urinary retention requiring chronic pichardo (most recently ND'ed in ).       Problem/Recommendations 1:  ams improved  toxic metabolic encephalopathy due to hyponatremia and pain related delirium   continue to monitor closely   avoid sleep wake cycle disruption   encourage po intake.       Problem/Recommendations 2:  parkinsons  continue home regimen.      ___________________________  Will follow with you.  Thank you,  Elzbieta Hargrove MD  Diplomate of the American Board of Neurology and Psychiatry.  Diplomate of the American Board of Vascular Neurology.   [facility] ([facility]), [facility]   Ph: [phone]    Differential diagnosis and plan of care discussed with patient after the evaluation.   Advanced care planning options discussed.   Pain assessed and judicious use of narcotics when appropriate was discussed.  Importance of Fall prevention discussed.  Counseling on Smoking and Alcohol cessation was offered when appropriate.  Counseling on Diet, exercise, and medication compliance was done.   83 minutes spent on the total encounter;  more than 50 % of the visit was spent on counseling  and or coordinating care by the attending physician.    Thank you for allowing me to participate in the care of this joseph patient. Please do not hesitate to call me if you have any questions.     This and subsequent notes were partially created using voice recognition software and will  inherently be subject to errors including those of syntax and sound alike substitutions which may escape proofreading. In such instances original meaning may be extrapolated by contextual derivation.   
[facility] NEPHROLOGY- CONSULTATION NOTE    75y Male with history of below presents with lower abdominal pain. Nephrology consulted for hyponatremia.    Patient with h/o hyponatremia on sodium chloride tabs as an outpatient. Patient on lexapro as well. Patient found to have urinary retention in ER s/p pichardo placement now with gross hematuria. CT with bilateral hydronephrosis. Sodium chloride increased to 1 gram TID by admitting team with improvement in serum Na this morning.    REVIEW OF SYSTEMS:  Gen: no changes in weight  HEENT: no rhinorrhea  Neck: no sore throat  Cards: no chest pain  Resp: no dyspnea  GI: no nausea or vomiting or diarrhea  : no dysuria or hematuria, + lower ab pain  Vascular: no LE edema  Derm: no rashes  Neuro: no numbness/tingling    No Known Allergies      Home Medications Reviewed  Hospital Medications:   MEDICATIONS  (STANDING):  acetaminophen   Tablet .. 975 milliGRAM(s) Oral every 8 hours  carbidopa/levodopa  25/100 1 Tablet(s) Oral three times a day  enoxaparin Injectable 40 milliGRAM(s) SubCutaneous daily  escitalopram 5 milliGRAM(s) Oral daily  influenza  Vaccine (HIGH DOSE) 0.7 milliLiter(s) IntraMuscular once  memantine 10 milliGRAM(s) Oral daily  phenazopyridine 100 milliGRAM(s) Oral every 8 hours  sodium chloride 1 Gram(s) Oral three times a day  tamsulosin 0.4 milliGRAM(s) Oral at bedtime      PAST MEDICAL & SURGICAL HISTORY:  UTI (urinary tract infection)    T2DM (type 2 diabetes mellitus)    Dementia    Parkinson disease    BPH (benign prostatic hyperplasia)    No significant past surgical history        FAMILY HISTORY:  No pertinent family history in first degree relatives        SOCIAL HISTORY:  Denies toxic substance use     VITALS:  T(F): 97.4 (20 @ 05:44), Max: 97.7 (20 @ 23:18)  HR: 58 (20 @ 05:44)  BP: 123/69 (20 @ 05:44)  RR: 17 (20 @ 05:44)  SpO2: 100% (20 @ 05:44)  Wt(kg): --     @ 07:01  -   @ 07:00  --------------------------------------------------------  IN: 0 mL / OUT: 1225 mL / NET: -1225 mL        Weight (kg): 65.8 ( @ 23:18)    PHYSICAL EXAM:  Gen: NAD, calm  HEENT: MMM  Neck: no JVD  Cards: RRR, +S1/S2, no M/G/R  Resp: CTA B/L  GI: soft, NT/ND, NABS  : no CVA tenderness, + pichardo with gross hematuria  Vascular: no LE edema B/L  Derm: no rashes  Neuro: non-focal    LABS:      126<L>  |  93<L>  |  12  ----------------------------<  80  3.9   |  22  |  0.67    Ca    8.8      28 Dec 2020 07:19  Phos  4.7       Mg     2.0         TPro  7.3  /  Alb  4.1  /  TBili  0.7  /  DBili      /  AST  12  /  ALT  <5  /  AlkPhos  70      Creatinine Trend: 0.67 <--, 0.77 <--, 0.69 <--                        12.4   6.79  )-----------( 304      ( 28 Dec 2020 07:19 )             35.9     Urine Studies:  Urinalysis Basic - ( 27 Dec 2020 12:28 )    Color: Light Yellow / Appearance: Clear / S.016 / pH:   Gluc:  / Ketone: Negative  / Bili: Negative / Urobili: <2 mg/dL   Blood:  / Protein: Trace / Nitrite: Negative   Leuk Esterase: Negative / RBC:  / WBC    Sq Epi:  / Non Sq Epi:  / Bacteria:       Osmolality, Random Urine: 415 mosm/kg ( @ 07:58)      RADIOLOGY & ADDITIONAL STUDIES:    < from: CT Abdomen and Pelvis No Cont (20 @ 12:34) >  IMPRESSION:  Mild bilateral hydroureteronephrosis with dilatation of the ureters to the level of the urinary bladder.    Enlarged prostate gland.    Moderately distended urinary bladder despite the presence of a Ipchardo catheter.    Hazy mesenteric fat along the SMA distribution, increased in prominence from prior, with small mesenteric lymph nodes. This is a nonspecific finding. However, neoplasms including lymphoma should be considered.    < end of copied text >  
CHIEF COMPLAINT:    HPI:  73 y/o salvador speaking M with PMH of Parkinsons dementia AAO x 2 at baseline, BPH(s/p microwave ablation), hx of multiple MDR Klebsiella UTIs presents to ED for worsening suprapubic pain. History obtained from grandson since pt is a poor historian (spoke with pt in native language). Per grandson, pt was recently evaluated in ED for "low sodium" although w/u was negative and he was discharged. Since then he has been experiencing worsening suprapubic pain, dysuria, increased nocturia, poor appetite. Denies fever, chills, N/V, flank pain, diarrhea. He was recently evaluated by his PCP in november and at that time he endorsed sialorrhea due to progression of parkinsons for which he was started on glycopyrrolate 1 mg TID. Of note he has prior hx of urinary retention requiring chronic pichardo (most recently MI'ed in 2019).    In the ED pt's vitals stable, straight cath with 550 cc s/p indwelling pichardo catheter. Na 121, Scr at baseline, CT Abdomen shows b/l mild hydroureteronephrosis, mod distended bladder despite pihcardo and prostate enlargement.    PAST MEDICAL & SURGICAL HISTORY:  UTI (urinary tract infection)    T2DM (type 2 diabetes mellitus)    Dementia    Parkinson disease    BPH (benign prostatic hyperplasia)    No significant past surgical history            PREVIOUS DIAGNOSTIC TESTING:    [ ] Echocardiogram:  [ ]  Catheterization:  [ ] Stress Test:  	    MEDICATIONS:  MEDICATIONS  (STANDING):  acetaminophen   Tablet .. 975 milliGRAM(s) Oral every 8 hours  carbidopa/levodopa  25/100 1 Tablet(s) Oral three times a day  enoxaparin Injectable 40 milliGRAM(s) SubCutaneous daily  escitalopram 5 milliGRAM(s) Oral daily  influenza  Vaccine (HIGH DOSE) 0.7 milliLiter(s) IntraMuscular once  memantine 10 milliGRAM(s) Oral daily  phenazopyridine 100 milliGRAM(s) Oral every 8 hours  sodium chloride 1 Gram(s) Oral three times a day  tamsulosin 0.4 milliGRAM(s) Oral at bedtime      FAMILY HISTORY:  No pertinent family history in first degree relatives        SOCIAL HISTORY:    [ ] Non-smoker  [ ] Smoker  [ ] Alcohol    Allergies    No Known Allergies    Intolerances    	    REVIEW OF SYSTEMS:  CONSTITUTIONAL: No fever, weight loss, or fatigue  EYES: No eye pain, visual disturbances, or discharge  ENMT:  No difficulty hearing, tinnitus, vertigo; No sinus or throat pain  NECK: No pain or stiffness  RESPIRATORY: No cough, wheezing, chills or hemoptysis; No Shortness of Breath  CARDIOVASCULAR: No chest pain, palpitations, passing out, dizziness, or leg swelling  GASTROINTESTINAL: No abdominal or epigastric pain. No nausea, vomiting, or hematemesis; No diarrhea or constipation. No melena or hematochezia.  GENITOURINARY: No dysuria, frequency, hematuria, or incontinence  NEUROLOGICAL: No headaches, memory loss, loss of strength, numbness, or tremors  SKIN: No itching, burning, rashes, or lesions   	    [ ] All others negative	  [ ] Unable to obtain    PHYSICAL EXAM:  T(C): 36.3 (12-28-20 @ 05:44), Max: 36.7 (12-27-20 @ 11:16)  HR: 58 (12-28-20 @ 05:44) (58 - 87)  BP: 123/69 (12-28-20 @ 05:44) (105/79 - 133/80)  RR: 17 (12-28-20 @ 05:44) (16 - 17)  SpO2: 100% (12-28-20 @ 05:44) (99% - 100%)  Wt(kg): --  I&O's Summary    27 Dec 2020 07:01  -  28 Dec 2020 07:00  --------------------------------------------------------  IN: 0 mL / OUT: 1225 mL / NET: -1225 mL        Appearance: Normal	  Psychiatry: A & O x 3, Mood & affect appropriate  HEENT:   Normal oral mucosa, PERRL, EOMI	  Lymphatic: No lymphadenopathy  Cardiovascular: Normal S1 S2,RRR, No JVD, No murmurs  Respiratory: Lungs clear to auscultation	  Gastrointestinal:  Soft, Non-tender, + BS	  Skin: No rashes, No ecchymoses, No cyanosis	  Neurologic: Non-focal  Extremities: Normal range of motion, No clubbing, cyanosis or edema  Vascular: Peripheral pulses palpable 2+ bilaterally    TELEMETRY: 	    ECG:  	  RADIOLOGY:  OTHER: 	  	  LABS:	 	    CARDIAC MARKERS:                                  12.4   6.79  )-----------( 304      ( 28 Dec 2020 07:19 )             35.9     12-28    126<L>  |  93<L>  |  12  ----------------------------<  80  3.9   |  22  |  0.67    Ca    8.8      28 Dec 2020 07:19  Phos  4.7     12-28  Mg     2.0     12-28    TPro  7.3  /  Alb  4.1  /  TBili  0.7  /  DBili  x   /  AST  12  /  ALT  <5  /  AlkPhos  70  12-27      proBNP:   Lipid Profile:   HgA1c:   TSH: Thyroid Stimulating Hormone, Serum: 2.87 uIU/mL (12-28 @ 07:19)      ASSESSMENT/PLAN:

## 2020-12-31 ENCOUNTER — TRANSCRIPTION ENCOUNTER (OUTPATIENT)
Age: 75
End: 2020-12-31

## 2020-12-31 LAB
ALBUMIN SERPL ELPH-MCNC: 4.3 G/DL — SIGNIFICANT CHANGE UP (ref 3.3–5)
ALP SERPL-CCNC: 65 U/L — SIGNIFICANT CHANGE UP (ref 40–120)
ALT FLD-CCNC: 6 U/L — SIGNIFICANT CHANGE UP (ref 4–41)
ANION GAP SERPL CALC-SCNC: 12 MMOL/L — SIGNIFICANT CHANGE UP (ref 7–14)
AST SERPL-CCNC: 19 U/L — SIGNIFICANT CHANGE UP (ref 4–40)
BASOPHILS # BLD AUTO: 0.04 K/UL — SIGNIFICANT CHANGE UP (ref 0–0.2)
BASOPHILS NFR BLD AUTO: 0.5 % — SIGNIFICANT CHANGE UP (ref 0–2)
BILIRUB SERPL-MCNC: 0.4 MG/DL — SIGNIFICANT CHANGE UP (ref 0.2–1.2)
BUN SERPL-MCNC: 17 MG/DL — SIGNIFICANT CHANGE UP (ref 7–23)
CALCIUM SERPL-MCNC: 9.7 MG/DL — SIGNIFICANT CHANGE UP (ref 8.4–10.5)
CHLORIDE SERPL-SCNC: 92 MMOL/L — LOW (ref 98–107)
CO2 SERPL-SCNC: 25 MMOL/L — SIGNIFICANT CHANGE UP (ref 22–31)
CREAT SERPL-MCNC: 0.69 MG/DL — SIGNIFICANT CHANGE UP (ref 0.5–1.3)
EOSINOPHIL # BLD AUTO: 0.14 K/UL — SIGNIFICANT CHANGE UP (ref 0–0.5)
EOSINOPHIL NFR BLD AUTO: 1.6 % — SIGNIFICANT CHANGE UP (ref 0–6)
GLUCOSE SERPL-MCNC: 147 MG/DL — HIGH (ref 70–99)
HCT VFR BLD CALC: 38.8 % — LOW (ref 39–50)
HGB BLD-MCNC: 13.4 G/DL — SIGNIFICANT CHANGE UP (ref 13–17)
IANC: 5.5 K/UL — SIGNIFICANT CHANGE UP (ref 1.5–8.5)
IMM GRANULOCYTES NFR BLD AUTO: 0.3 % — SIGNIFICANT CHANGE UP (ref 0–1.5)
LDH SERPL L TO P-CCNC: 195 U/L — SIGNIFICANT CHANGE UP (ref 135–225)
LYMPHOCYTES # BLD AUTO: 2.62 K/UL — SIGNIFICANT CHANGE UP (ref 1–3.3)
LYMPHOCYTES # BLD AUTO: 29.8 % — SIGNIFICANT CHANGE UP (ref 13–44)
MAGNESIUM SERPL-MCNC: 2 MG/DL — SIGNIFICANT CHANGE UP (ref 1.6–2.6)
MCHC RBC-ENTMCNC: 25.9 PG — LOW (ref 27–34)
MCHC RBC-ENTMCNC: 34.5 GM/DL — SIGNIFICANT CHANGE UP (ref 32–36)
MCV RBC AUTO: 75 FL — LOW (ref 80–100)
MONOCYTES # BLD AUTO: 0.45 K/UL — SIGNIFICANT CHANGE UP (ref 0–0.9)
MONOCYTES NFR BLD AUTO: 5.1 % — SIGNIFICANT CHANGE UP (ref 2–14)
NEUTROPHILS # BLD AUTO: 5.5 K/UL — SIGNIFICANT CHANGE UP (ref 1.8–7.4)
NEUTROPHILS NFR BLD AUTO: 62.7 % — SIGNIFICANT CHANGE UP (ref 43–77)
NRBC # BLD: 0 /100 WBCS — SIGNIFICANT CHANGE UP
NRBC # FLD: 0 K/UL — SIGNIFICANT CHANGE UP
OSMOLALITY UR: 490 MOSM/KG — SIGNIFICANT CHANGE UP (ref 50–1200)
PHOSPHATE SERPL-MCNC: 4.2 MG/DL — SIGNIFICANT CHANGE UP (ref 2.5–4.5)
PLATELET # BLD AUTO: 364 K/UL — SIGNIFICANT CHANGE UP (ref 150–400)
POTASSIUM SERPL-MCNC: 4.4 MMOL/L — SIGNIFICANT CHANGE UP (ref 3.5–5.3)
POTASSIUM SERPL-SCNC: 4.4 MMOL/L — SIGNIFICANT CHANGE UP (ref 3.5–5.3)
PROT SERPL-MCNC: 7.7 G/DL — SIGNIFICANT CHANGE UP (ref 6–8.3)
PSA FLD-MCNC: 5.9 NG/ML — HIGH (ref 0–4)
RBC # BLD: 5.17 M/UL — SIGNIFICANT CHANGE UP (ref 4.2–5.8)
RBC # FLD: 13.2 % — SIGNIFICANT CHANGE UP (ref 10.3–14.5)
SODIUM SERPL-SCNC: 129 MMOL/L — LOW (ref 135–145)
WBC # BLD: 8.78 K/UL — SIGNIFICANT CHANGE UP (ref 3.8–10.5)
WBC # FLD AUTO: 8.78 K/UL — SIGNIFICANT CHANGE UP (ref 3.8–10.5)

## 2020-12-31 PROCEDURE — 71250 CT THORAX DX C-: CPT | Mod: 26

## 2020-12-31 RX ORDER — ACETAMINOPHEN 500 MG
650 TABLET ORAL ONCE
Refills: 0 | Status: COMPLETED | OUTPATIENT
Start: 2020-12-31 | End: 2020-12-31

## 2020-12-31 RX ADMIN — SODIUM CHLORIDE 2 GRAM(S): 9 INJECTION INTRAMUSCULAR; INTRAVENOUS; SUBCUTANEOUS at 17:51

## 2020-12-31 RX ADMIN — CARBIDOPA AND LEVODOPA 1 TABLET(S): 25; 100 TABLET ORAL at 05:07

## 2020-12-31 RX ADMIN — TAMSULOSIN HYDROCHLORIDE 0.4 MILLIGRAM(S): 0.4 CAPSULE ORAL at 21:27

## 2020-12-31 RX ADMIN — Medication 2 DROP(S): at 21:26

## 2020-12-31 RX ADMIN — SODIUM CHLORIDE 2 GRAM(S): 9 INJECTION INTRAMUSCULAR; INTRAVENOUS; SUBCUTANEOUS at 05:07

## 2020-12-31 RX ADMIN — Medication 2 DROP(S): at 05:07

## 2020-12-31 RX ADMIN — FINASTERIDE 5 MILLIGRAM(S): 5 TABLET, FILM COATED ORAL at 12:49

## 2020-12-31 RX ADMIN — MEMANTINE HYDROCHLORIDE 10 MILLIGRAM(S): 10 TABLET ORAL at 12:48

## 2020-12-31 RX ADMIN — CARBIDOPA AND LEVODOPA 1 TABLET(S): 25; 100 TABLET ORAL at 21:28

## 2020-12-31 RX ADMIN — Medication 650 MILLIGRAM(S): at 04:45

## 2020-12-31 RX ADMIN — CARBIDOPA AND LEVODOPA 1 TABLET(S): 25; 100 TABLET ORAL at 12:48

## 2020-12-31 RX ADMIN — Medication 2 DROP(S): at 12:49

## 2020-12-31 RX ADMIN — ENOXAPARIN SODIUM 40 MILLIGRAM(S): 100 INJECTION SUBCUTANEOUS at 12:49

## 2020-12-31 RX ADMIN — Medication 650 MILLIGRAM(S): at 05:45

## 2020-12-31 RX ADMIN — Medication 3 MILLIGRAM(S): at 21:27

## 2020-12-31 NOTE — DISCHARGE NOTE PROVIDER - NSDCCPCAREPLAN_GEN_ALL_CORE_FT
PRINCIPAL DISCHARGE DIAGNOSIS  Diagnosis: Urinary retention  Assessment and Plan of Treatment: You have a pichardo catheter placed secondary to urinary retention.  Follow up with your Urologist within 1 week of discharge for further medical management.       PRINCIPAL DISCHARGE DIAGNOSIS  Diagnosis: Urinary retention  Assessment and Plan of Treatment: You have a pichardo catheter placed secondary to urinary retention.  Follow up with your Urologist within 1 week of discharge for further medical management. Continue Flomax and Proscar as prescribed.      SECONDARY DISCHARGE DIAGNOSES  Diagnosis: Parkinson disease  Assessment and Plan of Treatment: Continue anit parkinson medication as prescribed.    Diagnosis: Benign prostatic hyperplasia with nocturia  Assessment and Plan of Treatment:      PRINCIPAL DISCHARGE DIAGNOSIS  Diagnosis: Urinary retention  Assessment and Plan of Treatment: You have a pichardo catheter placed secondary to urinary retention.  Follow up with your Urologist within 1 week of discharge for further medical management. Continue Flomax and Proscar as prescribed. You have an appointment with Dr. Henriquez on 1/4.      SECONDARY DISCHARGE DIAGNOSES  Diagnosis: Parkinson disease  Assessment and Plan of Treatment: Continue anit parkinson medication as prescribed.    Diagnosis: Benign prostatic hyperplasia with nocturia  Assessment and Plan of Treatment:      PRINCIPAL DISCHARGE DIAGNOSIS  Diagnosis: Hyponatremia  Assessment and Plan of Treatment: continue sodium tablets  and I liter fluid restriction   follow up with Nephrology within 1 week of discharge.      SECONDARY DISCHARGE DIAGNOSES  Diagnosis: Urinary retention  Assessment and Plan of Treatment: You have a pichardo catheter placed secondary to urinary retention.  Follow up with your Urologist Dr. Henriquez within 1 week of discharge for further medical management. Call to schedule an appointment. Continue Flomax and Proscar as prescribed.    Diagnosis: Benign prostatic hyperplasia with nocturia  Assessment and Plan of Treatment: Maintain pichardo catheter to bedside drainage   Follow up with Urology, call to schedule an appointment    Diagnosis: Parkinson disease  Assessment and Plan of Treatment: Continue anti- parkinson medication as prescribed.     PRINCIPAL DISCHARGE DIAGNOSIS  Diagnosis: Hyponatremia  Assessment and Plan of Treatment: continue sodium tablets  and I liter fluid restriction   follow up with Nephrology within 1 week of discharge.      SECONDARY DISCHARGE DIAGNOSES  Diagnosis: Depression  Assessment and Plan of Treatment: Your Lexapro was stopped due to your low sodium, this medication could be the cause.   You can Follow up with [facility] or [facility] Outpatient Psychiatry Department- [phone]  or [facility] [facility]- [phone]  [facility]  Crisis clinic - [phone]    Diagnosis: Urinary retention  Assessment and Plan of Treatment: You have a pichardo catheter placed secondary to urinary retention.  Follow up with your Urologist Dr. Henriquez within 1 week of discharge for further medical management. Call to schedule an appointment. Continue Flomax and Proscar as prescribed.    Diagnosis: Benign prostatic hyperplasia with nocturia  Assessment and Plan of Treatment: Maintain pichardo catheter to bedside drainage   Follow up with Urology, call to schedule an appointment    Diagnosis: Parkinson disease  Assessment and Plan of Treatment: Continue anti- parkinson medication as prescribed.

## 2020-12-31 NOTE — DISCHARGE NOTE PROVIDER - CARE PROVIDER_API CALL
MEDINA ANNA  Internal Medicine  [address] [place] [address]  Phone: [phone]  Fax: [phone]  Follow Up Time:    MEDINA ANNA  Internal Medicine  [address] [place] [address]  Phone: [phone]  Fax: [phone]  Follow Up Time:     ELVIA SPICER  Urology  [address] [place] [address]  Phone: [phone]  Fax: [phone]  Follow Up Time:

## 2020-12-31 NOTE — DISCHARGE NOTE NURSING/CASE MANAGEMENT/SOCIAL WORK - PATIENT PORTAL LINK FT
You can access the FollowMyHealth Patient Portal offered by [facility] by registering at the following website: [email or web address]. By joining [employer]’s FollowMyHealth portal, you will also be able to view your health information using other applications (apps) compatible with our system.

## 2020-12-31 NOTE — DISCHARGE NOTE PROVIDER - PROVIDER TOKENS
PROVIDER:[TOKEN:[[id number]]] PROVIDER:[TOKEN:[[id number]]],PROVIDER:[TOKEN:[[id number]:[id number]]]

## 2020-12-31 NOTE — DISCHARGE NOTE PROVIDER - NSDCFUSCHEDAPPT_GEN_ALL_CORE_FT
JEANE JEFFERSON ; 01/13/2021 ; [place]ONT[place] [id number] [place] JEANE Castillo ; 02/10/2021 ; [address] [place] Margarette

## 2020-12-31 NOTE — DISCHARGE NOTE PROVIDER - HOSPITAL COURSE
75 y/o salvador speaking M with PMH of Parkinsons dementia AAO x 2 at baseline, sialorrhea, BPH(s/p microwave ablation), hx of multiple MDR Klebsiella UTIs admitted for drug induced acute urinary retention vs BPH c/b acute hyponatremia.    Hospital Course:    ·  Problem: Urinary retention.  Plan: drug induced (d/t anticholinergic med - glycopyrrolate) vs progression of BPH  retained 550cc s/p indwelling pichardo catheter draining clear urine  - resume tamsulosin  - check PSA  - may need urology fu      Problem/Plan - 2:  ·  Problem: Hyponatremia.  Plan: asymptomatic  renal fu  monitor bmp      Problem/Plan - 3:  ·  Problem: Sialorrhea.  Plan: secondary to complication of parkinson's  hold off on glycopyrrolate  obtain speech and swallow eval, in interim start mechanical soft diet.      Problem/Plan - 4:  ·  Problem: Benign prostatic hyperplasia with nocturia.  Plan: enlarged prostate seen on CT abdomen  can follow up outpatient.      Problem/Plan - 5:  ·  Problem: Parkinson disease.  Plan: currently at baseline mental status AAO x 2  c/w Carbidopa-Levodopa, memantine and lexapro. neuro fu      Problem/Plan - 6:  Problem: Lymphadenopathy. Plan: Hazy mesenteric fat along the SMA distribution, increased in prominence from prior,   with small mesenteric lymph nodes, non specific, incidentally seen on CT A/P  heme fu appreciated  check PSA  outpt workup         Dispo- On ___, patient stable for discharge as per Dr. Fulton.  All medications reviewed prior o discharge   75 y/o salvador speaking M with PMH of Parkinsons dementia AAO x 2 at baseline, sialorrhea, BPH(s/p microwave ablation), hx of multiple MDR Klebsiella UTIs admitted for drug induced acute urinary retention vs BPH c/b acute hyponatremia.    Hospital Course:     Urinary retention.  Plan: drug induced (d/t anticholinergic med - glycopyrrolate) vs progression of BPH  retained 550cc s/p indwelling pichardo catheter draining clear urine  - resume tamsulosin  - check PSA  - may need urology fu   Hyponatremia.  Plan: asymptomatic  renal fu  monitor bmp      Sialorrhea.  Plan: secondary to complication of parkinson's  hold off on glycopyrrolate  obtain speech and swallow eval, in interim start mechanical soft diet.      Benign prostatic hyperplasia with nocturia.  Plan: enlarged prostate seen on CT abdomen  can follow up outpatient.    Parkinson disease.  Plan: currently at baseline mental status AAO x 2  c/w Carbidopa-Levodopa, memantine and lexapro. neuro fu     Lymphadenopathy. Plan: Hazy mesenteric fat along the SMA distribution, increased in prominence from prior,   with small mesenteric lymph nodes, non specific, incidentally seen on CT A/P  heme fu appreciated  check PSA----------------------------------------------  outpt workup         Dispo- On ___, patient stable for discharge as per Dr. Fulton.  All medications reviewed prior o discharge   73 y/o salvador speaking M with PMH of Parkinsons dementia AAO x 2 at baseline, sialorrhea, BPH(s/p microwave ablation), hx of multiple MDR Klebsiella UTIs admitted for drug induced acute urinary retention vs BPH c/b acute hyponatremia.    Hospital Course:    Urinary retention.    -drug induced (d/t anticholinergic med - glycopyrrolate) vs progression of BPH  retained 550cc s/p indwelling pichardo catheter draining clear urine  -CT A/P showed b.l mild hydroureteronephrosis, BPH, distended bladder despite pichardo  - resume tamsulosin, start acetaminophen 975mg TID x 3 days pyridium x 2 days  - avoid belladonna given anticholinergic property  - UA neg, f/u Ucx, non toxic monitor off abx  - TOV prior to dc, consider urology if urinary retention doesn't improve.   -Repeat Renal US-no hydronephrosis  -Oncology consulted for eval, Enlarge prostate, non-specific mesenteric lymphadenopathy. LDH-normal, PSA 4.9.  -CT Chest-->6 mm right middle lobe fissural nodule, most likely benign intrapulmonary lymph node. Nonspecific 2 mm right upper lobe nodule. Repeat CT in 3 mts recommended     AMS  -Head CT negative   - B12/ Folate -WNL  -Neuro consulted --multifactorial toxic metabolic encephalopathy due to hyponatremia +/- pain related delirium +/- related to glycopyrrolate      Lymphadenopathy.   -Hazy mesenteric fat along the SMA distribution, increased in prominence from prior, with small mesenteric lymph nodes, non specific, incidentally seen on CT A/P  -Outpt f/u with Oncology.      Hyponatremia.    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 Pt started on Salt tabs, 1L fluid restriction  -Uosm 503, Ethel 69    Sialorrhea.    -secondary to complication of parkinson's  hold off on glycopyrrolate  -Swallow eval-Rec. mechanical soft thin liquid    Benign prostatic hyperplasia with nocturia.    -enlarged prostate seen on CT abdomen  can follow up outpatient.     Parkinson disease.    -currently at baseline mental status AAO x 2  c/w Carbidopa-Levodopa, memantine and Lexapro.   -Swallow eval-Rec. mechanical soft thin liquid  -Neurology consulted--> added atropine oph drops sublingual for sialorrhea         Dispo- On ___, patient stable for discharge as per Dr. Fulton.  All medications reviewed prior o discharge   73 y/o salvador speaking M with PMH of Parkinsons dementia AAO x 2 at baseline, sialorrhea, BPH(s/p microwave ablation), hx of multiple MDR Klebsiella UTIs admitted for drug induced acute urinary retention vs BPH c/b acute hyponatremia.    Hospital Course:    Urinary retention.    -drug induced (d/t anticholinergic med - glycopyrrolate) vs progression of BPH  retained 550cc s/p indwelling pichardo catheter draining clear urine  -CT A/P showed b.l mild hydroureteronephrosis, BPH, distended bladder despite pichardo  - resume tamsulosin, start acetaminophen 975mg TID x 3 days pyridium x 2 days  - avoid belladonna given anticholinergic property  - UA neg, f/u Ucx, non toxic monitor off abx  - TOV prior to dc, consider urology if urinary retention doesn't improve.   -Repeat Renal US-no hydronephrosis  -Oncology consulted for eval, Enlarge prostate, non-specific mesenteric lymphadenopathy. LDH-normal, PSA 4.9.  -CT Chest-->6 mm right middle lobe fissural nodule, most likely benign intrapulmonary lymph node. Nonspecific 2 mm right upper lobe nodule. Repeat CT in 3 mts recommended     AMS  -Head CT negative   - B12/ Folate -WNL  -Neuro consulted --multifactorial toxic metabolic encephalopathy due to hyponatremia +/- pain related delirium +/- related to glycopyrrolate  -Psych consulted 2/2 agitation, rec. Seroquel Po or Zyprexa IM. Discussion with Pt's granddaughter, pt does have a hx of agitation and depression, did improve with the Lexapro. Pt is currently off the Lexapro 2/2 hyponatremia. Family is comfortable managing him at home. Pt to f/u with Psych outpatient for consideration for other treatment options.       Lymphadenopathy.   -Hazy mesenteric fat along the SMA distribution, increased in prominence from prior, with small mesenteric lymph nodes, non specific, incidentally seen on CT A/P  -Outpt f/u with Oncology.      Hyponatremia.    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 Pt started on Salt tabs, 1L fluid restriction  -Uosm 503, Ethel 69    Sialorrhea.    -secondary to complication of parkinson's  hold off on glycopyrrolate  -Swallow eval-Rec. mechanical soft thin liquid    Benign prostatic hyperplasia with nocturia.    -enlarged prostate seen on CT abdomen  can follow up outpatient.     Parkinson disease.    -currently at baseline mental status AAO x 2  c/w Carbidopa-Levodopa, memantine and Lexapro.   -Swallow eval-Rec. mechanical soft thin liquid  -Neurology consulted--> added atropine oph drops sublingual for sialorrhea         Dispo- On ___, patient stable for discharge as per Dr. Fulton.  All medications reviewed prior o discharge   75 y/o salvador speaking M with PMH of Parkinsons dementia AAO x 2 at baseline, sialorrhea, BPH(s/p microwave ablation), hx of multiple MDR Klebsiella UTIs admitted for drug induced acute urinary retention vs BPH c/b acute hyponatremia.    Hospital Course:    Urinary retention.    -drug induced (d/t anticholinergic med - glycopyrrolate) vs progression of BPH  retained 550cc s/p indwelling pichardo catheter draining clear urine  -CT A/P showed b.l mild hydroureteronephrosis, BPH, distended bladder despite pichardo  - resume tamsulosin, start acetaminophen 975mg TID x 3 days pyridium x 2 days  - avoid belladonna given anticholinergic property  - UA neg, f/u Ucx, non toxic monitor off abx  - TOV prior to dc, consider urology if urinary retention doesn't improve.   -Repeat Renal US-no hydronephrosis  -Oncology consulted for eval, Enlarge prostate, non-specific mesenteric lymphadenopathy. LDH-normal, PSA 4.9.  -CT Chest-->6 mm right middle lobe fissural nodule, most likely benign intrapulmonary lymph node. Nonspecific 2 mm right upper lobe nodule. Repeat CT in 3 mts recommended     AMS  -Head CT negative   - B12/ Folate -WNL  -Neuro consulted --multifactorial toxic metabolic encephalopathy due to hyponatremia +/- pain related delirium +/- related to glycopyrrolate  -Psych consulted 2/2 agitation, rec. Seroquel Po or Zyprexa IM. Discussion with Pt's granddaughter, pt does have a hx of agitation and depression, did improve with the Lexapro. Pt is currently off the Lexapro 2/2 hyponatremia. Family is comfortable managing him at home. Pt to f/u with Psych outpatient for consideration for other treatment options.       Lymphadenopathy.   -Hazy mesenteric fat along the SMA distribution, increased in prominence from prior, with small mesenteric lymph nodes, non specific, incidentally seen on CT A/P  -Outpt f/u with Oncology.      Hyponatremia.    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 Pt started on Salt tabs, 1L fluid restriction  -Uosm 503, Ethel 69    Sialorrhea.    -secondary to complication of parkinson's  hold off on glycopyrrolate  -Swallow eval-Rec. mechanical soft thin liquid    Benign prostatic hyperplasia with nocturia.    -enlarged prostate seen on CT abdomen  can follow up outpatient.     Parkinson disease.    -currently at baseline mental status AAO x 2  c/w Carbidopa-Levodopa, memantine and Lexapro.   -Swallow eval-Rec. mechanical soft thin liquid  -Neurology consulted--> added atropine oph drops sublingual for sialorrhea         Dispo- On 1/5/2021, patient stable for discharge as per Dr. Fulton.  All medications reviewed prior to discharge.

## 2020-12-31 NOTE — DISCHARGE NOTE PROVIDER - NSDCMRMEDTOKEN_GEN_ALL_CORE_FT
carbidopa-levodopa 25 mg-100 mg oral tablet: 1 tab(s) orally 3 times a day  escitalopram 5 mg oral tablet: 1 tab(s) orally once a day  GenTeal ophthalmic gel: 1 drop(s) to each affected eye 2 times a day  glycopyrrolate 1 mg oral tablet: 1 tab(s) orally 3 times a day  memantine 10 mg oral tablet: 1 tab(s) orally once a day  methenamine hippurate 1 g oral tablet: 1 tab(s) orally 2 times a day   Sodium Chloride 1 g oral tablet: 1 tab(s) orally 2 times a day  tamsulosin 0.4 mg oral capsule: 1 cap(s) orally once a day (at bedtime)   carbidopa-levodopa 25 mg-100 mg oral tablet: 1 tab(s) orally 3 times a day  GenTeal ophthalmic gel: 1 drop(s) to each affected eye 2 times a day  glycopyrrolate 1 mg oral tablet: 1 tab(s) orally 3 times a day  memantine 10 mg oral tablet: 1 tab(s) orally once a day  methenamine hippurate 1 g oral tablet: 1 tab(s) orally 2 times a day   Sodium Chloride 1 g oral tablet: 1 tab(s) orally 2 times a day  tamsulosin 0.4 mg oral capsule: 1 cap(s) orally once a day (at bedtime)   carbidopa-levodopa 25 mg-100 mg oral tablet: 1 tab(s) orally 3 times a day  finasteride 5 mg oral tablet: 1 tab(s) orally once a day  GenTeal ophthalmic gel: 1 drop(s) to each affected eye 2 times a day  glycopyrrolate 1 mg oral tablet: 1 tab(s) orally 3 times a day  memantine 10 mg oral tablet: 1 tab(s) orally once a day  methenamine hippurate 1 g oral tablet: 1 tab(s) orally 2 times a day   sodium chloride 1 g oral tablet: 2 tab(s) orally 2 times a day  tamsulosin 0.4 mg oral capsule: 1 cap(s) orally once a day (at bedtime)

## 2020-12-31 NOTE — DISCHARGE NOTE NURSING/CASE MANAGEMENT/SOCIAL WORK - NSSCNAMETXT_GEN_ALL_CORE
1) [facility] [phone] Visiting RN to see patient 24-48 hours after discharge and will call prior to visit. Physical therapy to follow another time and will call prior to visit.   2) [facility] [phone]  CDPAP service will be resumed when patient is discharge home

## 2021-01-01 LAB
ALBUMIN SERPL ELPH-MCNC: 4 G/DL — SIGNIFICANT CHANGE UP (ref 3.3–5)
ALP SERPL-CCNC: 64 U/L — SIGNIFICANT CHANGE UP (ref 40–120)
ALT FLD-CCNC: <5 U/L — LOW (ref 4–41)
ANION GAP SERPL CALC-SCNC: 14 MMOL/L — SIGNIFICANT CHANGE UP (ref 7–14)
AST SERPL-CCNC: 19 U/L — SIGNIFICANT CHANGE UP (ref 4–40)
BILIRUB SERPL-MCNC: 0.6 MG/DL — SIGNIFICANT CHANGE UP (ref 0.2–1.2)
BUN SERPL-MCNC: 19 MG/DL — SIGNIFICANT CHANGE UP (ref 7–23)
CALCIUM SERPL-MCNC: 9.2 MG/DL — SIGNIFICANT CHANGE UP (ref 8.4–10.5)
CHLORIDE SERPL-SCNC: 91 MMOL/L — LOW (ref 98–107)
CO2 SERPL-SCNC: 23 MMOL/L — SIGNIFICANT CHANGE UP (ref 22–31)
CREAT SERPL-MCNC: 0.75 MG/DL — SIGNIFICANT CHANGE UP (ref 0.5–1.3)
GLUCOSE SERPL-MCNC: 131 MG/DL — HIGH (ref 70–99)
HCT VFR BLD CALC: 36.7 % — LOW (ref 39–50)
HGB BLD-MCNC: 12.7 G/DL — LOW (ref 13–17)
MCHC RBC-ENTMCNC: 25.8 PG — LOW (ref 27–34)
MCHC RBC-ENTMCNC: 34.6 GM/DL — SIGNIFICANT CHANGE UP (ref 32–36)
MCV RBC AUTO: 74.6 FL — LOW (ref 80–100)
NRBC # BLD: 0 /100 WBCS — SIGNIFICANT CHANGE UP
NRBC # FLD: 0 K/UL — SIGNIFICANT CHANGE UP
PLATELET # BLD AUTO: 363 K/UL — SIGNIFICANT CHANGE UP (ref 150–400)
POTASSIUM SERPL-MCNC: 4.1 MMOL/L — SIGNIFICANT CHANGE UP (ref 3.5–5.3)
POTASSIUM SERPL-SCNC: 4.1 MMOL/L — SIGNIFICANT CHANGE UP (ref 3.5–5.3)
PROT SERPL-MCNC: 7.3 G/DL — SIGNIFICANT CHANGE UP (ref 6–8.3)
PSA FLD-MCNC: 4.87 NG/ML — HIGH (ref 0–4)
RBC # BLD: 4.92 M/UL — SIGNIFICANT CHANGE UP (ref 4.2–5.8)
RBC # FLD: 13.2 % — SIGNIFICANT CHANGE UP (ref 10.3–14.5)
SODIUM SERPL-SCNC: 128 MMOL/L — LOW (ref 135–145)
WBC # BLD: 8.48 K/UL — SIGNIFICANT CHANGE UP (ref 3.8–10.5)
WBC # FLD AUTO: 8.48 K/UL — SIGNIFICANT CHANGE UP (ref 3.8–10.5)

## 2021-01-01 RX ORDER — ESCITALOPRAM OXALATE 10 MG/1
1 TABLET, FILM COATED ORAL
Qty: 0 | Refills: 0 | DISCHARGE

## 2021-01-01 RX ORDER — HALOPERIDOL DECANOATE 100 MG/ML
0.5 INJECTION INTRAMUSCULAR ONCE
Refills: 0 | Status: COMPLETED | OUTPATIENT
Start: 2021-01-01 | End: 2021-01-01

## 2021-01-01 RX ORDER — HALOPERIDOL DECANOATE 100 MG/ML
2 INJECTION INTRAMUSCULAR ONCE
Refills: 0 | Status: COMPLETED | OUTPATIENT
Start: 2021-01-01 | End: 2021-01-01

## 2021-01-01 RX ORDER — SODIUM CHLORIDE 9 MG/ML
2 INJECTION INTRAMUSCULAR; INTRAVENOUS; SUBCUTANEOUS THREE TIMES A DAY
Refills: 0 | Status: DISCONTINUED | OUTPATIENT
Start: 2021-01-01 | End: 2021-01-04

## 2021-01-01 RX ADMIN — ENOXAPARIN SODIUM 40 MILLIGRAM(S): 100 INJECTION SUBCUTANEOUS at 11:20

## 2021-01-01 RX ADMIN — CARBIDOPA AND LEVODOPA 1 TABLET(S): 25; 100 TABLET ORAL at 13:16

## 2021-01-01 RX ADMIN — Medication 3 MILLIGRAM(S): at 21:34

## 2021-01-01 RX ADMIN — POLYETHYLENE GLYCOL 3350 17 GRAM(S): 17 POWDER, FOR SOLUTION ORAL at 11:20

## 2021-01-01 RX ADMIN — Medication 2 DROP(S): at 05:12

## 2021-01-01 RX ADMIN — TAMSULOSIN HYDROCHLORIDE 0.4 MILLIGRAM(S): 0.4 CAPSULE ORAL at 21:34

## 2021-01-01 RX ADMIN — CARBIDOPA AND LEVODOPA 1 TABLET(S): 25; 100 TABLET ORAL at 05:12

## 2021-01-01 RX ADMIN — Medication 2 DROP(S): at 13:16

## 2021-01-01 RX ADMIN — HALOPERIDOL DECANOATE 2 MILLIGRAM(S): 100 INJECTION INTRAMUSCULAR at 21:35

## 2021-01-01 RX ADMIN — HALOPERIDOL DECANOATE 0.5 MILLIGRAM(S): 100 INJECTION INTRAMUSCULAR at 15:33

## 2021-01-01 RX ADMIN — Medication 2 DROP(S): at 21:34

## 2021-01-01 RX ADMIN — FINASTERIDE 5 MILLIGRAM(S): 5 TABLET, FILM COATED ORAL at 11:20

## 2021-01-01 RX ADMIN — CARBIDOPA AND LEVODOPA 1 TABLET(S): 25; 100 TABLET ORAL at 21:34

## 2021-01-01 RX ADMIN — MEMANTINE HYDROCHLORIDE 10 MILLIGRAM(S): 10 TABLET ORAL at 11:20

## 2021-01-01 RX ADMIN — SODIUM CHLORIDE 2 GRAM(S): 9 INJECTION INTRAMUSCULAR; INTRAVENOUS; SUBCUTANEOUS at 05:11

## 2021-01-01 RX ADMIN — SODIUM CHLORIDE 2 GRAM(S): 9 INJECTION INTRAMUSCULAR; INTRAVENOUS; SUBCUTANEOUS at 21:34

## 2021-01-01 RX ADMIN — SODIUM CHLORIDE 2 GRAM(S): 9 INJECTION INTRAMUSCULAR; INTRAVENOUS; SUBCUTANEOUS at 13:16

## 2021-01-01 NOTE — PROVIDER CONTACT NOTE (OTHER) - ASSESSMENT
Patient had one large episode of black emesis.
patient attempting to climb out of bed, ambulated to bathroom several times, patient grabbing pichardo and grimacing, appears to be in pain, difficult to understand as per  service, pichardo draining orange output

## 2021-01-01 NOTE — PROVIDER CONTACT NOTE (OTHER) - ACTION/TREATMENT ORDERED:
irrigate pichardo, give PO tylenol, continue to monitor
Kimberly Ashton notified. Draw am labs stat. Will continue to monitor.

## 2021-01-02 LAB
ANION GAP SERPL CALC-SCNC: 12 MMOL/L — SIGNIFICANT CHANGE UP (ref 7–14)
BASOPHILS # BLD AUTO: 0.03 K/UL — SIGNIFICANT CHANGE UP (ref 0–0.2)
BASOPHILS NFR BLD AUTO: 0.3 % — SIGNIFICANT CHANGE UP (ref 0–2)
BUN SERPL-MCNC: 21 MG/DL — SIGNIFICANT CHANGE UP (ref 7–23)
CALCIUM SERPL-MCNC: 9.6 MG/DL — SIGNIFICANT CHANGE UP (ref 8.4–10.5)
CHLORIDE SERPL-SCNC: 97 MMOL/L — LOW (ref 98–107)
CO2 SERPL-SCNC: 25 MMOL/L — SIGNIFICANT CHANGE UP (ref 22–31)
CREAT SERPL-MCNC: 0.61 MG/DL — SIGNIFICANT CHANGE UP (ref 0.5–1.3)
EOSINOPHIL # BLD AUTO: 0.14 K/UL — SIGNIFICANT CHANGE UP (ref 0–0.5)
EOSINOPHIL NFR BLD AUTO: 1.3 % — SIGNIFICANT CHANGE UP (ref 0–6)
GLUCOSE SERPL-MCNC: 114 MG/DL — HIGH (ref 70–99)
HCT VFR BLD CALC: 39.7 % — SIGNIFICANT CHANGE UP (ref 39–50)
HGB BLD-MCNC: 13.3 G/DL — SIGNIFICANT CHANGE UP (ref 13–17)
IANC: 6.5 K/UL — SIGNIFICANT CHANGE UP (ref 1.5–8.5)
IMM GRANULOCYTES NFR BLD AUTO: 0.3 % — SIGNIFICANT CHANGE UP (ref 0–1.5)
LYMPHOCYTES # BLD AUTO: 3.41 K/UL — HIGH (ref 1–3.3)
LYMPHOCYTES # BLD AUTO: 31.8 % — SIGNIFICANT CHANGE UP (ref 13–44)
MAGNESIUM SERPL-MCNC: 2.2 MG/DL — SIGNIFICANT CHANGE UP (ref 1.6–2.6)
MCHC RBC-ENTMCNC: 25.7 PG — LOW (ref 27–34)
MCHC RBC-ENTMCNC: 33.5 GM/DL — SIGNIFICANT CHANGE UP (ref 32–36)
MCV RBC AUTO: 76.8 FL — LOW (ref 80–100)
MONOCYTES # BLD AUTO: 0.62 K/UL — SIGNIFICANT CHANGE UP (ref 0–0.9)
MONOCYTES NFR BLD AUTO: 5.8 % — SIGNIFICANT CHANGE UP (ref 2–14)
NEUTROPHILS # BLD AUTO: 6.5 K/UL — SIGNIFICANT CHANGE UP (ref 1.8–7.4)
NEUTROPHILS NFR BLD AUTO: 60.5 % — SIGNIFICANT CHANGE UP (ref 43–77)
NRBC # BLD: 0 /100 WBCS — SIGNIFICANT CHANGE UP
NRBC # FLD: 0 K/UL — SIGNIFICANT CHANGE UP
PHOSPHATE SERPL-MCNC: 4.5 MG/DL — SIGNIFICANT CHANGE UP (ref 2.5–4.5)
PLATELET # BLD AUTO: 368 K/UL — SIGNIFICANT CHANGE UP (ref 150–400)
POTASSIUM SERPL-MCNC: 4.5 MMOL/L — SIGNIFICANT CHANGE UP (ref 3.5–5.3)
POTASSIUM SERPL-SCNC: 4.5 MMOL/L — SIGNIFICANT CHANGE UP (ref 3.5–5.3)
RBC # BLD: 5.17 M/UL — SIGNIFICANT CHANGE UP (ref 4.2–5.8)
RBC # FLD: 13.5 % — SIGNIFICANT CHANGE UP (ref 10.3–14.5)
SODIUM SERPL-SCNC: 134 MMOL/L — LOW (ref 135–145)
WBC # BLD: 10.73 K/UL — HIGH (ref 3.8–10.5)
WBC # FLD AUTO: 10.73 K/UL — HIGH (ref 3.8–10.5)

## 2021-01-02 PROCEDURE — 70450 CT HEAD/BRAIN W/O DYE: CPT | Mod: 26

## 2021-01-02 RX ORDER — THIAMINE MONONITRATE (VIT B1) 100 MG
500 TABLET ORAL DAILY
Refills: 0 | Status: COMPLETED | OUTPATIENT
Start: 2021-01-02 | End: 2021-01-04

## 2021-01-02 RX ORDER — OLANZAPINE 15 MG/1
1.25 TABLET, FILM COATED ORAL EVERY 6 HOURS
Refills: 0 | Status: DISCONTINUED | OUTPATIENT
Start: 2021-01-02 | End: 2021-01-05

## 2021-01-02 RX ADMIN — Medication 2 DROP(S): at 05:38

## 2021-01-02 RX ADMIN — Medication 3 MILLIGRAM(S): at 22:54

## 2021-01-02 RX ADMIN — MEMANTINE HYDROCHLORIDE 10 MILLIGRAM(S): 10 TABLET ORAL at 11:34

## 2021-01-02 RX ADMIN — Medication 0.5 MILLIGRAM(S): at 00:34

## 2021-01-02 RX ADMIN — CARBIDOPA AND LEVODOPA 1 TABLET(S): 25; 100 TABLET ORAL at 05:38

## 2021-01-02 RX ADMIN — SODIUM CHLORIDE 2 GRAM(S): 9 INJECTION INTRAMUSCULAR; INTRAVENOUS; SUBCUTANEOUS at 22:53

## 2021-01-02 RX ADMIN — Medication 2 DROP(S): at 13:34

## 2021-01-02 RX ADMIN — TAMSULOSIN HYDROCHLORIDE 0.4 MILLIGRAM(S): 0.4 CAPSULE ORAL at 22:54

## 2021-01-02 RX ADMIN — SODIUM CHLORIDE 2 GRAM(S): 9 INJECTION INTRAMUSCULAR; INTRAVENOUS; SUBCUTANEOUS at 05:38

## 2021-01-02 RX ADMIN — CARBIDOPA AND LEVODOPA 1 TABLET(S): 25; 100 TABLET ORAL at 22:54

## 2021-01-02 RX ADMIN — CARBIDOPA AND LEVODOPA 1 TABLET(S): 25; 100 TABLET ORAL at 13:34

## 2021-01-02 RX ADMIN — POLYETHYLENE GLYCOL 3350 17 GRAM(S): 17 POWDER, FOR SOLUTION ORAL at 11:34

## 2021-01-02 RX ADMIN — SODIUM CHLORIDE 2 GRAM(S): 9 INJECTION INTRAMUSCULAR; INTRAVENOUS; SUBCUTANEOUS at 13:34

## 2021-01-02 RX ADMIN — ENOXAPARIN SODIUM 40 MILLIGRAM(S): 100 INJECTION SUBCUTANEOUS at 11:34

## 2021-01-02 RX ADMIN — Medication 105 MILLIGRAM(S): at 22:52

## 2021-01-02 RX ADMIN — FINASTERIDE 5 MILLIGRAM(S): 5 TABLET, FILM COATED ORAL at 11:34

## 2021-01-02 NOTE — CHART NOTE - NSCHARTNOTEFT_GEN_A_CORE
Pt seen and examined. Pt is salvador speaking however, not making sense with . Pt appears very restless and attempting to   get out bed. Sitter at bedside. Pt has had two nights of agitation towards staff. Completing delirium work up, ordered b12 and Folate, TSH WNL.  Zyprexa ordered for PRN agitation. Will refrain from giving Ativan and Haldol due to advanced age and Parkinson's disease.

## 2021-01-03 LAB
ANION GAP SERPL CALC-SCNC: 11 MMOL/L — SIGNIFICANT CHANGE UP (ref 7–14)
BASOPHILS # BLD AUTO: 0.02 K/UL — SIGNIFICANT CHANGE UP (ref 0–0.2)
BASOPHILS NFR BLD AUTO: 0.3 % — SIGNIFICANT CHANGE UP (ref 0–2)
BUN SERPL-MCNC: 19 MG/DL — SIGNIFICANT CHANGE UP (ref 7–23)
CALCIUM SERPL-MCNC: 9.2 MG/DL — SIGNIFICANT CHANGE UP (ref 8.4–10.5)
CHLORIDE SERPL-SCNC: 100 MMOL/L — SIGNIFICANT CHANGE UP (ref 98–107)
CO2 SERPL-SCNC: 23 MMOL/L — SIGNIFICANT CHANGE UP (ref 22–31)
CREAT SERPL-MCNC: 0.61 MG/DL — SIGNIFICANT CHANGE UP (ref 0.5–1.3)
EOSINOPHIL # BLD AUTO: 0.24 K/UL — SIGNIFICANT CHANGE UP (ref 0–0.5)
EOSINOPHIL NFR BLD AUTO: 3.6 % — SIGNIFICANT CHANGE UP (ref 0–6)
FOLATE SERPL-MCNC: 6.6 NG/ML — SIGNIFICANT CHANGE UP (ref 3.1–17.5)
GLUCOSE SERPL-MCNC: 125 MG/DL — HIGH (ref 70–99)
HCT VFR BLD CALC: 39.3 % — SIGNIFICANT CHANGE UP (ref 39–50)
HGB BLD-MCNC: 12.9 G/DL — LOW (ref 13–17)
IANC: 3.73 K/UL — SIGNIFICANT CHANGE UP (ref 1.5–8.5)
IMM GRANULOCYTES NFR BLD AUTO: 0.3 % — SIGNIFICANT CHANGE UP (ref 0–1.5)
LYMPHOCYTES # BLD AUTO: 2.39 K/UL — SIGNIFICANT CHANGE UP (ref 1–3.3)
LYMPHOCYTES # BLD AUTO: 35.6 % — SIGNIFICANT CHANGE UP (ref 13–44)
MAGNESIUM SERPL-MCNC: 2.1 MG/DL — SIGNIFICANT CHANGE UP (ref 1.6–2.6)
MCHC RBC-ENTMCNC: 25.6 PG — LOW (ref 27–34)
MCHC RBC-ENTMCNC: 32.8 GM/DL — SIGNIFICANT CHANGE UP (ref 32–36)
MCV RBC AUTO: 78.1 FL — LOW (ref 80–100)
MONOCYTES # BLD AUTO: 0.31 K/UL — SIGNIFICANT CHANGE UP (ref 0–0.9)
MONOCYTES NFR BLD AUTO: 4.6 % — SIGNIFICANT CHANGE UP (ref 2–14)
NEUTROPHILS # BLD AUTO: 3.73 K/UL — SIGNIFICANT CHANGE UP (ref 1.8–7.4)
NEUTROPHILS NFR BLD AUTO: 55.6 % — SIGNIFICANT CHANGE UP (ref 43–77)
NRBC # BLD: 0 /100 WBCS — SIGNIFICANT CHANGE UP
NRBC # FLD: 0 K/UL — SIGNIFICANT CHANGE UP
PHOSPHATE SERPL-MCNC: 3.8 MG/DL — SIGNIFICANT CHANGE UP (ref 2.5–4.5)
PLATELET # BLD AUTO: 382 K/UL — SIGNIFICANT CHANGE UP (ref 150–400)
POTASSIUM SERPL-MCNC: 3.7 MMOL/L — SIGNIFICANT CHANGE UP (ref 3.5–5.3)
POTASSIUM SERPL-SCNC: 3.7 MMOL/L — SIGNIFICANT CHANGE UP (ref 3.5–5.3)
RBC # BLD: 5.03 M/UL — SIGNIFICANT CHANGE UP (ref 4.2–5.8)
RBC # FLD: 13.7 % — SIGNIFICANT CHANGE UP (ref 10.3–14.5)
SODIUM SERPL-SCNC: 134 MMOL/L — LOW (ref 135–145)
VIT B12 SERPL-MCNC: 545 PG/ML — SIGNIFICANT CHANGE UP (ref 200–900)
WBC # BLD: 6.71 K/UL — SIGNIFICANT CHANGE UP (ref 3.8–10.5)
WBC # FLD AUTO: 6.71 K/UL — SIGNIFICANT CHANGE UP (ref 3.8–10.5)

## 2021-01-03 RX ORDER — ATROPINE SULFATE 1 %
1 DROPS OPHTHALMIC (EYE)
Refills: 0 | Status: DISCONTINUED | OUTPATIENT
Start: 2021-01-03 | End: 2021-01-05

## 2021-01-03 RX ADMIN — SODIUM CHLORIDE 2 GRAM(S): 9 INJECTION INTRAMUSCULAR; INTRAVENOUS; SUBCUTANEOUS at 13:13

## 2021-01-03 RX ADMIN — MEMANTINE HYDROCHLORIDE 10 MILLIGRAM(S): 10 TABLET ORAL at 12:43

## 2021-01-03 RX ADMIN — Medication 3 MILLIGRAM(S): at 23:12

## 2021-01-03 RX ADMIN — POLYETHYLENE GLYCOL 3350 17 GRAM(S): 17 POWDER, FOR SOLUTION ORAL at 12:43

## 2021-01-03 RX ADMIN — Medication 105 MILLIGRAM(S): at 23:12

## 2021-01-03 RX ADMIN — CARBIDOPA AND LEVODOPA 1 TABLET(S): 25; 100 TABLET ORAL at 23:12

## 2021-01-03 RX ADMIN — ENOXAPARIN SODIUM 40 MILLIGRAM(S): 100 INJECTION SUBCUTANEOUS at 12:43

## 2021-01-03 RX ADMIN — CARBIDOPA AND LEVODOPA 1 TABLET(S): 25; 100 TABLET ORAL at 13:14

## 2021-01-03 RX ADMIN — FINASTERIDE 5 MILLIGRAM(S): 5 TABLET, FILM COATED ORAL at 12:43

## 2021-01-03 RX ADMIN — SODIUM CHLORIDE 2 GRAM(S): 9 INJECTION INTRAMUSCULAR; INTRAVENOUS; SUBCUTANEOUS at 06:08

## 2021-01-03 RX ADMIN — SODIUM CHLORIDE 2 GRAM(S): 9 INJECTION INTRAMUSCULAR; INTRAVENOUS; SUBCUTANEOUS at 23:13

## 2021-01-03 RX ADMIN — CARBIDOPA AND LEVODOPA 1 TABLET(S): 25; 100 TABLET ORAL at 06:08

## 2021-01-03 RX ADMIN — TAMSULOSIN HYDROCHLORIDE 0.4 MILLIGRAM(S): 0.4 CAPSULE ORAL at 23:13

## 2021-01-04 LAB
ANION GAP SERPL CALC-SCNC: 11 MMOL/L — SIGNIFICANT CHANGE UP (ref 7–14)
BASOPHILS # BLD AUTO: 0.02 K/UL — SIGNIFICANT CHANGE UP (ref 0–0.2)
BASOPHILS NFR BLD AUTO: 0.3 % — SIGNIFICANT CHANGE UP (ref 0–2)
BUN SERPL-MCNC: 25 MG/DL — HIGH (ref 7–23)
CALCIUM SERPL-MCNC: 9.2 MG/DL — SIGNIFICANT CHANGE UP (ref 8.4–10.5)
CHLORIDE SERPL-SCNC: 100 MMOL/L — SIGNIFICANT CHANGE UP (ref 98–107)
CO2 SERPL-SCNC: 26 MMOL/L — SIGNIFICANT CHANGE UP (ref 22–31)
CREAT SERPL-MCNC: 0.69 MG/DL — SIGNIFICANT CHANGE UP (ref 0.5–1.3)
EOSINOPHIL # BLD AUTO: 0.32 K/UL — SIGNIFICANT CHANGE UP (ref 0–0.5)
EOSINOPHIL NFR BLD AUTO: 4.2 % — SIGNIFICANT CHANGE UP (ref 0–6)
GLUCOSE SERPL-MCNC: 114 MG/DL — HIGH (ref 70–99)
HCT VFR BLD CALC: 39 % — SIGNIFICANT CHANGE UP (ref 39–50)
HGB BLD-MCNC: 13.2 G/DL — SIGNIFICANT CHANGE UP (ref 13–17)
IANC: 3.74 K/UL — SIGNIFICANT CHANGE UP (ref 1.5–8.5)
IMM GRANULOCYTES NFR BLD AUTO: 0.3 % — SIGNIFICANT CHANGE UP (ref 0–1.5)
LYMPHOCYTES # BLD AUTO: 3.12 K/UL — SIGNIFICANT CHANGE UP (ref 1–3.3)
LYMPHOCYTES # BLD AUTO: 40.9 % — SIGNIFICANT CHANGE UP (ref 13–44)
MAGNESIUM SERPL-MCNC: 2.1 MG/DL — SIGNIFICANT CHANGE UP (ref 1.6–2.6)
MCHC RBC-ENTMCNC: 26 PG — LOW (ref 27–34)
MCHC RBC-ENTMCNC: 33.8 GM/DL — SIGNIFICANT CHANGE UP (ref 32–36)
MCV RBC AUTO: 76.8 FL — LOW (ref 80–100)
MONOCYTES # BLD AUTO: 0.4 K/UL — SIGNIFICANT CHANGE UP (ref 0–0.9)
MONOCYTES NFR BLD AUTO: 5.2 % — SIGNIFICANT CHANGE UP (ref 2–14)
NEUTROPHILS # BLD AUTO: 3.74 K/UL — SIGNIFICANT CHANGE UP (ref 1.8–7.4)
NEUTROPHILS NFR BLD AUTO: 49.1 % — SIGNIFICANT CHANGE UP (ref 43–77)
NRBC # BLD: 0 /100 WBCS — SIGNIFICANT CHANGE UP
NRBC # FLD: 0 K/UL — SIGNIFICANT CHANGE UP
PHOSPHATE SERPL-MCNC: 4.6 MG/DL — HIGH (ref 2.5–4.5)
PLATELET # BLD AUTO: 393 K/UL — SIGNIFICANT CHANGE UP (ref 150–400)
POTASSIUM SERPL-MCNC: 4 MMOL/L — SIGNIFICANT CHANGE UP (ref 3.5–5.3)
POTASSIUM SERPL-SCNC: 4 MMOL/L — SIGNIFICANT CHANGE UP (ref 3.5–5.3)
RBC # BLD: 5.08 M/UL — SIGNIFICANT CHANGE UP (ref 4.2–5.8)
RBC # FLD: 13.9 % — SIGNIFICANT CHANGE UP (ref 10.3–14.5)
SODIUM SERPL-SCNC: 137 MMOL/L — SIGNIFICANT CHANGE UP (ref 135–145)
WBC # BLD: 7.62 K/UL — SIGNIFICANT CHANGE UP (ref 3.8–10.5)
WBC # FLD AUTO: 7.62 K/UL — SIGNIFICANT CHANGE UP (ref 3.8–10.5)

## 2021-01-04 PROCEDURE — 90792 PSYCH DIAG EVAL W/MED SRVCS: CPT

## 2021-01-04 RX ORDER — SODIUM CHLORIDE 9 MG/ML
2 INJECTION INTRAMUSCULAR; INTRAVENOUS; SUBCUTANEOUS
Refills: 0 | Status: DISCONTINUED | OUTPATIENT
Start: 2021-01-04 | End: 2021-01-05

## 2021-01-04 RX ORDER — ACETAMINOPHEN 500 MG
650 TABLET ORAL EVERY 6 HOURS
Refills: 0 | Status: DISCONTINUED | OUTPATIENT
Start: 2021-01-04 | End: 2021-01-05

## 2021-01-04 RX ORDER — QUETIAPINE FUMARATE 200 MG/1
12.5 TABLET, FILM COATED ORAL EVERY 6 HOURS
Refills: 0 | Status: DISCONTINUED | OUTPATIENT
Start: 2021-01-04 | End: 2021-01-05

## 2021-01-04 RX ADMIN — SODIUM CHLORIDE 2 GRAM(S): 9 INJECTION INTRAMUSCULAR; INTRAVENOUS; SUBCUTANEOUS at 18:03

## 2021-01-04 RX ADMIN — MEMANTINE HYDROCHLORIDE 10 MILLIGRAM(S): 10 TABLET ORAL at 11:32

## 2021-01-04 RX ADMIN — Medication 1 DROP(S): at 18:03

## 2021-01-04 RX ADMIN — Medication 650 MILLIGRAM(S): at 09:40

## 2021-01-04 RX ADMIN — TAMSULOSIN HYDROCHLORIDE 0.4 MILLIGRAM(S): 0.4 CAPSULE ORAL at 23:02

## 2021-01-04 RX ADMIN — OLANZAPINE 1.25 MILLIGRAM(S): 15 TABLET, FILM COATED ORAL at 13:18

## 2021-01-04 RX ADMIN — CARBIDOPA AND LEVODOPA 1 TABLET(S): 25; 100 TABLET ORAL at 06:47

## 2021-01-04 RX ADMIN — Medication 3 MILLIGRAM(S): at 23:02

## 2021-01-04 RX ADMIN — CARBIDOPA AND LEVODOPA 1 TABLET(S): 25; 100 TABLET ORAL at 13:19

## 2021-01-04 RX ADMIN — SODIUM CHLORIDE 2 GRAM(S): 9 INJECTION INTRAMUSCULAR; INTRAVENOUS; SUBCUTANEOUS at 06:47

## 2021-01-04 RX ADMIN — ENOXAPARIN SODIUM 40 MILLIGRAM(S): 100 INJECTION SUBCUTANEOUS at 11:32

## 2021-01-04 RX ADMIN — CARBIDOPA AND LEVODOPA 1 TABLET(S): 25; 100 TABLET ORAL at 23:01

## 2021-01-04 RX ADMIN — FINASTERIDE 5 MILLIGRAM(S): 5 TABLET, FILM COATED ORAL at 11:32

## 2021-01-04 RX ADMIN — Medication 1 DROP(S): at 09:20

## 2021-01-04 RX ADMIN — Medication 105 MILLIGRAM(S): at 23:03

## 2021-01-04 RX ADMIN — Medication 650 MILLIGRAM(S): at 09:19

## 2021-01-04 RX ADMIN — POLYETHYLENE GLYCOL 3350 17 GRAM(S): 17 POWDER, FOR SOLUTION ORAL at 11:32

## 2021-01-04 NOTE — BH CONSULTATION LIAISON ASSESSMENT NOTE - NSBHCHARTREVIEWVS_PSY_A_CORE FT
Vital Signs Last 24 Hrs  T(C): 36.6 (04 Jan 2021 13:32), Max: 37.2 (03 Jan 2021 15:39)  T(F): 97.9 (04 Jan 2021 13:32), Max: 99 (03 Jan 2021 15:39)  HR: 77 (04 Jan 2021 13:32) (65 - 108)  BP: 105/55 (04 Jan 2021 13:32) (105/55 - 139/73)  BP(mean): --  RR: 17 (04 Jan 2021 06:40) (16 - 17)  SpO2: 97% (04 Jan 2021 13:32) (97% - 100%)

## 2021-01-04 NOTE — BH CONSULTATION LIAISON ASSESSMENT NOTE - CASE SUMMARY
Chart reviewed, pt. seen and evaluated with Dr. Alexander, I agree with above assessment and plan, pt. AAOX2, overall calm, cooperative, reports feeling depressed in the context of current medical issues and current hospitalization, denies acute psychotic sxs, denies SI and HI. Patient is hopeful and future oriented. Presentation is c/w likely delirium superimposed on underlying dementia. Recommend PRNs as written above, monitor EKG for qtc, will follow

## 2021-01-04 NOTE — BH CONSULTATION LIAISON ASSESSMENT NOTE - HPI (INCLUDE ILLNESS QUALITY, SEVERITY, DURATION, TIMING, CONTEXT, MODIFYING FACTORS, ASSOCIATED SIGNS AND SYMPTOMS)
pt is a 75 y/o Eileen speaking male with psychiatric hx of depression, no psychiatric admission with PMH of Parkinson's dementia AAO x 2 at baseline, BPH(s/p microwave ablation), hx of multiple MDR Klebsiella UTIs, domiciled with wife, daughter and grandchildren presents to ED for worsening suprapubic pain/urinary retention and found to be hyponatremic being treated on the medical floors. Consult placed for agitation management.    Chart reviewed pt had received Zyprexa 1.5mg for agitation yesterday    pt was seen with attending Dr. Richard (spoke in Keshawn/Hungarian with patient )  Pt was calm cooperative, oriented x2. pt relates depressed mood due to multiple symptoms. He relates poor sleep at night due to coughing and suprapubic pain. He could not relates specific reason for admission. pt was not aware of all his medical diagnosis. He relates he got verbally upset due to wanting to sit up. He denies becoming physical with anyone and feels safe in the hospital. pt denies overt hopelessness or helplessness. He relates he lives with his daughter. pt denies suicidal/homicidal ideations. pt denies A/V hallucinations. pt denies paranoia, no delusions elicited. Pt does not appear internally preoccupied    Collateral from pt's granddaughter Tesha ()- She relates pt has had hx of depression was previously was on Lexapro started by his PCP. Pt immigrated 2 years ago from [facility]. pt sometimes gets agitated when he's alone at home. pt when upset usually yells and then refuses to speak with people. Pt can occasionally become physical agitated last time was about few months ago. Pt had responded to Lexapro            Pt is a 73 y/o Eileen speaking male with psychiatric hx of depression, no psychiatric admission with PMH of Parkinson's dementia AAO x 2 at baseline, BPH(s/p microwave ablation), hx of multiple MDR Klebsiella UTIs, domiciled with wife, daughter and grandchildren presents to ED for worsening suprapubic pain/urinary retention and found to be hyponatremic being treated on the medical floors. Consult placed for agitation management.    Chart reviewed pt had received Zyprexa 1.25mg for agitation yesterday    pt was seen with attending Dr. Richard (spoke in Keshawn/Yoruba with patient )  Pt was calm cooperative, oriented x2. pt relates depressed mood due to multiple symptoms. He relates poor sleep at night due to coughing and suprapubic pain. He could not relates specific reason for admission. pt was not aware of all his medical diagnosis. He relates he got verbally upset due to wanting to sit up. He denies becoming physical with anyone and feels safe in the hospital. pt denies overt hopelessness or helplessness. He relates he lives with his daughter. pt denies suicidal/homicidal ideations. pt denies A/V hallucinations. pt denies paranoia, no delusions elicited. Pt does not appear internally preoccupied. Remains hopeful.    Collateral from pt's granddaughter Tesha () (pt. gave consent)- She relates pt has had hx of depression was previously was on Lexapro started by his PCP. Pt immigrated 2 years ago from [facility]. pt sometimes gets agitated when he's alone at home. pt when upset usually yells and then refuses to speak with people. Pt can occasionally become physical agitated last time was about few months ago. Pt had responded to Lexapro

## 2021-01-04 NOTE — BH CONSULTATION LIAISON ASSESSMENT NOTE - NSBHCONSULTRECOMMENDOTHER_PSY_A_CORE FT
The patient would also benefit from maintenance of regular sleep/wake cycles, frequent re-orientation with use of language services, family member at bedside, Ambulation when possible with PT, ensuring personal eyeglasses or hearing aides available if used, avoidance of benzodiazepines and anticholinergic medications,     Pt may f/u at [facility] Adult Outpatient Psychiatry Department- [phone]   or [facility]- [phone]  [facility] Crisis clinic - [phone]   The patient would also benefit from maintenance of regular sleep/wake cycles, frequent re-orientation with use of language services, family member at bedside if possible, Ambulation when possible with PT, ensuring personal eyeglasses or hearing aides available if used, avoidance of benzodiazepines and anticholinergic medications,     Pt may f/u at [facility] Adult Outpatient Psychiatry Department- [phone]  or [facility]- [phone]  [facility] Crisis clinic - [phone]

## 2021-01-04 NOTE — BH CONSULTATION LIAISON ASSESSMENT NOTE - NSBHCONSULTMEDSEVERE_PSY_A_CORE FT
If pt is severely agitated and not accepting po meds may give Zyprexa 1.5mg [id number] PRN (monitor qtc and hold if greater then 500) If pt is severely agitated and not accepting po meds may give Zyprexa 1.25mg IM [id number] PRN (monitor qtc and hold if greater then 500)

## 2021-01-04 NOTE — BH CONSULTATION LIAISON ASSESSMENT NOTE - NSBHCONSULTMEDAGITATION_PSY_A_CORE FT
Must try and decrease white flour products and carbohydrates such as less potatos, rice, tortillas, bread, pasta, etc. Eat smaller portions and avoid eating late at night, especially before bed.  Try to not skip meals as many times your body will feel that if pt is agitated and accepting PO medications may offer Seroquel 12.5mg PO [id number] PRN if pt is agitated and accepting PO medications may offer Seroquel 12.5mg PO [id number] PRN if qtc <500

## 2021-01-04 NOTE — BH CONSULTATION LIAISON ASSESSMENT NOTE - CURRENT MEDICATION
MEDICATIONS  (STANDING):  atropine 1% Ophthalmic Solution for SubLingual Use 1 Drop(s) SubLingual two times a day  carbidopa/levodopa  25/100 1 Tablet(s) Oral three times a day  enoxaparin Injectable 40 milliGRAM(s) SubCutaneous daily  finasteride 5 milliGRAM(s) Oral daily  influenza  Vaccine (HIGH DOSE) 0.7 milliLiter(s) IntraMuscular once  melatonin 3 milliGRAM(s) Oral at bedtime  memantine 10 milliGRAM(s) Oral daily  phenazopyridine 100 milliGRAM(s) Oral every 8 hours  polyethylene glycol 3350 17 Gram(s) Oral daily  sodium chloride 2 Gram(s) Oral two times a day  tamsulosin 0.4 milliGRAM(s) Oral at bedtime  thiamine IVPB 500 milliGRAM(s) IV Intermittent daily    MEDICATIONS  (PRN):  acetaminophen   Tablet .. 650 milliGRAM(s) Oral every 6 hours PRN Mild Pain (1 - 3), Moderate Pain (4 - 6), Severe Pain (7 - 10)  OLANZapine 1.25 milliGRAM(s) Oral every 6 hours PRN Agitation  OLANZapine Injectable 1.25 milliGRAM(s) IntraMuscular every 6 hours PRN Agitation

## 2021-01-04 NOTE — BH CONSULTATION LIAISON ASSESSMENT NOTE - NSSUICPROTFACT_PSY_ALL_CORE
Responsibility to children, family, or others/Supportive social network of family or friends Responsibility to children, family, or others/Identifies reasons for living/Supportive social network of family or friends/Cultural, spiritual and/or moral attitudes against suicide

## 2021-01-04 NOTE — BH CONSULTATION LIAISON ASSESSMENT NOTE - SUMMARY
pt is a 75 y/o Eileen speaking male with psychiatric hx of depression, no psychiatric admission with PMH of Parkinson's dementia AAO x 2 at baseline, BPH(s/p microwave ablation), hx of multiple MDR Klebsiella UTIs, domiciled with wife, daughter and grandchildren presents to ED for worsening suprapubic pain/urinary retention and found to be hyponatremic being treated on the medical floors. Consult placed for agitation management.    Chart reviewed pt had received Zyprexa 1.5mg for agitation yesterday    pt was seen with attending Dr. Richard (spoke in Keshawn/Faroese with patient )  Pt was calm cooperative, oriented x2. pt relates depressed mood due to multiple symptoms. Pt reports becoming agitated because he relates the staff would not let him sit up. He denies becoming physically agitated. pt denies suicidal/homicidal ideations. pt denies A/V hallucinations. pt denies paranoia, no delusions elicited. Pt presents with agitation most likely secondary to dementia.           pt is a 75 y/o Eileen speaking male with psychiatric hx of depression, no psychiatric admission with PMH of Parkinson's dementia AAO x 2 at baseline, BPH(s/p microwave ablation), hx of multiple MDR Klebsiella UTIs, domiciled with wife, daughter and grandchildren presents to ED for worsening suprapubic pain/urinary retention and found to be hyponatremic being treated on the medical floors. Consult placed for agitation management.    Chart reviewed pt had received Zyprexa 1.25mg for agitation yesterday    Pt was calm cooperative, oriented x2. pt relates depressed mood due to multiple symptoms. Pt reports becoming agitated because he relates the staff would not let him sit up. He denies becoming physically agitated. pt denies suicidal/homicidal ideations. pt denies A/V hallucinations. pt denies paranoia, no delusions elicited. Pt presents with agitation most likely secondary to delirium superimposed on dementia.

## 2021-01-04 NOTE — BH CONSULTATION LIAISON ASSESSMENT NOTE - RISK ASSESSMENT
Risk factors: acute/chronic medical issues, dementia     Protective factors: no current suicidal/homicidal ideations intent or plan, No previous suicide attempt or self injurious behavior, no h/o psych admissions, no active substance abuse, no psychosis, engaged in work or school, domiciled, social supports, positive therapeutic relationship, engaged in treatment, compliant with treatment, help-seeking behaviors, goal oriented with plans for the future   Risk factors: acute/chronic medical issues, dementia , delirium    Protective factors: no current suicidal/homicidal ideations intent or plan, No previous suicide attempt or self injurious behavior, no h/o psych admissions, no active substance abuse, no psychosis,  domiciled, social supports, positive therapeutic relationship, engaged in treatment, compliant with treatment, help-seeking behaviors, goal oriented with plans for the future

## 2021-01-05 VITALS — WEIGHT: 145.06 LBS

## 2021-01-05 LAB
ANION GAP SERPL CALC-SCNC: 11 MMOL/L — SIGNIFICANT CHANGE UP (ref 7–14)
BUN SERPL-MCNC: 21 MG/DL — SIGNIFICANT CHANGE UP (ref 7–23)
CALCIUM SERPL-MCNC: 9 MG/DL — SIGNIFICANT CHANGE UP (ref 8.4–10.5)
CHLORIDE SERPL-SCNC: 103 MMOL/L — SIGNIFICANT CHANGE UP (ref 98–107)
CO2 SERPL-SCNC: 25 MMOL/L — SIGNIFICANT CHANGE UP (ref 22–31)
CREAT SERPL-MCNC: 0.63 MG/DL — SIGNIFICANT CHANGE UP (ref 0.5–1.3)
FOLATE SERPL-MCNC: 6.2 NG/ML — SIGNIFICANT CHANGE UP (ref 3.1–17.5)
GLUCOSE SERPL-MCNC: 129 MG/DL — HIGH (ref 70–99)
HCT VFR BLD CALC: 37.4 % — LOW (ref 39–50)
HGB BLD-MCNC: 12.5 G/DL — LOW (ref 13–17)
MAGNESIUM SERPL-MCNC: 2.1 MG/DL — SIGNIFICANT CHANGE UP (ref 1.6–2.6)
MCHC RBC-ENTMCNC: 26 PG — LOW (ref 27–34)
MCHC RBC-ENTMCNC: 33.4 GM/DL — SIGNIFICANT CHANGE UP (ref 32–36)
MCV RBC AUTO: 77.8 FL — LOW (ref 80–100)
NRBC # BLD: 0 /100 WBCS — SIGNIFICANT CHANGE UP
NRBC # FLD: 0 K/UL — SIGNIFICANT CHANGE UP
PHOSPHATE SERPL-MCNC: 4.3 MG/DL — SIGNIFICANT CHANGE UP (ref 2.5–4.5)
PLATELET # BLD AUTO: 385 K/UL — SIGNIFICANT CHANGE UP (ref 150–400)
POTASSIUM SERPL-MCNC: 4 MMOL/L — SIGNIFICANT CHANGE UP (ref 3.5–5.3)
POTASSIUM SERPL-SCNC: 4 MMOL/L — SIGNIFICANT CHANGE UP (ref 3.5–5.3)
RBC # BLD: 4.81 M/UL — SIGNIFICANT CHANGE UP (ref 4.2–5.8)
RBC # FLD: 13.8 % — SIGNIFICANT CHANGE UP (ref 10.3–14.5)
SODIUM SERPL-SCNC: 139 MMOL/L — SIGNIFICANT CHANGE UP (ref 135–145)
TSH SERPL-MCNC: 1.32 UIU/ML — SIGNIFICANT CHANGE UP (ref 0.27–4.2)
VIT B12 SERPL-MCNC: 563 PG/ML — SIGNIFICANT CHANGE UP (ref 200–900)
WBC # BLD: 6.41 K/UL — SIGNIFICANT CHANGE UP (ref 3.8–10.5)
WBC # FLD AUTO: 6.41 K/UL — SIGNIFICANT CHANGE UP (ref 3.8–10.5)

## 2021-01-05 RX ORDER — SODIUM CHLORIDE 9 MG/ML
1 INJECTION INTRAMUSCULAR; INTRAVENOUS; SUBCUTANEOUS THREE TIMES A DAY
Refills: 0 | Status: DISCONTINUED | OUTPATIENT
Start: 2021-01-05 | End: 2021-01-05

## 2021-01-05 RX ORDER — SODIUM CHLORIDE 9 MG/ML
1 INJECTION INTRAMUSCULAR; INTRAVENOUS; SUBCUTANEOUS
Qty: 0 | Refills: 0 | DISCHARGE

## 2021-01-05 RX ORDER — LANOLIN ALCOHOL/MO/W.PET/CERES
10 CREAM (GRAM) TOPICAL AT BEDTIME
Refills: 0 | Status: DISCONTINUED | OUTPATIENT
Start: 2021-01-05 | End: 2021-01-05

## 2021-01-05 RX ORDER — FINASTERIDE 5 MG/1
1 TABLET, FILM COATED ORAL
Qty: 30 | Refills: 0
Start: 2021-01-05 | End: 2021-02-03

## 2021-01-05 RX ORDER — SODIUM CHLORIDE 9 MG/ML
2 INJECTION INTRAMUSCULAR; INTRAVENOUS; SUBCUTANEOUS
Qty: 120 | Refills: 0
Start: 2021-01-05 | End: 2021-02-03

## 2021-01-05 RX ORDER — LANOLIN ALCOHOL/MO/W.PET/CERES
9 CREAM (GRAM) TOPICAL AT BEDTIME
Refills: 0 | Status: DISCONTINUED | OUTPATIENT
Start: 2021-01-05 | End: 2021-01-05

## 2021-01-05 RX ADMIN — ENOXAPARIN SODIUM 40 MILLIGRAM(S): 100 INJECTION SUBCUTANEOUS at 13:24

## 2021-01-05 RX ADMIN — SODIUM CHLORIDE 2 GRAM(S): 9 INJECTION INTRAMUSCULAR; INTRAVENOUS; SUBCUTANEOUS at 06:18

## 2021-01-05 RX ADMIN — SODIUM CHLORIDE 1 GRAM(S): 9 INJECTION INTRAMUSCULAR; INTRAVENOUS; SUBCUTANEOUS at 13:26

## 2021-01-05 RX ADMIN — MEMANTINE HYDROCHLORIDE 10 MILLIGRAM(S): 10 TABLET ORAL at 13:25

## 2021-01-05 RX ADMIN — CARBIDOPA AND LEVODOPA 1 TABLET(S): 25; 100 TABLET ORAL at 13:25

## 2021-01-05 RX ADMIN — FINASTERIDE 5 MILLIGRAM(S): 5 TABLET, FILM COATED ORAL at 13:25

## 2021-01-05 RX ADMIN — POLYETHYLENE GLYCOL 3350 17 GRAM(S): 17 POWDER, FOR SOLUTION ORAL at 13:26

## 2021-01-05 RX ADMIN — OLANZAPINE 1.25 MILLIGRAM(S): 15 TABLET, FILM COATED ORAL at 13:27

## 2021-01-05 RX ADMIN — CARBIDOPA AND LEVODOPA 1 TABLET(S): 25; 100 TABLET ORAL at 06:18

## 2021-01-05 NOTE — DIETITIAN INITIAL EVALUATION ADULT. - CONTINUE CURRENT NUTRITION CARE PLAN
1. Continue current diet order, which remains appropriate at this time. 2. Monitor weights, labs, BM's, skin integrity, p.o. intake. 3. Please Encourage po intake, assist with meals and menu selections, provide alternatives PRN.

## 2021-01-05 NOTE — DIETITIAN INITIAL EVALUATION ADULT. - PERTINENT MEDS FT
carbidopa/levodopa  25/100  finasteride  melatonin  memantine  polyethylene glycol 3350  sodium chloride  tamsulosin

## 2021-01-05 NOTE — PROGRESS NOTE ADULT - ASSESSMENT
73 y/o salvador speaking M with PMH of Parkinsons dementia AAO x 2 at baseline, BPH(s/p microwave ablation), hx of multiple MDR Klebsiella UTIs presents to ED for worsening suprapubic pain found to have enlarge mesenteric LNs     Enlarge LNs    Ct shows   - Mild bilateral hydroureteronephrosis with dilatation of the ureters to the level of the urinary bladder.Enlarged prostate gland.  Moderately distended urinary bladder despite the presence of a Aguilar catheter.  Hazy mesenteric fat along the SMA distribution, increased in prominence from prior, with small mesenteric lymph nodes. This is a nonspecific finding. However, neoplasms including lymphoma should be considered.       - attempted to call and discuss with radiology, no answer, not able to speak with MD  - CT chest without LAD  - in light of small nonspecific LN in CT a/p and neg CT chest, nml LDH, can likely repeat imaging as outpt (CT vs PET)  - PSA slightly elevated, will need  eval as well, can likley be done as outpt  - hg is 12.4 with normal uric acid     Parkinson -- per neuro    Will follow, d/c planning from heme/onc standpoint, outpt f/u in our office, call [phone] for appt, call if questions, [phone]
73 y/o salvador speaking M with PMH of Parkinsons dementia AAO x 2 at baseline, BPH(s/p microwave ablation), hx of multiple MDR Klebsiella UTIs presents to ED for worsening suprapubic pain/urinary retention    1. urinary retention  -medication induced versus BPH  -abdominal CT results noted  -cont pichardo   -urine cx negative  -US kidney  noted   -urology outpt    2. Hyponatremia  -possible SIADH  -mgmt per medicine  -renal f/u    DVT ppx
73 y/o salvador speaking M with PMH of Parkinsons dementia AAO x 2 at baseline, BPH(s/p microwave ablation), hx of multiple MDR Klebsiella UTIs presents to ED for worsening suprapubic pain/urinary retention    1. urinary retention  -medication induced versus BPH  -abdominal CT results noted  -management per renal/ med     2. Hyponatremia  -possible SIADH  -mgmt per renal   -cv stable     DVT ppx
73 y/o salvador speaking M with PMH of Parkinsons dementia AAO x 2 at baseline, sialorrhea, BPH(s/p microwave ablation), hx of multiple MDR Klebsiella UTIs admitted for drug induced acute urinary retention vs BPH c/b acute hyponatremia.    Problem/Plan - 1:  ·  Problem: Urinary retention.  Plan: drug induced (d/t anticholinergic med - glycopyrrolate) vs progression of BPH  retained 550cc s/p indwelling pichardo catheter draining clear urine  CT Abdomen showed b.l mild hydroureteronephrosis, BPH, distended bladder despite pichardo  - resume tamsulosin, start acetaminophen 975mg TID x 3 days pyridium x 2 days  - avoid belladonna given anticholinergic property  - UA neg, f/u Ucx, non toxic monitor off abx  - TOV prior to dc, consider urology if urinary retention doesn't improve.     Problem/Plan - 2:  ·  Problem: Hyponatremia.  Plan: asymptomatic  possibly secondary to SIADH in setting of pain vs distended bladder  incr salt tabs to 1g TID  f/u Uosm, Ethel, Serum osm  pt with poor appetite, will hold off on water restriction.     Problem/Plan - 3:  ·  Problem: Sialorrhea.  Plan: secondary to complication of parkinson's  hold off on glycopyrrolate  obtain speech and swallow eval, in interim start mechanical soft diet.     Problem/Plan - 4:  ·  Problem: Benign prostatic hyperplasia with nocturia.  Plan: enlarged prostate seen on CT abdomen  can follow up outpatient.     Problem/Plan - 5:  ·  Problem: Parkinson disease.  Plan: currently at baseline mental status AAO x 2  c/w Carbidopa-Levodopa, memantine and lexapro.   neuro fu     Problem/Plan - 6:  Problem: Lymphadenopathy. Plan: Hazy mesenteric fat along the SMA distribution, increased in prominence from prior, with small mesenteric lymph nodes, non specific, incidentally seen on CT A/P  heme fu   
75 y/o salvador speaking M with PMH of Parkinsons dementia AAO x 2 at baseline, BPH(s/p microwave ablation), hx of multiple MDR Klebsiella UTIs presents to ED for worsening suprapubic pain/urinary retention    1. urinary retention  -medication induced versus BPH  -abdominal CT results noted  -cont pichardo   -urine cx negative  -US kidney  noted   -urology outpt    2. Hyponatremia  -possible SIADH  -mgmt per medicine  -renal f/u    DVT ppx
75 y/o salvador speaking M with PMH of Parkinsons dementia AAO x 2 at baseline, BPH(s/p microwave ablation), hx of multiple MDR Klebsiella UTIs presents to ED for worsening suprapubic pain/urinary retention    1. urinary retention  -medication induced versus BPH  -abdominal CT results noted  -cont pichardo drainge  -urine cx negative  -urology eval    2. Hyponatremia  -possible SIADH  -mgmt per medicine  -renal eval noted     DVT ppx
75 y/o salvador speaking M with PMH of Parkinsons dementia AAO x 2 at baseline, BPH(s/p microwave ablation), hx of multiple MDR Klebsiella UTIs presents to ED for worsening suprapubic pain/urinary retention    1. urinary retention  -medication induced versus BPH  -abdominal CT results noted  -management per renal/ med     2. Hyponatremia  -possible SIADH  -mgmt per renal       DVT ppx
75 y/o salvador speaking M with PMH of Parkinsons dementia AAO x 2 at baseline, BPH(s/p microwave ablation), hx of multiple MDR Klebsiella UTIs presents to ED for worsening suprapubic pain/urinary retention    1. urinary retention  -medication induced versus BPH  -abdominal CT results noted  -management per renal/ med     2. Hyponatremia  -possible SIADH  -mgmt per renal   -cv stable     DVT ppx
75y Male with history of dementia presents with lower abdominal pain. Nephrology consulted for hyponatremia.    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 Serum Na improving for which would decrease NaCl to 2 grams twice daily. Continue with ensure with meals and 1L FR.  Monitor serum Na.    2) Urinary retention: Resolved as per renal US. Continue with flomax, proscar and pichardo (would not attempt TOV given h/o retention requiring pichardo and enlarged prostate on CT). Outpatient urology follow up.    3) Bilateral hydronephrosis: Due to urinary retention as above s/p pichardo. Repeat renal US with resolution.     4) Depression: Holding lexapro as likely cause of hyponatremia.
75y Male with history of dementia presents with lower abdominal pain. Nephrology consulted for hyponatremia.    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 Serum Na improving on sodium chloride 2 grams twice daily. Continue with ensure with meals and 1L FR. If worsening serum Na, will consider tolvaptan.  Monitor serum Na.    2) Urinary retention: Resolved as per renal US. Continue with flomax, proscar and pichardo (would not attempt TOV given h/o retention requiring pichardo and enlarged prostate on CT). Outpatient urology follow up.    3) Bilateral hydronephrosis: Due to urinary retention as above s/p pichardo. Repeat renal US with resolution.     4) Depression: Holding lexapro as likely cause of hyponatremia.
Problem/Plan - 1:  ·  Problem: Urinary retention.  Plan: drug induced (d/t anticholinergic med - glycopyrrolate) vs progression of BPH  retained 550cc s/p indwelling pichardo catheter draining clear urine  - resume tamsulosin  - check PSA  - may need urology fu     Problem/Plan - 2:  ·  Problem: Hyponatremia.  Plan: asymptomatic  renal fu  monitor bmp     Problem/Plan - 3:  ·  Problem: Sialorrhea.  Plan: secondary to complication of parkinson's  hold off on glycopyrrolate  obtain speech and swallow eval, in interim start mechanical soft diet.     Problem/Plan - 4:  ·  Problem: Benign prostatic hyperplasia with nocturia.  Plan: enlarged prostate seen on CT abdomen  can follow up outpatient.     Problem/Plan - 5:  ·  Problem: Parkinson disease.  Plan: currently at baseline mental status AAO x 2  c/w Carbidopa-Levodopa, memantine and lexapro. neuro fu     Problem/Plan - 6:  Problem: Lymphadenopathy. Plan: Hazy mesenteric fat along the SMA distribution, increased in prominence from prior, with small mesenteric lymph nodes, non specific, incidentally seen on CT A/P  heme fu appreciated  check PSA  outpt workup   
Problem/Plan - 1:  ·  Problem: Urinary retention.  Plan: drug induced (d/t anticholinergic med - glycopyrrolate) vs progression of BPH  retained 550cc s/p indwelling pichardo catheter draining clear urine  - resume tamsulosin  - urology consult     Problem/Plan - 2:  ·  Problem: Hyponatremia.  Plan: asymptomatic  renal fu  monitor bmp     Problem/Plan - 3:  ·  Problem: Sialorrhea.  Plan: secondary to complication of parkinson's  hold off on glycopyrrolate  obtain speech and swallow eval, in interim start mechanical soft diet.     Problem/Plan - 4:  ·  Problem: Benign prostatic hyperplasia with nocturia.  Plan: enlarged prostate seen on CT abdomen  can follow up outpatient.     Problem/Plan - 5:  ·  Problem: Parkinson disease.  Plan: currently at baseline mental status AAO x 2  c/w Carbidopa-Levodopa, memantine and lexapro. neuro fu     Problem/Plan - 6:  Problem: Lymphadenopathy. Plan: Hazy mesenteric fat along the SMA distribution, increased in prominence from prior, with small mesenteric lymph nodes, non specific, incidentally seen on CT A/P  heme fu appreciated  
Problem/Plan - 1:  ·  Problem: Urinary retention.  Plan: drug induced (d/t anticholinergic med - glycopyrrolate) vs progression of BPH  retained 550cc s/p indwelling pichardo catheter draining clear urine  - resume tamsulosin  - urology consult     Problem/Plan - 2:  ·  Problem: Hyponatremia.  Plan: asymptomatic  renal fu  monitor bmp     Problem/Plan - 3:  ·  Problem: Sialorrhea.  Plan: secondary to complication of parkinson's  hold off on glycopyrrolate  obtain speech and swallow eval, in interim start mechanical soft diet.     Problem/Plan - 4:  ·  Problem: Benign prostatic hyperplasia with nocturia.  Plan: enlarged prostate seen on CT abdomen  can follow up outpatient.     Problem/Plan - 5:  ·  Problem: Parkinson disease.  Plan: currently at baseline mental status AAO x 2  c/w Carbidopa-Levodopa, memantine and lexapro. neuro fu     Problem/Plan - 6:  Problem: Lymphadenopathy. Plan: Hazy mesenteric fat along the SMA distribution, increased in prominence from prior, with small mesenteric lymph nodes, non specific, incidentally seen on CT A/P  heme fu appreciated    Neuro called for FU for increased agitation
Problem/Plan - 1:  ·  Problem: Urinary retention.  Plan: drug induced (d/t anticholinergic med - glycopyrrolate) vs progression of BPH  retained 550cc s/p indwelling pichardo catheter draining clear urine  - resume tamsulosin  - urology consult     Problem/Plan - 2:  ·  Problem: Hyponatremia.  Plan: asymptomatic  renal fu  monitor bmp     Problem/Plan - 3:  ·  Problem: Sialorrhea.  Plan: secondary to complication of parkinson's  hold off on glycopyrrolate  obtain speech and swallow eval, in interim start mechanical soft diet.     Problem/Plan - 4:  ·  Problem: Benign prostatic hyperplasia with nocturia.  Plan: enlarged prostate seen on CT abdomen  can follow up outpatient.     Problem/Plan - 5:  ·  Problem: Parkinson disease.  Plan: currently at baseline mental status AAO x 2  c/w Carbidopa-Levodopa, memantine and lexapro. neuro fu Problem/Plan - 6:  Problem: Lymphadenopathy. Plan: Hazy mesenteric fat along the SMA distribution, increased in prominence from prior, with small mesenteric lymph nodes, non specific, incidentally seen on CT A/P  heme fu appreciated    dc planning 
73 y/o salvador speaking M with PMH of Parkinsons dementia AAO x 2 at baseline, sialorrhea, BPH(s/p microwave ablation), hx of multiple MDR Klebsiella UTIs admitted for drug induced acute urinary retention vs BPH c/b acute hyponatremia.    Problem/Plan - 1:  ·  Problem: Urinary retention.  Plan: drug induced (d/t anticholinergic med - glycopyrrolate) vs progression of BPH  retained 550cc s/p indwelling pichardo catheter draining clear urine  - resume tamsulosin, start acetaminophen 975mg TID x 3 days pyridium x 2 days    Problem/Plan - 2:  ·  Problem: Hyponatremia.  Plan: asymptomatic  renal fu  monitor bmp     Problem/Plan - 3:  ·  Problem: Sialorrhea.  Plan: secondary to complication of parkinson's  hold off on glycopyrrolate  obtain speech and swallow eval, in interim start mechanical soft diet.     Problem/Plan - 4:  ·  Problem: Benign prostatic hyperplasia with nocturia.  Plan: enlarged prostate seen on CT abdomen  can follow up outpatient.     Problem/Plan - 5:  ·  Problem: Parkinson disease.  Plan: currently at baseline mental status AAO x 2  c/w Carbidopa-Levodopa, memantine and lexapro.   neuro fu     Problem/Plan - 6:  Problem: Lymphadenopathy. Plan: Hazy mesenteric fat along the SMA distribution, increased in prominence from prior, with small mesenteric lymph nodes, non specific, incidentally seen on CT A/P  heme fu 
75 y/o salvador speaking M with PMH of Parkinsons dementia AAO x 2 at baseline, BPH(s/p microwave ablation), hx of multiple MDR Klebsiella UTIs presents to ED for worsening suprapubic pain found to have enlarge mesenteric LNs     Enlarge LNs    Ct shows   - Mild bilateral hydroureteronephrosis with dilatation of the ureters to the level of the urinary bladder.Enlarged prostate gland.  Moderately distended urinary bladder despite the presence of a Aguilar catheter.  Hazy mesenteric fat along the SMA distribution, increased in prominence from prior, with small mesenteric lymph nodes. This is a nonspecific finding. However, neoplasms including lymphoma should be considered.       - attempted to call and discuss with radiology, no answer, not able to speak with radiologist  - CT chest without LAD  - in light of small nonspecific LN in CT a/p and neg CT chest, nml LDH, can likely repeat imaging as outpt (CT vs PET)  - PSA slightly elevated, will need  eval as well, can likley be done as outpt  - hg is nml with normal uric acid   - lower suspicion for underlying lymphoproliferative disorder but will need to f/u as outpt    Parkinson -- per neuro    elevated PSA -- mild,  f/u, can be done as outpt if desired    Will follow, d/c planning from heme/onc standpoint, outpt f/u in our office, call [phone] for appt, call if questions, [phone]
75y Male with history of dementia presents with lower abdominal pain. Nephrology consulted for hyponatremia.    1) Hyponatremia: Hypotonic hyponatremia likely due to SIADH possibly secondary to lexapro versus CA given CT findings. Serum Na improving. Continue with sodium chloride 1 gram TID, ensure with meals and start 1L FR. Follow up urine Na and urine osm to confirm SIADH. Monitor serum Na.    2) Urinary retention: Continue with flomax, proscar and pichardo (would not attempt TOV given h/o retention requiring pichardo and enlarged prostate on CT). Outpatient urology follow up.    3) Bilateral hydronephrosis: Due to urinary retention as above s/p pichardo. Repeat renal US to ensure resolution.     4) Depression: Holding lexapro as likely cause of hyponatremia.
Problem/Plan - 1:  ·  Problem: Urinary retention.  Plan: drug induced (d/t anticholinergic med - glycopyrrolate) vs progression of BPH  retained 550cc s/p indwelling pichardo catheter draining clear urine  - resume tamsulosin  - urology consult     Problem/Plan - 2:  ·  Problem: Hyponatremia.  Plan: asymptomatic  renal fu  monitor bmp     Problem/Plan - 3:  ·  Problem: Sialorrhea.  Plan: secondary to complication of parkinson's  hold off on glycopyrrolate  obtain speech and swallow eval, in interim start mechanical soft diet.     Problem/Plan - 4:  ·  Problem: Benign prostatic hyperplasia with nocturia.  Plan: enlarged prostate seen on CT abdomen  can follow up outpatient.     Problem/Plan - 5:  ·  Problem: Parkinson disease.  Plan: currently at baseline mental status AAO x 2  c/w Carbidopa-Levodopa, memantine and lexapro. neuro fu     Problem/Plan - 6:  Problem: Lymphadenopathy. Plan: Hazy mesenteric fat along the SMA distribution, increased in prominence from prior, with small mesenteric lymph nodes, non specific, incidentally seen on CT A/P  heme fu appreciated    Neuro called for FU for increased agitation
75 y/o salvador speaking M with PMH of Parkinsons dementia AAO x 2 at baseline, BPH(s/p microwave ablation), hx of multiple MDR Klebsiella UTIs presents to ED for worsening suprapubic pain/urinary retention    1. urinary retention  -medication induced versus BPH  -abdominal CT results noted  -cont pichardo   -urine cx negative  -US kidney  noted   -urology outpt    2. Hyponatremia  -possible SIADH  -mgmt per medicine  -renal f/u    DVT ppx
75 y/o salvador speaking M with PMH of Parkinsons dementia AAO x 2 at baseline, BPH(s/p microwave ablation), hx of multiple MDR Klebsiella UTIs presents to ED for worsening suprapubic pain/urinary retention    1. urinary retention  -medication induced versus BPH  -abdominal CT results noted  -management per renal/ med     2. Hyponatremia  -possible SIADH  -mgmt per renal       DVT ppx
75y Male with history of dementia presents with lower abdominal pain. Nephrology consulted for hyponatremia.    1) Hyponatremia: Hypotonic hyponatremia likely due to SIADH possibly secondary to lexapro versus CA given CT findings. Serum Na decreased today for which would increase sodium chloride to 2 grams twice daily. Continue with ensure with meals and 1L FR. Please check urine sodium AND urine osm to confirm SIADH (ordered for multiple days however not collected despite patient having pichardo). Monitor serum Na.    2) Urinary retention: Resolved as per renal US. Continue with flomax, proscar and pichardo (would not attempt TOV given h/o retention requiring pichardo and enlarged prostate on CT). Outpatient urology follow up.    3) Bilateral hydronephrosis: Due to urinary retention as above s/p pichardo. Repeat renal US with resolution.     4) Depression: Holding lexapro as likely cause of hyponatremia.
75y Male with history of dementia presents with lower abdominal pain. Nephrology consulted for hyponatremia.    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 Serum Na improving for which would decrease NaCl to 1 gram TID. Continue with ensure with meals and 1L FR.  Monitor serum Na.    2) Urinary retention: Resolved as per renal US. Continue with flomax, proscar and pichardo (would not attempt TOV given h/o retention requiring pichardo and enlarged prostate on CT). Outpatient urology follow up.    3) Bilateral hydronephrosis: Due to urinary retention as above s/p pichardo. Repeat renal US with resolution.     4) Depression: Holding lexapro as likely cause of hyponatremia.
75y Male with history of dementia presents with lower abdominal pain. Nephrology consulted for hyponatremia.    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 Serum Na overall improving on salt tabs. Spoke with pharmacy; no tolvaptan 15mg available in pharmacy and due to holidays; unsure when it will be delivered.   Therefore, will c/w sodium chloride 2 grams thrice daily (increased on 1/1). Continue with ensure with meals and 1L FR.  Monitor serum Na.    2) Urinary retention: Resolved as per renal US. Continue with flomax, proscar and pichardo (would not attempt TOV given h/o retention requiring pichardo and enlarged prostate on CT). Outpatient urology follow up.    3) Bilateral hydronephrosis: Due to urinary retention as above s/p pichardo. Repeat renal US with resolution.     4) Depression: Holding lexapro as likely cause of hyponatremia.
75y Male with history of dementia presents with lower abdominal pain. Nephrology consulted for hyponatremia.    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 Serum Na overall improving on salt tabs. Spoke with pharmacy; no tolvaptan 15mg available in pharmacy and due to holidays; unsure when it will be delivered.   Therefore, will increase sodium chloride 2 grams thrice daily. Continue with ensure with meals and 1L FR.  Monitor serum Na.    2) Urinary retention: Resolved as per renal US. Continue with flomax, proscar and pichardo (would not attempt TOV given h/o retention requiring pichardo and enlarged prostate on CT). Outpatient urology follow up.    3) Bilateral hydronephrosis: Due to urinary retention as above s/p pichardo. Repeat renal US with resolution.     4) Depression: Holding lexapro as likely cause of hyponatremia.
Problem/Plan - 1:  ·  Problem: Urinary retention.  Plan: drug induced (d/t anticholinergic med - glycopyrrolate) vs progression of BPH  retained 550cc s/p indwelling pichardo catheter draining clear urine  - resume tamsulosin  - check PSA  - may need urology fu     Problem/Plan - 2:  ·  Problem: Hyponatremia.  Plan: asymptomatic  renal fu  monitor bmp     Problem/Plan - 3:  ·  Problem: Sialorrhea.  Plan: secondary to complication of parkinson's  hold off on glycopyrrolate  obtain speech and swallow eval, in interim start mechanical soft diet.     Problem/Plan - 4:  ·  Problem: Benign prostatic hyperplasia with nocturia.  Plan: enlarged prostate seen on CT abdomen  can follow up outpatient.     Problem/Plan - 5:  ·  Problem: Parkinson disease.  Plan: currently at baseline mental status AAO x 2  c/w Carbidopa-Levodopa, memantine and lexapro.   neuro fu   Problem/Plan - 6:  Problem: Lymphadenopathy. Plan: Hazy mesenteric fat along the SMA distribution, increased in prominence from prior, with small mesenteric lymph nodes, non specific, incidentally seen on CT A/P  heme fu appreciated  check PSA  outpt workup 
Problem/Plan - 1:  ·  Problem: Urinary retention.  Plan: drug induced (d/t anticholinergic med - glycopyrrolate) vs progression of BPH  retained 550cc s/p indwelling pichardo catheter draining clear urine  - resume tamsulosin  - urology consult     Problem/Plan - 2:  ·  Problem: Hyponatremia.  Plan: asymptomatic  renal fu  monitor bmp     Problem/Plan - 3:  ·  Problem: Sialorrhea.  Plan: secondary to complication of parkinson's  hold off on glycopyrrolate  obtain speech and swallow eval, in interim start mechanical soft diet.     Problem/Plan - 4:  ·  Problem: Benign prostatic hyperplasia with nocturia.  Plan: enlarged prostate seen on CT abdomen  can follow up outpatient.     Problem/Plan - 5:  ·  Problem: Parkinson disease.  Plan: currently at baseline mental status AAO x 2  c/w Carbidopa-Levodopa, memantine and lexapro. neuro fu     Problem/Plan - 6:  Problem: Lymphadenopathy. Plan: Hazy mesenteric fat along the SMA distribution, increased in prominence from prior, with small mesenteric lymph nodes, non specific, incidentally seen on CT A/P  heme fu appreciated    dc planning

## 2021-01-05 NOTE — PROGRESS NOTE ADULT - SUBJECTIVE AND OBJECTIVE BOX
[facility]      Seen earlier today, and examined.  - Today, patient is without complaints.           *****MEDICATIONS: Current medication reviewed and documented.    MEDICATIONS  (STANDING):  atropine 1% Ophthalmic Solution for SubLingual Use 2 Drop(s) SubLingual three times a day  carbidopa/levodopa  25/100 1 Tablet(s) Oral three times a day  enoxaparin Injectable 40 milliGRAM(s) SubCutaneous daily  finasteride 5 milliGRAM(s) Oral daily  influenza  Vaccine (HIGH DOSE) 0.7 milliLiter(s) IntraMuscular once  memantine 10 milliGRAM(s) Oral daily  phenazopyridine 100 milliGRAM(s) Oral every 8 hours  polyethylene glycol 3350 17 Gram(s) Oral daily  sodium chloride 2 Gram(s) Oral two times a day  tamsulosin 0.4 milliGRAM(s) Oral at bedtime    MEDICATIONS  (PRN):          ***** VITAL SIGNS:  T(F): 98.4 (-30-20 @ 14:00), Max: 98.4 (-30-20 @ 14:00)  HR: 98 (-30-20 @ 14:00) (66 - 98)  BP: 127/75 (-30-20 @ 14:00) (114/65 - 133/69)  RR: 18 (-30-20 @ 14:00) (17 - 18)  SpO2: 100% (-20 @ 14:00) (97% - 100%)  Wt(kg): --  ,   I&O's Summary    29 Dec 2020 07:01  -  30 Dec 2020 07:00  --------------------------------------------------------  IN: 1415 mL / OUT: 1175 mL / NET: 240 mL             *****PHYSICAL EXAM: pt complains of excessive saliva   and poor sleep intake    alert oriented x 3 attention comprehension are fair.  Able to name, repeat.   EOmi fundi not visualized   no nystagmus VFF to confrontation  Tongue is midline  Palate elevates symmetrically   Moving all 4 ext spontaneously no drift appreciated    Gait not assessed.            *****LAB AND IMAGIN.4   5.46  )-----------( 336      ( 30 Dec 2020 07:22 )             35.9               12-30    127<L>  |  92<L>  |  12  ----------------------------<  132<H>  4.1   |  25  |  0.69    Ca    9.1      30 Dec 2020 07:22                           [All pertinent recent Imaging/Reports reviewed]           *****A S S E S S M E N T   A N D   P L A N :  73 y/o salvador speaking M with PMH of Parkinsons dementia AAO x 2 at baseline, BPH(s/p microwave ablation), hx of multiple MDR Klebsiella UTIs presents to ED for worsening suprapubic pain. History obtained from grandson since pt is a poor historian (spoke with pt in native language). Per grandson, pt was recently evaluated in ED for "low sodium" although w/u was negative and he was discharged. Since then he has been experiencing worsening suprapubic pain, dysuria, increased nocturia, poor appetite. Denies fever, chills, N/V, flank pain, diarrhea. He was recently evaluated by his PCP in november and at that time he endorsed sialorrhea due to progression of parkinsons for which he was started on glycopyrrolate 1 mg TID. Of note he has prior hx of urinary retention requiring chronic pichardo (most recently PA'ed in ).       Problem/Recommendations 1:  ams improved  multifactorial toxic metabolic encephalopathy due to hyponatremia +/- pain related delirium +/- related to glycopyrrolate related anticholinergic effectsion  continue to monitor closely   avoid sleep wake cycle disruption   encourage po intake.       Problem/Recommendations 2:  parkinsons  continue home regimen.   continue sinemet three times a day   add atropine oph drops subligual for sialorrhea           Thank you for allowing me to participate in the care of this patient. Please do not hesitate to call me if you have any  questions.        ________________  Elzbieta Hargrvoe MD  [facility] ([place]  [phone]      33 minutes spent on total encounter; more than 50 % of the visit was  spent counseling about plan of care, compliance to diet/exercise and medication regimen and or  coordinating care by the attending physician.      It is advised that stroke patients follow up with CARLOS Ventura @ [phone] in 1- 2 weeks.   Others please follow up with Dr. Michael Nissenbaum [phone]
[facility]      Seen earlier today, and examined.  - Today, patient is without complaints.           *****MEDICATIONS: Current medication reviewed and documented.    MEDICATIONS  (STANDING):  carbidopa/levodopa  25/100 1 Tablet(s) Oral three times a day  enoxaparin Injectable 40 milliGRAM(s) SubCutaneous daily  finasteride 5 milliGRAM(s) Oral daily  influenza  Vaccine (HIGH DOSE) 0.7 milliLiter(s) IntraMuscular once  melatonin 3 milliGRAM(s) Oral at bedtime  memantine 10 milliGRAM(s) Oral daily  phenazopyridine 100 milliGRAM(s) Oral every 8 hours  polyethylene glycol 3350 17 Gram(s) Oral daily  sodium chloride 2 Gram(s) Oral three times a day  tamsulosin 0.4 milliGRAM(s) Oral at bedtime  thiamine IVPB 500 milliGRAM(s) IV Intermittent daily    MEDICATIONS  (PRN):  OLANZapine 1.25 milliGRAM(s) Oral every 6 hours PRN Agitation  OLANZapine Injectable 1.25 milliGRAM(s) IntraMuscular every 6 hours PRN Agitation          ***** VITAL SIGNS:  T(F): 97.9 (21 @ 05:59), Max: 97.9 (21 @ 05:59)  HR: 79 (21 @ 05:59) (79 - 86)  BP: 134/78 (21 @ 05:59) (131/77 - 137/78)  RR: 17 (21 @ 05:59) (17 - 18)  SpO2: 100% (21 @ 05:59) (98% - 100%)  Wt(kg): --  ,   I&O's Summary    2021 07:01  -  2021 07:00  --------------------------------------------------------  IN: 600 mL / OUT: 1200 mL / NET: -600 mL             *****PHYSICAL EXAM:   alert oriented x2  attention comprehension are better   Able to name, repeat. speech mumbled  EOmi fundi not visualized   no nystagmus VFF to confrontation  Tongue is midline  Palate elevates symmetrically   Moving all 4 ext spontaneously no drift appreciated    Gait not assessed.            *****LAB AND IMAGIN.9   6.71  )-----------( 382      ( 2021 07:25 )             39.3               -03    134<L>  |  100  |  19  ----------------------------<  125<H>  3.7   |  23  |  0.61    Ca    9.2      2021 07:32  Phos  3.8     -  Mg     2.1                                [All pertinent recent Imaging/Reports reviewed]           *****A S S E S S M E N T   A N D   P L A N :  73 y/o salv[place] speaking M with PMH of Parkinsons dementia AAO x 2 at baseline, BPH(s/p microwave ablation), hx of multiple MDR Klebsiella UTIs presents to ED for worsening suprapubic pain. History obtained from grandson since pt is a poor historian (spoke with pt in native language). Per grandson, pt was recently evaluated in ED for "low sodium" although w/u was negative and he was discharged. Since then he has been experiencing worsening suprapubic pain, dysuria, increased nocturia, poor appetite. Denies fever, chills, N/V, flank pain, diarrhea. He was recently evaluated by his PCP in november and at that time he endorsed sialorrhea due to progression of parkinsons for which he was started on glycopyrrolate 1 mg TID. Of note he has prior hx of urinary retention requiring chronic pichardo (most recently GA'ed in 2019).       Problem/Recommendations 1:  ams improved  multifactorial toxic metabolic encephalopathy due to hyponatremia +/- pain related delirium +/- related to glycopyrrolate related anticholinergic effects   continue to monitor closely   avoid sleep wake cycle disruption   encourage po intake.   antibiotic related thiamnine 500 daily     Problem/Recommendations 2:  parkinsons  continue home regimen.   continue sinemet three times a day   add atropine oph drops subligual for sialorrhea         Thank you for allowing me to participate in the care of this patient. Please do not hesitate to call me if you have any  questions.        ________________  Elzbieta Hargrove MD  [facility]  [phone]      33 minutes spent on total encounter; more than 50 % of the visit was  spent counseling about plan of care, compliance to diet/exercise and medication regimen and or  coordinating care by the attending physician.      It is advised that stroke patients follow up with CARLOS Ventura @ [phone] in 1- 2 weeks.   Others please follow up with Dr. Michael Nissenbaum [phone]
[facility]      Seen earlier today, and examined.  - Today, patient is without complaints.  he reports poor sleep intake          *****MEDICATIONS: Current medication reviewed and documented.    MEDICATIONS  (STANDING):  atropine 1% Ophthalmic Solution for SubLingual Use 1 Drop(s) SubLingual two times a day  carbidopa/levodopa  25/100 1 Tablet(s) Oral three times a day  enoxaparin Injectable 40 milliGRAM(s) SubCutaneous daily  finasteride 5 milliGRAM(s) Oral daily  influenza  Vaccine (HIGH DOSE) 0.7 milliLiter(s) IntraMuscular once  melatonin 3 milliGRAM(s) Oral at bedtime  memantine 10 milliGRAM(s) Oral daily  phenazopyridine 100 milliGRAM(s) Oral every 8 hours  polyethylene glycol 3350 17 Gram(s) Oral daily  sodium chloride 2 Gram(s) Oral three times a day  tamsulosin 0.4 milliGRAM(s) Oral at bedtime  thiamine IVPB 500 milliGRAM(s) IV Intermittent daily    MEDICATIONS  (PRN):  acetaminophen   Tablet .. 650 milliGRAM(s) Oral every 6 hours PRN Mild Pain (1 - 3), Moderate Pain (4 - 6), Severe Pain (7 - 10)  OLANZapine 1.25 milliGRAM(s) Oral every 6 hours PRN Agitation  OLANZapine Injectable 1.25 milliGRAM(s) IntraMuscular every 6 hours PRN Agitation          ***** VITAL SIGNS:  T(F): 97.7 (21 @ 06:40), Max: 99 (21 @ 15:39)  HR: 65 (21 @ 06:40) (65 - 108)  BP: 121/59 (21 @ 06:40) (121/59 - 139/73)  RR: 17 (21 @ 06:40) (16 - 17)  SpO2: 100% (21 @ 06:40) (99% - 100%)  Wt(kg): --  ,   I&O's Summary    2021 07:01  -  2021 07:00  --------------------------------------------------------  IN: 0 mL / OUT: 650 mL / NET: -650 mL    2021 07:01  -  2021 12:19  --------------------------------------------------------  IN: 0 mL / OUT: 500 mL / NET: -500 mL             *****PHYSICAL EXAM:  alert oriented x2  attention comprehension are better   Able to name, repeat. speech mumbled  EOmi fundi not visualized   no nystagmus VFF to confrontation  Tongue is midline  Palate elevates symmetrically   Moving all 4 ext spontaneously no drift appreciated    Gait not assessed.              *****LAB AND IMAGIN.2   7.62  )-----------( 393      ( 2021 07:12 )             39.0               01-04    137  |  100  |  25<H>  ----------------------------<  114<H>  4.0   |  26  |  0.69    Ca    9.2      2021 07:12  Phos  4.6     01-04  Mg     2.1     01-04                           [All pertinent recent Imaging/Reports reviewed]           *****A S S E S S M E N T   A N D   P L A N :    75 y/o [facility] speaking M with PMH of Parkinsons dementia AAO x 2 at baseline, BPH(s/p microwave ablation), hx of multiple MDR Klebsiella UTIs presents to ED for worsening suprapubic pain. History obtained from grandson since pt is a poor historian (spoke with pt in native language). Per grandson, pt was recently evaluated in ED for "low sodium" although w/u was negative and he was discharged. Since then he has been experiencing worsening suprapubic pain, dysuria, increased nocturia, poor appetite. Denies fever, chills, N/V, flank pain, diarrhea. He was recently evaluated by his PCP in november and at that time he endorsed sialorrhea due to progression of parkinsons for which he was started on glycopyrrolate 1 mg TID. Of note he has prior hx of urinary retention requiring chronic picahrdo (most recently dc'ed in 2019).       Problem/Recommendations 1:  ams improved  multifactorial toxic metabolic encephalopathy due to hyponatremia +/- pain related delirium +/- related to glycopyrrolate related anticholinergic effects   continue to monitor closely   avoid sleep wake cycle disruption   encourage po intake.   antibiotic related thiamine 500 daily     Problem/Recommendations 2:  parkinsons  continue home regimen.   continue sinemet three times a day   add atropine oph drops subligual for sialorrhea           Thank you for allowing me to participate in the care of this patient. Please do not hesitate to call me if you have any  questions.        ________________  Elzbieta Hargrove MD  [facility] ([facility]  [phone]      33 minutes spent on total encounter; more than 50 % of the visit was  spent counseling about plan of care, compliance to diet/exercise and medication regimen and or  coordinating care by the attending physician.      It is advised that stroke patients follow up with CARLOS Ventura @ [phone] in 1- 2 weeks.   Others please follow up with Dr. Michael Nissenbaum [phone]
[facility]      Seen earlier today, and examined.  - Today, patient is without complaints.           *****MEDICATIONS: Current medication reviewed and documented.    MEDICATIONS  (STANDING):  atropine 1% Ophthalmic Solution for SubLingual Use 1 Drop(s) SubLingual two times a day  carbidopa/levodopa  25/100 1 Tablet(s) Oral three times a day  enoxaparin Injectable 40 milliGRAM(s) SubCutaneous daily  finasteride 5 milliGRAM(s) Oral daily  influenza  Vaccine (HIGH DOSE) 0.7 milliLiter(s) IntraMuscular once  melatonin 3 milliGRAM(s) Oral at bedtime  memantine 10 milliGRAM(s) Oral daily  phenazopyridine 100 milliGRAM(s) Oral every 8 hours  polyethylene glycol 3350 17 Gram(s) Oral daily  sodium chloride 1 Gram(s) Oral three times a day  tamsulosin 0.4 milliGRAM(s) Oral at bedtime    MEDICATIONS  (PRN):  acetaminophen   Tablet .. 650 milliGRAM(s) Oral every 6 hours PRN Mild Pain (1 - 3), Moderate Pain (4 - 6), Severe Pain (7 - 10)  OLANZapine 1.25 milliGRAM(s) Oral every 6 hours PRN Agitation  OLANZapine Injectable 1.25 milliGRAM(s) IntraMuscular every 6 hours PRN Agitation  QUEtiapine 12.5 milliGRAM(s) Oral every 6 hours PRN Agitation          ***** VITAL SIGNS:  T(F): 97.4 (21 @ 13:31), Max: 97.7 (21 @ 20:37)  HR: 91 (21 @ 13:31) (81 - 91)  BP: 132/73 (21 @ 13:31) (126/73 - 132/73)  RR: 18 (21 @ 13:31) (17 - 18)  SpO2: 98% (21 @ 13:31) (98% - 100%)  Wt(kg): --  ,   I&O's Summary    2021 07:01  -  2021 07:00  --------------------------------------------------------  IN: 120 mL / OUT: 1500 mL / NET: -1380 mL    2021 07:01  -  2021 17:26  --------------------------------------------------------  IN: 0 mL / OUT: 300 mL / NET: -300 mL             *****PHYSICAL EXAM:    alert oriented x2  attention comprehension are better   Able to name, repeat. speech mumbled  EOmi fundi not visualized   no nystagmus VFF to confrontation  Tongue is midline  Palate elevates symmetrically   Moving all 4 ext spontaneously no drift appreciated         *****LAB AND IMAGIN.5   6.41  )-----------( 385      ( 2021 07:54 )             37.4               01-05    139  |  103  |  21  ----------------------------<  129<H>  4.0   |  25  |  0.63    Ca    9.0      2021 07:54  Phos  4.3     01-05  Mg     2.1     01-05                           [All pertinent recent Imaging/Reports reviewed]           *****A S S E S S M E N T   A N D   P L A N :        73 y/o [facility] speaking M with PMH of Parkinsons dementia AAO x 2 at baseline, BPH(s/p microwave ablation), hx of multiple MDR Klebsiella UTIs presents to ED for worsening suprapubic pain. History obtained from grandson since pt is a poor historian (spoke with pt in native language). Per grandson, pt was recently evaluated in ED for "low sodium" although w/u was negative and he was discharged. Since then he has been experiencing worsening suprapubic pain, dysuria, increased nocturia, poor appetite. Denies fever, chills, N/V, flank pain, diarrhea. He was recently evaluated by his PCP in november and at that time he endorsed sialorrhea due to progression of parkinsons for which he was started on glycopyrrolate 1 mg TID. Of note he has prior hx of urinary retention requiring chronic pichardo (most recently dc'ed in 2019).       Problem/Recommendations 1:  ams improved  multifactorial toxic metabolic encephalopathy due to hyponatremia +/- pain related delirium +/- related to glycopyrrolate related anticholinergic effects   continue to monitor closely   avoid sleep wake cycle disruption   encourage po intake.   antibiotic related thiamine 500 daily     Problem/Recommendations 2:  parkinsons  continue home regimen.   continue sinemet three times a day   add atropine oph drops subligual for sialorrhea     Thank you for allowing me to participate in the care of this patient. Please do not hesitate to call me if you have any  questions.        ________________  Elzbieta Hargrove MD  [facility] ([facility]  [phone]      33 minutes spent on total encounter; more than 50 % of the visit was  spent counseling about plan of care, compliance to diet/exercise and medication regimen and or  coordinating care by the attending physician.      It is advised that stroke patients follow up with CARLOS Ventura @ [phone] in 1- 2 weeks.   Others please follow up with Dr. Michael Nissenbaum [phone]
[facility]      Seen earlier today, and examined.  - Today, patient is without complaints.           *****MEDICATIONS: Current medication reviewed and documented.    MEDICATIONS  (STANDING):  atropine 1% Ophthalmic Solution for SubLingual Use 2 Drop(s) SubLingual three times a day  carbidopa/levodopa  25/100 1 Tablet(s) Oral three times a day  enoxaparin Injectable 40 milliGRAM(s) SubCutaneous daily  finasteride 5 milliGRAM(s) Oral daily  influenza  Vaccine (HIGH DOSE) 0.7 milliLiter(s) IntraMuscular once  melatonin 3 milliGRAM(s) Oral at bedtime  memantine 10 milliGRAM(s) Oral daily  phenazopyridine 100 milliGRAM(s) Oral every 8 hours  polyethylene glycol 3350 17 Gram(s) Oral daily  sodium chloride 2 Gram(s) Oral two times a day  tamsulosin 0.4 milliGRAM(s) Oral at bedtime    MEDICATIONS  (PRN):          ***** VITAL SIGNS:  T(F): 98 (20 @ 16:43), Max: 98 (20 @ 16:43)  HR: 72 (20 @ 16:43) (72 - 100)  BP: 126/81 (20 @ 16:43) (126/81 - 135/83)  RR: 17 (20 @ 05:32) (17 - 18)  SpO2: 92% (20 @ 16:43) (87% - 100%)  Wt(kg): --  ,   I&O's Summary    30 Dec 2020 07:01  -  31 Dec 2020 07:00  --------------------------------------------------------  IN: 0 mL / OUT: 700 mL / NET: -700 mL    31 Dec 2020 07:01  -  31 Dec 2020 17:45  --------------------------------------------------------  IN: 0 mL / OUT: 375 mL / NET: -375 mL             *****PHYSICAL EXAM:    pt complains of excessive saliva   and poor sleep intake    alert oriented x 3 attention comprehension are fair.  Able to name, repeat.   EOmi fundi not visualized   no nystagmus VFF to confrontation  Tongue is midline  Palate elevates symmetrically   Moving all 4 ext spontaneously no drift appreciated    Gait not assessed.            *****LAB AND IMAGIN.4   8.78  )-----------( 364      ( 31 Dec 2020 13:36 )             38.8               12    129<L>  |  92<L>  |  17  ----------------------------<  147<H>  4.4   |  25  |  0.69    Ca    9.7      31 Dec 2020 13:36  Phos  4.2     12-  Mg     2.0         TPro  7.7  /  Alb  4.3  /  TBili  0.4  /  DBili  x   /  AST  19  /  ALT  6   /  AlkPhos  65                           [All pertinent recent Imaging/Reports reviewed]           *****A S S E S S M E N T   A N D   P L A N :    75 y/o salv[place] speaking M with PMH of Parkinsons dementia AAO x 2 at baseline, BPH(s/p microwave ablation), hx of multiple MDR Klebsiella UTIs presents to ED for worsening suprapubic pain. History obtained from grandson since pt is a poor historian (spoke with pt in native language). Per grandson, pt was recently evaluated in ED for "low sodium" although w/u was negative and he was discharged. Since then he has been experiencing worsening suprapubic pain, dysuria, increased nocturia, poor appetite. Denies fever, chills, N/V, flank pain, diarrhea. He was recently evaluated by his PCP in november and at that time he endorsed sialorrhea due to progression of parkinsons for which he was started on glycopyrrolate 1 mg TID. Of note he has prior hx of urinary retention requiring chronic pichardo (most recently NM'ed in 2019).       Problem/Recommendations 1:  ams improved  multifactorial toxic metabolic encephalopathy due to hyponatremia +/- pain related delirium +/- related to glycopyrrolate related anticholinergic effects   continue to monitor closely   avoid sleep wake cycle disruption   encourage po intake.       Problem/Recommendations 2:  parkinsons  continue home regimen.   continue sinemet three times a day   add atropine oph drops subligual for sialorrhea         Thank you for allowing me to participate in the care of this patient. Please do not hesitate to call me if you have any  questions.        ________________  Elzbieta Hargrove MD  [facility]rological [facility] ([facility]  [phone]      33 minutes spent on total encounter; more than 50 % of the visit was  spent counseling about plan of care, compliance to diet/exercise and medication regimen and or  coordinating care by the attending physician.      It is advised that stroke patients follow up with CARLOS Ventura @ [phone] in 1- 2 weeks.   Others please follow up with Dr. Michael Nissenbaum [phone]
[facility] NEPHROLOGY- PROGRESS NOTE    75y Male with history of dementia presents with lower abdominal pain. Nephrology consulted for hyponatremia.    REVIEW OF SYSTEMS: unable to obtain due to confusion.    No Known Allergies      Hospital Medications: Medications reviewed        VITALS:  T(F): 97.4 (01-05-21 @ 06:17), Max: 97.9 (01-04-21 @ 13:32)  HR: 81 (01-05-21 @ 06:17)  BP: 128/74 (01-05-21 @ 06:17)  RR: 17 (01-05-21 @ 06:17)  SpO2: 100% (01-05-21 @ 06:17)  Wt(kg): --    01-04 @ 07:01  -  01-05 @ 07:00  --------------------------------------------------------  IN: 120 mL / OUT: 1500 mL / NET: -1380 mL        PHYSICAL EXAM:    Gen: NAD, calm  Cards: RRR, +S1/S2, no M/G/R  Resp: CTA B/L  GI: soft, NT/ND, NABS  : + pichardo  Vascular: no LE edema B/L        LABS:  01-05    139  |  103  |  21  ----------------------------<  129<H>  4.0   |  25  |  0.63    Ca    9.0      05 Jan 2021 07:54  Phos  4.3     01-05  Mg     2.1     01-05      Creatinine Trend: 0.63 <--, 0.69 <--, 0.61 <--, 0.61 <--, 0.75 <--, 0.69 <--, 0.69 <--                        12.5   6.41  )-----------( 385      ( 05 Jan 2021 07:54 )             37.4     Urine Studies:    Osmolality, Random Urine: 490 mosm/Kg (12-31 @ 13:52)  Sodium, Random Urine: 69 mmol/L (12-30 @ 14:37)  Osmolality, Random Urine: 503 mosm/Kg (12-30 @ 13:06)      
CARDIOLOGY FOLLOW UP - Dr. Us    CC in NAD, confuse   no acute events over night       PHYSICAL EXAM:  T(C): 36.6 (01-03-21 @ 05:59), Max: 36.6 (01-03-21 @ 05:59)  HR: 79 (01-03-21 @ 05:59) (79 - 86)  BP: 134/78 (01-03-21 @ 05:59) (131/77 - 137/78)  RR: 17 (01-03-21 @ 05:59) (17 - 18)  SpO2: 100% (01-03-21 @ 05:59) (98% - 100%)  Wt(kg): --  I&O's Summary    02 Jan 2021 07:01  -  03 Jan 2021 07:00  --------------------------------------------------------  IN: 600 mL / OUT: 1200 mL / NET: -600 mL        Appearance: NAD confuse 	  Cardiovascular: Normal S1 S2,RRR, No JVD, No murmurs  Respiratory: Lungs clear to auscultation	  Gastrointestinal:  Soft, Non-tender, + BS	  Extremities: Normal range of motion, No clubbing, cyanosis or edema      Home Medications:  GenTeal ophthalmic gel: 1 drop(s) to each affected eye 2 times a day (27 Dec 2020 17:14)  glycopyrrolate 1 mg oral tablet: 1 tab(s) orally 3 times a day (27 Dec 2020 17:14)  memantine 10 mg oral tablet: 1 tab(s) orally once a day (27 Dec 2020 17:14)  Sodium Chloride 1 g oral tablet: 1 tab(s) orally 2 times a day (27 Dec 2020 17:14)      MEDICATIONS  (STANDING):  carbidopa/levodopa  25/100 1 Tablet(s) Oral three times a day  enoxaparin Injectable 40 milliGRAM(s) SubCutaneous daily  finasteride 5 milliGRAM(s) Oral daily  influenza  Vaccine (HIGH DOSE) 0.7 milliLiter(s) IntraMuscular once  melatonin 3 milliGRAM(s) Oral at bedtime  memantine 10 milliGRAM(s) Oral daily  phenazopyridine 100 milliGRAM(s) Oral every 8 hours  polyethylene glycol 3350 17 Gram(s) Oral daily  sodium chloride 2 Gram(s) Oral three times a day  tamsulosin 0.4 milliGRAM(s) Oral at bedtime  thiamine IVPB 500 milliGRAM(s) IV Intermittent daily      TELEMETRY: 	    ECG:  	  RADIOLOGY:   DIAGNOSTIC TESTING:  [ ] Echocardiogram:  [ ]  Catheterization:  [ ] Stress Test:    OTHER: 	    LABS:	 	                            12.9   6.71  )-----------( 382      ( 03 Jan 2021 07:25 )             39.3     01-03    134<L>  |  100  |  19  ----------------------------<  125<H>  3.7   |  23  |  0.61    Ca    9.2      03 Jan 2021 07:32  Phos  3.8     01-03  Mg     2.1     01-03              
CARDIOLOGY FOLLOW UP - Dr. Us    CC no acute events       PHYSICAL EXAM:  T(C): 36.5 (01-04-21 @ 06:40), Max: 37.2 (01-03-21 @ 15:39)  HR: 65 (01-04-21 @ 06:40) (65 - 108)  BP: 121/59 (01-04-21 @ 06:40) (121/59 - 139/73)  RR: 17 (01-04-21 @ 06:40) (16 - 17)  SpO2: 100% (01-04-21 @ 06:40) (99% - 100%)  Wt(kg): --  I&O's Summary    03 Jan 2021 07:01  -  04 Jan 2021 07:00  --------------------------------------------------------  IN: 0 mL / OUT: 650 mL / NET: -650 mL    04 Jan 2021 07:01  -  04 Jan 2021 13:28  --------------------------------------------------------  IN: 0 mL / OUT: 500 mL / NET: -500 mL        Appearance: Normal	  Cardiovascular: Normal S1 S2,RRR, No JVD, No murmurs  Respiratory: Lungs clear to auscultation	  Gastrointestinal:  Soft, Non-tender, + BS	  Extremities: Normal range of motion, No clubbing, cyanosis or edema      Home Medications:  GenTeal ophthalmic gel: 1 drop(s) to each affected eye 2 times a day (27 Dec 2020 17:14)  glycopyrrolate 1 mg oral tablet: 1 tab(s) orally 3 times a day (27 Dec 2020 17:14)  memantine 10 mg oral tablet: 1 tab(s) orally once a day (27 Dec 2020 17:14)  Sodium Chloride 1 g oral tablet: 1 tab(s) orally 2 times a day (27 Dec 2020 17:14)      MEDICATIONS  (STANDING):  atropine 1% Ophthalmic Solution for SubLingual Use 1 Drop(s) SubLingual two times a day  carbidopa/levodopa  25/100 1 Tablet(s) Oral three times a day  enoxaparin Injectable 40 milliGRAM(s) SubCutaneous daily  finasteride 5 milliGRAM(s) Oral daily  influenza  Vaccine (HIGH DOSE) 0.7 milliLiter(s) IntraMuscular once  melatonin 3 milliGRAM(s) Oral at bedtime  memantine 10 milliGRAM(s) Oral daily  phenazopyridine 100 milliGRAM(s) Oral every 8 hours  polyethylene glycol 3350 17 Gram(s) Oral daily  sodium chloride 2 Gram(s) Oral two times a day  tamsulosin 0.4 milliGRAM(s) Oral at bedtime  thiamine IVPB 500 milliGRAM(s) IV Intermittent daily      TELEMETRY: 	    ECG:  	  RADIOLOGY:   DIAGNOSTIC TESTING:  [ ] Echocardiogram:  [ ]  Catheterization:  [ ] Stress Test:    OTHER: 	    LABS:	 	                            13.2   7.62  )-----------( 393      ( 04 Jan 2021 07:12 )             39.0     01-04    137  |  100  |  25<H>  ----------------------------<  114<H>  4.0   |  26  |  0.69    Ca    9.2      04 Jan 2021 07:12  Phos  4.6     01-04  Mg     2.1     01-04              
CARDIOLOGY FOLLOW UP NOTE - DR. COX    Subjective:    denies chest pain, dyspnea, palpitations, dizziness  ROS: otherwise negative   overnight events:      PHYSICAL EXAM:  T(C): 37.2 (01-01-21 @ 13:45), Max: 37.2 (01-01-21 @ 13:45)  HR: 94 (01-01-21 @ 13:45) (81 - 94)  BP: 142/63 (01-01-21 @ 13:45) (131/82 - 143/83)  RR: 18 (01-01-21 @ 13:45) (17 - 18)  SpO2: 94% (01-01-21 @ 13:45) (94% - 100%)  Wt(kg): --  I&O's Summary    31 Dec 2020 07:01  -  01 Jan 2021 07:00  --------------------------------------------------------  IN: 240 mL / OUT: 900 mL / NET: -660 mL    01 Jan 2021 07:01  -  01 Jan 2021 16:45  --------------------------------------------------------  IN: 625 mL / OUT: 325 mL / NET: 300 mL      Daily     Daily     Appearance: Normal	  Cardiovascular: Normal S1 S2,RRR, No JVD, No murmurs  Respiratory: Lungs clear to auscultation	  Gastrointestinal:  Soft, Non-tender, + BS	  Extremities: Normal range of motion, No clubbing, cyanosis or edema      Home Medications:  GenTeal ophthalmic gel: 1 drop(s) to each affected eye 2 times a day (27 Dec 2020 17:14)  glycopyrrolate 1 mg oral tablet: 1 tab(s) orally 3 times a day (27 Dec 2020 17:14)  memantine 10 mg oral tablet: 1 tab(s) orally once a day (27 Dec 2020 17:14)  Sodium Chloride 1 g oral tablet: 1 tab(s) orally 2 times a day (27 Dec 2020 17:14)      MEDICATIONS  (STANDING):  atropine 1% Ophthalmic Solution for SubLingual Use 2 Drop(s) SubLingual three times a day  carbidopa/levodopa  25/100 1 Tablet(s) Oral three times a day  enoxaparin Injectable 40 milliGRAM(s) SubCutaneous daily  finasteride 5 milliGRAM(s) Oral daily  influenza  Vaccine (HIGH DOSE) 0.7 milliLiter(s) IntraMuscular once  melatonin 3 milliGRAM(s) Oral at bedtime  memantine 10 milliGRAM(s) Oral daily  phenazopyridine 100 milliGRAM(s) Oral every 8 hours  polyethylene glycol 3350 17 Gram(s) Oral daily  sodium chloride 2 Gram(s) Oral three times a day  tamsulosin 0.4 milliGRAM(s) Oral at bedtime      TELEMETRY: 	    ECG:  	  RADIOLOGY:   DIAGNOSTIC TESTING:  [ ] Echocardiogram:  [ ] Catheterization:  [ ] Stress Test:    OTHER: 	    LABS:	 	    CARDIAC MARKERS:                                12.7   8.48  )-----------( 363      ( 01 Jan 2021 04:32 )             36.7     01-01    128<L>  |  91<L>  |  19  ----------------------------<  131<H>  4.1   |  23  |  0.75    Ca    9.2      01 Jan 2021 04:32  Phos  4.2     12-31  Mg     2.0     12-31    TPro  7.3  /  Alb  4.0  /  TBili  0.6  /  DBili  x   /  AST  19  /  ALT  <5<L>  /  AlkPhos  64  01-01    proBNP:     Lipid Profile:   HgA1c:     Creatinine, Serum: 0.75 mg/dL (01-01-21 @ 04:32)  Creatinine, Serum: 0.69 mg/dL (12-31-20 @ 13:36)  Creatinine, Serum: 0.69 mg/dL (12-30-20 @ 07:22)            
[facility] NEPHROLOGY- PROGRESS NOTE    75y Male with history of dementia presents with lower abdominal pain. Nephrology consulted for hyponatremia.    REVIEW OF SYSTEMS:  Gen: no changes in weight  Cards: no chest pain  Resp: no dyspnea  GI: no nausea or vomiting or diarrhea,   Vascular: no LE edema    No Known Allergies      Hospital Medications: Medications reviewed      VITALS:  T(F): 97.7 (20 @ 05:32), Max: 97.7 (20 @ 05:32)  HR: 100 (20 @ 05:32)  BP: 135/83 (20 @ 05:32)  RR: 17 (20 @ 05:32)  SpO2: 100% (20 @ 08:45)  Wt(kg): --     @ 07:01  -   @ 07:00  --------------------------------------------------------  IN: 0 mL / OUT: 700 mL / NET: -700 mL        PHYSICAL EXAM:    Gen: NAD, calm  Cards: RRR, +S1/S2, no M/G/R  Resp: CTA B/L  GI: soft, NT/ND, NABS  : + pichardo with orange urine  Vascular: no LE edema B/L      LABS:    129 <--, 127 <--, 128 <--, 126 <--, 122 <--  129<L>  |  92<L>  |  17  ----------------------------<  147<H>  4.4   |  25  |  0.69    Ca    9.7      31 Dec 2020 13:36  Phos  4.2       Mg     2.0         TPro  7.7  /  Alb  4.3  /  TBili  0.4  /  DBili      /  AST  19  /  ALT  6   /  AlkPhos  65      Creatinine Trend: 0.69 <--, 0.69 <--, 0.64 <--, 0.67 <--, 0.77 <--                        13.4   8.78  )-----------( 364      ( 31 Dec 2020 13:36 )             38.8     Urine Studies:  Urinalysis Basic - ( 27 Dec 2020 12:28 )    Color: Light Yellow / Appearance: Clear / S.016 / pH:   Gluc:  / Ketone: Negative  / Bili: Negative / Urobili: <2 mg/dL   Blood:  / Protein: Trace / Nitrite: Negative   Leuk Esterase: Negative / RBC:  / WBC    Sq Epi:  / Non Sq Epi:  / Bacteria:       Sodium, Random Urine: 69 mmol/L ( @ 14:37)  Osmolality, Random Urine: 503 mosm/Kg ( @ 13:06)  Osmolality, Random Urine: 415 mosm/kg ( @ 07:58)    
[facility]      Seen earlier today, and examined.  - Today, patient is without complaints.           *****MEDICATIONS: Current medication reviewed and documented.    MEDICATIONS  (STANDING):  atropine 1% Ophthalmic Solution for SubLingual Use 2 Drop(s) SubLingual three times a day  carbidopa/levodopa  25/100 1 Tablet(s) Oral three times a day  enoxaparin Injectable 40 milliGRAM(s) SubCutaneous daily  finasteride 5 milliGRAM(s) Oral daily  influenza  Vaccine (HIGH DOSE) 0.7 milliLiter(s) IntraMuscular once  melatonin 3 milliGRAM(s) Oral at bedtime  memantine 10 milliGRAM(s) Oral daily  phenazopyridine 100 milliGRAM(s) Oral every 8 hours  polyethylene glycol 3350 17 Gram(s) Oral daily  sodium chloride 2 Gram(s) Oral three times a day  tamsulosin 0.4 milliGRAM(s) Oral at bedtime    MEDICATIONS  (PRN):          ***** VITAL SIGNS:  T(F): 98.9 (21 @ 13:45), Max: 98.9 (21 @ 13:45)  HR: 94 (21 @ 13:45) (81 - 94)  BP: 142/63 (21 @ 13:45) (131/82 - 143/83)  RR: 18 (21 @ 13:45) (17 - 18)  SpO2: 94% (21 @ 13:45) (94% - 100%)  Wt(kg): --  ,   I&O's Summary    31 Dec 2020 07:  -  2021 07:00  --------------------------------------------------------  IN: 240 mL / OUT: 900 mL / NET: -660 mL    :  -  2021 18:55  --------------------------------------------------------  IN: 862 mL / OUT: 325 mL / NET: 537 mL             *****PHYSICAL EXAM:   alert oriented x 2 attention comprehension are limited.  Able to name, repeat.  mumbled speech     EOmi fundi not visualized   no nystagmus VFF to confrontation  Tongue is midline  Palate elevates symmetrically   Moving all 4 ext spontaneously no drift appreciated    Gait not assessed.            *****LAB AND IMAGIN.7   8.48  )-----------( 363      ( 2021 04:32 )             36.7               01-    128<L>  |  91<L>  |  19  ----------------------------<  131<H>  4.1   |  23  |  0.75    Ca    9.2      2021 04:32  Phos  4.2     12-31  Mg     2.0     12-31    TPro  7.3  /  Alb  4.0  /  TBili  0.6  /  DBili  x   /  AST  19  /  ALT  <5<L>  /  AlkPhos  64                           [All pertinent recent Imaging/Reports reviewed]           *****A S S E S S M E N T   A N D   P L A N :    75 y/o salvador speaking M with PMH of Parkinsons dementia AAO x 2 at baseline, BPH(s/p microwave ablation), hx of multiple MDR Klebsiella UTIs presents to ED for worsening suprapubic pain. History obtained from grandson since pt is a poor historian (spoke with pt in native language). Per grandson, pt was recently evaluated in ED for "low sodium" although w/u was negative and he was discharged. Since then he has been experiencing worsening suprapubic pain, dysuria, increased nocturia, poor appetite. Denies fever, chills, N/V, flank pain, diarrhea. He was recently evaluated by his PCP in november and at that time he endorsed sialorrhea due to progression of parkinsons for which he was started on glycopyrrolate 1 mg TID. Of note he has prior hx of urinary retention requiring chronic pichardo (most recently dc'ed in 2019).       Problem/Recommendations 1:  ams improved  multifactorial toxic metabolic encephalopathy due to hyponatremia +/- pain related delirium +/- related to glycopyrrolate related anticholinergic effects   continue to monitor closely   avoid sleep wake cycle disruption   encourage po intake.       Problem/Recommendations 2:  parkinsons  continue home regimen.   continue sinemet three times a day   add atropine oph drops subligual for sialorrhea       Thank you for allowing me to participate in the care of this patient. Please do not hesitate to call me if you have any  questions.        ________________  Elzbieta Hargrove MD  [facility] ([facility]  [phone]      33 minutes spent on total encounter; more than 50 % of the visit was  spent counseling about plan of care, compliance to diet/exercise and medication regimen and or  coordinating care by the attending physician.      It is advised that stroke patients follow up with CARLOS Ventura @ [phone] in 1- 2 weeks.   Others please follow up with Dr. Michael Nissenbaum [phone]
Patient is a 75y old  Male who presents with a chief complaint of urinary retention (31 Dec 2020 10:42)      INTERVAL HPI/OVERNIGHT EVENTS:  T(C): 37.2 (01-01-21 @ 13:45), Max: 37.2 (01-01-21 @ 13:45)  HR: 94 (01-01-21 @ 13:45) (81 - 94)  BP: 142/63 (01-01-21 @ 13:45) (131/82 - 143/83)  RR: 18 (01-01-21 @ 13:45) (17 - 18)  SpO2: 94% (01-01-21 @ 13:45) (94% - 100%)  Wt(kg): --  I&O's Summary    31 Dec 2020 07:01  -  01 Jan 2021 07:00  --------------------------------------------------------  IN: 240 mL / OUT: 900 mL / NET: -660 mL    01 Jan 2021 07:01  -  01 Jan 2021 17:41  --------------------------------------------------------  IN: 625 mL / OUT: 325 mL / NET: 300 mL        LABS:                        12.7   8.48  )-----------( 363      ( 01 Jan 2021 04:32 )             36.7     01-01    128<L>  |  91<L>  |  19  ----------------------------<  131<H>  4.1   |  23  |  0.75    Ca    9.2      01 Jan 2021 04:32  Phos  4.2     12-31  Mg     2.0     12-31    TPro  7.3  /  Alb  4.0  /  TBili  0.6  /  DBili  x   /  AST  19  /  ALT  <5<L>  /  AlkPhos  64  01-01        CAPILLARY BLOOD GLUCOSE                MEDICATIONS  (STANDING):  atropine 1% Ophthalmic Solution for SubLingual Use 2 Drop(s) SubLingual three times a day  carbidopa/levodopa  25/100 1 Tablet(s) Oral three times a day  enoxaparin Injectable 40 milliGRAM(s) SubCutaneous daily  finasteride 5 milliGRAM(s) Oral daily  influenza  Vaccine (HIGH DOSE) 0.7 milliLiter(s) IntraMuscular once  melatonin 3 milliGRAM(s) Oral at bedtime  memantine 10 milliGRAM(s) Oral daily  phenazopyridine 100 milliGRAM(s) Oral every 8 hours  polyethylene glycol 3350 17 Gram(s) Oral daily  sodium chloride 2 Gram(s) Oral three times a day  tamsulosin 0.4 milliGRAM(s) Oral at bedtime    MEDICATIONS  (PRN):          PHYSICAL EXAM:  GENERAL: NAD, well-groomed, well-developed  HEAD:  Atraumatic, Normocephalic  CHEST/LUNG: Clear to percussion bilaterally; No rales, rhonchi, wheezing, or rubs  HEART: Regular rate and rhythm; No murmurs, rubs, or gallops  ABDOMEN: Soft, Nontender, Nondistended; Bowel sounds present  EXTREMITIES:  2+ Peripheral Pulses, No clubbing, cyanosis, or edema  LYMPH: No lymphadenopathy noted  SKIN: No rashes or lesions    Care Discussed with Consultants/Other Providers [ ] YES  [ ] NO
[facility] NEPHROLOGY- PROGRESS NOTE    75y Male with history of dementia presents with lower abdominal pain. Nephrology consulted for hyponatremia.    REVIEW OF SYSTEMS:  Gen: no changes in weight  Cards: no chest pain  Resp: no dyspnea  GI: no nausea or vomiting or diarrhea,   Vascular: no LE edema    No Known Allergies      Hospital Medications: Medications reviewed      VITALS:  T(F): 98.3 (21 @ 05:10), Max: 98.3 (21 @ 05:10)  HR: 81 (21 @ 05:10)  BP: 131/82 (21 @ 05:10)  RR: 18 (21 @ 05:10)  SpO2: 100% (21 @ 05:10)  Wt(kg): --     @ 07:  -   @ 07:00  --------------------------------------------------------  IN: 240 mL / OUT: 900 mL / NET: -660 mL     @ 07:01   @ 12:08  --------------------------------------------------------  IN: 450 mL / OUT: 0 mL / NET: 450 mL      PHYSICAL EXAM:    Gen: NAD, calm  Cards: RRR, +S1/S2, no M/G/R  Resp: CTA B/L  GI: soft, NT/ND, NABS  : + pichardo with orange urine  Vascular: no LE edema B/L      LABS:    128 <--, 129 <--, 127 <--, 128 <--, 126 <--, 122 <--  128<L>  |  91<L>  |  19  ----------------------------<  131<H>  4.1   |  23  |  0.75    Ca    9.2      2021 04:32  Phos  4.2     12  Mg     2.0         TPro  7.3  /  Alb  4.0  /  TBili  0.6  /  DBili      /  AST  19  /  ALT  <5<L>  /  AlkPhos  64      Creatinine Trend: 0.75 <--, 0.69 <--, 0.69 <--, 0.64 <--, 0.67 <--, 0.77 <--                        12.7   8.48  )-----------( 363      ( 2021 04:32 )             36.7     Urine Studies:  Urinalysis Basic - ( 27 Dec 2020 12:28 )    Color: Light Yellow / Appearance: Clear / S.016 / pH:   Gluc:  / Ketone: Negative  / Bili: Negative / Urobili: <2 mg/dL   Blood:  / Protein: Trace / Nitrite: Negative   Leuk Esterase: Negative / RBC:  / WBC    Sq Epi:  / Non Sq Epi:  / Bacteria:       Osmolality, Random Urine: 490 mosm/Kg ( @ 13:52)  Sodium, Random Urine: 69 mmol/L ( @ 14:37)  Osmolality, Random Urine: 503 mosm/Kg ( @ 13:06)  Osmolality, Random Urine: 415 mosm/kg ( @ 07:58)    
CARDIOLOGY FOLLOW UP - Dr. Us    CC no cp or sob       PHYSICAL EXAM:  T(C): 37 (01-01-21 @ 23:56), Max: 37.2 (01-01-21 @ 13:45)  HR: 88 (01-01-21 @ 23:56) (88 - 94)  BP: 138/77 (01-01-21 @ 23:56) (138/77 - 142/63)  RR: 19 (01-01-21 @ 23:56) (18 - 19)  SpO2: 98% (01-01-21 @ 23:56) (94% - 98%)  Wt(kg): --  I&O's Summary    01 Jan 2021 07:01  -  02 Jan 2021 07:00  --------------------------------------------------------  IN: 1112 mL / OUT: 725 mL / NET: 387 mL    02 Jan 2021 07:01  -  02 Jan 2021 13:31  --------------------------------------------------------  IN: 0 mL / OUT: 400 mL / NET: -400 mL        Appearance: Normal	  Cardiovascular: Normal S1 S2,RRR, No JVD, No murmurs  Respiratory: Lungs clear to auscultation	  Gastrointestinal:  Soft, Non-tender, + BS	  Extremities: Normal range of motion, No clubbing, cyanosis or edema      Home Medications:  GenTeal ophthalmic gel: 1 drop(s) to each affected eye 2 times a day (27 Dec 2020 17:14)  glycopyrrolate 1 mg oral tablet: 1 tab(s) orally 3 times a day (27 Dec 2020 17:14)  memantine 10 mg oral tablet: 1 tab(s) orally once a day (27 Dec 2020 17:14)  Sodium Chloride 1 g oral tablet: 1 tab(s) orally 2 times a day (27 Dec 2020 17:14)      MEDICATIONS  (STANDING):  atropine 1% Ophthalmic Solution for SubLingual Use 2 Drop(s) SubLingual three times a day  carbidopa/levodopa  25/100 1 Tablet(s) Oral three times a day  enoxaparin Injectable 40 milliGRAM(s) SubCutaneous daily  finasteride 5 milliGRAM(s) Oral daily  influenza  Vaccine (HIGH DOSE) 0.7 milliLiter(s) IntraMuscular once  melatonin 3 milliGRAM(s) Oral at bedtime  memantine 10 milliGRAM(s) Oral daily  phenazopyridine 100 milliGRAM(s) Oral every 8 hours  polyethylene glycol 3350 17 Gram(s) Oral daily  sodium chloride 2 Gram(s) Oral three times a day  tamsulosin 0.4 milliGRAM(s) Oral at bedtime      TELEMETRY: 	    ECG:  	  RADIOLOGY:   DIAGNOSTIC TESTING:  [ ] Echocardiogram:  [ ]  Catheterization:  [ ] Stress Test:    OTHER: 	    LABS:	 	                            13.3   10.73 )-----------( 368      ( 02 Jan 2021 08:49 )             39.7     01-02    134<L>  |  97<L>  |  21  ----------------------------<  114<H>  4.5   |  25  |  0.61    Ca    9.6      02 Jan 2021 08:49  Phos  4.5     01-02  Mg     2.2     01-02    TPro  7.3  /  Alb  4.0  /  TBili  0.6  /  DBili  x   /  AST  19  /  ALT  <5<L>  /  AlkPhos  64  01-01            
[facility] NEPHROLOGY- PROGRESS NOTE    75y Male with history of dementia presents with lower abdominal pain. Nephrology consulted for hyponatremia.    REVIEW OF SYSTEMS: unable to obtain due to confusion    No Known Allergies      Hospital Medications: Medications reviewed    VITALS:  T(F): 97.9 (21 @ 05:59), Max: 97.9 (21 @ 05:59)  HR: 79 (21 @ 05:59)  BP: 134/78 (21 @ 05:59)  RR: 17 (21 @ 05:59)  SpO2: 100% (21 @ 05:59)  Wt(kg): --     @ 07:01  -   @ 07:00  --------------------------------------------------------  IN: 600 mL / OUT: 1200 mL / NET: -600 mL      PHYSICAL EXAM:    Gen: Lethargic  Cards: RRR, +S1/S2, no M/G/R  Resp: CTA B/L  GI: soft, NT/ND, NABS  : + pichardo  Vascular: no LE edema B/L    LABS:    134 <--, 134 <--, 128 <--, 129 <--, 127 <--, 128 <--, 126 <--  134<L>  |  100  |  19  ----------------------------<  125<H>  3.7   |  23  |  0.61    Ca    9.2      2021 07:32  Phos  3.8       Mg     2.1           Creatinine Trend: 0.61 <--, 0.61 <--, 0.75 <--, 0.69 <--, 0.69 <--, 0.64 <--, 0.67 <--, 0.77 <--                        12.9   6.71  )-----------( 382      ( 2021 07:25 )             39.3     Urine Studies:  Urinalysis Basic - ( 27 Dec 2020 12:28 )    Color: Light Yellow / Appearance: Clear / S.016 / pH:   Gluc:  / Ketone: Negative  / Bili: Negative / Urobili: <2 mg/dL   Blood:  / Protein: Trace / Nitrite: Negative   Leuk Esterase: Negative / RBC:  / WBC    Sq Epi:  / Non Sq Epi:  / Bacteria:       Osmolality, Random Urine: 490 mosm/Kg ( @ 13:52)  Sodium, Random Urine: 69 mmol/L ( @ 14:37)  Osmolality, Random Urine: 503 mosm/Kg ( @ 13:06)  Osmolality, Random Urine: 415 mosm/kg ( @ 07:58)            
Patient is a 75y old  Male who presents with a chief complaint of     INTERVAL HPI/OVERNIGHT EVENTS:  T(C): 36.7 (20 @ 14:37), Max: 36.7 (20 @ 14:37)  HR: 67 (20 @ 14:37) (58 - 87)  BP: 152/87 (20 @ 14:37) (105/79 - 152/87)  RR: 16 (20 @ 14:37) (16 - 17)  SpO2: 99% (20 @ 14:37) (99% - 100%)  Wt(kg): --  I&O's Summary    27 Dec 2020 07:01  -  28 Dec 2020 07:00  --------------------------------------------------------  IN: 0 mL / OUT: 1225 mL / NET: -1225 mL    28 Dec 2020 07:01  -  28 Dec 2020 17:30  --------------------------------------------------------  IN: 0 mL / OUT: 900 mL / NET: -900 mL        LABS:                        12.4   6.79  )-----------( 304      ( 28 Dec 2020 07:19 )             35.9         126<L>  |  93<L>  |  12  ----------------------------<  80  3.9   |  22  |  0.67    Ca    8.8      28 Dec 2020 07:19  Phos  4.7       Mg     2.0         TPro  7.3  /  Alb  4.1  /  TBili  0.7  /  DBili  x   /  AST  12  /  ALT  <5  /  AlkPhos  70  27      Urinalysis Basic - ( 27 Dec 2020 12:28 )    Color: Light Yellow / Appearance: Clear / S.016 / pH: x  Gluc: x / Ketone: Negative  / Bili: Negative / Urobili: <2 mg/dL   Blood: x / Protein: Trace / Nitrite: Negative   Leuk Esterase: Negative / RBC: x / WBC x   Sq Epi: x / Non Sq Epi: x / Bacteria: x      CAPILLARY BLOOD GLUCOSE      POCT Blood Glucose.: 101 mg/dL (28 Dec 2020 01:31)        Urinalysis Basic - ( 27 Dec 2020 12:28 )    Color: Light Yellow / Appearance: Clear / S.016 / pH: x  Gluc: x / Ketone: Negative  / Bili: Negative / Urobili: <2 mg/dL   Blood: x / Protein: Trace / Nitrite: Negative   Leuk Esterase: Negative / RBC: x / WBC x   Sq Epi: x / Non Sq Epi: x / Bacteria: x        MEDICATIONS  (STANDING):  acetaminophen   Tablet .. 975 milliGRAM(s) Oral every 8 hours  carbidopa/levodopa  25/100 1 Tablet(s) Oral three times a day  enoxaparin Injectable 40 milliGRAM(s) SubCutaneous daily  finasteride 5 milliGRAM(s) Oral daily  influenza  Vaccine (HIGH DOSE) 0.7 milliLiter(s) IntraMuscular once  memantine 10 milliGRAM(s) Oral daily  phenazopyridine 100 milliGRAM(s) Oral every 8 hours  sodium chloride 1 Gram(s) Oral three times a day  tamsulosin 0.4 milliGRAM(s) Oral at bedtime    MEDICATIONS  (PRN):          PHYSICAL EXAM:  GENERAL: NAD, well-groomed, well-developed  HEAD:  Atraumatic, Normocephalic  CHEST/LUNG: Clear to percussion bilaterally; No rales, rhonchi, wheezing, or rubs  HEART: Regular rate and rhythm; No murmurs, rubs, or gallops  ABDOMEN: Soft, Nontender, Nondistended; Bowel sounds present  EXTREMITIES:  2+ Peripheral Pulses, No clubbing, cyanosis, or edema  LYMPH: No lymphadenopathy noted  SKIN: No rashes or lesions    Care Discussed with Consultants/Other Providers [ ] YES  [ ] NO
Patient is a 75y old  Male who presents with a chief complaint of ams (03 Jan 2021 18:37)      INTERVAL HPI/OVERNIGHT EVENTS:  T(C): 36.6 (01-04-21 @ 13:32), Max: 36.6 (01-04-21 @ 13:32)  HR: 77 (01-04-21 @ 13:32) (65 - 80)  BP: 105/55 (01-04-21 @ 13:32) (105/55 - 123/76)  RR: 17 (01-04-21 @ 06:40) (17 - 17)  SpO2: 97% (01-04-21 @ 13:32) (97% - 100%)  Wt(kg): --  I&O's Summary    03 Jan 2021 07:01  -  04 Jan 2021 07:00  --------------------------------------------------------  IN: 0 mL / OUT: 650 mL / NET: -650 mL    04 Jan 2021 07:01  -  04 Jan 2021 18:22  --------------------------------------------------------  IN: 0 mL / OUT: 500 mL / NET: -500 mL        LABS:                        13.2   7.62  )-----------( 393      ( 04 Jan 2021 07:12 )             39.0     01-04    137  |  100  |  25<H>  ----------------------------<  114<H>  4.0   |  26  |  0.69    Ca    9.2      04 Jan 2021 07:12  Phos  4.6     01-04  Mg     2.1     01-04          CAPILLARY BLOOD GLUCOSE                MEDICATIONS  (STANDING):  atropine 1% Ophthalmic Solution for SubLingual Use 1 Drop(s) SubLingual two times a day  carbidopa/levodopa  25/100 1 Tablet(s) Oral three times a day  enoxaparin Injectable 40 milliGRAM(s) SubCutaneous daily  finasteride 5 milliGRAM(s) Oral daily  influenza  Vaccine (HIGH DOSE) 0.7 milliLiter(s) IntraMuscular once  melatonin 3 milliGRAM(s) Oral at bedtime  memantine 10 milliGRAM(s) Oral daily  phenazopyridine 100 milliGRAM(s) Oral every 8 hours  polyethylene glycol 3350 17 Gram(s) Oral daily  sodium chloride 2 Gram(s) Oral two times a day  tamsulosin 0.4 milliGRAM(s) Oral at bedtime  thiamine IVPB 500 milliGRAM(s) IV Intermittent daily    MEDICATIONS  (PRN):  acetaminophen   Tablet .. 650 milliGRAM(s) Oral every 6 hours PRN Mild Pain (1 - 3), Moderate Pain (4 - 6), Severe Pain (7 - 10)  OLANZapine 1.25 milliGRAM(s) Oral every 6 hours PRN Agitation  OLANZapine Injectable 1.25 milliGRAM(s) IntraMuscular every 6 hours PRN Agitation  QUEtiapine 12.5 milliGRAM(s) Oral every 6 hours PRN Agitation          PHYSICAL EXAM:  GENERAL: confused   CHEST/LUNG: Clear to percussion bilaterally; No rales, rhonchi, wheezing, or rubs  HEART: Regular rate and rhythm; No murmurs, rubs, or gallops  ABDOMEN: Soft, Nontender, Nondistended; Bowel sounds present  EXTREMITIES:  no edema     Care Discussed with Consultants/Other Providers [ x] YES  [ ] NO
Patient is a 75y old  Male who presents with a chief complaint of urinary retention (29 Dec 2020 10:39)      INTERVAL HPI/OVERNIGHT EVENTS:  T(C): 36.6 (12-29-20 @ 13:24), Max: 36.6 (12-29-20 @ 06:03)  HR: 87 (12-29-20 @ 13:24) (66 - 87)  BP: 143/68 (12-29-20 @ 13:24) (117/65 - 144/83)  RR: 18 (12-29-20 @ 06:03) (18 - 19)  SpO2: 99% (12-29-20 @ 13:24) (98% - 100%)  Wt(kg): --  I&O's Summary    28 Dec 2020 07:01  -  29 Dec 2020 07:00  --------------------------------------------------------  IN: 120 mL / OUT: 1600 mL / NET: -1480 mL    29 Dec 2020 07:01  -  29 Dec 2020 17:46  --------------------------------------------------------  IN: 715 mL / OUT: 475 mL / NET: 240 mL        LABS:                        12.8   6.44  )-----------( 340      ( 29 Dec 2020 07:53 )             37.1     12-29    128<L>  |  93<L>  |  13  ----------------------------<  111<H>  3.9   |  23  |  0.64    Ca    9.0      29 Dec 2020 07:53  Phos  4.7     12-28  Mg     2.0     12-28          CAPILLARY BLOOD GLUCOSE                MEDICATIONS  (STANDING):  acetaminophen   Tablet .. 975 milliGRAM(s) Oral every 8 hours  carbidopa/levodopa  25/100 1 Tablet(s) Oral three times a day  enoxaparin Injectable 40 milliGRAM(s) SubCutaneous daily  finasteride 5 milliGRAM(s) Oral daily  influenza  Vaccine (HIGH DOSE) 0.7 milliLiter(s) IntraMuscular once  memantine 10 milliGRAM(s) Oral daily  phenazopyridine 100 milliGRAM(s) Oral every 8 hours  sodium chloride 1 Gram(s) Oral three times a day  tamsulosin 0.4 milliGRAM(s) Oral at bedtime    MEDICATIONS  (PRN):          PHYSICAL EXAM:  GENERAL: NAD, well-groomed, well-developed  HEAD:  Atraumatic, Normocephalic  CHEST/LUNG: Clear to percussion bilaterally; No rales, rhonchi, wheezing, or rubs  HEART: Regular rate and rhythm; No murmurs, rubs, or gallops  ABDOMEN: Soft, Nontender, Nondistended; Bowel sounds present  EXTREMITIES:  2+ Peripheral Pulses, No clubbing, cyanosis, or edema  LYMPH: No lymphadenopathy noted  SKIN: No rashes or lesions    Care Discussed with Consultants/Other Providers [ ] YES  [ ] NO
Patient is a 75y old  Male who presents with a chief complaint of urinary retention (30 Dec 2020 11:21)      INTERVAL HPI/OVERNIGHT EVENTS:  T(C): 36.9 (12-30-20 @ 14:00), Max: 36.9 (12-30-20 @ 14:00)  HR: 98 (12-30-20 @ 14:00) (66 - 98)  BP: 127/75 (12-30-20 @ 14:00) (114/65 - 133/69)  RR: 18 (12-30-20 @ 14:00) (17 - 18)  SpO2: 100% (12-30-20 @ 14:00) (97% - 100%)  Wt(kg): --  I&O's Summary    29 Dec 2020 07:01  -  30 Dec 2020 07:00  --------------------------------------------------------  IN: 1415 mL / OUT: 1175 mL / NET: 240 mL        LABS:                        12.4   5.46  )-----------( 336      ( 30 Dec 2020 07:22 )             35.9     12-30    127<L>  |  92<L>  |  12  ----------------------------<  132<H>  4.1   |  25  |  0.69    Ca    9.1      30 Dec 2020 07:22          CAPILLARY BLOOD GLUCOSE                MEDICATIONS  (STANDING):  atropine 1% Ophthalmic Solution for SubLingual Use 2 Drop(s) SubLingual three times a day  carbidopa/levodopa  25/100 1 Tablet(s) Oral three times a day  enoxaparin Injectable 40 milliGRAM(s) SubCutaneous daily  finasteride 5 milliGRAM(s) Oral daily  influenza  Vaccine (HIGH DOSE) 0.7 milliLiter(s) IntraMuscular once  melatonin 3 milliGRAM(s) Oral at bedtime  memantine 10 milliGRAM(s) Oral daily  phenazopyridine 100 milliGRAM(s) Oral every 8 hours  polyethylene glycol 3350 17 Gram(s) Oral daily  sodium chloride 2 Gram(s) Oral two times a day  tamsulosin 0.4 milliGRAM(s) Oral at bedtime    MEDICATIONS  (PRN):          PHYSICAL EXAM:  GENERAL: NAD, well-groomed, well-developed  HEAD:  Atraumatic, Normocephalic  CHEST/LUNG: Clear to percussion bilaterally; No rales, rhonchi, wheezing, or rubs  HEART: Regular rate and rhythm; No murmurs, rubs, or gallops  ABDOMEN: Soft, Nontender, Nondistended; Bowel sounds present  EXTREMITIES:  2+ Peripheral Pulses, No clubbing, cyanosis, or edema  LYMPH: No lymphadenopathy noted  SKIN: No rashes or lesions    Care Discussed with Consultants/Other Providers [ ] YES  [ ] NO
Patient is a 75y old  Male who presents with a chief complaint of urinary retention (31 Dec 2020 10:42)      INTERVAL HPI/OVERNIGHT EVENTS:  T(C): 36.5 (12-31-20 @ 05:32), Max: 36.5 (12-31-20 @ 05:32)  HR: 100 (12-31-20 @ 05:32) (94 - 100)  BP: 135/83 (12-31-20 @ 05:32) (135/63 - 135/83)  RR: 17 (12-31-20 @ 05:32) (17 - 18)  SpO2: 100% (12-31-20 @ 08:45) (87% - 100%)  Wt(kg): --  I&O's Summary    30 Dec 2020 07:01  -  31 Dec 2020 07:00  --------------------------------------------------------  IN: 0 mL / OUT: 700 mL / NET: -700 mL        LABS:                        13.4   8.78  )-----------( 364      ( 31 Dec 2020 13:36 )             38.8     12-31    129<L>  |  92<L>  |  17  ----------------------------<  147<H>  4.4   |  25  |  0.69    Ca    9.7      31 Dec 2020 13:36  Phos  4.2     12-31  Mg     2.0     12-31    TPro  7.7  /  Alb  4.3  /  TBili  0.4  /  DBili  x   /  AST  19  /  ALT  6   /  AlkPhos  65  12-31        CAPILLARY BLOOD GLUCOSE                MEDICATIONS  (STANDING):  atropine 1% Ophthalmic Solution for SubLingual Use 2 Drop(s) SubLingual three times a day  carbidopa/levodopa  25/100 1 Tablet(s) Oral three times a day  enoxaparin Injectable 40 milliGRAM(s) SubCutaneous daily  finasteride 5 milliGRAM(s) Oral daily  influenza  Vaccine (HIGH DOSE) 0.7 milliLiter(s) IntraMuscular once  melatonin 3 milliGRAM(s) Oral at bedtime  memantine 10 milliGRAM(s) Oral daily  phenazopyridine 100 milliGRAM(s) Oral every 8 hours  polyethylene glycol 3350 17 Gram(s) Oral daily  sodium chloride 2 Gram(s) Oral two times a day  tamsulosin 0.4 milliGRAM(s) Oral at bedtime    MEDICATIONS  (PRN):          PHYSICAL EXAM:  GENERAL: NAD, well-groomed, well-developed  HEAD:  Atraumatic, Normocephalic  CHEST/LUNG: Clear to percussion bilaterally; No rales, rhonchi, wheezing, or rubs  HEART: Regular rate and rhythm; No murmurs, rubs, or gallops  ABDOMEN: Soft, Nontender, Nondistended; Bowel sounds present  EXTREMITIES:  2+ Peripheral Pulses, No clubbing, cyanosis, or edema  LYMPH: No lymphadenopathy noted  SKIN: No rashes or lesions    Care Discussed with Consultants/Other Providers [ ] YES  [ ] NO
Pt seen, agitated this am, no other events    MEDICATIONS  (STANDING):  atropine 1% Ophthalmic Solution for SubLingual Use 2 Drop(s) SubLingual three times a day  carbidopa/levodopa  25/100 1 Tablet(s) Oral three times a day  enoxaparin Injectable 40 milliGRAM(s) SubCutaneous daily  finasteride 5 milliGRAM(s) Oral daily  influenza  Vaccine (HIGH DOSE) 0.7 milliLiter(s) IntraMuscular once  melatonin 3 milliGRAM(s) Oral at bedtime  memantine 10 milliGRAM(s) Oral daily  phenazopyridine 100 milliGRAM(s) Oral every 8 hours  polyethylene glycol 3350 17 Gram(s) Oral daily  sodium chloride 2 Gram(s) Oral three times a day  tamsulosin 0.4 milliGRAM(s) Oral at bedtime    MEDICATIONS  (PRN):      ROS  UTO 2/2 participation    Vital Signs Last 24 Hrs  T(C): 37 (01 Jan 2021 23:56), Max: 37.2 (01 Jan 2021 13:45)  T(F): 98.6 (01 Jan 2021 23:56), Max: 98.9 (01 Jan 2021 13:45)  HR: 88 (01 Jan 2021 23:56) (88 - 94)  BP: 138/77 (01 Jan 2021 23:56) (138/77 - 142/63)  BP(mean): --  RR: 19 (01 Jan 2021 23:56) (18 - 19)  SpO2: 98% (01 Jan 2021 23:56) (94% - 98%)    PE  NAD  Awake                        13.3   10.73 )-----------( 368      ( 02 Jan 2021 08:49 )             39.7       01-02    134<L>  |  97<L>  |  21  ----------------------------<  114<H>  4.5   |  25  |  0.61    Ca    9.6      02 Jan 2021 08:49  Phos  4.5     01-02  Mg     2.2     01-02    TPro  7.3  /  Alb  4.0  /  TBili  0.6  /  DBili  x   /  AST  19  /  ALT  <5<L>  /  AlkPhos  64  01-01      
Pt seen, no events    MEDICATIONS  (STANDING):  atropine 1% Ophthalmic Solution for SubLingual Use 2 Drop(s) SubLingual three times a day  carbidopa/levodopa  25/100 1 Tablet(s) Oral three times a day  enoxaparin Injectable 40 milliGRAM(s) SubCutaneous daily  finasteride 5 milliGRAM(s) Oral daily  influenza  Vaccine (HIGH DOSE) 0.7 milliLiter(s) IntraMuscular once  melatonin 3 milliGRAM(s) Oral at bedtime  memantine 10 milliGRAM(s) Oral daily  phenazopyridine 100 milliGRAM(s) Oral every 8 hours  polyethylene glycol 3350 17 Gram(s) Oral daily  sodium chloride 2 Gram(s) Oral three times a day  tamsulosin 0.4 milliGRAM(s) Oral at bedtime    MEDICATIONS  (PRN):      ROS  limited 2/2 participation    Vital Signs Last 24 Hrs  T(C): 36.8 (01 Jan 2021 05:10), Max: 36.8 (01 Jan 2021 05:10)  T(F): 98.3 (01 Jan 2021 05:10), Max: 98.3 (01 Jan 2021 05:10)  HR: 81 (01 Jan 2021 05:10) (72 - 84)  BP: 131/82 (01 Jan 2021 05:10) (126/81 - 143/83)  BP(mean): --  RR: 18 (01 Jan 2021 05:10) (17 - 18)  SpO2: 100% (01 Jan 2021 05:10) (92% - 100%)    PE  NAD                            12.7   8.48  )-----------( 363      ( 01 Jan 2021 04:32 )             36.7       01-01    128<L>  |  91<L>  |  19  ----------------------------<  131<H>  4.1   |  23  |  0.75    Ca    9.2      01 Jan 2021 04:32  Phos  4.2     12-31  Mg     2.0     12-31    TPro  7.3  /  Alb  4.0  /  TBili  0.6  /  DBili  x   /  AST  19  /  ALT  <5<L>  /  AlkPhos  64  01-01      
CARDIOLOGY FOLLOW UP - Dr. Us    CC: denies cp, sob, and palpitations       PHYSICAL EXAM:  T(C): 36.6 (12-29-20 @ 06:03), Max: 36.7 (12-28-20 @ 14:37)  HR: 75 (12-29-20 @ 06:03) (66 - 75)  BP: 144/83 (12-29-20 @ 06:03) (117/65 - 152/87)  RR: 18 (12-29-20 @ 06:03) (16 - 19)  SpO2: 100% (12-29-20 @ 06:03) (98% - 100%)  Wt(kg): --  I&O's Summary    28 Dec 2020 07:01  -  29 Dec 2020 07:00  --------------------------------------------------------  IN: 120 mL / OUT: 1600 mL / NET: -1480 mL        Appearance: Normal	  Cardiovascular: Normal S1 S2,RRR, No JVD, No murmurs  Respiratory: Lungs clear to auscultation	  Gastrointestinal:  Soft, Non-tender, + BS	  Extremities: Normal range of motion, No clubbing, cyanosis or edema      Home Medications:  escitalopram 5 mg oral tablet: 1 tab(s) orally once a day (27 Dec 2020 17:14)  GenTeal ophthalmic gel: 1 drop(s) to each affected eye 2 times a day (27 Dec 2020 17:14)  glycopyrrolate 1 mg oral tablet: 1 tab(s) orally 3 times a day (27 Dec 2020 17:14)  memantine 10 mg oral tablet: 1 tab(s) orally once a day (27 Dec 2020 17:14)  Sodium Chloride 1 g oral tablet: 1 tab(s) orally 2 times a day (27 Dec 2020 17:14)      MEDICATIONS  (STANDING):  acetaminophen   Tablet .. 975 milliGRAM(s) Oral every 8 hours  carbidopa/levodopa  25/100 1 Tablet(s) Oral three times a day  enoxaparin Injectable 40 milliGRAM(s) SubCutaneous daily  finasteride 5 milliGRAM(s) Oral daily  influenza  Vaccine (HIGH DOSE) 0.7 milliLiter(s) IntraMuscular once  memantine 10 milliGRAM(s) Oral daily  phenazopyridine 100 milliGRAM(s) Oral every 8 hours  tamsulosin 0.4 milliGRAM(s) Oral at bedtime      TELEMETRY: 	    ECG:  	  RADIOLOGY:   DIAGNOSTIC TESTING:  [ ] Echocardiogram:  [ ]  Catheterization:  [ ] Stress Test:    OTHER: 	    LABS:	 	                            12.8   6.44  )-----------( 340      ( 29 Dec 2020 07:53 )             37.1     12-29    128<L>  |  93<L>  |  13  ----------------------------<  111<H>  3.9   |  23  |  0.64    Ca    9.0      29 Dec 2020 07:53  Phos  4.7     12-28  Mg     2.0     12-28    TPro  7.3  /  Alb  4.1  /  TBili  0.7  /  DBili  x   /  AST  12  /  ALT  <5  /  AlkPhos  70  12-27            
CARDIOLOGY FOLLOW UP - Dr. Us    CC: denies cp, sob, and palpitations       PHYSICAL EXAM:  T(C): 36.7 (12-30-20 @ 05:29), Max: 36.7 (12-30-20 @ 05:29)  HR: 84 (12-30-20 @ 05:29) (66 - 87)  BP: 114/65 (12-30-20 @ 05:29) (114/65 - 143/68)  RR: 18 (12-30-20 @ 05:29) (17 - 18)  SpO2: 97% (12-30-20 @ 05:29) (97% - 100%)  Wt(kg): --  I&O's Summary    29 Dec 2020 07:01  -  30 Dec 2020 07:00  --------------------------------------------------------  IN: 1415 mL / OUT: 1175 mL / NET: 240 mL        Appearance: Normal	  Cardiovascular: Normal S1 S2,RRR, No JVD, No murmurs  Respiratory: Lungs clear to auscultation	  Gastrointestinal:  Soft, Non-tender, + BS	  Extremities: Normal range of motion, No clubbing, cyanosis or edema      Home Medications:  escitalopram 5 mg oral tablet: 1 tab(s) orally once a day (27 Dec 2020 17:14)  GenTeal ophthalmic gel: 1 drop(s) to each affected eye 2 times a day (27 Dec 2020 17:14)  glycopyrrolate 1 mg oral tablet: 1 tab(s) orally 3 times a day (27 Dec 2020 17:14)  memantine 10 mg oral tablet: 1 tab(s) orally once a day (27 Dec 2020 17:14)  Sodium Chloride 1 g oral tablet: 1 tab(s) orally 2 times a day (27 Dec 2020 17:14)      MEDICATIONS  (STANDING):  acetaminophen   Tablet .. 975 milliGRAM(s) Oral every 8 hours  carbidopa/levodopa  25/100 1 Tablet(s) Oral three times a day  enoxaparin Injectable 40 milliGRAM(s) SubCutaneous daily  finasteride 5 milliGRAM(s) Oral daily  influenza  Vaccine (HIGH DOSE) 0.7 milliLiter(s) IntraMuscular once  memantine 10 milliGRAM(s) Oral daily  phenazopyridine 100 milliGRAM(s) Oral every 8 hours  polyethylene glycol 3350 17 Gram(s) Oral daily  sodium chloride 2 Gram(s) Oral two times a day  tamsulosin 0.4 milliGRAM(s) Oral at bedtime      TELEMETRY: 	    ECG:  	  RADIOLOGY:   US Kidney and Bladder (12.29.20 @ 14:05)   FINDINGS:  Right kidney: 9.5 cm. No renal mass, hydronephrosis or calculi.  Left kidney: 10.6 cm. No renal mass, hydronephrosis or calculi.  Urinary bladder: Decompressed with a Aguilar catheter.    IMPRESSION:  Sonographically normal kidneys. No hydronephrosis.    DIAGNOSTIC TESTING:  [ ] Echocardiogram:  [ ]  Catheterization:  [ ] Stress Test:    OTHER: 	    LABS:	 	                            12.4   5.46  )-----------( 336      ( 30 Dec 2020 07:22 )             35.9     12-30    127<L>  |  92<L>  |  12  ----------------------------<  132<H>  4.1   |  25  |  0.69    Ca    9.1      30 Dec 2020 07:22              
CARDIOLOGY FOLLOW UP - Dr. Us    CC: pt confused, NAD      PHYSICAL EXAM:  T(C): 36.3 (01-05-21 @ 06:17), Max: 36.6 (01-04-21 @ 13:32)  HR: 81 (01-05-21 @ 06:17) (77 - 84)  BP: 128/74 (01-05-21 @ 06:17) (105/55 - 128/74)  RR: 17 (01-05-21 @ 06:17) (17 - 18)  SpO2: 100% (01-05-21 @ 06:17) (97% - 100%)  Wt(kg): --  I&O's Summary    04 Jan 2021 07:01  -  05 Jan 2021 07:00  --------------------------------------------------------  IN: 120 mL / OUT: 1500 mL / NET: -1380 mL        Appearance: Normal	  Cardiovascular: Normal S1 S2,RRR, No JVD, No murmurs  Respiratory: Lungs clear to auscultation	  Gastrointestinal:  Soft, Non-tender, + BS	  Extremities: Normal range of motion, No clubbing, cyanosis or edema      Home Medications:  GenTeal ophthalmic gel: 1 drop(s) to each affected eye 2 times a day (27 Dec 2020 17:14)  glycopyrrolate 1 mg oral tablet: 1 tab(s) orally 3 times a day (27 Dec 2020 17:14)  memantine 10 mg oral tablet: 1 tab(s) orally once a day (27 Dec 2020 17:14)  Sodium Chloride 1 g oral tablet: 1 tab(s) orally 2 times a day (27 Dec 2020 17:14)      MEDICATIONS  (STANDING):  atropine 1% Ophthalmic Solution for SubLingual Use 1 Drop(s) SubLingual two times a day  carbidopa/levodopa  25/100 1 Tablet(s) Oral three times a day  enoxaparin Injectable 40 milliGRAM(s) SubCutaneous daily  finasteride 5 milliGRAM(s) Oral daily  influenza  Vaccine (HIGH DOSE) 0.7 milliLiter(s) IntraMuscular once  melatonin 3 milliGRAM(s) Oral at bedtime  memantine 10 milliGRAM(s) Oral daily  phenazopyridine 100 milliGRAM(s) Oral every 8 hours  polyethylene glycol 3350 17 Gram(s) Oral daily  sodium chloride 1 Gram(s) Oral three times a day  tamsulosin 0.4 milliGRAM(s) Oral at bedtime      TELEMETRY: off tele 	    ECG:  	  RADIOLOGY:   DIAGNOSTIC TESTING:  [ ] Echocardiogram:  [ ]  Catheterization:  [ ] Stress Test:    OTHER: 	    LABS:	 	                            12.5   6.41  )-----------( 385      ( 05 Jan 2021 07:54 )             37.4     01-05    139  |  103  |  21  ----------------------------<  129<H>  4.0   |  25  |  0.63    Ca    9.0      05 Jan 2021 07:54  Phos  4.3     01-05  Mg     2.1     01-05              
CARDIOLOGY FOLLOW UP - Dr. Us    CC: pt confused, NAD      PHYSICAL EXAM:  T(C): 36.5 (12-31-20 @ 05:32), Max: 36.9 (12-30-20 @ 14:00)  HR: 100 (12-31-20 @ 05:32) (94 - 100)  BP: 135/83 (12-31-20 @ 05:32) (127/75 - 135/83)  RR: 17 (12-31-20 @ 05:32) (17 - 18)  SpO2: 100% (12-31-20 @ 08:45) (87% - 100%)  Wt(kg): --  I&O's Summary    30 Dec 2020 07:01  -  31 Dec 2020 07:00  --------------------------------------------------------  IN: 0 mL / OUT: 700 mL / NET: -700 mL        Appearance: Normal	  Cardiovascular: Normal S1 S2,RRR, No JVD, No murmurs  Respiratory: Lungs clear to auscultation	  Gastrointestinal:  Soft, Non-tender, + BS	  Extremities: Normal range of motion, No clubbing, cyanosis or edema      Home Medications:  escitalopram 5 mg oral tablet: 1 tab(s) orally once a day (27 Dec 2020 17:14)  GenTeal ophthalmic gel: 1 drop(s) to each affected eye 2 times a day (27 Dec 2020 17:14)  glycopyrrolate 1 mg oral tablet: 1 tab(s) orally 3 times a day (27 Dec 2020 17:14)  memantine 10 mg oral tablet: 1 tab(s) orally once a day (27 Dec 2020 17:14)  Sodium Chloride 1 g oral tablet: 1 tab(s) orally 2 times a day (27 Dec 2020 17:14)      MEDICATIONS  (STANDING):  atropine 1% Ophthalmic Solution for SubLingual Use 2 Drop(s) SubLingual three times a day  carbidopa/levodopa  25/100 1 Tablet(s) Oral three times a day  enoxaparin Injectable 40 milliGRAM(s) SubCutaneous daily  finasteride 5 milliGRAM(s) Oral daily  influenza  Vaccine (HIGH DOSE) 0.7 milliLiter(s) IntraMuscular once  melatonin 3 milliGRAM(s) Oral at bedtime  memantine 10 milliGRAM(s) Oral daily  phenazopyridine 100 milliGRAM(s) Oral every 8 hours  polyethylene glycol 3350 17 Gram(s) Oral daily  sodium chloride 2 Gram(s) Oral two times a day  tamsulosin 0.4 milliGRAM(s) Oral at bedtime      TELEMETRY: 	    ECG:  	  RADIOLOGY:   DIAGNOSTIC TESTING:  [ ] Echocardiogram:  [ ]  Catheterization:  [ ] Stress Test:    OTHER: 	    LABS:	 	                            12.4   5.46  )-----------( 336      ( 30 Dec 2020 07:22 )             35.9     12-30    127<L>  |  92<L>  |  12  ----------------------------<  132<H>  4.1   |  25  |  0.69    Ca    9.1      30 Dec 2020 07:22              
Patient is a 75y old  Male who presents with a chief complaint of AMS (02 Jan 2021 17:34)      INTERVAL HPI/OVERNIGHT EVENTS:  T(C): 37.2 (01-03-21 @ 15:39), Max: 37.2 (01-03-21 @ 15:39)  HR: 108 (01-03-21 @ 15:39) (79 - 108)  BP: 139/73 (01-03-21 @ 15:39) (134/78 - 139/73)  RR: 16 (01-03-21 @ 15:39) (16 - 17)  SpO2: 100% (01-03-21 @ 15:39) (98% - 100%)  Wt(kg): --  I&O's Summary    02 Jan 2021 07:01  -  03 Jan 2021 07:00  --------------------------------------------------------  IN: 600 mL / OUT: 1200 mL / NET: -600 mL    03 Jan 2021 07:01  -  03 Jan 2021 18:37  --------------------------------------------------------  IN: 0 mL / OUT: 300 mL / NET: -300 mL        LABS:                        12.9   6.71  )-----------( 382      ( 03 Jan 2021 07:25 )             39.3     01-03    134<L>  |  100  |  19  ----------------------------<  125<H>  3.7   |  23  |  0.61    Ca    9.2      03 Jan 2021 07:32  Phos  3.8     01-03  Mg     2.1     01-03          CAPILLARY BLOOD GLUCOSE                MEDICATIONS  (STANDING):  atropine 1% Ophthalmic Solution for SubLingual Use 1 Drop(s) SubLingual two times a day  carbidopa/levodopa  25/100 1 Tablet(s) Oral three times a day  enoxaparin Injectable 40 milliGRAM(s) SubCutaneous daily  finasteride 5 milliGRAM(s) Oral daily  influenza  Vaccine (HIGH DOSE) 0.7 milliLiter(s) IntraMuscular once  melatonin 3 milliGRAM(s) Oral at bedtime  memantine 10 milliGRAM(s) Oral daily  phenazopyridine 100 milliGRAM(s) Oral every 8 hours  polyethylene glycol 3350 17 Gram(s) Oral daily  sodium chloride 2 Gram(s) Oral three times a day  tamsulosin 0.4 milliGRAM(s) Oral at bedtime  thiamine IVPB 500 milliGRAM(s) IV Intermittent daily    MEDICATIONS  (PRN):  OLANZapine 1.25 milliGRAM(s) Oral every 6 hours PRN Agitation  OLANZapine Injectable 1.25 milliGRAM(s) IntraMuscular every 6 hours PRN Agitation          PHYSICAL EXAM:  GENERAL: NAD, well-groomed, well-developed  HEAD:  Atraumatic, Normocephalic  CHEST/LUNG: Clear to percussion bilaterally; No rales, rhonchi, wheezing, or rubs  HEART: Regular rate and rhythm; No murmurs, rubs, or gallops  ABDOMEN: Soft, Nontender, Nondistended; Bowel sounds present  EXTREMITIES:  2+ Peripheral Pulses, No clubbing, cyanosis, or edema  LYMPH: No lymphadenopathy noted  SKIN: No rashes or lesions    Care Discussed with Consultants/Other Providers [ ] YES  [ ] NO
Patient is a 75y old  Male who presents with a chief complaint of retention (05 Jan 2021 15:36)      INTERVAL HPI/OVERNIGHT EVENTS:  T(C): 36.3 (01-05-21 @ 13:31), Max: 36.5 (01-04-21 @ 20:37)  HR: 91 (01-05-21 @ 13:31) (81 - 91)  BP: 132/73 (01-05-21 @ 13:31) (126/73 - 132/73)  RR: 18 (01-05-21 @ 13:31) (17 - 18)  SpO2: 98% (01-05-21 @ 13:31) (98% - 100%)  Wt(kg): --  I&O's Summary    04 Jan 2021 07:01  -  05 Jan 2021 07:00  --------------------------------------------------------  IN: 120 mL / OUT: 1500 mL / NET: -1380 mL    05 Jan 2021 07:01  -  05 Jan 2021 17:13  --------------------------------------------------------  IN: 0 mL / OUT: 300 mL / NET: -300 mL        LABS:                        12.5   6.41  )-----------( 385      ( 05 Jan 2021 07:54 )             37.4     01-05    139  |  103  |  21  ----------------------------<  129<H>  4.0   |  25  |  0.63    Ca    9.0      05 Jan 2021 07:54  Phos  4.3     01-05  Mg     2.1     01-05          CAPILLARY BLOOD GLUCOSE                MEDICATIONS  (STANDING):  atropine 1% Ophthalmic Solution for SubLingual Use 1 Drop(s) SubLingual two times a day  carbidopa/levodopa  25/100 1 Tablet(s) Oral three times a day  enoxaparin Injectable 40 milliGRAM(s) SubCutaneous daily  finasteride 5 milliGRAM(s) Oral daily  influenza  Vaccine (HIGH DOSE) 0.7 milliLiter(s) IntraMuscular once  melatonin 3 milliGRAM(s) Oral at bedtime  memantine 10 milliGRAM(s) Oral daily  phenazopyridine 100 milliGRAM(s) Oral every 8 hours  polyethylene glycol 3350 17 Gram(s) Oral daily  sodium chloride 1 Gram(s) Oral three times a day  tamsulosin 0.4 milliGRAM(s) Oral at bedtime    MEDICATIONS  (PRN):  acetaminophen   Tablet .. 650 milliGRAM(s) Oral every 6 hours PRN Mild Pain (1 - 3), Moderate Pain (4 - 6), Severe Pain (7 - 10)  OLANZapine 1.25 milliGRAM(s) Oral every 6 hours PRN Agitation  OLANZapine Injectable 1.25 milliGRAM(s) IntraMuscular every 6 hours PRN Agitation  QUEtiapine 12.5 milliGRAM(s) Oral every 6 hours PRN Agitation          PHYSICAL EXAM:  GENERAL: NAD  CHEST/LUNG: Clear to percussion bilaterally; No rales, rhonchi, wheezing, or rubs  HEART: Regular rate and rhythm; No murmurs, rubs, or gallops  ABDOMEN: Soft, Nontender, Nondistended; Bowel sounds present  EXTREMITIES:  2+ Peripheral Pulses, No clubbing, cyanosis, or edema  LYMPH: No lymphadenopathy noted  SKIN: No rashes or lesions    Care Discussed with Consultants/Other Providers [x ] YES  [ ] NO
Patient is a 75y old  Male who presents with a chief complaint of urinary retention (31 Dec 2020 10:42)      INTERVAL HPI/OVERNIGHT EVENTS:  T(C): 36.3 (01-02-21 @ 15:33), Max: 37 (01-01-21 @ 23:56)  HR: 86 (01-02-21 @ 15:33) (86 - 88)  BP: 131/77 (01-02-21 @ 15:33) (131/77 - 138/77)  RR: 18 (01-02-21 @ 15:33) (18 - 19)  SpO2: 99% (01-02-21 @ 15:33) (98% - 99%)  Wt(kg): --  I&O's Summary    01 Jan 2021 07:01  -  02 Jan 2021 07:00  --------------------------------------------------------  IN: 1112 mL / OUT: 725 mL / NET: 387 mL    02 Jan 2021 07:01  -  02 Jan 2021 17:34  --------------------------------------------------------  IN: 0 mL / OUT: 400 mL / NET: -400 mL        LABS:                        13.3   10.73 )-----------( 368      ( 02 Jan 2021 08:49 )             39.7     01-02    134<L>  |  97<L>  |  21  ----------------------------<  114<H>  4.5   |  25  |  0.61    Ca    9.6      02 Jan 2021 08:49  Phos  4.5     01-02  Mg     2.2     01-02    TPro  7.3  /  Alb  4.0  /  TBili  0.6  /  DBili  x   /  AST  19  /  ALT  <5<L>  /  AlkPhos  64  01-01        CAPILLARY BLOOD GLUCOSE                MEDICATIONS  (STANDING):  atropine 1% Ophthalmic Solution for SubLingual Use 2 Drop(s) SubLingual three times a day  carbidopa/levodopa  25/100 1 Tablet(s) Oral three times a day  enoxaparin Injectable 40 milliGRAM(s) SubCutaneous daily  finasteride 5 milliGRAM(s) Oral daily  influenza  Vaccine (HIGH DOSE) 0.7 milliLiter(s) IntraMuscular once  melatonin 3 milliGRAM(s) Oral at bedtime  memantine 10 milliGRAM(s) Oral daily  phenazopyridine 100 milliGRAM(s) Oral every 8 hours  polyethylene glycol 3350 17 Gram(s) Oral daily  sodium chloride 2 Gram(s) Oral three times a day  tamsulosin 0.4 milliGRAM(s) Oral at bedtime    MEDICATIONS  (PRN):  OLANZapine 1.25 milliGRAM(s) Oral every 6 hours PRN Agitation  OLANZapine Injectable 1.25 milliGRAM(s) IntraMuscular every 6 hours PRN Agitation          PHYSICAL EXAM:  GENERAL: frail, confiused  CHEST/LUNG: Clear to percussion bilaterally; No rales, rhonchi, wheezing, or rubs  HEART: Regular rate and rhythm; No murmurs, rubs, or gallops  ABDOMEN: Soft, Nontender, Nondistended; Bowel sounds present  EXTREMITIES:  no edema     Care Discussed with Consultants/Other Providers [ x] YES  [ ] NO
[facility] NEPHROLOGY- PROGRESS NOTE    75y Male with history of dementia presents with lower abdominal pain. Nephrology consulted for hyponatremia.    REVIEW OF SYSTEMS: unable to obtain due to confusion.    No Known Allergies      Hospital Medications: Medications reviewed        VITALS:  T(F): 97.7 (01-04-21 @ 06:40), Max: 99 (01-03-21 @ 15:39)  HR: 65 (01-04-21 @ 06:40)  BP: 121/59 (01-04-21 @ 06:40)  RR: 17 (01-04-21 @ 06:40)  SpO2: 100% (01-04-21 @ 06:40)  Wt(kg): --    01-03 @ 07:01  -  01-04 @ 07:00  --------------------------------------------------------  IN: 0 mL / OUT: 650 mL / NET: -650 mL    01-04 @ 07:01  -  01-04 @ 12:54  --------------------------------------------------------  IN: 0 mL / OUT: 500 mL / NET: -500 mL        PHYSICAL EXAM:    Gen: NAD, calm  Cards: RRR, +S1/S2, no M/G/R  Resp: CTA B/L  GI: soft, NT/ND, NABS  : + pichardo  Vascular: no LE edema B/L        LABS:  01-04    137  |  100  |  25<H>  ----------------------------<  114<H>  4.0   |  26  |  0.69    Ca    9.2      04 Jan 2021 07:12  Phos  4.6     01-04  Mg     2.1     01-04      Creatinine Trend: 0.69 <--, 0.61 <--, 0.61 <--, 0.75 <--, 0.69 <--, 0.69 <--, 0.64 <--                        13.2   7.62  )-----------( 393      ( 04 Jan 2021 07:12 )             39.0     Urine Studies:    Osmolality, Random Urine: 490 mosm/Kg (12-31 @ 13:52)  Sodium, Random Urine: 69 mmol/L (12-30 @ 14:37)  Osmolality, Random Urine: 503 mosm/Kg (12-30 @ 13:06)      
[facility] NEPHROLOGY- PROGRESS NOTE    75y Male with history of dementia presents with lower abdominal pain. Nephrology consulted for hyponatremia.    REVIEW OF SYSTEMS:  Gen: no changes in weight  Cards: no chest pain  Resp: no dyspnea  GI: no nausea or vomiting or diarrhea, + lower ab pain  Vascular: no LE edema    No Known Allergies      Hospital Medications: Medications reviewed      VITALS:  T(F): 98.1 (20 @ 05:29), Max: 98.1 (20 @ 05:29)  HR: 84 (20 @ 05:29)  BP: 114/65 (20 @ 05:29)  RR: 18 (20 @ 05:29)  SpO2: 97% (20 @ 05:29)  Wt(kg): --     @ 07:01  -   @ 07:00  --------------------------------------------------------  IN: 1415 mL / OUT: 1175 mL / NET: 240 mL      PHYSICAL EXAM:    Gen: NAD, calm  Cards: RRR, +S1/S2, no M/G/R  Resp: CTA B/L  GI: soft, NT/ND, NABS  : + pichardo with orange urine  Vascular: no LE edema B/L      LABS:      127<L>  |  92<L>  |  12  ----------------------------<  132<H>  4.1   |  25  |  0.69    Ca    9.1      30 Dec 2020 07:22      Creatinine Trend: 0.69 <--, 0.64 <--, 0.67 <--, 0.77 <--, 0.69 <--                        12.4   5.46  )-----------( 336      ( 30 Dec 2020 07:22 )             35.9     Urine Studies:  Urinalysis Basic - ( 27 Dec 2020 12:28 )    Color: Light Yellow / Appearance: Clear / S.016 / pH:   Gluc:  / Ketone: Negative  / Bili: Negative / Urobili: <2 mg/dL   Blood:  / Protein: Trace / Nitrite: Negative   Leuk Esterase: Negative / RBC:  / WBC    Sq Epi:  / Non Sq Epi:  / Bacteria:       Osmolality, Random Urine: 415 mosm/kg (12- @ 07:58)        < from: US Kidney and Bladder (20 @ 14:05) >  IMPRESSION:    Sonographically normal kidneys. No hydronephrosis.    < end of copied text >  
[facility] NEPHROLOGY- PROGRESS NOTE    75y Male with history of dementia presents with lower abdominal pain. Nephrology consulted for hyponatremia.    REVIEW OF SYSTEMS:  Gen: no changes in weight  Cards: no chest pain  Resp: no dyspnea  GI: no nausea or vomiting or diarrhea, + lower ab pain  Vascular: no LE edema    No Known Allergies      Hospital Medications: Medications reviewed    VITALS:  T(F): 97.8 (20 @ 06:03), Max: 98 (20 @ 14:37)  HR: 75 (20 @ 06:03)  BP: 144/83 (20 @ 06:03)  RR: 18 (20 @ 06:03)  SpO2: 100% (20 @ 06:03)  Wt(kg): --  Height (cm): 165.1 ( @ 11:16), 165.1 ( @ 11:38)  Weight (kg): 65.8 ( 23:18)  BMI (kg/m2): 24.1 ( 23:18)  BSA (m2): 1.73 ( @ 23:18)     @ 07:01  -   @ 07:00  --------------------------------------------------------  IN: 120 mL / OUT: 1600 mL / NET: -1480 mL        PHYSICAL EXAM:    Gen: NAD, calm  Cards: RRR, +S1/S2, no M/G/R  Resp: CTA B/L  GI: soft, NT/ND, NABS  : + pichardo with orange urine  Vascular: no LE edema B/L    LABS:      128<L>  |  93<L>  |  13  ----------------------------<  111<H>  3.9   |  23  |  0.64    Ca    9.0      29 Dec 2020 07:53  Phos  4.7       Mg     2.0           Creatinine Trend: 0.64 <--, 0.67 <--, 0.77 <--, 0.69 <--                        12.8   6.44  )-----------( 340      ( 29 Dec 2020 07:53 )             37.1     Urine Studies:  Urinalysis Basic - ( 27 Dec 2020 12:28 )    Color: Light Yellow / Appearance: Clear / S.016 / pH:   Gluc:  / Ketone: Negative  / Bili: Negative / Urobili: <2 mg/dL   Blood:  / Protein: Trace / Nitrite: Negative   Leuk Esterase: Negative / RBC:  / WBC    Sq Epi:  / Non Sq Epi:  / Bacteria:       Osmolality, Random Urine: 415 mosm/kg ( @ 07:58)      RADIOLOGY & ADDITIONAL STUDIES:

## 2021-01-05 NOTE — DIETITIAN INITIAL EVALUATION ADULT. - OTHER INFO
Patient currently on Mechanical Soft diet, thin liquids as per SLP recommendation on 12/28. Patient consuming good po intake at this time as confirmed with RN. Also consuming PO supplement Ensure Enlive -100% completed at breakfast this am-observed at bedside. 100% po intake on 1/3 per nursing flowsheet documentation. No GI distress (nausea, vomiting, diarrhea, constipation) reported. Plan for d/c today.

## 2021-01-05 NOTE — PROGRESS NOTE ADULT - PROVIDER SPECIALTY LIST ADULT
Cardiology
Cardiology
Heme/Onc
Hospitalist
Nephrology
Neurology
Cardiology
Cardiology
Heme/Onc
Hospitalist
Nephrology
Nephrology
Neurology
Cardiology
Hospitalist
Hospitalist
Nephrology
Nephrology

## 2021-01-05 NOTE — PROGRESS NOTE ADULT - ATTENDING COMMENTS
[facility] NEPHROLOGY  Karan Kessler M.D.  Jay Flores D.O.  Kori Paris M.D.  Lina Vallecillo, MSN, ANP-C    Telephone: [phone]  Facsimile: [phone]    [address] [place] [address]
[facility] NEPHROLOGY  Karan Kessler M.D.  Jay Flores D.O.  Kori Paris M.D.  Lina Vallecillo, MSN, ANP-C    Telephone: [phone]  Facsimile: [phone]    [address] [place] [address]
[facility] NEPHROLOGY  Karan Kessler M.D.  Jay Flores D.O.  Kori Paris M.D.  Lina Vallecillo, MSN, ANP-C    Telephone: [phone]  Facsimile: [phone]    [address] [place] [address]
[facility] NEPHROLOGY  Karan Kessler M.D.  Jay Flores D.O.  Kori Paris M.D.  Lina Vallecillo, MSN, ANP-C    Telephone: [phone]  Facsimile: [phone]    [address] [place] [address]
[facility] NEPHROLOGY  Karan Kessler M.D.  Jay Flores D.O.  Kori Paris M.D.  Lina Vallecillo, MSN, ANP-C    Telephone: [phone]  Facsimile: [phone]    [address] [place] [address]
[facility] NEPHROLOGY  Karan Kessler M.D.  Jay Flores D.O.  Kori Paris M.D.  Lina Vallecillo, MSN, ANP-C    Telephone: [phone]  Facsimile: [phone]    [address] [place] [address]
Agree with above NP note.  cv stable  cont current tx
[facility] NEPHROLOGY  Karan Kessler M.D.  Jay Flores D.O.  Kori Paris M.D.  Lina Vallecillo, MSN, ANP-C    Telephone: [phone]  Facsimile: [phone]    [address] [place] [address]
Agree with above NP note.  cv stable  cont current tx

## 2021-01-11 NOTE — DISCUSSION/SUMMARY
[Med Rec Performed] : med rec performed [Follow Up Call to Patient's Family] : follow up call to patient's family [Follow Up Appt with Provider within 7 days] : follow up appt with provider within 7 days [FreeTextEntry1] : Spoke to patient's granddaughter, states grandfather feeling better, but continues to have swelling in face. \par Has all medications at home, and good family support.  lfs

## 2021-01-13 ENCOUNTER — APPOINTMENT (OUTPATIENT)
Dept: INTERNAL MEDICINE | Facility: CLINIC | Age: 76
End: 2021-01-13
Payer: MEDICAID

## 2021-01-13 ENCOUNTER — OUTPATIENT (OUTPATIENT)
Dept: OUTPATIENT SERVICES | Facility: HOSPITAL | Age: 76
LOS: 1 days | End: 2021-01-13

## 2021-01-13 ENCOUNTER — RESULT REVIEW (OUTPATIENT)
Age: 76
End: 2021-01-13

## 2021-01-13 VITALS
SYSTOLIC BLOOD PRESSURE: 110 MMHG | HEART RATE: 86 BPM | DIASTOLIC BLOOD PRESSURE: 70 MMHG | HEIGHT: 63 IN | OXYGEN SATURATION: 96 % | BODY MASS INDEX: 26.22 KG/M2 | WEIGHT: 148 LBS

## 2021-01-13 VITALS — TEMPERATURE: 96.8 F

## 2021-01-13 DIAGNOSIS — Z23 ENCOUNTER FOR IMMUNIZATION: ICD-10-CM

## 2021-01-13 PROBLEM — N39.0 URINARY TRACT INFECTION, SITE NOT SPECIFIED: Chronic | Status: ACTIVE | Noted: 2020-12-27

## 2021-01-13 PROBLEM — E11.9 TYPE 2 DIABETES MELLITUS WITHOUT COMPLICATIONS: Chronic | Status: ACTIVE | Noted: 2020-12-27

## 2021-01-13 LAB
ANION GAP SERPL CALC-SCNC: 10 MMOL/L — SIGNIFICANT CHANGE UP (ref 7–14)
BUN SERPL-MCNC: 11 MG/DL — SIGNIFICANT CHANGE UP (ref 7–23)
CALCIUM SERPL-MCNC: 9.1 MG/DL — SIGNIFICANT CHANGE UP (ref 8.4–10.5)
CHLORIDE SERPL-SCNC: 101 MMOL/L — SIGNIFICANT CHANGE UP (ref 98–107)
CO2 SERPL-SCNC: 25 MMOL/L — SIGNIFICANT CHANGE UP (ref 22–31)
CREAT SERPL-MCNC: 0.79 MG/DL — SIGNIFICANT CHANGE UP (ref 0.5–1.3)
GLUCOSE SERPL-MCNC: 163 MG/DL — HIGH (ref 70–99)
POTASSIUM SERPL-MCNC: 4.6 MMOL/L — SIGNIFICANT CHANGE UP (ref 3.5–5.3)
POTASSIUM SERPL-SCNC: 4.6 MMOL/L — SIGNIFICANT CHANGE UP (ref 3.5–5.3)
SODIUM SERPL-SCNC: 136 MMOL/L — SIGNIFICANT CHANGE UP (ref 135–145)

## 2021-01-13 PROCEDURE — 99214 OFFICE O/P EST MOD 30 MIN: CPT

## 2021-01-13 RX ORDER — ESCITALOPRAM OXALATE 5 MG/1
5 TABLET ORAL DAILY
Qty: 90 | Refills: 1 | Status: DISCONTINUED | COMMUNITY
Start: 2019-11-19 | End: 2021-01-13

## 2021-01-13 RX ORDER — DONEPEZIL HYDROCHLORIDE 10 MG/1
10 TABLET ORAL DAILY
Qty: 90 | Refills: 1 | Status: DISCONTINUED | COMMUNITY
Start: 2020-08-03 | End: 2021-01-13

## 2021-01-13 RX ORDER — FINASTERIDE 5 MG/1
5 TABLET, FILM COATED ORAL DAILY
Qty: 90 | Refills: 0 | Status: ACTIVE | COMMUNITY
Start: 2021-01-13

## 2021-01-13 RX ORDER — HYPROMELLOSE 0 G/G
0.3 GEL OPHTHALMIC
Qty: 1 | Refills: 0 | Status: ACTIVE | COMMUNITY
Start: 2021-01-13

## 2021-01-21 DIAGNOSIS — Z23 ENCOUNTER FOR IMMUNIZATION: ICD-10-CM

## 2021-01-21 DIAGNOSIS — G20 PARKINSON'S DISEASE: ICD-10-CM

## 2021-01-21 DIAGNOSIS — N40.1 BENIGN PROSTATIC HYPERPLASIA WITH LOWER URINARY TRACT SYMPTOMS: ICD-10-CM

## 2021-01-21 DIAGNOSIS — E87.1 HYPO-OSMOLALITY AND HYPONATREMIA: ICD-10-CM

## 2021-01-21 DIAGNOSIS — R59.1 GENERALIZED ENLARGED LYMPH NODES: ICD-10-CM

## 2021-01-21 DIAGNOSIS — K11.7 DISTURBANCES OF SALIVARY SECRETION: ICD-10-CM

## 2021-01-21 DIAGNOSIS — E11.9 TYPE 2 DIABETES MELLITUS WITHOUT COMPLICATIONS: ICD-10-CM

## 2021-01-21 NOTE — ASSESSMENT
[FreeTextEntry1] : Healthcare maintenance: Pneumovax today\par Discussed healthcare proxy again with patient's son, he has at home and will discuss with his family.\par Also found to have pulmonary nodules on chest CT chest while hospitalized, repeat recommended in April 2020. \par Return to office in 3 months.

## 2021-01-21 NOTE — HISTORY OF PRESENT ILLNESS
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 Admitted to [facility] from 12/27/20-1/5/21. \par Was admitted for urinary retention and AMS, sodium 122, glycopyrrolate was held along with Lexapro, Aguilar was placed which drained 550 cc of clear urine.  CT head was negative, B12 folate were normal.  Neurology saw patient and thought likely from hyponatremia, delirium from pain and/or from glycopyrrolate.  Thought secondary to medication versus progressive BPH.  Resumed tamsulosin.  Urine testing was negative for signs of infection.  Renal ultrasound without hydronephrosis.  On imaging patient had nonspecific mesenteric lymphadenopathy with a normal LDH and a PSA of 4.9.  Was seen by oncology and recommended outpatient onc evaluation.  Was also evaluated by inpatient psychiatry and recommended possibly Seroquel p.o. or Zyprexa IM but patient's family opted to manage him at home without those medications.  Was recommended for outpatient psychiatry evaluation.  Sodium was corrected throughout course.  Was discharged back on glycopyrrolate 3 times daily.\par He is here today with his son, reporting that he still has mild waxing and waning but overall is improving in terms of mental status.  Still has Aguilar in place.  Has a urology appointment on Friday in the morning.  Has been taking the glycopyrrolate and family is resistant to stop the medication despite the fact that it may have caused his urinary retention which we had discussed was a possibility at the last visit.  Also with an enlarged prostate seen on his CT, so they are hoping to continue the medication.  They have follow-up appointment with neurology on the 21st.  They have not made a follow-up with outpatient psychiatry but have the scheduling information.

## 2021-01-21 NOTE — HISTORY OF PRESENT ILLNESS
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 Admitted to [facility] from 12/27/20-1/5/21. \par Was admitted for urinary retention and AMS, sodium 122, glycopyrrolate was held along with Lexapro, Aguilar was placed which drained 550 cc of clear urine.  CT head was negative, B12 folate were normal.  Neurology saw patient and thought likely from hyponatremia, delirium from pain and/or from glycopyrrolate.  Thought secondary to medication versus progressive BPH.  Resumed tamsulosin.  Urine testing was negative for signs of infection.  Renal ultrasound without hydronephrosis.  On imaging patient had nonspecific mesenteric lymphadenopathy with a normal LDH and a PSA of 4.9.  Was seen by oncology and recommended outpatient onc evaluation.  Was also evaluated by inpatient psychiatry and recommended possibly Seroquel p.o. or Zyprexa IM but patient's family opted to manage him at home without those medications.  Was recommended for outpatient psychiatry evaluation.  Sodium was corrected throughout course.  Was discharged back on glycopyrrolate 3 times daily.\par He is here today with his son, reporting that he still has mild waxing and waning but overall is improving in terms of mental status.  Still has Aguilar in place.  Has a urology appointment on Friday in the morning.  Has been taking the glycopyrrolate and family is resistant to stop the medication despite the fact that it may have caused his urinary retention which we had discussed was a possibility at the last visit.  Also with an enlarged prostate seen on his CT, so they are hoping to continue the medication.  They have follow-up appointment with neurology on the 21st.  They have not made a follow-up with outpatient psychiatry but have the scheduling information.

## 2021-01-21 NOTE — PHYSICAL EXAM
[Comprehensive Foot Exam Normal] : Right and left foot were examined and both feet are normal. No ulcers in either foot. Toes are normal and with full ROM.  Normal tactile sensation with monofilament testing throughout both feet [de-identified] : Awake and alert.  Moist mucous membranes.  No sialorrhea.  Abdomen soft, nondistended, nontender without palpable bladder margin.  No lower extremity edema. [de-identified] : Monofilament exam normal today.

## 2021-02-10 ENCOUNTER — APPOINTMENT (OUTPATIENT)
Dept: INTERNAL MEDICINE | Facility: CLINIC | Age: 76
End: 2021-02-10

## 2021-02-10 ENCOUNTER — OUTPATIENT (OUTPATIENT)
Dept: OUTPATIENT SERVICES | Facility: HOSPITAL | Age: 76
LOS: 1 days | End: 2021-02-10

## 2021-02-10 VITALS
HEART RATE: 78 BPM | HEIGHT: 63 IN | BODY MASS INDEX: 26.22 KG/M2 | SYSTOLIC BLOOD PRESSURE: 125 MMHG | WEIGHT: 148 LBS | DIASTOLIC BLOOD PRESSURE: 80 MMHG | OXYGEN SATURATION: 98 %

## 2021-02-10 DIAGNOSIS — E11.9 TYPE 2 DIABETES MELLITUS W/OUT COMPLICATIONS: ICD-10-CM

## 2021-02-23 DIAGNOSIS — E11.9 TYPE 2 DIABETES MELLITUS WITHOUT COMPLICATIONS: ICD-10-CM

## 2021-02-23 DIAGNOSIS — K11.7 DISTURBANCES OF SALIVARY SECRETION: ICD-10-CM

## 2021-02-23 DIAGNOSIS — F32.89 OTHER SPECIFIED DEPRESSIVE EPISODES: ICD-10-CM

## 2021-02-23 DIAGNOSIS — E87.1 HYPO-OSMOLALITY AND HYPONATREMIA: ICD-10-CM

## 2021-02-23 DIAGNOSIS — R59.1 GENERALIZED ENLARGED LYMPH NODES: ICD-10-CM

## 2021-02-23 DIAGNOSIS — N40.1 BENIGN PROSTATIC HYPERPLASIA WITH LOWER URINARY TRACT SYMPTOMS: ICD-10-CM

## 2021-02-23 NOTE — REVIEW OF SYSTEMS
[Fever] : no fever [Recent Change In Weight] : ~T no recent weight change [Chest Pain] : no chest pain [Lower Ext Edema] : no lower extremity edema [Shortness Of Breath] : no shortness of breath [Cough] : no cough [Abdominal Pain] : no abdominal pain [Nausea] : no nausea [Constipation] : no constipation [Diarrhea] : no diarrhea [Vomiting] : no vomiting [Melena] : no melena [Dysuria] : no dysuria [Skin Rash] : no skin rash [Headache] : no headache [Fainting] : no fainting [Unsteady Walk] : ataxia [Suicidal] : not suicidal [Insomnia] : insomnia [Anxiety] : no anxiety [Depression] : depression [Easy Bruising] : no easy bruising

## 2021-02-23 NOTE — HISTORY OF PRESENT ILLNESS
[Family Member] : family member [FreeTextEntry1] : follow up of chronic medical conditions  [de-identified] : Here with his son today.\par 75 year old man with history of Parkinson's disease, dementia, chronic hypo-osmolar hyponatremia, BPH s/p thermal prostate reduction with h/o recurrent UTI, type 2 diabetes (a1c 7% while off medications) who presents for follow up. Last seen  for post-hospital follow up after admission for urinary retention and hyponatremia. Decreased glycopyrrolate dose to BID at that visit. Saw urology after our last visit 1 month ago, determined his prostate was again enlarged and he would remain with indwelling Aguilar catheter; stopped tamsulosin, continued finasteride. He continues to have excess saliva, and they note that it is somewhat thicker. Having mostly toast dipped in milk for his meals; drinks some tea and some water. \par A1c 7.0% in 2020, not on medications. Not eating a lot, low appetite. Family reports that he has been depressed and also grieving, his grandson  a couple of weeks ago. He is still not sleeping well. SSRI was stopped while admitted due to hyponatremia. \par They have not yet scheduled oncology follow up for lymphadenopathy seen on abdominal imaging while admitted - was seen by oncology in hospital and recommended he see outpatient oncology for evaluation. His son notes that he would want to know the cause/have it evaluated and treated. Son reports that patient saw outside ophthalmologist 2.5 weeks ago.

## 2021-02-23 NOTE — PHYSICAL EXAM
[EOMI] : extraocular movements intact [Normal Outer Ear/Nose] : the outer ears and nose were normal in appearance [Normal Oropharynx] : the oropharynx was normal [No JVD] : no jugular venous distention [No Respiratory Distress] : no respiratory distress  [No Accessory Muscle Use] : no accessory muscle use [Clear to Auscultation] : lungs were clear to auscultation bilaterally [Normal Rate] : normal rate  [Regular Rhythm] : with a regular rhythm [Normal S1, S2] : normal S1 and S2 [No Edema] : there was no peripheral edema [No Extremity Clubbing/Cyanosis] : no extremity clubbing/cyanosis [Soft] : abdomen soft [Non Tender] : non-tender [Non-distended] : non-distended [Normal Bowel Sounds] : normal bowel sounds [No CVA Tenderness] : no CVA  tenderness [No Joint Swelling] : no joint swelling [No Rash] : no rash [de-identified] : bradykinesia; masked facies [de-identified] : WILTON

## 2021-02-23 NOTE — ASSESSMENT
[FreeTextEntry1] : Parkinson's disease, with sialorrhea: Patient's family would like to consider patch over pills for sialorrhea - we discussed that scopolamine and glycopyrrolate work in the same manner. Given he will have long-term indwelling urinary catheter, urinary retention is less of a concern. Will try to get scopolamine patch approved and then will switch from glycopyrrolate to scopolamine TD. Continue sinemet. \par \par BPH and Urinary retention: Following with urology; has now chronic indwelling Aguilar catheter. Has h/o recurrent UTI previously but no current symptoms or signs of UTI. Continue finasteride.\par \par Depression, decreased appetite, and insomnia: would benefit from antidepressant; SSRI was stopped while he was hospitalized due to hyponatremia. Will start mirtazapine 7.5mg daily with goal to titrate to 15mg daily to help with depression, appetite, and sleep. I counseled on risks and benefits of using the new medication prescribed and the patient's son verbalized understanding. \par \par Hyponatremia, hypoosmolar: Due to toast/milk diet primarily.  Last Na was normal at 136 at last visit 1 mo ago; no med changes at that time. Continue NaCl 1g BID and encouraged to give electrolyte rich liquids (such as broth).\par \par Diabetes type 2, controlled: A1c <7.0% in 11/2020, at goal. Continue with dietary control; not on medications currently. sCr was normal/stable at last hospitalization in Jan 2021 (0.79mg/dl). UAC elevated but patient has not tolerated ACEI in the past due to hyperkalemia to lisinopril 2.5mg daily. Will hold off checking lipids until he needs further bloodwork. Monofilament exam with normal sensation on 1/13/21. Saw ophtho in 1/2021. \par \par Lymphadenopathy: encouraged to schedule appt with oncology; has active referral ordered. \par \par Health care maintenance: \par Saw ophthalmology in Jan 2021\par Not yet ready to decide on HCP\par Declines shingrix at this time\par Needs repeat Chest CT in 4/2021 for pulmonary nodules\par RTO 2 months

## 2021-03-05 ENCOUNTER — NON-APPOINTMENT (OUTPATIENT)
Age: 76
End: 2021-03-05

## 2021-03-11 ENCOUNTER — RX RENEWAL (OUTPATIENT)
Age: 76
End: 2021-03-11

## 2021-03-11 RX ORDER — CARBIDOPA AND LEVODOPA 25; 100 MG/1; MG/1
25-100 TABLET, ORALLY DISINTEGRATING ORAL 3 TIMES DAILY
Qty: 90 | Refills: 1 | Status: ACTIVE | COMMUNITY
Start: 2019-09-17 | End: 1900-01-01

## 2021-03-18 RX ORDER — METHENAMINE HIPPURATE 1 G/1
1 TABLET ORAL
Qty: 180 | Refills: 1 | Status: ACTIVE | COMMUNITY
Start: 2020-01-29 | End: 1900-01-01

## 2021-03-31 ENCOUNTER — OUTPATIENT (OUTPATIENT)
Dept: OUTPATIENT SERVICES | Facility: HOSPITAL | Age: 76
LOS: 1 days | End: 2021-03-31

## 2021-03-31 ENCOUNTER — APPOINTMENT (OUTPATIENT)
Dept: INTERNAL MEDICINE | Facility: CLINIC | Age: 76
End: 2021-03-31
Payer: MEDICAID

## 2021-03-31 VITALS
DIASTOLIC BLOOD PRESSURE: 68 MMHG | WEIGHT: 146 LBS | HEIGHT: 63 IN | BODY MASS INDEX: 25.87 KG/M2 | SYSTOLIC BLOOD PRESSURE: 110 MMHG | HEART RATE: 80 BPM | OXYGEN SATURATION: 99 %

## 2021-03-31 VITALS — TEMPERATURE: 96.5 F

## 2021-03-31 DIAGNOSIS — R59.1 GENERALIZED ENLARGED LYMPH NODES: ICD-10-CM

## 2021-03-31 DIAGNOSIS — F32.89 OTHER SPECIFIED DEPRESSIVE EPISODES: ICD-10-CM

## 2021-03-31 DIAGNOSIS — N13.8 BENIGN PROSTATIC HYPERPLASIA WITH LOWER URINARY TRACT SYMPMS: ICD-10-CM

## 2021-03-31 DIAGNOSIS — N40.1 BENIGN PROSTATIC HYPERPLASIA WITH LOWER URINARY TRACT SYMPMS: ICD-10-CM

## 2021-03-31 DIAGNOSIS — R91.8 OTHER NONSPECIFIC ABNORMAL FINDING OF LUNG FIELD: ICD-10-CM

## 2021-03-31 PROCEDURE — 99214 OFFICE O/P EST MOD 30 MIN: CPT

## 2021-03-31 RX ORDER — TAMSULOSIN HYDROCHLORIDE 0.4 MG/1
0.4 CAPSULE ORAL
Qty: 90 | Refills: 1 | Status: DISCONTINUED | COMMUNITY
Start: 2019-09-17 | End: 2021-03-31

## 2021-03-31 RX ORDER — MIRTAZAPINE 7.5 MG/1
7.5 TABLET, FILM COATED ORAL
Qty: 30 | Refills: 0 | Status: DISCONTINUED | COMMUNITY
Start: 2021-02-23 | End: 2021-03-31

## 2021-03-31 RX ORDER — NORMAL SALT TABLETS 1 G/G
1 TABLET ORAL
Qty: 180 | Refills: 1 | Status: ACTIVE | COMMUNITY
Start: 2019-10-24 | End: 1900-01-01

## 2021-03-31 RX ORDER — SCOPOLAMINE 1.5 MG/1
1 PATCH, EXTENDED RELEASE TRANSDERMAL
Qty: 10 | Refills: 2 | Status: DISCONTINUED | COMMUNITY
Start: 2021-02-23 | End: 2021-03-31

## 2021-04-02 DIAGNOSIS — R59.1 GENERALIZED ENLARGED LYMPH NODES: ICD-10-CM

## 2021-04-02 DIAGNOSIS — E87.1 HYPO-OSMOLALITY AND HYPONATREMIA: ICD-10-CM

## 2021-04-02 DIAGNOSIS — R91.8 OTHER NONSPECIFIC ABNORMAL FINDING OF LUNG FIELD: ICD-10-CM

## 2021-04-02 DIAGNOSIS — F32.89 OTHER SPECIFIED DEPRESSIVE EPISODES: ICD-10-CM

## 2021-04-02 DIAGNOSIS — G20 PARKINSON'S DISEASE: ICD-10-CM

## 2021-04-02 DIAGNOSIS — N40.1 BENIGN PROSTATIC HYPERPLASIA WITH LOWER URINARY TRACT SYMPTOMS: ICD-10-CM

## 2021-04-02 NOTE — ASSESSMENT
[FreeTextEntry1] : hyponatremia - still on toast/milk diet - last BMP in Jan with Na 136; continue with NaCl tabs 1g BID - refills sent. He is off SSRI and off donepezil due to his hyponatremia. \par \par depression - uncontrolled. per granddaughter, mood is improved somewhat. Eating about the same; sleeping a little more but not all night. Will increase mirtazapine to 15mg po qhs. Check CBC next visit. Also still taking melatonin 5mg at night sometimes (OTC) - can continue. \par \par Parkinson's disease with sialorrhea - uncontrolled. following with neurology for Parkinson's/dementia -continue  sinemet and memantine as per neurology; remains off donepezil. Continue glycopyrrolate for sialorrhea, remains with indwelling pichardo catheter. \par \par BPH - following with urology; remains on finasteride; no longer on tamsulosin - has indwelling pichardo catheter. Denies abdominal/suprapubic or genital pain today \par \par abdominal lymphadenopathy - Have not scheduled oncology visit yet - gave phone number again for scheduling. \par \par h/o pulmonary nodules - ordered repeat CT to complete in April for 12 mo f/u\par \par Healthcare maintenance - encouraged COVID-19 vaccination, risks and benefits discussed and given vaccine scheduling handout to granddaughter. He would like to travel to [place] this year to be with his family there; if airline needs a letter after he is vaccinated, they were notified that this can be provided for him here.  \par Discussed adv dir with pt/gdtr - given new form for proxy - no decisions made yet\par Knee contracture - will refer for home PT - asked granddaughter to reach out with information about his home care agency so can be referred through them - she stated she will sign the patient up for the portal to message me with the information.

## 2021-04-02 NOTE — HISTORY OF PRESENT ILLNESS
[FreeTextEntry1] : JEANE JEFFERSON is a 76 year old man who presents today for follow up of chronic medical conditions.  [de-identified] : 76 year old man with history of Parkinson's disease, dementia, chronic hypo-osmolar hyponatremia, BPH s/p thermal prostate reduction with h/o recurrent UTI, type 2 diabetes (a1c 7% while off medications) who presents for follow up of chronic medical conditions. He is here with his granddaughter today, who he requests interpret for him in [facility]; declined [facility]  use. \par \par Reviewed all medications he is taking together today - his granddaughter has brought a bag of all his medications with them. Had excessive dry mouth with scopolamine patch and difficulty talking as a result so they stopped it and restarted glycopyrrolate and are requesting refills. Still with some drooling on glycopyrrolate. She reports that he has for a long time been having a contracted right knee and favors weight bearing on the left; this is not new - has been for >1y. Has never had physical therapy for this though he has CDPAP services arranged (his son is attendant). \par

## 2021-04-02 NOTE — PHYSICAL EXAM
[No Respiratory Distress] : no respiratory distress  [No Accessory Muscle Use] : no accessory muscle use [Clear to Auscultation] : lungs were clear to auscultation bilaterally [No Edema] : there was no peripheral edema [de-identified] : no warmth, erythema, or effusions of b/l knees - slight contraction right knee with medial bony prominence, extends to ~165 degrees; slight flexion contracture on left as well but milder

## 2021-04-28 ENCOUNTER — APPOINTMENT (OUTPATIENT)
Dept: CT IMAGING | Facility: IMAGING CENTER | Age: 76
End: 2021-04-28
Payer: MEDICAID

## 2021-04-28 ENCOUNTER — OUTPATIENT (OUTPATIENT)
Dept: OUTPATIENT SERVICES | Facility: HOSPITAL | Age: 76
LOS: 1 days | End: 2021-04-28
Payer: MEDICAID

## 2021-04-28 DIAGNOSIS — Z00.8 ENCOUNTER FOR OTHER GENERAL EXAMINATION: ICD-10-CM

## 2021-04-28 PROCEDURE — 71250 CT THORAX DX C-: CPT

## 2021-04-28 PROCEDURE — 71250 CT THORAX DX C-: CPT | Mod: 26

## 2021-05-05 ENCOUNTER — RX RENEWAL (OUTPATIENT)
Age: 76
End: 2021-05-05

## 2021-05-12 ENCOUNTER — APPOINTMENT (OUTPATIENT)
Dept: INTERNAL MEDICINE | Facility: CLINIC | Age: 76
End: 2021-05-12
Payer: MEDICAID

## 2021-05-12 ENCOUNTER — OUTPATIENT (OUTPATIENT)
Dept: OUTPATIENT SERVICES | Facility: HOSPITAL | Age: 76
LOS: 1 days | End: 2021-05-12

## 2021-05-12 VITALS
OXYGEN SATURATION: 98 % | BODY MASS INDEX: 25.87 KG/M2 | WEIGHT: 146 LBS | HEIGHT: 63 IN | DIASTOLIC BLOOD PRESSURE: 64 MMHG | HEART RATE: 89 BPM | SYSTOLIC BLOOD PRESSURE: 110 MMHG

## 2021-05-12 VITALS — TEMPERATURE: 98.1 F

## 2021-05-12 DIAGNOSIS — R53.1 WEAKNESS: ICD-10-CM

## 2021-05-12 PROCEDURE — 99213 OFFICE O/P EST LOW 20 MIN: CPT

## 2021-05-12 RX ORDER — DORZOLAMIDE HYDROCHLORIDE AND TIMOLOL MALEATE PRESERVATIVE FREE 20; 5 MG/ML; MG/ML
2-0.5 SOLUTION/ DROPS OPHTHALMIC
Qty: 1 | Refills: 1 | Status: ACTIVE | COMMUNITY
Start: 2020-11-18

## 2021-05-14 PROBLEM — R53.1 WEAKNESS: Status: ACTIVE | Noted: 2021-05-14

## 2021-05-14 NOTE — PHYSICAL EXAM
[No Acute Distress] : no acute distress [Normal Sclera/Conjunctiva] : normal sclera/conjunctiva [Normal Oropharynx] : the oropharynx was normal [Normal TMs] : both tympanic membranes were normal [No Lymphadenopathy] : no lymphadenopathy [No Respiratory Distress] : no respiratory distress  [No Accessory Muscle Use] : no accessory muscle use [Clear to Auscultation] : lungs were clear to auscultation bilaterally [Normal Rate] : normal rate  [Regular Rhythm] : with a regular rhythm [Normal S1, S2] : normal S1 and S2 [No Edema] : there was no peripheral edema [Soft] : abdomen soft [Non Tender] : non-tender [Non-distended] : non-distended [Normal Bowel Sounds] : normal bowel sounds [No CVA Tenderness] : no CVA  tenderness [No Spinal Tenderness] : no spinal tenderness [No Rash] : no rash [de-identified] : No acute distress, masked facies. [de-identified] : 4+ out of 5 motor strength in bilateral upper and lower extremities. [de-identified] : Significant rigidity, low amplitude resting tremor in hands.  Requires use of hands to get out of chair, ultimately with assistance.  Takes very small steps, takes 9 steps to turn 180 degrees.  Slightly stooped posture.

## 2021-05-14 NOTE — HISTORY OF PRESENT ILLNESS
[FreeTextEntry1] : Follow-up of chronic medical conditions [de-identified] : 76-year-old man with a history of Parkinson's disease, dementia, chronic hypoosmolar hyponatremia, BPH status post thermal prostate reduction with history of recurrent UTI, type 2 diabetes (A1c 7% while off medications) who presents for follow-up of chronic medical conditions.  Here with his son today.  He requests that his son interpret for him and declined an .  His son reports that patient's drooling is significantly improved, and he is sleeping better and not really taking melatonin.  He continues to take mirtazapine.  Last sodium check was in January, 136.  We discussed his CT chest results which were stable (6 mm perifissural triangular nodule in right middle lobe is stable, 2 mm pulmonary nodule in right upper lobe is stable).  They have not started home PT his son is his,  home health attended.  He did not have his Covid vaccination yet but it is scheduled to start on Monday.  He has been feeling globally more weak according to his son, taking smaller steps and having increasing difficulty in turning around.  No changes to Sinemet recently, saw neurology less than 1 month ago.  Urinary catheter was changed last week.  No fevers or chills, no abdominal pain.  No significant weight change, shortness of breath, chest pain, numbness, no changes to his speech, no paralysis.  Sometimes still with low back pain, but not every day.  Requesting refills on latanoprost and dorzolamide.  Have not seen ophthalmology in over 1 year.

## 2021-05-14 NOTE — ASSESSMENT
[FreeTextEntry1] : Global weakness: May be due to progression of his Parkinson's disease, some deconditioning, may be multifactorial.  But will rule out other etiologies by testing CBC, CMP, magnesium, phosphorus, TSH, CK.  No signs of focal weakness/ neuro abnormality on exam to suggest neuro event; no signs or symptoms of infection on history or examination, and also with recent urinary catheter change and no abdominal or penile pain. Cardiopulmonary examination normal and no focal cardiopulmonary symptoms. PHQ2 score = 0 today. Referral placed for PMR for evaluation for improvement in mobility, balance, strength. \par \par Glaucoma: Encouraged to schedule with ophtho, referral placed. Refill sent on latanoprost and dorzolamide.  \par \par Parkinson's disease: His drooling is improved.  Since he remains with indwelling catheter, we will continue to use glycopyrrolate for secretion control.  He is sleeping better on mirtazapine, continue mirtazapine at this time.\par \par HCM:Encouraged to attend Covid vaccine appointment scheduled on Monday.  Given healthcare proxy form.\par \par He will return for nursing visit for blood draw given timing of visit.

## 2021-05-17 DIAGNOSIS — R53.1 WEAKNESS: ICD-10-CM

## 2021-05-17 DIAGNOSIS — G20 PARKINSON'S DISEASE: ICD-10-CM

## 2021-05-17 DIAGNOSIS — H40.9 UNSPECIFIED GLAUCOMA: ICD-10-CM

## 2021-05-19 ENCOUNTER — APPOINTMENT (OUTPATIENT)
Dept: INTERNAL MEDICINE | Facility: CLINIC | Age: 76
End: 2021-05-19

## 2021-05-19 ENCOUNTER — OUTPATIENT (OUTPATIENT)
Dept: OUTPATIENT SERVICES | Facility: HOSPITAL | Age: 76
LOS: 1 days | End: 2021-05-19

## 2021-05-19 ENCOUNTER — RESULT REVIEW (OUTPATIENT)
Age: 76
End: 2021-05-19

## 2021-05-19 VITALS — TEMPERATURE: 98.2 F

## 2021-05-19 DIAGNOSIS — R53.1 WEAKNESS: ICD-10-CM

## 2021-05-19 DIAGNOSIS — R59.1 GENERALIZED ENLARGED LYMPH NODES: ICD-10-CM

## 2021-05-19 DIAGNOSIS — G20 PARKINSON'S DISEASE: ICD-10-CM

## 2021-05-19 DIAGNOSIS — Z00.00 ENCOUNTER FOR GENERAL ADULT MEDICAL EXAMINATION WITHOUT ABNORMAL FINDINGS: ICD-10-CM

## 2021-05-19 DIAGNOSIS — N40.1 BENIGN PROSTATIC HYPERPLASIA WITH LOWER URINARY TRACT SYMPTOMS: ICD-10-CM

## 2021-05-19 DIAGNOSIS — E87.1 HYPO-OSMOLALITY AND HYPONATREMIA: ICD-10-CM

## 2021-05-19 LAB
ALBUMIN SERPL ELPH-MCNC: 4.1 G/DL — SIGNIFICANT CHANGE UP (ref 3.3–5)
ALP SERPL-CCNC: 89 U/L — SIGNIFICANT CHANGE UP (ref 40–120)
ALT FLD-CCNC: 7 U/L — SIGNIFICANT CHANGE UP (ref 4–41)
ANION GAP SERPL CALC-SCNC: 15 MMOL/L — HIGH (ref 7–14)
AST SERPL-CCNC: 8 U/L — SIGNIFICANT CHANGE UP (ref 4–40)
BASOPHILS # BLD AUTO: 0.02 K/UL — SIGNIFICANT CHANGE UP (ref 0–0.2)
BASOPHILS NFR BLD AUTO: 0.2 % — SIGNIFICANT CHANGE UP (ref 0–2)
BILIRUB SERPL-MCNC: 0.4 MG/DL — SIGNIFICANT CHANGE UP (ref 0.2–1.2)
BUN SERPL-MCNC: 10 MG/DL — SIGNIFICANT CHANGE UP (ref 7–23)
CALCIUM SERPL-MCNC: 8.9 MG/DL — SIGNIFICANT CHANGE UP (ref 8.4–10.5)
CHLORIDE SERPL-SCNC: 99 MMOL/L — SIGNIFICANT CHANGE UP (ref 98–107)
CK SERPL-CCNC: 44 U/L — SIGNIFICANT CHANGE UP (ref 30–200)
CO2 SERPL-SCNC: 20 MMOL/L — LOW (ref 22–31)
CREAT SERPL-MCNC: 0.73 MG/DL — SIGNIFICANT CHANGE UP (ref 0.5–1.3)
EOSINOPHIL # BLD AUTO: 0.29 K/UL — SIGNIFICANT CHANGE UP (ref 0–0.5)
EOSINOPHIL NFR BLD AUTO: 2.7 % — SIGNIFICANT CHANGE UP (ref 0–6)
GLUCOSE SERPL-MCNC: 186 MG/DL — HIGH (ref 70–99)
HCT VFR BLD CALC: 36.9 % — LOW (ref 39–50)
HGB BLD-MCNC: 12.3 G/DL — LOW (ref 13–17)
IANC: 6.21 K/UL — SIGNIFICANT CHANGE UP (ref 1.5–8.5)
IMM GRANULOCYTES NFR BLD AUTO: 0.3 % — SIGNIFICANT CHANGE UP (ref 0–1.5)
LYMPHOCYTES # BLD AUTO: 3.63 K/UL — HIGH (ref 1–3.3)
LYMPHOCYTES # BLD AUTO: 34.2 % — SIGNIFICANT CHANGE UP (ref 13–44)
MAGNESIUM SERPL-MCNC: 2 MG/DL — SIGNIFICANT CHANGE UP (ref 1.6–2.6)
MCHC RBC-ENTMCNC: 25.4 PG — LOW (ref 27–34)
MCHC RBC-ENTMCNC: 33.3 GM/DL — SIGNIFICANT CHANGE UP (ref 32–36)
MCV RBC AUTO: 76.2 FL — LOW (ref 80–100)
MONOCYTES # BLD AUTO: 0.44 K/UL — SIGNIFICANT CHANGE UP (ref 0–0.9)
MONOCYTES NFR BLD AUTO: 4.1 % — SIGNIFICANT CHANGE UP (ref 2–14)
NEUTROPHILS # BLD AUTO: 6.21 K/UL — SIGNIFICANT CHANGE UP (ref 1.8–7.4)
NEUTROPHILS NFR BLD AUTO: 58.5 % — SIGNIFICANT CHANGE UP (ref 43–77)
NRBC # BLD: 0 /100 WBCS — SIGNIFICANT CHANGE UP
NRBC # FLD: 0 K/UL — SIGNIFICANT CHANGE UP
PHOSPHATE SERPL-MCNC: 3.4 MG/DL — SIGNIFICANT CHANGE UP (ref 2.5–4.5)
PLATELET # BLD AUTO: 371 K/UL — SIGNIFICANT CHANGE UP (ref 150–400)
POTASSIUM SERPL-MCNC: 4 MMOL/L — SIGNIFICANT CHANGE UP (ref 3.5–5.3)
POTASSIUM SERPL-SCNC: 4 MMOL/L — SIGNIFICANT CHANGE UP (ref 3.5–5.3)
PROT SERPL-MCNC: 7.3 G/DL — SIGNIFICANT CHANGE UP (ref 6–8.3)
RBC # BLD: 4.84 M/UL — SIGNIFICANT CHANGE UP (ref 4.2–5.8)
RBC # FLD: 14 % — SIGNIFICANT CHANGE UP (ref 10.3–14.5)
SODIUM SERPL-SCNC: 134 MMOL/L — LOW (ref 135–145)
TSH SERPL-MCNC: 2.24 UIU/ML — SIGNIFICANT CHANGE UP (ref 0.27–4.2)
WBC # BLD: 10.62 K/UL — HIGH (ref 3.8–10.5)
WBC # FLD AUTO: 10.62 K/UL — HIGH (ref 3.8–10.5)

## 2021-05-24 ENCOUNTER — NON-APPOINTMENT (OUTPATIENT)
Age: 76
End: 2021-05-24

## 2021-05-24 ENCOUNTER — EMERGENCY (EMERGENCY)
Facility: HOSPITAL | Age: 76
LOS: 1 days | Discharge: HOME | End: 2021-05-24
Attending: STUDENT IN AN ORGANIZED HEALTH CARE EDUCATION/TRAINING PROGRAM | Admitting: STUDENT IN AN ORGANIZED HEALTH CARE EDUCATION/TRAINING PROGRAM
Payer: MEDICAID

## 2021-05-24 VITALS
RESPIRATION RATE: 18 BRPM | OXYGEN SATURATION: 100 % | TEMPERATURE: 99 F | HEART RATE: 71 BPM | DIASTOLIC BLOOD PRESSURE: 64 MMHG | SYSTOLIC BLOOD PRESSURE: 147 MMHG | HEIGHT: 65 IN

## 2021-05-24 LAB
ALBUMIN SERPL ELPH-MCNC: 4.1 G/DL — SIGNIFICANT CHANGE UP (ref 3.3–5)
ALP SERPL-CCNC: 93 U/L — SIGNIFICANT CHANGE UP (ref 40–120)
ALT FLD-CCNC: 9 U/L — SIGNIFICANT CHANGE UP (ref 4–41)
ANION GAP SERPL CALC-SCNC: 14 MMOL/L — SIGNIFICANT CHANGE UP (ref 7–14)
AST SERPL-CCNC: 14 U/L — SIGNIFICANT CHANGE UP (ref 4–40)
BASOPHILS # BLD AUTO: 0.05 K/UL — SIGNIFICANT CHANGE UP (ref 0–0.2)
BASOPHILS NFR BLD AUTO: 0.4 % — SIGNIFICANT CHANGE UP (ref 0–2)
BILIRUB SERPL-MCNC: 0.4 MG/DL — SIGNIFICANT CHANGE UP (ref 0.2–1.2)
BUN SERPL-MCNC: 8 MG/DL — SIGNIFICANT CHANGE UP (ref 7–23)
CALCIUM SERPL-MCNC: 9.2 MG/DL — SIGNIFICANT CHANGE UP (ref 8.4–10.5)
CHLORIDE SERPL-SCNC: 102 MMOL/L — SIGNIFICANT CHANGE UP (ref 98–107)
CO2 SERPL-SCNC: 22 MMOL/L — SIGNIFICANT CHANGE UP (ref 22–31)
CREAT SERPL-MCNC: 0.74 MG/DL — SIGNIFICANT CHANGE UP (ref 0.5–1.3)
EOSINOPHIL # BLD AUTO: 0.42 K/UL — SIGNIFICANT CHANGE UP (ref 0–0.5)
EOSINOPHIL NFR BLD AUTO: 3.7 % — SIGNIFICANT CHANGE UP (ref 0–6)
GLUCOSE SERPL-MCNC: 97 MG/DL — SIGNIFICANT CHANGE UP (ref 70–99)
HCT VFR BLD CALC: 41.9 % — SIGNIFICANT CHANGE UP (ref 39–50)
HGB BLD-MCNC: 13.8 G/DL — SIGNIFICANT CHANGE UP (ref 13–17)
IANC: 5.71 K/UL — SIGNIFICANT CHANGE UP (ref 1.5–8.5)
IMM GRANULOCYTES NFR BLD AUTO: 0.2 % — SIGNIFICANT CHANGE UP (ref 0–1.5)
LIDOCAIN IGE QN: 26 U/L — SIGNIFICANT CHANGE UP (ref 7–60)
LYMPHOCYTES # BLD AUTO: 4.68 K/UL — HIGH (ref 1–3.3)
LYMPHOCYTES # BLD AUTO: 40.9 % — SIGNIFICANT CHANGE UP (ref 13–44)
MCHC RBC-ENTMCNC: 25 PG — LOW (ref 27–34)
MCHC RBC-ENTMCNC: 32.9 GM/DL — SIGNIFICANT CHANGE UP (ref 32–36)
MCV RBC AUTO: 76 FL — LOW (ref 80–100)
MONOCYTES # BLD AUTO: 0.57 K/UL — SIGNIFICANT CHANGE UP (ref 0–0.9)
MONOCYTES NFR BLD AUTO: 5 % — SIGNIFICANT CHANGE UP (ref 2–14)
NEUTROPHILS # BLD AUTO: 5.71 K/UL — SIGNIFICANT CHANGE UP (ref 1.8–7.4)
NEUTROPHILS NFR BLD AUTO: 49.8 % — SIGNIFICANT CHANGE UP (ref 43–77)
NRBC # BLD: 0 /100 WBCS — SIGNIFICANT CHANGE UP
NRBC # FLD: 0 K/UL — SIGNIFICANT CHANGE UP
PLATELET # BLD AUTO: 340 K/UL — SIGNIFICANT CHANGE UP (ref 150–400)
POTASSIUM SERPL-MCNC: 4.1 MMOL/L — SIGNIFICANT CHANGE UP (ref 3.5–5.3)
POTASSIUM SERPL-SCNC: 4.1 MMOL/L — SIGNIFICANT CHANGE UP (ref 3.5–5.3)
PROT SERPL-MCNC: 7.6 G/DL — SIGNIFICANT CHANGE UP (ref 6–8.3)
RBC # BLD: 5.51 M/UL — SIGNIFICANT CHANGE UP (ref 4.2–5.8)
RBC # FLD: 13.9 % — SIGNIFICANT CHANGE UP (ref 10.3–14.5)
SODIUM SERPL-SCNC: 138 MMOL/L — SIGNIFICANT CHANGE UP (ref 135–145)
WBC # BLD: 11.45 K/UL — HIGH (ref 3.8–10.5)
WBC # FLD AUTO: 11.45 K/UL — HIGH (ref 3.8–10.5)

## 2021-05-24 PROCEDURE — 99285 EMERGENCY DEPT VISIT HI MDM: CPT

## 2021-05-24 RX ORDER — ACETAMINOPHEN 500 MG
975 TABLET ORAL ONCE
Refills: 0 | Status: COMPLETED | OUTPATIENT
Start: 2021-05-24 | End: 2021-05-24

## 2021-05-24 RX ADMIN — Medication 975 MILLIGRAM(S): at 23:37

## 2021-05-24 NOTE — ED PROVIDER NOTE - PHYSICAL EXAMINATION
Physical Exam:    I have reviewed the triage vital signs.    Gen: NAD, AOx3, non-toxic appearing, able to ambulate without assistance  Head and Neck: NCAT, Neck supple without meningismus   HEENT: EOMI, PEERLA, normal conjunctiva, tongue midline, oral mucosa moist  Lung: CTAB, no respiratory distress, no wheezes/rhonchi/rales B/L, speaking in full sentences  CV: RRR, no murmurs, rubs or gallops  Abd: soft, NT, ND, no guarding, no rigidity, no rebound tenderness, no CVA tenderness, no masses.   MSK: no gross deformities, ROM normal in UE/LE, no back tenderness  Neuro: CNs II-XII grossly intact. No focal sensory or motor deficits  Skin: Warm, well perfused, no rash, no leg swelling  Psych: Appropriate mood and affect  Rectal exam: some small hard stool pellets in vault, no impaction. no blood. large prostate.

## 2021-05-24 NOTE — ED PROVIDER NOTE - PROGRESS NOTE DETAILS
Emmy - ct negative. pt had large bowel movemnt and now feels much better. uti tx with ABx. ABx sent to pts phamacy. pt and family updated on plan. To be discharged.

## 2021-05-24 NOTE — ED PROVIDER NOTE - OBJECTIVE STATEMENT
Hisbozena preformed with granddaughter on speakerphone:    76M presents with constipation x 4 days. He also has abd pain diffuse x 2 days. They tried a laxative powder (purelax) x 2 days with no improvement. still passing gas. no surgical hx. no opoid medications. no hx constipation. pt has Aguilar in changed 8 days ago at urologist for BPH. No nausea or vomiting.

## 2021-05-24 NOTE — ED PROVIDER NOTE - PATIENT PORTAL LINK FT
You can access the FollowMyHealth Patient Portal offered by [facility] by registering at the following website: [email or web address]. By joining [facility]’s FollowMyHealth portal, you will also be able to view your health information using other applications (apps) compatible with our system.

## 2021-05-24 NOTE — ED PROVIDER NOTE - CLINICAL SUMMARY MEDICAL DECISION MAKING FREE TEXT BOX
Emmy - elderly male with constipation that led to abdominal pain. given age, will ct scan to r/o obstruction or other abd pathology. Patient failed outpatient laxative so will try enema here if ct scan negative. also eval for UTI.

## 2021-05-24 NOTE — ED ADULT TRIAGE NOTE - CHIEF COMPLAINT QUOTE
Pt c/o constipation x 4-5 days with abdominal pain. PMH parkinson's, low sodium, depression, arthritis and arrives with leg bag in place  Granddaughter Tory [phone]

## 2021-05-24 NOTE — ED PROVIDER NOTE - ATTENDING CONTRIBUTION TO CARE
#[id number]    76M with pmh BPH, dementia, parkinsons disease, DM presenting with constipation x 4 days with associated diffuse abd bloating and discomfort. States tried taking purelax with no relief. Endorses passing gas. No hx abdominal surgeries. Chronic pichardo in place with exchange every 15 days with last exchange 8 days ago. Denies fever, chills, cp, sob, nausea, vomiting, diarrhea, dysuria, headache, cough    GEN: NAD, awake, well appearing  HEENT: NCAT, MMM, normal conjunctiva, perrl  CHEST/LUNGS: Non-tachypneic, CTAB, bilateral breath sounds  CARDIAC: Non-tachycardic, s1s2, normal perfusion, no peripheral edema  ABDOMEN: Soft, diffuse minimal tenderness, No rebound/guarding  MSK: No joint tenderness, no gross deformity of extremities  SKIN: No rashes, no petechiae, no vesicles  NEURO: CN grossly intact, normal coordination, no focal motor or sensory deficits    Patient presenting with constipation and likely associated abdominal discomfort. Unlikely acute surgical abdominal process. No infectious signs or symptoms. Screening labs and CT to assess.

## 2021-05-25 VITALS
HEART RATE: 67 BPM | TEMPERATURE: 98 F | RESPIRATION RATE: 18 BRPM | SYSTOLIC BLOOD PRESSURE: 163 MMHG | DIASTOLIC BLOOD PRESSURE: 83 MMHG | OXYGEN SATURATION: 100 %

## 2021-05-25 LAB
APPEARANCE UR: ABNORMAL
BILIRUB UR-MCNC: NEGATIVE — SIGNIFICANT CHANGE UP
COLOR SPEC: SIGNIFICANT CHANGE UP
DIFF PNL FLD: ABNORMAL
GLUCOSE UR QL: NEGATIVE — SIGNIFICANT CHANGE UP
KETONES UR-MCNC: NEGATIVE — SIGNIFICANT CHANGE UP
LEUKOCYTE ESTERASE UR-ACNC: ABNORMAL
NITRITE UR-MCNC: POSITIVE
PH UR: 7.5 — SIGNIFICANT CHANGE UP (ref 5–8)
PROT UR-MCNC: ABNORMAL
SARS-COV-2 RNA SPEC QL NAA+PROBE: SIGNIFICANT CHANGE UP
SP GR SPEC: 1.01 — SIGNIFICANT CHANGE UP (ref 1.01–1.02)
UROBILINOGEN FLD QL: SIGNIFICANT CHANGE UP

## 2021-05-25 PROCEDURE — 74177 CT ABD & PELVIS W/CONTRAST: CPT | Mod: 26

## 2021-05-25 RX ORDER — CEFTRIAXONE 500 MG/1
1000 INJECTION, POWDER, FOR SOLUTION INTRAMUSCULAR; INTRAVENOUS ONCE
Refills: 0 | Status: COMPLETED | OUTPATIENT
Start: 2021-05-25 | End: 2021-05-25

## 2021-05-25 RX ORDER — CEFPODOXIME PROXETIL 100 MG
1 TABLET ORAL
Qty: 14 | Refills: 0
Start: 2021-05-25 | End: 2021-05-31

## 2021-05-25 RX ORDER — LACTULOSE 10 G/15ML
10 SOLUTION ORAL ONCE
Refills: 0 | Status: COMPLETED | OUTPATIENT
Start: 2021-05-25 | End: 2021-05-25

## 2021-05-25 RX ADMIN — LACTULOSE 10 GRAM(S): 10 SOLUTION ORAL at 02:48

## 2021-05-25 RX ADMIN — CEFTRIAXONE 100 MILLIGRAM(S): 500 INJECTION, POWDER, FOR SOLUTION INTRAMUSCULAR; INTRAVENOUS at 04:07

## 2021-05-25 RX ADMIN — Medication 5 MILLIGRAM(S): at 02:48

## 2021-05-25 RX ADMIN — Medication 975 MILLIGRAM(S): at 01:48

## 2021-05-25 NOTE — ED ADULT NURSE NOTE - CHIEF COMPLAINT QUOTE
Pt c/o constipation x 4-5 days with abdominal pain. PMH parkinson's, low sodium, depression, arthritis and arrives with leg bag in place  Granddaughter Tory [phone]

## 2021-05-25 NOTE — ED ADULT NURSE REASSESSMENT NOTE - NS ED NURSE REASSESS COMMENT FT1
Pt in bed, able to have bowel movement. Expresses partial relief. PT with diaper on, respirations even and unlabored. Denies chest pain, SOB, headache, dizziness, MD Agrawal aware. As per MD, pichardo not to be changed, stat lock applied, and PT appointment made to see urology.

## 2021-05-25 NOTE — ED ADULT NURSE NOTE - OBJECTIVE STATEMENT
Received PT to room 19, ambulatory at [facility] speaking, little english. Brought it by family for constipation x4 days. Presents to ED with 18fr pichardo - draining cloudy urine, MD Aware, has Pichardo changed 8 days ago at urologist for BPH. Right wrist 20 g placed, labs drawn, medicated as ordered, bed in lowest position.

## 2021-05-27 LAB
-  AMIKACIN: SIGNIFICANT CHANGE UP
-  AMOXICILLIN/CLAVULANIC ACID: SIGNIFICANT CHANGE UP
-  AMPICILLIN/SULBACTAM: SIGNIFICANT CHANGE UP
-  AMPICILLIN: SIGNIFICANT CHANGE UP
-  AZTREONAM: SIGNIFICANT CHANGE UP
-  CEFAZOLIN: SIGNIFICANT CHANGE UP
-  CEFEPIME: SIGNIFICANT CHANGE UP
-  CEFOXITIN: SIGNIFICANT CHANGE UP
-  CEFTRIAXONE: SIGNIFICANT CHANGE UP
-  CIPROFLOXACIN: SIGNIFICANT CHANGE UP
-  ERTAPENEM: SIGNIFICANT CHANGE UP
-  GENTAMICIN: SIGNIFICANT CHANGE UP
-  IMIPENEM: SIGNIFICANT CHANGE UP
-  LEVOFLOXACIN: SIGNIFICANT CHANGE UP
-  MEROPENEM: SIGNIFICANT CHANGE UP
-  NITROFURANTOIN: SIGNIFICANT CHANGE UP
-  PIPERACILLIN/TAZOBACTAM: SIGNIFICANT CHANGE UP
-  TIGECYCLINE: SIGNIFICANT CHANGE UP
-  TOBRAMYCIN: SIGNIFICANT CHANGE UP
-  TRIMETHOPRIM/SULFAMETHOXAZOLE: SIGNIFICANT CHANGE UP
METHOD TYPE: SIGNIFICANT CHANGE UP

## 2021-05-27 RX ORDER — CEFDINIR 250 MG/5ML
1 POWDER, FOR SUSPENSION ORAL
Qty: 14 | Refills: 0
Start: 2021-05-27 | End: 2021-06-02

## 2021-05-27 NOTE — ED POST DISCHARGE NOTE - DETAILS
Pt family called, abx not covered by insurance requesting another abx. Will change to another 3rd gen cephalosporin GÓMEZ:  On 5/28/21 at 16:30 I received call from patient's granddaughter that requesting pharmacy did not receive patient's prescription.  I reviewed chart which showed cefdinir 300 mg bid x7 days was prescribed to patient one day ago.  Prescription resent.

## 2021-05-28 LAB
-  AMIKACIN: SIGNIFICANT CHANGE UP
-  AZTREONAM: SIGNIFICANT CHANGE UP
-  CEFEPIME: SIGNIFICANT CHANGE UP
-  CEFTAZIDIME: SIGNIFICANT CHANGE UP
-  CIPROFLOXACIN: SIGNIFICANT CHANGE UP
-  GENTAMICIN: SIGNIFICANT CHANGE UP
-  IMIPENEM: SIGNIFICANT CHANGE UP
-  LEVOFLOXACIN: SIGNIFICANT CHANGE UP
-  MEROPENEM: SIGNIFICANT CHANGE UP
-  PIPERACILLIN/TAZOBACTAM: SIGNIFICANT CHANGE UP
-  TOBRAMYCIN: SIGNIFICANT CHANGE UP
CULTURE RESULTS: SIGNIFICANT CHANGE UP
METHOD TYPE: SIGNIFICANT CHANGE UP
ORGANISM # SPEC MICROSCOPIC CNT: SIGNIFICANT CHANGE UP
SPECIMEN SOURCE: SIGNIFICANT CHANGE UP

## 2021-06-01 NOTE — ED PROVIDER NOTE - TOBACCO USE
Dr Gaviota Brown to the bedside, time out performed @ 1432, Pt monitored, 02,  Ankle nerve block completed using 20 mL 0.5 % Bupivacaine,   pt tolerated procedure well,  Site CDI, (see charting)  Versed Given: 2 mg  Fentanyl 100 mcg    Start: 1432  End: 1436 Unknown if ever smoked

## 2021-06-16 ENCOUNTER — EMERGENCY (EMERGENCY)
Facility: HOSPITAL | Age: 76
LOS: 1 days | Discharge: HOME | End: 2021-06-16
Attending: EMERGENCY MEDICINE | Admitting: EMERGENCY MEDICINE
Payer: MEDICAID

## 2021-06-16 VITALS
DIASTOLIC BLOOD PRESSURE: 58 MMHG | HEIGHT: 65 IN | TEMPERATURE: 99 F | HEART RATE: 94 BPM | SYSTOLIC BLOOD PRESSURE: 101 MMHG | RESPIRATION RATE: 18 BRPM | OXYGEN SATURATION: 100 %

## 2021-06-16 VITALS
HEART RATE: 75 BPM | TEMPERATURE: 98 F | OXYGEN SATURATION: 100 % | DIASTOLIC BLOOD PRESSURE: 65 MMHG | RESPIRATION RATE: 16 BRPM | SYSTOLIC BLOOD PRESSURE: 110 MMHG

## 2021-06-16 LAB
ALBUMIN SERPL ELPH-MCNC: 3.8 G/DL — SIGNIFICANT CHANGE UP (ref 3.3–5)
ALP SERPL-CCNC: 75 U/L — SIGNIFICANT CHANGE UP (ref 40–120)
ALT FLD-CCNC: 6 U/L — SIGNIFICANT CHANGE UP (ref 4–41)
ANION GAP SERPL CALC-SCNC: 13 MMOL/L — SIGNIFICANT CHANGE UP (ref 7–14)
APPEARANCE UR: SIGNIFICANT CHANGE UP
AST SERPL-CCNC: 11 U/L — SIGNIFICANT CHANGE UP (ref 4–40)
BACTERIA # UR AUTO: ABNORMAL
BASOPHILS # BLD AUTO: 0.07 K/UL — SIGNIFICANT CHANGE UP (ref 0–0.2)
BASOPHILS NFR BLD AUTO: 0.3 % — SIGNIFICANT CHANGE UP (ref 0–2)
BILIRUB SERPL-MCNC: 0.9 MG/DL — SIGNIFICANT CHANGE UP (ref 0.2–1.2)
BILIRUB UR-MCNC: NEGATIVE — SIGNIFICANT CHANGE UP
BLOOD GAS VENOUS - CREATININE: 0.8 MG/DL — SIGNIFICANT CHANGE UP (ref 0.5–1.3)
BLOOD GAS VENOUS COMPREHENSIVE RESULT: SIGNIFICANT CHANGE UP
BUN SERPL-MCNC: 13 MG/DL — SIGNIFICANT CHANGE UP (ref 7–23)
CALCIUM SERPL-MCNC: 8.7 MG/DL — SIGNIFICANT CHANGE UP (ref 8.4–10.5)
CHLORIDE BLDV-SCNC: 106 MMOL/L — SIGNIFICANT CHANGE UP (ref 96–108)
CHLORIDE SERPL-SCNC: 97 MMOL/L — LOW (ref 98–107)
CO2 SERPL-SCNC: 21 MMOL/L — LOW (ref 22–31)
COLOR SPEC: SIGNIFICANT CHANGE UP
COMMENT - URINE: SIGNIFICANT CHANGE UP
CREAT SERPL-MCNC: 0.83 MG/DL — SIGNIFICANT CHANGE UP (ref 0.5–1.3)
DIFF PNL FLD: NEGATIVE — SIGNIFICANT CHANGE UP
EOSINOPHIL # BLD AUTO: 0.05 K/UL — SIGNIFICANT CHANGE UP (ref 0–0.5)
EOSINOPHIL NFR BLD AUTO: 0.2 % — SIGNIFICANT CHANGE UP (ref 0–6)
EPI CELLS # UR: 1 /HPF — SIGNIFICANT CHANGE UP (ref 0–5)
GAS PNL BLDV: 132 MMOL/L — LOW (ref 136–146)
GLUCOSE BLDV-MCNC: 116 MG/DL — HIGH (ref 70–99)
GLUCOSE SERPL-MCNC: 193 MG/DL — HIGH (ref 70–99)
GLUCOSE UR QL: NEGATIVE — SIGNIFICANT CHANGE UP
HCO3 BLDV-SCNC: 23 MMOL/L — SIGNIFICANT CHANGE UP (ref 20–27)
HCT VFR BLD CALC: 33.3 % — LOW (ref 39–50)
HCT VFR BLDA CALC: 34.5 % — LOW (ref 39–51)
HGB BLD CALC-MCNC: 11.2 G/DL — LOW (ref 13–17)
HGB BLD-MCNC: 11.2 G/DL — LOW (ref 13–17)
IANC: 16.6 K/UL — HIGH (ref 1.5–8.5)
IMM GRANULOCYTES NFR BLD AUTO: 2.4 % — HIGH (ref 0–1.5)
KETONES UR-MCNC: NEGATIVE — SIGNIFICANT CHANGE UP
LACTATE BLDV-MCNC: 1.4 MMOL/L — SIGNIFICANT CHANGE UP (ref 0.5–2)
LEUKOCYTE ESTERASE UR-ACNC: ABNORMAL
LYMPHOCYTES # BLD AUTO: 17.7 % — SIGNIFICANT CHANGE UP (ref 13–44)
LYMPHOCYTES # BLD AUTO: 4.02 K/UL — HIGH (ref 1–3.3)
MCHC RBC-ENTMCNC: 25.9 PG — LOW (ref 27–34)
MCHC RBC-ENTMCNC: 33.6 GM/DL — SIGNIFICANT CHANGE UP (ref 32–36)
MCV RBC AUTO: 77.1 FL — LOW (ref 80–100)
MONOCYTES # BLD AUTO: 1.4 K/UL — HIGH (ref 0–0.9)
MONOCYTES NFR BLD AUTO: 6.2 % — SIGNIFICANT CHANGE UP (ref 2–14)
NEUTROPHILS # BLD AUTO: 16.6 K/UL — HIGH (ref 1.8–7.4)
NEUTROPHILS NFR BLD AUTO: 73.2 % — SIGNIFICANT CHANGE UP (ref 43–77)
NITRITE UR-MCNC: NEGATIVE — SIGNIFICANT CHANGE UP
NRBC # BLD: 0 /100 WBCS — SIGNIFICANT CHANGE UP
NRBC # FLD: 0 K/UL — SIGNIFICANT CHANGE UP
PCO2 BLDV: 46 MMHG — SIGNIFICANT CHANGE UP (ref 41–51)
PH BLDV: 7.35 — SIGNIFICANT CHANGE UP (ref 7.32–7.43)
PH UR: 7 — SIGNIFICANT CHANGE UP (ref 5–8)
PLATELET # BLD AUTO: 303 K/UL — SIGNIFICANT CHANGE UP (ref 150–400)
PO2 BLDV: <24 MMHG — LOW (ref 35–40)
POTASSIUM BLDV-SCNC: 3.3 MMOL/L — LOW (ref 3.4–4.5)
POTASSIUM SERPL-MCNC: 3.4 MMOL/L — LOW (ref 3.5–5.3)
POTASSIUM SERPL-SCNC: 3.4 MMOL/L — LOW (ref 3.5–5.3)
PROT SERPL-MCNC: 7 G/DL — SIGNIFICANT CHANGE UP (ref 6–8.3)
PROT UR-MCNC: ABNORMAL
RBC # BLD: 4.32 M/UL — SIGNIFICANT CHANGE UP (ref 4.2–5.8)
RBC # FLD: 14.1 % — SIGNIFICANT CHANGE UP (ref 10.3–14.5)
SAO2 % BLDV: 38.2 % — LOW (ref 60–85)
SODIUM SERPL-SCNC: 131 MMOL/L — LOW (ref 135–145)
SP GR SPEC: >1.03 — HIGH (ref 1.01–1.02)
UROBILINOGEN FLD QL: SIGNIFICANT CHANGE UP
WBC # BLD: 22.69 K/UL — HIGH (ref 3.8–10.5)
WBC # FLD AUTO: 22.69 K/UL — HIGH (ref 3.8–10.5)
WBC UR QL: 3 /HPF — SIGNIFICANT CHANGE UP (ref 0–5)

## 2021-06-16 PROCEDURE — 93010 ELECTROCARDIOGRAM REPORT: CPT

## 2021-06-16 PROCEDURE — 99284 EMERGENCY DEPT VISIT MOD MDM: CPT | Mod: 25

## 2021-06-16 PROCEDURE — 73564 X-RAY EXAM KNEE 4 OR MORE: CPT | Mod: 26,RT

## 2021-06-16 PROCEDURE — 71046 X-RAY EXAM CHEST 2 VIEWS: CPT | Mod: 26

## 2021-06-16 RX ORDER — SODIUM CHLORIDE 9 MG/ML
1000 INJECTION INTRAMUSCULAR; INTRAVENOUS; SUBCUTANEOUS ONCE
Refills: 0 | Status: COMPLETED | OUTPATIENT
Start: 2021-06-16 | End: 2021-06-16

## 2021-06-16 RX ORDER — IBUPROFEN 200 MG
600 TABLET ORAL ONCE
Refills: 0 | Status: COMPLETED | OUTPATIENT
Start: 2021-06-16 | End: 2021-06-16

## 2021-06-16 RX ORDER — SENNOSIDES/DOCUSATE SODIUM 8.6MG-50MG
2 TABLET ORAL
Qty: 28 | Refills: 0
Start: 2021-06-16 | End: 2021-06-29

## 2021-06-16 RX ORDER — POTASSIUM CHLORIDE 20 MEQ
40 PACKET (EA) ORAL ONCE
Refills: 0 | Status: COMPLETED | OUTPATIENT
Start: 2021-06-16 | End: 2021-06-16

## 2021-06-16 RX ADMIN — Medication 600 MILLIGRAM(S): at 14:59

## 2021-06-16 RX ADMIN — SODIUM CHLORIDE 1000 MILLILITER(S): 9 INJECTION INTRAMUSCULAR; INTRAVENOUS; SUBCUTANEOUS at 14:59

## 2021-06-16 RX ADMIN — SODIUM CHLORIDE 1000 MILLILITER(S): 9 INJECTION INTRAMUSCULAR; INTRAVENOUS; SUBCUTANEOUS at 10:46

## 2021-06-16 RX ADMIN — SODIUM CHLORIDE 1000 MILLILITER(S): 9 INJECTION INTRAMUSCULAR; INTRAVENOUS; SUBCUTANEOUS at 13:38

## 2021-06-16 RX ADMIN — Medication 600 MILLIGRAM(S): at 10:46

## 2021-06-16 RX ADMIN — Medication 40 MILLIEQUIVALENT(S): at 15:01

## 2021-06-16 NOTE — ED ADULT TRIAGE NOTE - CHIEF COMPLAINT QUOTE
Arrives from home with granddaughter reports constipation at home for one week. Family reports was here recently for the same complaint without relief of symptoms. Arrives with indwelling pichardo catheter

## 2021-06-16 NOTE — ED PROVIDER NOTE - PATIENT PORTAL LINK FT
You can access the FollowMyHealth Patient Portal offered by [facility] by registering at the following website: [email or web address]. By joining [facility]’s FollowMyHealth portal, you will also be able to view your health information using other applications (apps) compatible with our system.

## 2021-06-16 NOTE — ED PROVIDER NOTE - PROGRESS NOTE DETAILS
SRI anguiano- Pt had large BM after SMIG enema, feeling better after ER stay, WBC 22K however afebrile and normal lactate. Will dc home with close PMD f/u for repeat blood work. stable for dc

## 2021-06-16 NOTE — ED ADULT NURSE NOTE - OBJECTIVE STATEMENT
pt received in rm 23. pt primarily salvador speaking and prefers granddaughter to translate. pt with hx of chronic indwelling pichardo, dementia (AAO x 3 at baseline per granddaughter). as per granddaughter, pt c/o constipation for 5 days associated with weakness. pts granddaughter also reports pt had a fever of 101 last night. LBM 5 days ago. pt denies sob, chest pain, n/v/d, cough. respirations even and unlabored. 20g iv placed to left ac. labs drawn and sent. will continue to monitor.

## 2021-06-16 NOTE — ED ADULT NURSE NOTE - NS ED NURSE DISCH DISPOSITION
[phone] Discharged

## 2021-06-16 NOTE — ED ADULT TRIAGE NOTE - INTERPRETATION SERVICES DECLINED
I concur with the Admission Order and I certify that services are provided in accordance with Section 42 CFR § 412.3 Patient/Caregiver requests family/friend to interpret.

## 2021-06-16 NOTE — ED PROVIDER NOTE - ATTENDING CONTRIBUTION TO CARE
Patient is a 77 yo M with history of type 2 DM, Parkinson's disease, dementia, BPH here for evaluation of constipation for 5 days. Patient's grand-daughter at bedside. She states he went to his pcp 2 days ago, was given a laxative without relief. Patient is a 77 yo M with history of type 2 DM, Parkinson's disease, dementia, BPH here for evaluation of constipation for 5 days. Patient's grand-daughter at bedside. She states he went to his pcp 2 days ago, was given a laxative without relief. LBM 5 days ago. Patient has been seen in the past for this. He is not on a bowel regimen. No nausea, vomiting. No fevers. + chills. Patient has chronic right knee pain 2/2 arthritis, worse over the past 2 days. He had a CT Scan 1-2 weeks ago with no acute findings. No history of cancer.    VS noted  Gen. no acute distress, Non toxic   HEENT: EOMI, mmm  Lungs: CTAB/L no C/ W /R   CVS: RRR   Abd; Soft non tender, distended, tympanic, no rebound or guarding  Ext: no edema  Skin: no rash  Neuro: alert, non focal clear speech  a/p: abd distention, constipation - plan for labs, enema, reassess.   - Esha COTE

## 2021-06-16 NOTE — ED ADULT NURSE NOTE - INTERVENTIONS DEFINITIONS
[place] to call system/Call bell, personal items and telephone within reach/Instruct patient to call for assistance/Non-slip footwear when patient is off stretcher/Physically safe environment: no spills, clutter or unnecessary equipment/Stretcher in lowest position, wheels locked, appropriate side rails in place/Monitor gait and stability

## 2021-06-16 NOTE — ED PROVIDER NOTE - OBJECTIVE STATEMENT
77 y/o male pmh dementia, dm, chronic indwelling pichardo c/o constipation. As per daughter pt has been experiencing constipation for over 1 month. Pt was seen in the ER x 3 weeks ago and had neg CT but + UTI and was dc on abx and stool softeners. Pt went to PMD this week and had another CT which was unremarkable and given more laxatives. Pt has not had a BM in 5 days but is tolerating PO. Pt also admits to R knee pain over the last 2-3 days, worse with movement. Denies chest pain, sob, n/v, fever or chills.

## 2021-06-18 LAB
CULTURE RESULTS: SIGNIFICANT CHANGE UP
SPECIMEN SOURCE: SIGNIFICANT CHANGE UP

## 2021-06-20 NOTE — ED POST DISCHARGE NOTE - RESULT SUMMARY
culture grew 3 or more types of organisms  which indicate collection contamination, consider recollection only if clinically indicated. Patient's granddaughter contacted discussed with her patient needs repeat UA/UCX. Strict return precautions given to patient's granddaughter. GD asked that we fax over CXR and knee xray to fax . faxed

## 2021-06-21 ENCOUNTER — OUTPATIENT (OUTPATIENT)
Dept: OUTPATIENT SERVICES | Facility: HOSPITAL | Age: 76
LOS: 1 days | End: 2021-06-21

## 2021-06-21 ENCOUNTER — APPOINTMENT (OUTPATIENT)
Dept: INTERNAL MEDICINE | Facility: CLINIC | Age: 76
End: 2021-06-21
Payer: MEDICAID

## 2021-06-21 ENCOUNTER — RESULT REVIEW (OUTPATIENT)
Age: 76
End: 2021-06-21

## 2021-06-21 VITALS
WEIGHT: 144 LBS | BODY MASS INDEX: 25.52 KG/M2 | OXYGEN SATURATION: 98 % | DIASTOLIC BLOOD PRESSURE: 68 MMHG | HEART RATE: 87 BPM | HEIGHT: 63 IN | SYSTOLIC BLOOD PRESSURE: 100 MMHG

## 2021-06-21 DIAGNOSIS — E87.1 HYPO-OSMOLALITY AND HYPONATREMIA: ICD-10-CM

## 2021-06-21 DIAGNOSIS — D72.829 ELEVATED WHITE BLOOD CELL COUNT, UNSPECIFIED: ICD-10-CM

## 2021-06-21 DIAGNOSIS — K11.7 DISTURBANCES OF SALIVARY SECRETION: ICD-10-CM

## 2021-06-21 LAB
ANION GAP SERPL CALC-SCNC: 14 MMOL/L — SIGNIFICANT CHANGE UP (ref 7–14)
BASOPHILS # BLD AUTO: 0.04 K/UL — SIGNIFICANT CHANGE UP (ref 0–0.2)
BASOPHILS NFR BLD AUTO: 0.5 % — SIGNIFICANT CHANGE UP (ref 0–2)
BUN SERPL-MCNC: 8 MG/DL — SIGNIFICANT CHANGE UP (ref 7–23)
CALCIUM SERPL-MCNC: 9 MG/DL — SIGNIFICANT CHANGE UP (ref 8.4–10.5)
CHLORIDE SERPL-SCNC: 98 MMOL/L — SIGNIFICANT CHANGE UP (ref 98–107)
CO2 SERPL-SCNC: 21 MMOL/L — LOW (ref 22–31)
CREAT SERPL-MCNC: 0.63 MG/DL — SIGNIFICANT CHANGE UP (ref 0.5–1.3)
CULTURE RESULTS: SIGNIFICANT CHANGE UP
EOSINOPHIL # BLD AUTO: 0.24 K/UL — SIGNIFICANT CHANGE UP (ref 0–0.5)
EOSINOPHIL NFR BLD AUTO: 3.1 % — SIGNIFICANT CHANGE UP (ref 0–6)
GLUCOSE SERPL-MCNC: 170 MG/DL — HIGH (ref 70–99)
HCT VFR BLD CALC: 36 % — LOW (ref 39–50)
HGB BLD-MCNC: 12 G/DL — LOW (ref 13–17)
IANC: 3.74 K/UL — SIGNIFICANT CHANGE UP (ref 1.5–8.5)
IMM GRANULOCYTES NFR BLD AUTO: 1.4 % — SIGNIFICANT CHANGE UP (ref 0–1.5)
LYMPHOCYTES # BLD AUTO: 3.28 K/UL — SIGNIFICANT CHANGE UP (ref 1–3.3)
LYMPHOCYTES # BLD AUTO: 41.9 % — SIGNIFICANT CHANGE UP (ref 13–44)
MCHC RBC-ENTMCNC: 25.9 PG — LOW (ref 27–34)
MCHC RBC-ENTMCNC: 33.3 GM/DL — SIGNIFICANT CHANGE UP (ref 32–36)
MCV RBC AUTO: 77.6 FL — LOW (ref 80–100)
MONOCYTES # BLD AUTO: 0.41 K/UL — SIGNIFICANT CHANGE UP (ref 0–0.9)
MONOCYTES NFR BLD AUTO: 5.2 % — SIGNIFICANT CHANGE UP (ref 2–14)
NEUTROPHILS # BLD AUTO: 3.74 K/UL — SIGNIFICANT CHANGE UP (ref 1.8–7.4)
NEUTROPHILS NFR BLD AUTO: 47.9 % — SIGNIFICANT CHANGE UP (ref 43–77)
NRBC # BLD: 0 /100 WBCS — SIGNIFICANT CHANGE UP
NRBC # FLD: 0 K/UL — SIGNIFICANT CHANGE UP
PLATELET # BLD AUTO: 454 K/UL — HIGH (ref 150–400)
POTASSIUM SERPL-MCNC: 3.8 MMOL/L — SIGNIFICANT CHANGE UP (ref 3.5–5.3)
POTASSIUM SERPL-SCNC: 3.8 MMOL/L — SIGNIFICANT CHANGE UP (ref 3.5–5.3)
RBC # BLD: 4.64 M/UL — SIGNIFICANT CHANGE UP (ref 4.2–5.8)
RBC # FLD: 14.6 % — HIGH (ref 10.3–14.5)
SODIUM SERPL-SCNC: 133 MMOL/L — LOW (ref 135–145)
SPECIMEN SOURCE: SIGNIFICANT CHANGE UP
WBC # BLD: 7.82 K/UL — SIGNIFICANT CHANGE UP (ref 3.8–10.5)
WBC # FLD AUTO: 7.82 K/UL — SIGNIFICANT CHANGE UP (ref 3.8–10.5)

## 2021-06-21 PROCEDURE — 99214 OFFICE O/P EST MOD 30 MIN: CPT

## 2021-06-23 ENCOUNTER — APPOINTMENT (OUTPATIENT)
Dept: INTERNAL MEDICINE | Facility: CLINIC | Age: 76
End: 2021-06-23

## 2021-06-23 DIAGNOSIS — K11.7 DISTURBANCES OF SALIVARY SECRETION: ICD-10-CM

## 2021-06-23 DIAGNOSIS — E87.1 HYPO-OSMOLALITY AND HYPONATREMIA: ICD-10-CM

## 2021-06-23 DIAGNOSIS — D72.829 ELEVATED WHITE BLOOD CELL COUNT, UNSPECIFIED: ICD-10-CM

## 2021-06-23 DIAGNOSIS — K59.09 OTHER CONSTIPATION: ICD-10-CM

## 2021-06-25 NOTE — HISTORY OF PRESENT ILLNESS
[FreeTextEntry1] : f/u after 2 ED visits for constipation [de-identified] : 76M with a history of Parkinson's disease, dementia, chronic hypoosmolar hyponatremia, BPH s/p thermal prostate reduction but with chronic indwelling Pichardo catheter with h/o recurrent UTIs who presents for follow-up after recent ED visits for constipation.\par Initially went to the ED 5/24 for constipation and was treated for UTI; CTAP was ok, given senna - WBc at that time was 11. Completed abx. He went back to the ED with constipation on 6/16 and after enema in ED had a BM - WBC at that visit was 22, Hb 11, Na 131, K 3.4. Here today for f/u, here with his son in person and his granddaughter Tesha on the phone. Patient declines  and requests family interpret for him (granddaughter). Reports no abdominal pain or bloating but after ED visit has been having some looser, watery, foul smelling stools; once went in the bed as he needs assistance to get up and cannot move quickly. No fevers at home. \par They also report he is still drooling, viscous secretions. Good UOP via chronic indwelling pichardo catheter, clear, no abd pain.\par Having COVID vaccine dose #2 later today. \par Did not yet make appointment with ophthalmology or PMR since the last visit.  Requesting the referrals and phone numbers to schedule again.

## 2021-06-25 NOTE — PHYSICAL EXAM
[No Respiratory Distress] : no respiratory distress  [No Accessory Muscle Use] : no accessory muscle use [Clear to Auscultation] : lungs were clear to auscultation bilaterally [Normal Rate] : normal rate  [Regular Rhythm] : with a regular rhythm [Normal S1, S2] : normal S1 and S2 [No Edema] : there was no peripheral edema [No Rash] : no rash [de-identified] : seated in wheelchair, chronically ill appearing. slow movements  [de-identified] : mild drooling, viscous saliva [de-identified] : no suprapubic tenderness [de-identified] : chronic indwelling pichardo catheter with clear yellow urine draining into bag [de-identified] : no skin ulceration or erythema of back or buttocks [de-identified] : +cogwheel rigidity b/l UE, no pronounced resting tremor

## 2021-06-25 NOTE — ASSESSMENT
[FreeTextEntry1] : 76M here for f/u after recent ED visit\par \par Leukocytosis/Constipation- now some foul-smelling diarrhea, recent leukocytosis to 22 after course of antibiotics for UTI 3 weeks previously.  No longer constipated on senna but having occasional diarrhea with some fecal incontinence due to decreased mobility likely.  Given risk for C. difficile, check stool toxin with reflex PCR.  Counseled family on hygiene measures and avoidance of possible contagion. Repeat CBC to trend WBC.\par \par Decreased mobility: Today he is in a wheelchair from the practice as he is unable to move around without full assistance from his family member.  We discussed his Parkinson's, for which he continues to follow-up with neurology outside [facility] and is taking Sinemet and they did not recommend an increased dose/frequency.  Referred to PMR for globally decreased functional status and mobility and he may need wheelchair to avoid falls versus walker, reprinted referral and given number to schedule.\par \par Sialorrhea: We discussed that drooling is one of the symptoms of advancing Parkinson's and that is what he is taking glycopyrrolate for and it does help somewhat according to his granddaughter.  But we also discussed that significant drooling can cause dehydration (recent specific gravity on UA in the ED was > 1.030) and that this can contribute to viscous secretions as well as constipation.  Encouraged to have free water replacement but to continue his salt tablets given his history of hyponatremia due to low-electrolyte diet. \par \par Hyponatremia, hypo-osmolar: 2/2 low electrolyte diet. With sodium 131 at recent ED visit, repeat BMP at this time. Continue NaCl 1g BID supplementation for now.\par \par Glaucoma: Reprinted ophthalmology referral and given number for scheduling.\par \par Health care maintenance:\par Discussed healthcare proxy again, given new form.  He is able to make this determination- has capacity. He is considering his options, likely his son will be primary proxy.\par Covid vaccine series will be completed later today with dose #2.\par \par RTO 1 mo

## 2021-06-29 ENCOUNTER — APPOINTMENT (OUTPATIENT)
Dept: PHYSICAL MEDICINE AND REHAB | Facility: CLINIC | Age: 76
End: 2021-06-29
Payer: MEDICAID

## 2021-06-29 DIAGNOSIS — R26.89 OTHER ABNORMALITIES OF GAIT AND MOBILITY: ICD-10-CM

## 2021-06-29 DIAGNOSIS — R26.9 UNSPECIFIED ABNORMALITIES OF GAIT AND MOBILITY: ICD-10-CM

## 2021-06-29 DIAGNOSIS — R47.9 UNSPECIFIED SPEECH DISTURBANCES: ICD-10-CM

## 2021-06-29 DIAGNOSIS — R53.81 OTHER MALAISE: ICD-10-CM

## 2021-06-29 PROCEDURE — 99204 OFFICE O/P NEW MOD 45 MIN: CPT

## 2021-06-29 NOTE — HISTORY OF PRESENT ILLNESS
[FreeTextEntry1] : Language line services offered but was declined by family\par 77 yo M with PMH Parkinson's diseaese, dementia, chronic hypoosmolar hypontrameia, BPH who presents with acute decline in functional status.\par \par Patient reports progressive functional decline secondary to multiple co-morbidities including Parkinson's disease and dementia.  However, patient's daughter reports that he has experienced a significant and sudden functional decline about 5-6 weeks ago..  No inciting events, trauma, or falls.  Patient is having a difficult time ambulating, previously patient was able to ambulate around the house for several minutes whereas now he is not able to walk independently.  Patient is also more dependent on his caregivers for ADL's including bathing and  toileting.   Patient has some chronic low back pain but he reports that this is mild.  Otherwise, patient denies new pain.  Patient and family are not aware if he is experiencing worsening weakness or numbness but have not noticed any focal neurologic deficit. Denies bowel/bladder dysfunction, fevers, chills, weight loss, night pain, or night sweats.\par \par Patient's daughter also reports that he has been having trouble speaking, often using a softer voice and with significant dry mouth.  Patient reports no noticeable problems swallowing.  No changes in appetite.\par \par Patient is being followed by an outside Neurologist but he has not been seen since he has experienced this acute functional decline.  \par \par

## 2021-06-29 NOTE — PHYSICAL EXAM
[FreeTextEntry1] : Gen: NAD\par HEENT: neck supple\par CV: no cyanosis\par Pulm: breathing well on room air\par Abd: soft\par Low back: range of motion limited by pain, mild tenderness to palpation lower lumbar paraspinals, neg seated straight leg raise, neg FABERE, neg FAIR\par Msk: \par 5/5 hip flexion B/L, 5/5 knee extension B/L, 5/5 knee flexion B/L, 5/5 dorsiflexion B/L, 5/5 EHL B/L, 5/5 plantar flexion B/L grossly but exam limited by patient's comprehension and ability to follow directions.\par 5/5 shoulder abduction B/L, 5/5 elbow flexion B/L, 5/5 elbow extension B/L, 5/5 wrist extension B/L, 5/5 hand  B/L grossly but exam limited by patient's comprehension and ability to follow directions.\par Neuro: sensation intact to light touch in bilateral upper and lower extremities, reflexes 2+ brachioradialis, biceps, triceps bilaterally, reflexes 2+ patella, medial hamstring, achilles bilaterally, negative babinski, negative reddy grossly but exam limited by patient's comprehension and ability to follow directions.\par Gait: shuffling, slowed stephanie, wide base, currently 1 person assist\par

## 2021-06-29 NOTE — ASSESSMENT
[FreeTextEntry1] : 77 yo M who presents with acute on chronic functional decline likely secondary to progression of Parkinson's disease and dementia.  However, given the sudden decline in functional status 4-5 weeks ago, an urgent re-evaluation by Neurology to r/o new neurologic dysfunction (ie CVA, etc.) should be conducted.  Patient is being followed by an outside Neurologist and will schedule a follow up appointment ASAP.  Risks of non-compliance including worsening and permanent neurologic dysfunction reviewed.\par \par -Int. med notes reviewed\par -Start PT/HEP, new referral provided\par -Start SLP, new referral provided.  Will ask for a swallowing assessment.\par -Follow up with Neurology as outlined above\par -RTC following consultation and assessment. \par \par Cade Cam MD\par Spine and Sports Medicine\par \par [employer]\par At [facility]\par \par

## 2021-07-19 ENCOUNTER — APPOINTMENT (OUTPATIENT)
Dept: INTERNAL MEDICINE | Facility: CLINIC | Age: 76
End: 2021-07-19

## 2021-08-02 ENCOUNTER — APPOINTMENT (OUTPATIENT)
Dept: INTERNAL MEDICINE | Facility: CLINIC | Age: 76
End: 2021-08-02
Payer: MEDICAID

## 2021-08-02 ENCOUNTER — OUTPATIENT (OUTPATIENT)
Dept: OUTPATIENT SERVICES | Facility: HOSPITAL | Age: 76
LOS: 1 days | End: 2021-08-02

## 2021-08-02 VITALS
WEIGHT: 149.25 LBS | RESPIRATION RATE: 20 BRPM | SYSTOLIC BLOOD PRESSURE: 112 MMHG | BODY MASS INDEX: 26.44 KG/M2 | HEART RATE: 87 BPM | DIASTOLIC BLOOD PRESSURE: 64 MMHG | OXYGEN SATURATION: 98 %

## 2021-08-02 VITALS — TEMPERATURE: 98.5 F

## 2021-08-02 DIAGNOSIS — H40.9 UNSPECIFIED GLAUCOMA: ICD-10-CM

## 2021-08-02 DIAGNOSIS — K59.09 OTHER CONSTIPATION: ICD-10-CM

## 2021-08-02 DIAGNOSIS — G20 PARKINSON'S DISEASE: ICD-10-CM

## 2021-08-02 DIAGNOSIS — Z00.00 ENCOUNTER FOR GENERAL ADULT MEDICAL EXAMINATION W/OUT ABNORMAL FINDINGS: ICD-10-CM

## 2021-08-02 DIAGNOSIS — M54.5 LOW BACK PAIN: ICD-10-CM

## 2021-08-02 PROCEDURE — 99213 OFFICE O/P EST LOW 20 MIN: CPT | Mod: GE

## 2021-08-02 RX ORDER — DOCUSATE SODIUM 50 MG AND SENNOSIDES 8.6 MG 8.6; 5 MG/1; MG/1
8.6-5 TABLET, FILM COATED ORAL DAILY
Qty: 60 | Refills: 3 | Status: ACTIVE | COMMUNITY
Start: 2021-08-02 | End: 1900-01-01

## 2021-08-02 RX ORDER — LATANOPROST/PF 0.005 %
0.01 DROPS OPHTHALMIC (EYE)
Qty: 2 | Refills: 1 | Status: ACTIVE | COMMUNITY
Start: 2020-11-18

## 2021-08-02 RX ORDER — CYCLOBENZAPRINE HYDROCHLORIDE 5 MG/1
5 TABLET, FILM COATED ORAL 3 TIMES DAILY
Qty: 60 | Refills: 0 | Status: ACTIVE | COMMUNITY
Start: 2021-08-02 | End: 1900-01-01

## 2021-08-03 NOTE — ASSESSMENT
[FreeTextEntry1] : Mr. Vega is a 75 y/o M w/ a PMH of Parkinson's disease w/ dementia, chronic hypoosmolar hyponatremia, BPH s/p thermal prostate reduction w/ chronic pichardo and hx of recurrent UTI who presents for a follow up visit. Patient declined , instead had daughter translate via telephone.

## 2021-08-03 NOTE — END OF VISIT
[] : Resident [FreeTextEntry3] : 75yo M with Parkinson's dz, hyponatremia (on salt tabs), chronic pichardo with recurrent UTI here for follow up. Follows with outside Neurology, had planed for MRI but was not approved. Constipation - no BM for 3d.  \par Discussed one time refill of latanoprost; back pain treated with cyclobenzaprine;

## 2021-08-03 NOTE — HISTORY OF PRESENT ILLNESS
[FreeTextEntry1] : Follow-up [de-identified] : Mr. Vega is a 77 y/o M w/ a PMH of Parkinson's disease w/ dementia, chronic hypoosmolar hyponatremia, BPH s/p thermal prostate reduction w/ chronic pichardo and hx of recurrent UTI who presents for a follow up visit. Patient declined , instead had daughter translate via telephone. \par \par #Parkinson's Disease:\par - Patient saw neurologist 7/12/2021 at recommendation on PMR due  to rapidly progressing Parkinson's recommended MRI brain however patient could not get it done due to lack of insurance approval. \par - Sees [facility] \par \par #Back Pain: \par Patient has been having back pain, which they report has some associated weakness in the patient's legs. The patient has been stating that it has been getting worse over the past month. They deny numbness, decreased mobility in the LE, that was increased when the back pain started. The patient states he had a fall 7 years ago and injured his back. 2021 CT A/P demonstrated chronic fx of T12 and L2 endplates. \par \par #Constipation\par Patient has a hx of constipation, last BM was in 3 days. Patient is normally on senna but patient ran out of medication. \par \par #Glaucoma:\par - Running low in Latanoprost \par - If drops not used eyes get very itchy. requesting refills. \par \par #HCM:\par - COVID Vaccine - Moderna x2 doses\par - Colonoscopy: patient has never had a colonoscopy. family is interested in colonoscopy

## 2021-08-03 NOTE — REVIEW OF SYSTEMS
[Incontinence] : incontinence [Frequency] : frequency [Joint Pain] : joint pain [Back Pain] : back pain [Skin Rash] : skin rash [Unsteady Walk] : ataxia [Memory Loss] : memory loss [Insomnia] : insomnia [Fever] : no fever [Night Sweats] : no night sweats [Chest Pain] : no chest pain [Shortness Of Breath] : no shortness of breath [Wheezing] : no wheezing [Abdominal Pain] : no abdominal pain [Nausea] : no nausea [Vomiting] : no vomiting [Joint Stiffness] : no joint stiffness [Muscle Pain] : no muscle pain

## 2021-08-03 NOTE — PHYSICAL EXAM
[No Acute Distress] : no acute distress [PERRL] : pupils equal round and reactive to light [No Lymphadenopathy] : no lymphadenopathy [No Respiratory Distress] : no respiratory distress  [Normal Rate] : normal rate  [Regular Rhythm] : with a regular rhythm [Soft] : abdomen soft [Non Tender] : non-tender [No Spinal Tenderness] : no spinal tenderness [No Joint Swelling] : no joint swelling [No Rash] : no rash [Normal Affect] : the affect was normal [de-identified] : pichardo catheter in place [de-identified] : L paraspinal tenderness, negative straight leg test [de-identified] : diffuse weakness in extremities. [de-identified] : slow gait, bradykinesia noted, stooping with walking

## 2021-09-10 ENCOUNTER — APPOINTMENT (OUTPATIENT)
Dept: OPHTHALMOLOGY | Facility: CLINIC | Age: 76
End: 2021-09-10

## 2021-09-30 ENCOUNTER — OUTPATIENT (OUTPATIENT)
Dept: OUTPATIENT SERVICES | Facility: HOSPITAL | Age: 76
LOS: 1 days | End: 2021-09-30

## 2021-09-30 ENCOUNTER — APPOINTMENT (OUTPATIENT)
Dept: GASTROENTEROLOGY | Facility: CLINIC | Age: 76
End: 2021-09-30
Payer: MEDICAID

## 2021-09-30 VITALS
OXYGEN SATURATION: 99 % | HEIGHT: 63 IN | DIASTOLIC BLOOD PRESSURE: 78 MMHG | HEART RATE: 92 BPM | RESPIRATION RATE: 20 BRPM | BODY MASS INDEX: 26.42 KG/M2 | WEIGHT: 149.13 LBS | SYSTOLIC BLOOD PRESSURE: 134 MMHG

## 2021-09-30 VITALS — TEMPERATURE: 97.2 F

## 2021-09-30 DIAGNOSIS — R19.4 CHANGE IN BOWEL HABIT: ICD-10-CM

## 2021-09-30 DIAGNOSIS — D50.9 IRON DEFICIENCY ANEMIA, UNSPECIFIED: ICD-10-CM

## 2021-09-30 DIAGNOSIS — K59.09 OTHER CONSTIPATION: ICD-10-CM

## 2021-09-30 DIAGNOSIS — Z12.12 ENCOUNTER FOR SCREENING FOR MALIGNANT NEOPLASM OF COLON: ICD-10-CM

## 2021-09-30 DIAGNOSIS — R63.4 ABNORMAL WEIGHT LOSS: ICD-10-CM

## 2021-09-30 DIAGNOSIS — Z12.11 ENCOUNTER FOR SCREENING FOR MALIGNANT NEOPLASM OF COLON: ICD-10-CM

## 2021-09-30 PROCEDURE — 99203 OFFICE O/P NEW LOW 30 MIN: CPT | Mod: GC

## 2021-09-30 RX ORDER — POLYETHYLENE GLYCOL 3350 17 G/17G
17 POWDER, FOR SOLUTION ORAL TWICE DAILY
Qty: 60 | Refills: 2 | Status: DISCONTINUED | COMMUNITY
Start: 2021-08-02 | End: 2021-09-30

## 2021-09-30 RX ORDER — POLYETHYLENE GLYCOL 3350 17 G/17G
17 POWDER, FOR SOLUTION ORAL TWICE DAILY
Qty: 340 | Refills: 0 | Status: ACTIVE | COMMUNITY
Start: 2021-09-30 | End: 1900-01-01

## 2021-09-30 NOTE — PHYSICAL EXAM
[General Appearance - Alert] : alert [General Appearance - In No Acute Distress] : in no acute distress [Sclera] : the sclera and conjunctiva were normal [Extraocular Movements] : extraocular movements were intact [Outer Ear] : the ears and nose were normal in appearance [Hearing Threshold Finger Rub Not [place]] : hearing was normal [Neck Appearance] : the appearance of the neck was normal [Exaggerated Use Of Accessory Muscles For Inspiration] : no accessory muscle use [Apical Impulse] : the apical impulse was normal [Heart Rate And Rhythm] : heart rate was normal and rhythm regular [Abdomen Soft] : soft [Abdomen Tenderness] : non-tender [] : no hepato-splenomegaly [Motor Exam] : the motor exam was normal [No Focal Deficits] : no focal deficits [FreeTextEntry1] : flat affect

## 2021-09-30 NOTE — ASSESSMENT
[FreeTextEntry1] : JEANE JEFFERSON is a 76 year year old male who presents to GI clinic for constipation and colorectal cancer screening.\par \par # Constipation\par # Colorectal cancer screening\par # Microcytic anemia with borderline ferritin and slow drop over past two years\par # Parkinson disease on anticholinergic medications\par Patient having intermittent constipation for past several months, no BM in last 3 days, but passing flatus.  Only taking Senna twice daily.  TSH normal.  Iron studies and ferritin low-normal in 2019.  Patient never had history of endoscopy or colonoscopy, however would rather avoid a procedure if able.\par \par Recommendations:\par -continue Senna however would add Miralax twice daily for constipation, and may need to up titrate\par -repeat CBC, iron studies, ferritin, total IgA, and tTG IgA to r/o celiac disease\par -pending iron studies, ferritin, and celiac serologies, would consider Cologuard versus colonoscopy for CRC screening, however patient and family would rather avoid procedures if possible\par \par \par Froilan Price, PGY-4\par GI/Hepatology Fellow\par \par

## 2021-09-30 NOTE — REVIEW OF SYSTEMS
[Negative] : Heme/Lymph [Fever] : no fever [Chills] : no chills [Eye Pain] : no eye pain [Red Eyes] : eyes not red [Earache] : no earache [Loss Of Hearing] : no hearing loss [Heart Rate Is Slow] : the heart rate was not slow [Heart Rate Is Fast] : the heart rate was not fast [Dysuria] : no dysuria [Incontinence] : no incontinence [de-identified] : Parkinson disease, tremor, neurogenic bladder [de-identified] : flat affect

## 2021-09-30 NOTE — HISTORY OF PRESENT ILLNESS
[Heartburn] : denies heartburn [Nausea] : denies nausea [Vomiting] : denies vomiting [Diarrhea] : denies diarrhea [Constipation] : stable constipation [Yellow Skin Or Eyes (Jaundice)] : denies jaundice [Abdominal Pain] : denies abdominal pain [Abdominal Swelling] : denies abdominal swelling [Rectal Pain] : denies rectal pain [Wt Loss ___ Lbs] : recent [unfilled] ~Upound(s) weight loss [de-identified] : JEANE JEFFERSON is a 76 year year old male who presents to GI clinic for constipation.\par \par Patient states he has been constipated for the past 2 months.  He is currently taking two tablets of Senna daily.  He has not had a bowel movements in 3 days.  He does not and has not taken anything besides the Senna.  TSH in May 2021 within normal limits.\par \par Otherwise, patient denies fevers, chills, weight loss, dysphagia, odynophagia, early satiety, poor oral intake, abdominal pain, nausea, vomiting, diarrhea, melena, hematemesis, hematochezia, change in stool caliber, or family history of GI-related cancers.\par \par Patient never had history of endoscopy or colonoscopy.  He presented to the ED at [facility] in May and June 2021 for some nausea vomiting, which resolved.  CT imaging at that time revealed no CT evidence of bowel obstruction, active inflammatory process or intra-abdominal source for infection; however he did have small hiatal hernia, distal esophageal wall thickening versus underdistention.\par

## 2021-09-30 NOTE — END OF VISIT
[] : Fellow [FreeTextEntry3] : As modified and discussed with patient and son\par MD TREMAYNE Duran [facility]\par Associate Professor of Medicine\par Alexis Guthrieen [employer]\par

## 2021-10-01 DIAGNOSIS — R63.4 ABNORMAL WEIGHT LOSS: ICD-10-CM

## 2021-10-01 DIAGNOSIS — K59.09 OTHER CONSTIPATION: ICD-10-CM

## 2021-10-01 DIAGNOSIS — R19.4 CHANGE IN BOWEL HABIT: ICD-10-CM

## 2021-10-01 DIAGNOSIS — D50.9 IRON DEFICIENCY ANEMIA, UNSPECIFIED: ICD-10-CM

## 2021-10-04 RX ORDER — MIRTAZAPINE 15 MG/1
15 TABLET, FILM COATED ORAL
Qty: 90 | Refills: 1 | Status: ACTIVE | COMMUNITY
Start: 2021-03-31 | End: 1900-01-01

## 2021-10-04 RX ORDER — GLYCOPYRROLATE 1 MG/1
1 TABLET ORAL
Qty: 90 | Refills: 2 | Status: ACTIVE | COMMUNITY
Start: 2020-11-18 | End: 1900-01-01

## 2021-10-05 ENCOUNTER — APPOINTMENT (OUTPATIENT)
Dept: INTERNAL MEDICINE | Facility: CLINIC | Age: 76
End: 2021-10-05

## 2021-10-11 ENCOUNTER — APPOINTMENT (OUTPATIENT)
Dept: INTERNAL MEDICINE | Facility: CLINIC | Age: 76
End: 2021-10-11

## 2022-02-03 NOTE — PHYSICAL THERAPY INITIAL EVALUATION ADULT - LEVEL OF INDEPENDENCE: STAIR NEGOTIATION, REHAB EVAL
Please note the following items as they are important to your next appointment:    · Bring updated medication list to next appointment.   · Labs to be completed 3-5 days before your future appointment date or one hour prior to appointment time.   · Arrive to lab appointment with a full bladder as you will be asked to provide a urine specimen.    Thank you!  For Your Information   · If you are in need of a medication refill please use one of the following options. You can expect your medication to be filled within 2-3 business days.   1. Call your pharmacy for all medication refills and renewals.   2. [email or web address]- [email or web address]  3. Call your providers office    · If your provider ordered any imaging for you today. Our pre-scheduling services will be reaching out to you within 2 business days to schedule this. [facility] can be reached by calling [phone]     · If your provider ordered testing today, you will be notified of your test results within 3-5 business days unless specified otherwise. If you have not received your results within 5 business days please call your provider's office.    · You may be receiving a survey.  Please take the time to complete this, as your feedback is very important to us!  We strive to make your experience exceptional and your comments help us with that goal.  We look forward to hearing from you!    · For all future appointments please arrive 15 minutes prior to your scheduled visit.     · Patient Contact Center Business Office: assistance with medical billing & financial inquires [phone]             Not appropriate @ this time

## 2022-06-17 NOTE — PHYSICAL EXAM
Followed protocol [Comprehensive Foot Exam Normal] : Right and left foot were examined and both feet are normal. No ulcers in either foot. Toes are normal and with full ROM.  Normal tactile sensation with monofilament testing throughout both feet [de-identified] : Awake and alert.  Moist mucous membranes.  No sialorrhea.  Abdomen soft, nondistended, nontender without palpable bladder margin.  No lower extremity edema. [de-identified] : Monofilament exam normal today.

## 2022-06-27 NOTE — ED ADULT NURSE NOTE - SUICIDE SCREENING DEPRESSION
Detail Level: Simple Instructions: This plan will send the code FBSE to the PM system.  DO NOT or CHANGE the price. Price (Do Not Change): 0.00 Negative

## 2022-10-11 NOTE — PHYSICAL THERAPY INITIAL EVALUATION ADULT - BALANCE DISTURBANCE, SYSTEM IMPAIRMENT CONTRIBUTE, REHAB EVAL
Corticosteroid Injections Patient information  The local anesthetic component of the injection works within minutes and can last 2-6 hours. The steroid/cortisone component can start working within 1-14 days.  Cortisone side effects may include facial flushing, euphoria, increased glucose if you are a diabetic patient, localized bruising, or aching at the injection site. These side effects are rare (1%) and clear up within 12-24 hours. If you are diabetic and concerned about your glucose, please consult your diabetic/managing doctor.  If you are having pain at the injection site, use ice and Tylenol for pain relief. You can ice for 10 minutes every hour as needed.  Infection is a very unusual complication which can occur with any kind of injection. If the injected area becomes red, hot, and/or swollen, call the clinic immediately ([phone]) or call emergency room if after hours.  Showering after the injection/procedure is okay. Do not submerge the injected site in any body of water for 5 days (eg. Baths, pools, hot tubs, etc.).  For upper extremity injections, do not push, pull or lift greater than 5 pounds for 5 days.  For lower extremity injections, do not perform any heavy lifting or running for 5 days.     
musculoskeletal

## 2023-02-24 NOTE — ED ADULT TRIAGE NOTE - NS ED NOTE AC HIGH RISK COUNTRIES
No
Sudden numbness or weakness of the face, arm, or leg, especially on one side of the body. Confusion, trouble speaking or understanding. Trouble seeing in one or both eyes. Trouble walking, dizziness, loss of balance or coordination. Severe headache.

## 2023-03-18 NOTE — PHYSICAL THERAPY INITIAL EVALUATION ADULT - PERTINENT HX OF CURRENT PROBLEM, REHAB EVAL
(1) More than 48 hours/None
75 y/o salvador speaking M with PMH of Parkinsons dementia AAO x 2 at baseline, BPH(s/p microwave ablation), hx of multiple MDR Klebsiella UTIs presents to ED for worsening suprapubic pain.

## 2024-02-19 NOTE — DIETITIAN INITIAL EVALUATION ADULT. - CHIEF COMPLAINT
Goal Outcome Evaluation:Wean from ventilator                                               73 y/o male with medical history of Parkinsons dementia AAO x 2 at baseline, BPH(s/p microwave ablation), hx of multiple MDR Klebsiella UTIs presents to ED for worsening suprapubic pain/urinary retention

## 2024-08-23 NOTE — ED ADULT TRIAGE NOTE - PAIN: PRESENCE, MLM
complains of pain/discomfort [Alert] : alert [No Acute Distress] : no acute distress [Normocephalic] : normocephalic [Conjunctivae with no discharge] : conjunctivae with no discharge [PERRL] : PERRL [EOMI Bilateral] : EOMI bilateral [Auricles Well Formed] : auricles well formed [Clear Tympanic membranes with present light reflex and bony landmarks] : clear tympanic membranes with present light reflex and bony landmarks [No Discharge] : no discharge [Nares Patent] : nares patent [Pink Nasal Mucosa] : pink nasal mucosa [Palate Intact] : palate intact [Nonerythematous Oropharynx] : nonerythematous oropharynx [Supple, full passive range of motion] : supple, full passive range of motion [No Palpable Masses] : no palpable masses [Symmetric Chest Rise] : symmetric chest rise [Clear to Auscultation Bilaterally] : clear to auscultation bilaterally [Regular Rate and Rhythm] : regular rate and rhythm [Normal S1, S2 present] : normal S1, S2 present [No Murmurs] : no murmurs [+2 Femoral Pulses] : +2 femoral pulses [Soft] : soft [NonTender] : non tender [Non Distended] : non distended [Normoactive Bowel Sounds] : normoactive bowel sounds [No Hepatomegaly] : no hepatomegaly [No Splenomegaly] : no splenomegaly [Testicles Descended Bilaterally] : testicles descended bilaterally [Patent] : patent [No fissures] : no fissures [No Abnormal Lymph Nodes Palpated] : no abnormal lymph nodes palpated [No Gait Asymmetry] : no gait asymmetry [No pain or deformities with palpation of bone, muscles, joints] : no pain or deformities with palpation of bone, muscles, joints [Normal Muscle Tone] : normal muscle tone [Straight] : straight [+2 Patella DTR] : +2 patella DTR [Cranial Nerves Grossly Intact] : cranial nerves grossly intact [No Rash or Lesions] : no rash or lesions

## 2024-11-06 NOTE — ED ADULT TRIAGE NOTE - NS ED TRIAGE AVPU SCALE
Alert-The patient is alert, awake and responds to voice. The patient is oriented to time, place, and person. The triage nurse is able to obtain subjective information. Applied

## 2025-06-19 NOTE — PLAN
[facility] General Surgery   Juan Flores MD, FACS  Senia Canales, APRN-CNP  [address], [address]  [place] [address]  P: [phone], F: [phone]    General and Robotic Surgery  Progress Note             PATIENT NAME: Earnest Avila   :  1956   MRN: [id number]   PCP:  Theresa Garrison MD     TODAY'S DATE: 2025    68 y.o. male seen and examined in the recovery room.  Status post EGD.  Case discussed with GI physician.    PAST MEDICAL HISTORY     Past Medical History:   Diagnosis Date    Allergic rhinitis     Angina pectoris 2025    Anxiety     Borderline hypertension     CAD (coronary artery disease)     Controlled diabetes mellitus type II without complication (HCC)     Debility 2025    Dental abscess     Depression     On medas    Essential hypertension 2021    History of colon polyps     History of suprapubic catheter 2018    Intermittent self-catheterization of bladder 2016    3/8/17 does not do this any more- was getting UTI's    Kidney stones     Neuropathy     Obesity     HISTORY OF OBESITY, AS OF 2017 PATIENT HAS LOST 80 LBS    Obstructive sleep apnea of adult 2005    on CPAP    Osteoarthritis     RIGHT SHOULDER, BACK    Prostate enlargement 2014    Self cath since 2016    Restless legs syndrome     Stomach ulcer 2017    Tubular adenoma of colon 2016    Type II or unspecified type diabetes mellitus without mention of complication, not stated as uncontrolled     NO LONGER ON MEDS, LOST WEIGHT, DIET CONTROLLED    UTI (urinary tract infection)     Wears glasses     Weight loss counseling, encounter for 2017       PROBLEM LIST     Patient Active Problem List   Diagnosis    Type 2 diabetes, controlled, with neuropathy (HCC)    Benign hypertension with CKD (chronic kidney disease), stage II    Anxiety    BRIDGETTE (obstructive sleep apnea)    Obesity, Class I, BMI 30-34.9    Chronic right shoulder pain     [FreeTextEntry1] : #Back pain:\par Per physical exam likely 2/2 muscle soreness and spasms especially in the setting of Parkinson's disease. No alarming symptoms. \par - Recommended massage of the back and legs for relief of pain\par - Started patient on cyclobenzaprine\par - Recommended hot compresses and regular exercise\par \par #Parkinson's Disease\par - Rapidly progressive w/ neuro eval\par - Re-ordered MRI brain to see if it will be approved by insurance this time\par \par #Constipation\par - Ordered Miralax and Senna\par \par #Glaucoma:\par - Reordered Latanoprost eye drops. Strongly encouraged patient to follow up with ophthalmology for management. \par \par #HCM:\par - colonoscopy referral was made, however patient and family were counseled regarding need for colonoscopy given patient's age and current disease process. \par \par RTC in 2 months\par \par Case Discussed with Dr. Chawla.\par \par Ralph Vernon, PGY-1 \par [employer]\par Internal Medicine